# Patient Record
Sex: FEMALE | Race: WHITE | NOT HISPANIC OR LATINO | Employment: OTHER | URBAN - METROPOLITAN AREA
[De-identification: names, ages, dates, MRNs, and addresses within clinical notes are randomized per-mention and may not be internally consistent; named-entity substitution may affect disease eponyms.]

---

## 2017-02-21 ENCOUNTER — HISTORICAL (OUTPATIENT)
Dept: PREADMISSION TESTING | Facility: HOSPITAL | Age: 52
End: 2017-02-21

## 2017-02-23 ENCOUNTER — HISTORICAL (OUTPATIENT)
Dept: SURGERY | Facility: HOSPITAL | Age: 52
End: 2017-02-23

## 2017-09-20 ENCOUNTER — HISTORICAL (OUTPATIENT)
Dept: ADMINISTRATIVE | Facility: HOSPITAL | Age: 52
End: 2017-09-20

## 2018-07-18 ENCOUNTER — HISTORICAL (OUTPATIENT)
Dept: ADMINISTRATIVE | Facility: HOSPITAL | Age: 53
End: 2018-07-18

## 2018-09-26 ENCOUNTER — HISTORICAL (OUTPATIENT)
Dept: ADMINISTRATIVE | Facility: HOSPITAL | Age: 53
End: 2018-09-26

## 2019-04-30 ENCOUNTER — HISTORICAL (OUTPATIENT)
Dept: ADMINISTRATIVE | Facility: HOSPITAL | Age: 54
End: 2019-04-30

## 2019-10-01 ENCOUNTER — HISTORICAL (OUTPATIENT)
Dept: ADMINISTRATIVE | Facility: HOSPITAL | Age: 54
End: 2019-10-01

## 2020-07-07 ENCOUNTER — HISTORICAL (OUTPATIENT)
Dept: ADMINISTRATIVE | Facility: HOSPITAL | Age: 55
End: 2020-07-07

## 2020-10-06 ENCOUNTER — HISTORICAL (OUTPATIENT)
Dept: LAB | Facility: HOSPITAL | Age: 55
End: 2020-10-06

## 2020-10-06 ENCOUNTER — HISTORICAL (OUTPATIENT)
Dept: ADMINISTRATIVE | Facility: HOSPITAL | Age: 55
End: 2020-10-06

## 2020-10-06 LAB
ABS NEUT (OLG): 3.47 X10(3)/MCL (ref 2.1–9.2)
BASOPHILS # BLD AUTO: 0.08 X10(3)/MCL (ref 0–0.2)
BASOPHILS NFR BLD AUTO: 1.2 % (ref 0–1)
CRP SERPL HS-MCNC: 2.44 MG/L (ref 0–5)
EOSINOPHIL # BLD AUTO: 0.3 X10(3)/MCL (ref 0–0.9)
EOSINOPHIL NFR BLD AUTO: 4.5 % (ref 0–6.4)
ERYTHROCYTE [DISTWIDTH] IN BLOOD BY AUTOMATED COUNT: 12.9 % (ref 11.5–17)
ERYTHROCYTE [SEDIMENTATION RATE] IN BLOOD: 5 MM/HR (ref 0–30)
HCT VFR BLD AUTO: 43.5 % (ref 37–47)
HGB BLD-MCNC: 14.4 GM/DL (ref 12–16)
IMM GRANULOCYTES # BLD AUTO: 0.02 10*3/UL (ref 0–0.02)
IMM GRANULOCYTES NFR BLD AUTO: 0.3 % (ref 0–0.43)
LYMPHOCYTES # BLD AUTO: 2.17 X10(3)/MCL (ref 0.6–4.6)
LYMPHOCYTES NFR BLD AUTO: 32.9 % (ref 16–44)
MCH RBC QN AUTO: 30.3 PG (ref 27–31)
MCHC RBC AUTO-ENTMCNC: 33.1 GM/DL (ref 33–36)
MCV RBC AUTO: 91.4 FL (ref 80–94)
MONOCYTES # BLD AUTO: 0.56 X10(3)/MCL (ref 0.1–1.3)
MONOCYTES NFR BLD AUTO: 8.5 % (ref 4–12.1)
NEUTROPHILS # BLD AUTO: 3.47 X10(3)/MCL (ref 2.1–9.2)
NEUTROPHILS NFR BLD AUTO: 52.6 % (ref 43–73)
NRBC BLD AUTO-RTO: 0 % (ref 0–0.2)
PLATELET # BLD AUTO: 333 X10(3)/MCL (ref 130–400)
PMV BLD AUTO: 8.6 FL (ref 7.4–10.4)
RBC # BLD AUTO: 4.76 X10(6)/MCL (ref 4.2–5.4)
WBC # SPEC AUTO: 6.6 X10(3)/MCL (ref 4.5–11.5)

## 2020-11-05 ENCOUNTER — HISTORICAL (OUTPATIENT)
Dept: ADMINISTRATIVE | Facility: HOSPITAL | Age: 55
End: 2020-11-05

## 2020-12-03 ENCOUNTER — HISTORICAL (OUTPATIENT)
Dept: ADMINISTRATIVE | Facility: HOSPITAL | Age: 55
End: 2020-12-03

## 2021-01-14 ENCOUNTER — HISTORICAL (OUTPATIENT)
Dept: ADMINISTRATIVE | Facility: HOSPITAL | Age: 56
End: 2021-01-14

## 2021-02-11 ENCOUNTER — HISTORICAL (OUTPATIENT)
Dept: ADMINISTRATIVE | Facility: HOSPITAL | Age: 56
End: 2021-02-11

## 2021-03-11 LAB
HUMAN PAPILLOMAVIRUS (HPV): NORMAL
PAP RECOMMENDATION EXT: NORMAL

## 2021-06-29 ENCOUNTER — HISTORICAL (OUTPATIENT)
Dept: ADMINISTRATIVE | Facility: HOSPITAL | Age: 56
End: 2021-06-29

## 2021-12-21 ENCOUNTER — HISTORICAL (OUTPATIENT)
Dept: ADMINISTRATIVE | Facility: HOSPITAL | Age: 56
End: 2021-12-21

## 2022-04-08 ENCOUNTER — HISTORICAL (OUTPATIENT)
Dept: ADMINISTRATIVE | Facility: HOSPITAL | Age: 57
End: 2022-04-08

## 2022-04-08 ENCOUNTER — HISTORICAL (OUTPATIENT)
Dept: RADIOLOGY | Facility: HOSPITAL | Age: 57
End: 2022-04-08

## 2022-04-11 ENCOUNTER — HISTORICAL (OUTPATIENT)
Dept: ADMINISTRATIVE | Facility: HOSPITAL | Age: 57
End: 2022-04-11
Payer: MEDICARE

## 2022-04-29 VITALS
WEIGHT: 156.75 LBS | SYSTOLIC BLOOD PRESSURE: 122 MMHG | DIASTOLIC BLOOD PRESSURE: 83 MMHG | HEIGHT: 62 IN | BODY MASS INDEX: 28.84 KG/M2

## 2022-07-06 ENCOUNTER — HOSPITAL ENCOUNTER (INPATIENT)
Facility: HOSPITAL | Age: 57
LOS: 6 days | Discharge: HOME | DRG: 069 | End: 2022-07-12
Attending: EMERGENCY MEDICINE | Admitting: INTERNAL MEDICINE
Payer: MEDICARE

## 2022-07-06 DIAGNOSIS — R20.2 PARESTHESIA: ICD-10-CM

## 2022-07-06 DIAGNOSIS — R47.81 SLURRED SPEECH: Primary | ICD-10-CM

## 2022-07-06 DIAGNOSIS — I63.9 STROKE: ICD-10-CM

## 2022-07-06 DIAGNOSIS — R53.1 WEAKNESS: ICD-10-CM

## 2022-07-06 DIAGNOSIS — G45.9 TIA (TRANSIENT ISCHEMIC ATTACK): ICD-10-CM

## 2022-07-06 DIAGNOSIS — R27.8 DYSMETRIA: ICD-10-CM

## 2022-07-06 PROBLEM — R47.1 DYSARTHRIA: Status: ACTIVE | Noted: 2022-07-06

## 2022-07-06 PROBLEM — E11.9 TYPE II DIABETES MELLITUS: Status: ACTIVE | Noted: 2022-07-06

## 2022-07-06 LAB
ALBUMIN SERPL-MCNC: 4.3 GM/DL (ref 3.5–5)
ALBUMIN/GLOB SERPL: 1.7 RATIO (ref 1.1–2)
ALP SERPL-CCNC: 77 UNIT/L (ref 40–150)
ALT SERPL-CCNC: 21 UNIT/L (ref 0–55)
AMPHET UR QL SCN: NEGATIVE
APPEARANCE UR: ABNORMAL
AST SERPL-CCNC: 18 UNIT/L (ref 5–34)
BARBITURATE SCN PRESENT UR: NEGATIVE
BASOPHILS # BLD AUTO: 0.05 X10(3)/MCL (ref 0–0.2)
BASOPHILS NFR BLD AUTO: 0.8 %
BENZODIAZ UR QL SCN: NEGATIVE
BILIRUB UR QL STRIP.AUTO: NEGATIVE MG/DL
BILIRUBIN DIRECT+TOT PNL SERPL-MCNC: 0.4 MG/DL
BUN SERPL-MCNC: 14.1 MG/DL (ref 9.8–20.1)
CALCIUM SERPL-MCNC: 8.9 MG/DL (ref 8.4–10.2)
CANNABINOIDS UR QL SCN: NEGATIVE
CARBAMAZEPINE FREE SERPL-MCNC: 10.8 UG/ML (ref 4–12)
CHLORIDE SERPL-SCNC: 107 MMOL/L (ref 98–107)
CHOLEST SERPL-MCNC: 154 MG/DL
CHOLEST/HDLC SERPL: 4 {RATIO} (ref 0–5)
CO2 SERPL-SCNC: 21 MMOL/L (ref 22–29)
COCAINE UR QL SCN: NEGATIVE
COLOR UR AUTO: YELLOW
CREAT SERPL-MCNC: 0.89 MG/DL (ref 0.55–1.02)
EOSINOPHIL # BLD AUTO: 0.3 X10(3)/MCL (ref 0–0.9)
EOSINOPHIL NFR BLD AUTO: 4.8 %
ERYTHROCYTE [DISTWIDTH] IN BLOOD BY AUTOMATED COUNT: 12.9 % (ref 11.5–17)
EST. AVERAGE GLUCOSE BLD GHB EST-MCNC: 139.9 MG/DL
ETHANOL SERPL-MCNC: <10 MG/DL
FLUAV AG UPPER RESP QL IA.RAPID: NOT DETECTED
FLUBV AG UPPER RESP QL IA.RAPID: NOT DETECTED
GLOBULIN SER-MCNC: 2.5 GM/DL (ref 2.4–3.5)
GLUCOSE SERPL-MCNC: 95 MG/DL (ref 74–100)
GLUCOSE UR QL STRIP.AUTO: NEGATIVE MG/DL
HBA1C MFR BLD: 6.5 %
HCT VFR BLD AUTO: 37.4 % (ref 37–47)
HDLC SERPL-MCNC: 36 MG/DL (ref 35–60)
HGB BLD-MCNC: 13 GM/DL (ref 12–16)
IMM GRANULOCYTES # BLD AUTO: 0.02 X10(3)/MCL (ref 0–0.04)
IMM GRANULOCYTES NFR BLD AUTO: 0.3 %
INR BLD: 1 (ref 2–3)
KETONES UR QL STRIP.AUTO: NEGATIVE MG/DL
LDLC SERPL CALC-MCNC: 67 MG/DL (ref 50–140)
LEUKOCYTE ESTERASE UR QL STRIP.AUTO: NEGATIVE UNIT/L
LYMPHOCYTES # BLD AUTO: 2.76 X10(3)/MCL (ref 0.6–4.6)
LYMPHOCYTES NFR BLD AUTO: 44.4 %
MCH RBC QN AUTO: 30.1 PG (ref 27–31)
MCHC RBC AUTO-ENTMCNC: 34.8 MG/DL (ref 33–36)
MCV RBC AUTO: 86.6 FL (ref 80–94)
MDMA UR QL SCN: NEGATIVE
MONOCYTES # BLD AUTO: 0.53 X10(3)/MCL (ref 0.1–1.3)
MONOCYTES NFR BLD AUTO: 8.5 %
NEUTROPHILS # BLD AUTO: 2.6 X10(3)/MCL (ref 2.1–9.2)
NEUTROPHILS NFR BLD AUTO: 41.2 %
NITRITE UR QL STRIP.AUTO: NEGATIVE
NRBC BLD AUTO-RTO: 0 %
OPIATES UR QL SCN: POSITIVE
PCP UR QL: NEGATIVE
PH UR STRIP.AUTO: 7 [PH]
PH UR: 7 [PH] (ref 3–11)
PLATELET # BLD AUTO: 282 X10(3)/MCL (ref 130–400)
PMV BLD AUTO: 8.7 FL (ref 7.4–10.4)
POTASSIUM SERPL-SCNC: 3.6 MMOL/L (ref 3.5–5.1)
PROT SERPL-MCNC: 6.8 GM/DL (ref 6.4–8.3)
PROT UR QL STRIP.AUTO: NEGATIVE MG/DL
PROTHROMBIN TIME: 13 SECONDS (ref 11.7–14.5)
RBC # BLD AUTO: 4.32 X10(6)/MCL (ref 4.2–5.4)
RBC UR QL AUTO: NEGATIVE UNIT/L
SARS-COV-2 RNA RESP QL NAA+PROBE: NOT DETECTED
SODIUM SERPL-SCNC: 141 MMOL/L (ref 136–145)
SP GR UR STRIP.AUTO: 1.01
SPECIFIC GRAVITY, URINE AUTO (.000) (OHS): 1.01 (ref 1–1.03)
TRIGL SERPL-MCNC: 257 MG/DL (ref 37–140)
TSH SERPL-ACNC: 1.04 UIU/ML (ref 0.35–4.94)
UROBILINOGEN UR STRIP-ACNC: 1 MG/DL
VLDLC SERPL CALC-MCNC: 51 MG/DL
WBC # SPEC AUTO: 6.2 X10(3)/MCL (ref 4.5–11.5)

## 2022-07-06 PROCEDURE — 85025 COMPLETE CBC W/AUTO DIFF WBC: CPT | Performed by: EMERGENCY MEDICINE

## 2022-07-06 PROCEDURE — 80053 COMPREHEN METABOLIC PANEL: CPT | Performed by: EMERGENCY MEDICINE

## 2022-07-06 PROCEDURE — 99285 EMERGENCY DEPT VISIT HI MDM: CPT | Mod: 25

## 2022-07-06 PROCEDURE — 82077 ASSAY SPEC XCP UR&BREATH IA: CPT | Performed by: EMERGENCY MEDICINE

## 2022-07-06 PROCEDURE — 85610 PROTHROMBIN TIME: CPT | Performed by: EMERGENCY MEDICINE

## 2022-07-06 PROCEDURE — 87636 SARSCOV2 & INF A&B AMP PRB: CPT | Performed by: FAMILY MEDICINE

## 2022-07-06 PROCEDURE — 93010 ELECTROCARDIOGRAM REPORT: CPT | Mod: ,,, | Performed by: INTERNAL MEDICINE

## 2022-07-06 PROCEDURE — 25000003 PHARM REV CODE 250: Performed by: FAMILY MEDICINE

## 2022-07-06 PROCEDURE — 93010 EKG 12-LEAD: ICD-10-PCS | Mod: ,,, | Performed by: INTERNAL MEDICINE

## 2022-07-06 PROCEDURE — 96374 THER/PROPH/DIAG INJ IV PUSH: CPT

## 2022-07-06 PROCEDURE — 80061 LIPID PANEL: CPT | Performed by: EMERGENCY MEDICINE

## 2022-07-06 PROCEDURE — 25500020 PHARM REV CODE 255: Performed by: EMERGENCY MEDICINE

## 2022-07-06 PROCEDURE — 11000001 HC ACUTE MED/SURG PRIVATE ROOM

## 2022-07-06 PROCEDURE — 93005 ELECTROCARDIOGRAM TRACING: CPT

## 2022-07-06 PROCEDURE — 84443 ASSAY THYROID STIM HORMONE: CPT | Performed by: EMERGENCY MEDICINE

## 2022-07-06 PROCEDURE — 80161 ASY CARBAMAZEPIN 10,11-EPXID: CPT | Performed by: EMERGENCY MEDICINE

## 2022-07-06 PROCEDURE — 83036 HEMOGLOBIN GLYCOSYLATED A1C: CPT | Performed by: INTERNAL MEDICINE

## 2022-07-06 PROCEDURE — 80307 DRUG TEST PRSMV CHEM ANLYZR: CPT | Performed by: EMERGENCY MEDICINE

## 2022-07-06 PROCEDURE — 82962 GLUCOSE BLOOD TEST: CPT | Mod: 59

## 2022-07-06 PROCEDURE — 81003 URINALYSIS AUTO W/O SCOPE: CPT | Mod: 59 | Performed by: FAMILY MEDICINE

## 2022-07-06 PROCEDURE — 36415 COLL VENOUS BLD VENIPUNCTURE: CPT | Performed by: EMERGENCY MEDICINE

## 2022-07-06 RX ORDER — TALC
6 POWDER (GRAM) TOPICAL NIGHTLY PRN
Status: DISCONTINUED | OUTPATIENT
Start: 2022-07-06 | End: 2022-07-12 | Stop reason: HOSPADM

## 2022-07-06 RX ORDER — IBUPROFEN 200 MG
16 TABLET ORAL
Status: DISCONTINUED | OUTPATIENT
Start: 2022-07-06 | End: 2022-07-12 | Stop reason: HOSPADM

## 2022-07-06 RX ORDER — CYCLOBENZAPRINE HCL 10 MG
10 TABLET ORAL
Status: COMPLETED | OUTPATIENT
Start: 2022-07-06 | End: 2022-07-06

## 2022-07-06 RX ORDER — ONDANSETRON 2 MG/ML
4 INJECTION INTRAMUSCULAR; INTRAVENOUS EVERY 8 HOURS PRN
Status: DISCONTINUED | OUTPATIENT
Start: 2022-07-06 | End: 2022-07-06

## 2022-07-06 RX ORDER — LABETALOL HCL 20 MG/4 ML
10 SYRINGE (ML) INTRAVENOUS EVERY 6 HOURS PRN
Status: DISCONTINUED | OUTPATIENT
Start: 2022-07-06 | End: 2022-07-12 | Stop reason: HOSPADM

## 2022-07-06 RX ORDER — IBUPROFEN 200 MG
24 TABLET ORAL
Status: DISCONTINUED | OUTPATIENT
Start: 2022-07-06 | End: 2022-07-12 | Stop reason: HOSPADM

## 2022-07-06 RX ORDER — ACETAMINOPHEN 325 MG/1
650 TABLET ORAL EVERY 6 HOURS PRN
Status: DISCONTINUED | OUTPATIENT
Start: 2022-07-06 | End: 2022-07-12 | Stop reason: HOSPADM

## 2022-07-06 RX ORDER — ONDANSETRON 2 MG/ML
4 INJECTION INTRAMUSCULAR; INTRAVENOUS EVERY 6 HOURS PRN
Status: DISCONTINUED | OUTPATIENT
Start: 2022-07-06 | End: 2022-07-12 | Stop reason: HOSPADM

## 2022-07-06 RX ORDER — ATORVASTATIN CALCIUM 40 MG/1
40 TABLET, FILM COATED ORAL DAILY
Status: DISCONTINUED | OUTPATIENT
Start: 2022-07-07 | End: 2022-07-12 | Stop reason: HOSPADM

## 2022-07-06 RX ORDER — ASPIRIN 81 MG/1
81 TABLET ORAL DAILY
Status: DISCONTINUED | OUTPATIENT
Start: 2022-07-07 | End: 2022-07-12 | Stop reason: HOSPADM

## 2022-07-06 RX ORDER — SODIUM CHLORIDE 0.9 % (FLUSH) 0.9 %
10 SYRINGE (ML) INJECTION
Status: DISCONTINUED | OUTPATIENT
Start: 2022-07-06 | End: 2022-07-12 | Stop reason: HOSPADM

## 2022-07-06 RX ORDER — INSULIN ASPART 100 [IU]/ML
1-10 INJECTION, SOLUTION INTRAVENOUS; SUBCUTANEOUS
Status: DISCONTINUED | OUTPATIENT
Start: 2022-07-06 | End: 2022-07-12 | Stop reason: HOSPADM

## 2022-07-06 RX ORDER — GLUCAGON 1 MG
1 KIT INJECTION
Status: DISCONTINUED | OUTPATIENT
Start: 2022-07-06 | End: 2022-07-12 | Stop reason: HOSPADM

## 2022-07-06 RX ORDER — ENOXAPARIN SODIUM 100 MG/ML
40 INJECTION SUBCUTANEOUS EVERY 24 HOURS
Status: DISCONTINUED | OUTPATIENT
Start: 2022-07-06 | End: 2022-07-12 | Stop reason: HOSPADM

## 2022-07-06 RX ADMIN — IOPAMIDOL 100 ML: 755 INJECTION, SOLUTION INTRAVENOUS at 06:07

## 2022-07-06 RX ADMIN — CYCLOBENZAPRINE 10 MG: 10 TABLET, FILM COATED ORAL at 08:07

## 2022-07-06 NOTE — ED PROVIDER NOTES
Encounter Date: 7/6/2022       History     Chief Complaint   Patient presents with    Hyperglycemia     Patient presents with feeling unsteady and some slurred speech. Patient thinks she woke up like this. She had a friend come over and thought she looked very unsteady on her feet. Checked her glucose and told the patient is was in the 400s. They called her doctor and told her to come in here. Patient thinks she may have some tingling in the left forefinger but no where else. She does feel like her speech may be slurred. She's recently decreased her klonopin but otherwise has not had any adjustments to her meds. She lives in an appartment. She does have a headache but denies any falls that hit her head. She has some chronic weakness on the R side that lets that leg go out on her but none today that lead to a fall. No fevers, chills, or vision changes. No chest pain or shortness of breath. No vertigo like sensation.     Patient has an extensive medication list.  Albuterol, denadryk, benztropine, buproprion, calcium, carbamazepine, chlorzoaxone, lobetasol, clonazepam cymbalta, dexilant, docusate, estradiol, szopiclone, ezetimibe, famotines, feuroseminde, gabapentin, norco, invega, loratidine, meclinizine, metformin, methlyprednisolone, iovatik, prometazine, statin, isngular,         Review of patient's allergies indicates:   Allergen Reactions    Adhesive      Other reaction(s): skin tears easily with adhesive    Cephalexin Nausea And Vomiting    Erythromycin Nausea And Vomiting    Lithium Nausea And Vomiting    Naproxen Nausea And Vomiting     No past medical history on file.  No past surgical history on file.  No family history on file.     Review of Systems   All other systems reviewed and are negative.      Physical Exam     Initial Vitals [07/06/22 1402]   BP Pulse Resp Temp SpO2   (!) 135/93 93 20 98.2 °F (36.8 °C) 96 %      MAP       --         Physical Exam    Nursing note and vitals  reviewed.  Constitutional: She appears well-developed and well-nourished. No distress.   HENT:   Head: Normocephalic and atraumatic.   Eyes: Conjunctivae are normal.   Pupils are equal, mildly dilated and sluggishly reactive.    Neck: Neck supple.   Cardiovascular: Normal rate, regular rhythm and normal heart sounds.   No murmur heard.  Pulmonary/Chest: Breath sounds normal. No respiratory distress. She has no wheezes. She has no rhonchi.   Abdominal: Abdomen is soft. Bowel sounds are normal. She exhibits no distension. There is no abdominal tenderness. There is no rebound and no guarding.   Musculoskeletal:         General: No edema. Normal range of motion.      Cervical back: Neck supple.     Neurological: She is alert and oriented to person, place, and time. She has normal strength. A sensory deficit (subjective mild sensation changes in the left arm) is present. No cranial nerve deficit. Coordination (dysmetria but equal bilaterally) abnormal. GCS eye subscore is 4. GCS verbal subscore is 5. GCS motor subscore is 6.   Strength equal in extremities. No sensation changes in legs. No nystagmus. EOEM intact. No visual field deficits.Some slurred speech but no facial assymetry   Skin: Skin is warm and dry.   Psychiatric: She has a normal mood and affect. Thought content normal.         ED Course   Procedures  Labs Reviewed   COMPREHENSIVE METABOLIC PANEL - Abnormal; Notable for the following components:       Result Value    Carbon Dioxide 21 (*)     All other components within normal limits   PROTIME-INR - Abnormal; Notable for the following components:    INR 1.00 (*)     All other components within normal limits   LIPID PANEL - Abnormal; Notable for the following components:    Triglyceride 257 (*)     All other components within normal limits   DRUG SCREEN, URINE (BEAKER) - Abnormal; Notable for the following components:    Opiates, Urine Positive (*)     All other components within normal limits    Narrative:      Cut off concentrations:    Amphetamines - 1000 ng/ml  Barbiturates - 200 ng/ml  Benzodiazepine - 200 ng/ml  Cannabinoids (THC) - 50 ng/ml  Cocaine - 300 ng/ml  Fentanyl - 1.0 ng/ml  MDMA - 500 ng/ml  Opiates - 300 ng/ml   Phencyclidine (PCP) - 25 ng/ml    Specimen submitted for drug analysis and tested for pH and specific gravity in order to evaluate sample integrity. Suspect tampering if specific gravity is <1.003 and/or pH is not within the range of 4.5 - 8.0  False negatives may result form substances such as bleach added to urine.  False positives may result for the presence of a substance with similar chemical structure to the drug or its metabolite.    This test provides only a PRELIMINARY analytical test result. A more specific alternate chemical method must be used in order to obtain a confirmed analytical result. Gas chromatography/mass spectrometry (GC/MS) is the preferred confirmatory method. Other chemical confirmation methods are available. Clinical consideration and professional judgement should be applied to any drug of abuse test result, particularly when preliminary positive results are used.    Positive results will be confirmed only at the physicians request. Unconfirmed screening results are to be used only for medical purposes (treatment).        URINALYSIS - Abnormal; Notable for the following components:    Appearance, UA Slightly Cloudy (*)     All other components within normal limits   TSH - Normal   CARBAMAZEPINE LEVEL, TOTAL - Normal   ALCOHOL,MEDICAL (ETHANOL) - Normal   COVID/FLU A&B PCR - Normal   CBC W/ AUTO DIFFERENTIAL    Narrative:     The following orders were created for panel order CBC W/ AUTO DIFFERENTIAL.  Procedure                               Abnormality         Status                     ---------                               -----------         ------                     CBC with Differential[[id number]]                            Final result                 Please  view results for these tests on the individual orders.   CBC WITH DIFFERENTIAL   POCT GLUCOSE, HAND-HELD DEVICE   POCT PROTIME-INR     EKG Readings: (Independently Interpreted)   EKG Interpretation 1420 PM    Rate: 87  Rhythm: NSR  QRS: WNL  Qtc: WNL  No signs of ischemia         ECG Results          ECG 12 lead (Final result)  Result time 07/06/22 15:33:50    Final result by Interface, Lab In [facility] (07/06/22 15:33:50)                 Narrative:    Test Reason : I63.9,    Vent. Rate : 087 BPM     Atrial Rate : 087 BPM     P-R Int : 154 ms          QRS Dur : 090 ms      QT Int : 390 ms       P-R-T Axes : 043 030 036 degrees     QTc Int : 469 ms    Normal sinus rhythm  Normal ECG  Confirmed by Akil Sosa MD ([id number]) on 7/6/2022 3:33:44 PM    Referred By: [email or web address]   SELF           Confirmed By:Akil Sosa MD                            Imaging Results          CTA Head and Neck (xpd) (Final result)  Result time 07/06/22 18:45:05    Final result by Ceilne Mustafa MD (07/06/22 18:45:05)                 Impression:      No large vessel occlusions, critical stenoses, vascular malformations or aneurysms.      Electronically signed by: Celine Mustafa  Date:    07/06/2022  Time:    18:45             Narrative:    EXAMINATION:  CTA HEAD AND NECK (XPD)    CLINICAL HISTORY:  Stroke/TIA, determine embolic source;    TECHNIQUE:  Axial images obtained through the head and neck before and after the administration of intravenous contrast. Coronal, sagittal, MIP and 3D reconstructions were obtained from the axial data.    Automatic exposure control was utilized to limit radiation dose.    Radiation Dose:    Total DLP: 1094 mGy*cm    COMPARISON:  Head CT earlier the same day    FINDINGS:  Head CT with contrast:    No interval changes when compared to the previous CT.    No enhancing abnormalities.    If present, stenosis of the carotid bulbs is measured based on NASCET criteria, i.e. area of maximal stenosis compared to  the cervical ICA distal to the bulb.    Cervical CTA:    Aortic arch: Patent.  Minimal calcified plaque.  Otherwise no abnormalities.    Common Carotid arteries: Patent, no abnormalities.    Carotid Bulbs: Patent, no abnormalities.    Internal Carotid arteries: Patent, no abnormalities.    Vertebral arteries: Patent, no abnormalities.    Intracranial CTA:    Carotid arteries:    Minimal calcified plaque at the siphons without significant narrowing.    Left A1 segment is hypoplastic.    Otherwise normal A1 and M1 segments.    Vertebrobasilar Circulation:    Vertebral arteries: Patent, no abnormalities.    Basilar artery: Patent, no abnormalities.    Posterior cerebral arteries: Patent, no abnormalities.    Dural venous sinuses: Patent.    Normal Variants:    ACom: Patent.    PComs: Patent, diminutive bilaterally.    Vertebral arteries: Co-dominant.    Additional findings:    No cervical lymphadenopathy.    No acute bony pathology.    Atelectatic changes, otherwise lung apices clear.                               CT Head Without Contrast (Final result)  Result time 07/06/22 16:15:32    Final result by Celine Mustafa MD (07/06/22 16:15:32)                 Impression:      No acute intracranial abnormalities.    Chronic left sphenoid sinusitis.    No significant interval change from 02/16/2016.      Electronically signed by: Celine Mustafa  Date:    07/06/2022  Time:    16:15             Narrative:    EXAMINATION:  CT HEAD WITHOUT CONTRAST    CLINICAL HISTORY:  Neuro deficit, acute, stroke suspected;    TECHNIQUE:  Axial scans were obtained from skull base to the vertex.    Coronal and sagittal reconstructions obtained from the axial data.    Automatic exposure control was utilized to limit radiation dose.    Contrast: None    Radiation Dose:    Total DLP: 841 mGy*cm    COMPARISON:  Head CT 02/16/2016    FINDINGS:  Scalp/Skull:    No acute abnormalities.    Hyperostosis frontalis interna.    Retromastoid  osteoma.    Brain sulci: Appropriate for patient's age.    Ventricles: Normal in size and configuration. No hydrocephalus.    Extra-axial spaces:    No masses or fluid collections.    Parenchyma:    Mild chronic microangiopathy in the supratentorial white matter.    No mass, hemorrhage or CT evidence of an acute vascular insult.    Dural sinuses: No abnormal densities.    Sellar/Suprasellar region: No abnormalities.    Skull base and Craniocervical junction: No abnormalities.    Incidental findings:    Carotid siphon atherosclerotic vascular calcifications.    Near complete left sphenoid sinus opacification with wall osteitis.                                 Medications   iopamidoL (ISOVUE-370) injection 100 mL (100 mLs Intravenous Given 7/6/22 1824)   cyclobenzaprine tablet 10 mg (10 mg Oral Given 7/6/22 2028)     Medical Decision Making:   ED Management:  Entire workup was completed by previous ED physician Dr. Raman.  I was asked to speak with the hospitalist for her admission.             ED Course as of 07/06/22 2146 Wed Jul 06, 2022   1500 Glu here 95 [GM]   1504 Trying to get up to date list of medications [GM]   1853 CT plan to admit [GM]   1950 Shirin NP- Ok to admit. [AG]      ED Course User Index  [AG] Carlitos Marcelo MD  [GM] Deepika Raman MD             Clinical Impression:   Final diagnoses:  [I63.9] Stroke  [R53.1] Weakness  [R47.81] Slurred speech (Primary)  [R27.8] Dysmetria  [R20.2] Paresthesia          ED Disposition Condition    Admit               Carlitos Marcelo MD  07/06/22 2146

## 2022-07-06 NOTE — ED TRIAGE NOTES
at 1230 today. Rechecked in 15 minutes and CBG was 160. Reports clumsiness and lethargy today. Worker thinks she is slurring her speech and dropping things more today.

## 2022-07-07 LAB
ALBUMIN SERPL-MCNC: 4.1 GM/DL (ref 3.5–5)
ALBUMIN SERPL-MCNC: 4.2 GM/DL (ref 3.5–5)
ALBUMIN/GLOB SERPL: 1.6 RATIO (ref 1.1–2)
ALBUMIN/GLOB SERPL: 1.6 RATIO (ref 1.1–2)
ALP SERPL-CCNC: 74 UNIT/L (ref 40–150)
ALP SERPL-CCNC: 80 UNIT/L (ref 40–150)
ALT SERPL-CCNC: 19 UNIT/L (ref 0–55)
ALT SERPL-CCNC: 19 UNIT/L (ref 0–55)
APTT PPP: 28.2 SECONDS (ref 23.4–33.9)
AST SERPL-CCNC: 17 UNIT/L (ref 5–34)
AST SERPL-CCNC: 18 UNIT/L (ref 5–34)
BASOPHILS # BLD AUTO: 0.04 X10(3)/MCL (ref 0–0.2)
BASOPHILS # BLD AUTO: 0.04 X10(3)/MCL (ref 0–0.2)
BASOPHILS NFR BLD AUTO: 0.9 %
BASOPHILS NFR BLD AUTO: 0.9 %
BILIRUBIN DIRECT+TOT PNL SERPL-MCNC: 0.4 MG/DL
BILIRUBIN DIRECT+TOT PNL SERPL-MCNC: 0.4 MG/DL
BUN SERPL-MCNC: 10.7 MG/DL (ref 9.8–20.1)
BUN SERPL-MCNC: 12.1 MG/DL (ref 9.8–20.1)
CALCIUM SERPL-MCNC: 9.1 MG/DL (ref 8.4–10.2)
CALCIUM SERPL-MCNC: 9.6 MG/DL (ref 8.4–10.2)
CHLORIDE SERPL-SCNC: 108 MMOL/L (ref 98–107)
CHLORIDE SERPL-SCNC: 110 MMOL/L (ref 98–107)
CK MB SERPL-MCNC: 0.9 NG/ML
CO2 SERPL-SCNC: 21 MMOL/L (ref 22–29)
CO2 SERPL-SCNC: 24 MMOL/L (ref 22–29)
CREAT SERPL-MCNC: 0.79 MG/DL (ref 0.55–1.02)
CREAT SERPL-MCNC: 0.81 MG/DL (ref 0.55–1.02)
EOSINOPHIL # BLD AUTO: 0.19 X10(3)/MCL (ref 0–0.9)
EOSINOPHIL # BLD AUTO: 0.24 X10(3)/MCL (ref 0–0.9)
EOSINOPHIL NFR BLD AUTO: 4.2 %
EOSINOPHIL NFR BLD AUTO: 5.4 %
ERYTHROCYTE [DISTWIDTH] IN BLOOD BY AUTOMATED COUNT: 12.7 % (ref 11.5–17)
ERYTHROCYTE [DISTWIDTH] IN BLOOD BY AUTOMATED COUNT: 12.8 % (ref 11.5–17)
GLOBULIN SER-MCNC: 2.6 GM/DL (ref 2.4–3.5)
GLOBULIN SER-MCNC: 2.7 GM/DL (ref 2.4–3.5)
GLUCOSE SERPL-MCNC: 166 MG/DL (ref 74–100)
GLUCOSE SERPL-MCNC: 190 MG/DL (ref 74–100)
HCT VFR BLD AUTO: 39.4 % (ref 37–47)
HCT VFR BLD AUTO: 39.8 % (ref 37–47)
HGB BLD-MCNC: 13.6 GM/DL (ref 12–16)
HGB BLD-MCNC: 13.6 GM/DL (ref 12–16)
IMM GRANULOCYTES # BLD AUTO: 0.01 X10(3)/MCL (ref 0–0.04)
IMM GRANULOCYTES # BLD AUTO: 0.02 X10(3)/MCL (ref 0–0.04)
IMM GRANULOCYTES NFR BLD AUTO: 0.2 %
IMM GRANULOCYTES NFR BLD AUTO: 0.4 %
INR BLD: 1.02 (ref 2–3)
LYMPHOCYTES # BLD AUTO: 1.41 X10(3)/MCL (ref 0.6–4.6)
LYMPHOCYTES # BLD AUTO: 1.64 X10(3)/MCL (ref 0.6–4.6)
LYMPHOCYTES NFR BLD AUTO: 31.5 %
LYMPHOCYTES NFR BLD AUTO: 36.6 %
MAGNESIUM SERPL-MCNC: 2.1 MG/DL (ref 1.6–2.6)
MCH RBC QN AUTO: 30.5 PG (ref 27–31)
MCH RBC QN AUTO: 30.9 PG (ref 27–31)
MCHC RBC AUTO-ENTMCNC: 34.2 MG/DL (ref 33–36)
MCHC RBC AUTO-ENTMCNC: 34.5 MG/DL (ref 33–36)
MCV RBC AUTO: 89.2 FL (ref 80–94)
MCV RBC AUTO: 89.5 FL (ref 80–94)
MONOCYTES # BLD AUTO: 0.29 X10(3)/MCL (ref 0.1–1.3)
MONOCYTES # BLD AUTO: 0.4 X10(3)/MCL (ref 0.1–1.3)
MONOCYTES NFR BLD AUTO: 6.5 %
MONOCYTES NFR BLD AUTO: 8.9 %
NEUTROPHILS # BLD AUTO: 2.2 X10(3)/MCL (ref 2.1–9.2)
NEUTROPHILS # BLD AUTO: 2.5 X10(3)/MCL (ref 2.1–9.2)
NEUTROPHILS NFR BLD AUTO: 48 %
NEUTROPHILS NFR BLD AUTO: 56.5 %
NRBC BLD AUTO-RTO: 0 %
NRBC BLD AUTO-RTO: 0 %
PHOSPHATE SERPL-MCNC: 4.4 MG/DL (ref 2.3–4.7)
PLATELET # BLD AUTO: 275 X10(3)/MCL (ref 130–400)
PLATELET # BLD AUTO: 280 X10(3)/MCL (ref 130–400)
PMV BLD AUTO: 8.5 FL (ref 7.4–10.4)
PMV BLD AUTO: 8.7 FL (ref 7.4–10.4)
POCT GLUCOSE: 102 MG/DL (ref 70–110)
POCT GLUCOSE: 135 MG/DL (ref 70–110)
POCT GLUCOSE: 149 MG/DL (ref 70–110)
POCT GLUCOSE: 170 MG/DL (ref 70–110)
POTASSIUM SERPL-SCNC: 4 MMOL/L (ref 3.5–5.1)
POTASSIUM SERPL-SCNC: 4.3 MMOL/L (ref 3.5–5.1)
PROT SERPL-MCNC: 6.7 GM/DL (ref 6.4–8.3)
PROT SERPL-MCNC: 6.9 GM/DL (ref 6.4–8.3)
PROTHROMBIN TIME: 13.2 SECONDS (ref 11.7–14.5)
RBC # BLD AUTO: 4.4 X10(6)/MCL (ref 4.2–5.4)
RBC # BLD AUTO: 4.46 X10(6)/MCL (ref 4.2–5.4)
SODIUM SERPL-SCNC: 140 MMOL/L (ref 136–145)
SODIUM SERPL-SCNC: 142 MMOL/L (ref 136–145)
TROPONIN I SERPL-MCNC: 0.01 NG/ML (ref 0–0.04)
WBC # SPEC AUTO: 4.5 X10(3)/MCL (ref 4.5–11.5)
WBC # SPEC AUTO: 4.5 X10(3)/MCL (ref 4.5–11.5)

## 2022-07-07 PROCEDURE — 63600175 PHARM REV CODE 636 W HCPCS: Performed by: INTERNAL MEDICINE

## 2022-07-07 PROCEDURE — 85025 COMPLETE CBC W/AUTO DIFF WBC: CPT | Performed by: INTERNAL MEDICINE

## 2022-07-07 PROCEDURE — 84100 ASSAY OF PHOSPHORUS: CPT | Performed by: INTERNAL MEDICINE

## 2022-07-07 PROCEDURE — 92610 EVALUATE SWALLOWING FUNCTION: CPT

## 2022-07-07 PROCEDURE — 83735 ASSAY OF MAGNESIUM: CPT | Performed by: INTERNAL MEDICINE

## 2022-07-07 PROCEDURE — S4991 NICOTINE PATCH NONLEGEND: HCPCS | Performed by: INTERNAL MEDICINE

## 2022-07-07 PROCEDURE — 36415 COLL VENOUS BLD VENIPUNCTURE: CPT | Performed by: INTERNAL MEDICINE

## 2022-07-07 PROCEDURE — 85610 PROTHROMBIN TIME: CPT | Performed by: INTERNAL MEDICINE

## 2022-07-07 PROCEDURE — 80053 COMPREHEN METABOLIC PANEL: CPT | Performed by: INTERNAL MEDICINE

## 2022-07-07 PROCEDURE — 25000003 PHARM REV CODE 250: Performed by: INTERNAL MEDICINE

## 2022-07-07 PROCEDURE — 25000242 PHARM REV CODE 250 ALT 637 W/ HCPCS: Performed by: INTERNAL MEDICINE

## 2022-07-07 PROCEDURE — 85730 THROMBOPLASTIN TIME PARTIAL: CPT | Performed by: INTERNAL MEDICINE

## 2022-07-07 PROCEDURE — 11000001 HC ACUTE MED/SURG PRIVATE ROOM

## 2022-07-07 PROCEDURE — 82553 CREATINE MB FRACTION: CPT | Performed by: INTERNAL MEDICINE

## 2022-07-07 PROCEDURE — 84484 ASSAY OF TROPONIN QUANT: CPT | Performed by: INTERNAL MEDICINE

## 2022-07-07 RX ORDER — DULOXETIN HYDROCHLORIDE 30 MG/1
60 CAPSULE, DELAYED RELEASE ORAL 2 TIMES DAILY
Status: DISCONTINUED | OUTPATIENT
Start: 2022-07-07 | End: 2022-07-12 | Stop reason: HOSPADM

## 2022-07-07 RX ORDER — FAMOTIDINE 20 MG/1
20 TABLET, FILM COATED ORAL NIGHTLY
COMMUNITY
Start: 2022-06-15

## 2022-07-07 RX ORDER — ICOSAPENT ETHYL 1000 MG/1
2 CAPSULE ORAL 2 TIMES DAILY
COMMUNITY
Start: 2022-06-17

## 2022-07-07 RX ORDER — MECLIZINE HYDROCHLORIDE 25 MG/1
25 TABLET ORAL 3 TIMES DAILY PRN
COMMUNITY
Start: 2022-01-26 | End: 2023-01-26

## 2022-07-07 RX ORDER — MECLIZINE HYDROCHLORIDE 25 MG/1
25 TABLET ORAL 3 TIMES DAILY PRN
Status: DISCONTINUED | OUTPATIENT
Start: 2022-07-07 | End: 2022-07-12 | Stop reason: HOSPADM

## 2022-07-07 RX ORDER — CYCLOSPORINE 0.5 MG/ML
1 EMULSION OPHTHALMIC 2 TIMES DAILY
COMMUNITY
Start: 2022-02-24

## 2022-07-07 RX ORDER — GABAPENTIN 300 MG/1
300 CAPSULE ORAL 3 TIMES DAILY
COMMUNITY
Start: 2022-06-15 | End: 2023-01-26

## 2022-07-07 RX ORDER — FAMOTIDINE 20 MG/1
20 TABLET, FILM COATED ORAL 2 TIMES DAILY
Status: DISCONTINUED | OUTPATIENT
Start: 2022-07-07 | End: 2022-07-12 | Stop reason: HOSPADM

## 2022-07-07 RX ORDER — FLUTICASONE PROPIONATE 50 MCG
1 SPRAY, SUSPENSION (ML) NASAL NIGHTLY
Status: DISCONTINUED | OUTPATIENT
Start: 2022-07-07 | End: 2022-07-12 | Stop reason: HOSPADM

## 2022-07-07 RX ORDER — IBUPROFEN 200 MG
1 TABLET ORAL DAILY
COMMUNITY
Start: 2022-05-06 | End: 2024-03-29

## 2022-07-07 RX ORDER — METFORMIN HYDROCHLORIDE 500 MG/1
500 TABLET ORAL 3 TIMES DAILY
COMMUNITY
Start: 2022-03-30

## 2022-07-07 RX ORDER — MELOXICAM 7.5 MG/1
15 TABLET ORAL DAILY
Status: ON HOLD | COMMUNITY
Start: 2022-01-31 | End: 2022-09-15 | Stop reason: HOSPADM

## 2022-07-07 RX ORDER — TIZANIDINE 4 MG/1
4 TABLET ORAL
COMMUNITY
Start: 2022-04-13 | End: 2022-07-13

## 2022-07-07 RX ORDER — BENZTROPINE MESYLATE 1 MG/1
1 TABLET ORAL
COMMUNITY
Start: 2022-06-15

## 2022-07-07 RX ORDER — BUPROPION HYDROCHLORIDE 300 MG/1
300 TABLET ORAL EVERY MORNING
COMMUNITY
Start: 2022-06-15

## 2022-07-07 RX ORDER — CARBAMAZEPINE 200 MG/1
400 TABLET ORAL 2 TIMES DAILY
Status: DISCONTINUED | OUTPATIENT
Start: 2022-07-07 | End: 2022-07-12 | Stop reason: HOSPADM

## 2022-07-07 RX ORDER — TOPIRAMATE 100 MG/1
200 TABLET, FILM COATED ORAL 2 TIMES DAILY
Status: DISCONTINUED | OUTPATIENT
Start: 2022-07-07 | End: 2022-07-12 | Stop reason: HOSPADM

## 2022-07-07 RX ORDER — AZELASTINE 1 MG/ML
2 SPRAY, METERED NASAL 2 TIMES DAILY
Status: DISCONTINUED | OUTPATIENT
Start: 2022-07-07 | End: 2022-07-12 | Stop reason: HOSPADM

## 2022-07-07 RX ORDER — IBUPROFEN 200 MG
1 TABLET ORAL DAILY
Status: DISCONTINUED | OUTPATIENT
Start: 2022-07-07 | End: 2022-07-12 | Stop reason: HOSPADM

## 2022-07-07 RX ORDER — CLONAZEPAM 0.5 MG/1
0.5 TABLET ORAL 2 TIMES DAILY
COMMUNITY
Start: 2022-06-15

## 2022-07-07 RX ORDER — ATORVASTATIN CALCIUM 40 MG/1
40 TABLET, FILM COATED ORAL DAILY
Status: DISCONTINUED | OUTPATIENT
Start: 2022-07-07 | End: 2022-07-07

## 2022-07-07 RX ORDER — VALACYCLOVIR HYDROCHLORIDE 500 MG/1
500 TABLET, FILM COATED ORAL 2 TIMES DAILY
Status: DISCONTINUED | OUTPATIENT
Start: 2022-07-07 | End: 2022-07-12 | Stop reason: HOSPADM

## 2022-07-07 RX ORDER — HYDROCODONE BITARTRATE AND ACETAMINOPHEN 10; 325 MG/1; MG/1
1 TABLET ORAL EVERY 8 HOURS PRN
COMMUNITY
Start: 2022-06-14 | End: 2022-07-28

## 2022-07-07 RX ORDER — BUPROPION HYDROCHLORIDE 150 MG/1
300 TABLET ORAL EVERY MORNING
Status: DISCONTINUED | OUTPATIENT
Start: 2022-07-07 | End: 2022-07-12 | Stop reason: HOSPADM

## 2022-07-07 RX ORDER — MEDROXYPROGESTERONE ACETATE 10 MG/1
10 TABLET ORAL DAILY
COMMUNITY
Start: 2022-04-05 | End: 2023-01-26

## 2022-07-07 RX ORDER — DULOXETIN HYDROCHLORIDE 60 MG/1
60 CAPSULE, DELAYED RELEASE ORAL 2 TIMES DAILY
COMMUNITY
Start: 2022-06-15

## 2022-07-07 RX ORDER — CLONAZEPAM 0.5 MG/1
0.5 TABLET ORAL 3 TIMES DAILY
Status: DISCONTINUED | OUTPATIENT
Start: 2022-07-07 | End: 2022-07-08

## 2022-07-07 RX ORDER — ROSUVASTATIN CALCIUM 40 MG/1
40 TABLET, COATED ORAL NIGHTLY
COMMUNITY
Start: 2022-06-15 | End: 2022-07-11

## 2022-07-07 RX ORDER — FUROSEMIDE 20 MG/1
20 TABLET ORAL DAILY
Status: DISCONTINUED | OUTPATIENT
Start: 2022-07-07 | End: 2022-07-12 | Stop reason: HOSPADM

## 2022-07-07 RX ORDER — DEXLANSOPRAZOLE 60 MG/1
1 CAPSULE, DELAYED RELEASE ORAL DAILY
COMMUNITY
Start: 2022-06-14

## 2022-07-07 RX ORDER — ESZOPICLONE 2 MG/1
2 TABLET, FILM COATED ORAL NIGHTLY PRN
COMMUNITY
Start: 2022-06-15 | End: 2023-01-26

## 2022-07-07 RX ORDER — CARIPRAZINE 3 MG/1
1 CAPSULE, GELATIN COATED ORAL DAILY
COMMUNITY
Start: 2022-06-15 | End: 2024-02-15

## 2022-07-07 RX ORDER — BENZTROPINE MESYLATE 1 MG/1
1 TABLET ORAL 3 TIMES DAILY
Status: DISCONTINUED | OUTPATIENT
Start: 2022-07-07 | End: 2022-07-12 | Stop reason: HOSPADM

## 2022-07-07 RX ORDER — PREGABALIN 75 MG/1
75 CAPSULE ORAL 3 TIMES DAILY
COMMUNITY
Start: 2022-06-15 | End: 2023-04-26 | Stop reason: DRUGHIGH

## 2022-07-07 RX ORDER — PREGABALIN 75 MG/1
75 CAPSULE ORAL 3 TIMES DAILY
Status: DISCONTINUED | OUTPATIENT
Start: 2022-07-07 | End: 2022-07-12 | Stop reason: HOSPADM

## 2022-07-07 RX ORDER — CARBAMAZEPINE 400 MG/1
400 TABLET, EXTENDED RELEASE ORAL 2 TIMES DAILY
COMMUNITY
Start: 2022-06-15

## 2022-07-07 RX ORDER — ACYCLOVIR 50 MG/G
CREAM TOPICAL
Status: ON HOLD | COMMUNITY
Start: 2022-06-03 | End: 2022-09-13 | Stop reason: ALTCHOICE

## 2022-07-07 RX ORDER — NALOXEGOL OXALATE 25 MG/1
25 TABLET, FILM COATED ORAL DAILY
COMMUNITY
Start: 2022-06-15 | End: 2024-03-29

## 2022-07-07 RX ORDER — MELOXICAM 7.5 MG/1
15 TABLET ORAL DAILY
Status: DISCONTINUED | OUTPATIENT
Start: 2022-07-07 | End: 2022-07-12 | Stop reason: HOSPADM

## 2022-07-07 RX ORDER — TOPIRAMATE 200 MG/1
200 TABLET ORAL DAILY
COMMUNITY
Start: 2022-06-15

## 2022-07-07 RX ORDER — FLUTICASONE PROPIONATE 50 MCG
1 SPRAY, SUSPENSION (ML) NASAL NIGHTLY
COMMUNITY
Start: 2022-02-15

## 2022-07-07 RX ORDER — CELECOXIB 100 MG/1
100 CAPSULE ORAL 2 TIMES DAILY
Status: ON HOLD | COMMUNITY
Start: 2022-06-15 | End: 2022-07-11 | Stop reason: HOSPADM

## 2022-07-07 RX ORDER — PALIPERIDONE PALMITATE 234 MG/1.5ML
234 INJECTION INTRAMUSCULAR
COMMUNITY
Start: 2022-03-30 | End: 2024-02-15

## 2022-07-07 RX ORDER — METFORMIN HYDROCHLORIDE 500 MG/1
500 TABLET ORAL DAILY
Status: DISCONTINUED | OUTPATIENT
Start: 2022-07-07 | End: 2022-07-12 | Stop reason: HOSPADM

## 2022-07-07 RX ORDER — EZETIMIBE 10 MG/1
10 TABLET ORAL DAILY
COMMUNITY
Start: 2022-05-17 | End: 2023-01-26

## 2022-07-07 RX ORDER — FUROSEMIDE 20 MG/1
20 TABLET ORAL DAILY
COMMUNITY
Start: 2022-06-15

## 2022-07-07 RX ORDER — VALACYCLOVIR HYDROCHLORIDE 500 MG/1
500 TABLET, FILM COATED ORAL 2 TIMES DAILY
COMMUNITY
Start: 2022-05-10

## 2022-07-07 RX ORDER — AZELASTINE 1 MG/ML
SPRAY, METERED NASAL DAILY PRN
COMMUNITY
Start: 2022-06-06

## 2022-07-07 RX ORDER — EZETIMIBE 10 MG/1
10 TABLET ORAL DAILY
Status: DISCONTINUED | OUTPATIENT
Start: 2022-07-07 | End: 2022-07-12 | Stop reason: HOSPADM

## 2022-07-07 RX ORDER — ZOLPIDEM TARTRATE 5 MG/1
5 TABLET ORAL NIGHTLY PRN
Status: DISCONTINUED | OUTPATIENT
Start: 2022-07-07 | End: 2022-07-12 | Stop reason: HOSPADM

## 2022-07-07 RX ADMIN — ENOXAPARIN SODIUM 40 MG: 40 INJECTION SUBCUTANEOUS at 05:07

## 2022-07-07 RX ADMIN — VALACYCLOVIR HYDROCHLORIDE 500 MG: 500 TABLET, FILM COATED ORAL at 09:07

## 2022-07-07 RX ADMIN — INSULIN ASPART 2 UNITS: 100 INJECTION, SOLUTION INTRAVENOUS; SUBCUTANEOUS at 07:07

## 2022-07-07 RX ADMIN — FAMOTIDINE 20 MG: 20 TABLET ORAL at 03:07

## 2022-07-07 RX ADMIN — CARBAMAZEPINE 400 MG: 200 TABLET ORAL at 09:07

## 2022-07-07 RX ADMIN — TOPIRAMATE 200 MG: 100 TABLET, FILM COATED ORAL at 09:07

## 2022-07-07 RX ADMIN — TOPIRAMATE 200 MG: 100 TABLET, FILM COATED ORAL at 11:07

## 2022-07-07 RX ADMIN — ENOXAPARIN SODIUM 40 MG: 40 INJECTION SUBCUTANEOUS at 12:07

## 2022-07-07 RX ADMIN — BENZTROPINE MESYLATE 1 MG: 1 TABLET ORAL at 09:07

## 2022-07-07 RX ADMIN — MELOXICAM 15 MG: 7.5 TABLET ORAL at 11:07

## 2022-07-07 RX ADMIN — VALACYCLOVIR HYDROCHLORIDE 500 MG: 500 TABLET, FILM COATED ORAL at 11:07

## 2022-07-07 RX ADMIN — ACETAMINOPHEN 650 MG: 325 TABLET, FILM COATED ORAL at 09:07

## 2022-07-07 RX ADMIN — PREGABALIN 75 MG: 75 CAPSULE ORAL at 09:07

## 2022-07-07 RX ADMIN — CARBAMAZEPINE 400 MG: 200 TABLET ORAL at 12:07

## 2022-07-07 RX ADMIN — NICOTINE 1 PATCH: 21 PATCH, EXTENDED RELEASE TRANSDERMAL at 03:07

## 2022-07-07 RX ADMIN — AZELASTINE HYDROCHLORIDE 274 MCG: 137 SPRAY, METERED NASAL at 03:07

## 2022-07-07 RX ADMIN — DULOXETINE 60 MG: 30 CAPSULE, DELAYED RELEASE ORAL at 09:07

## 2022-07-07 RX ADMIN — DULOXETINE 60 MG: 30 CAPSULE, DELAYED RELEASE ORAL at 12:07

## 2022-07-07 RX ADMIN — CARIPRAZINE 3 MG: 3 CAPSULE, GELATIN COATED ORAL at 03:07

## 2022-07-07 RX ADMIN — CLONAZEPAM 0.5 MG: 0.5 TABLET ORAL at 09:07

## 2022-07-07 RX ADMIN — FUROSEMIDE 20 MG: 20 TABLET ORAL at 03:07

## 2022-07-07 RX ADMIN — FAMOTIDINE 20 MG: 20 TABLET ORAL at 09:07

## 2022-07-07 RX ADMIN — METFORMIN HYDROCHLORIDE 500 MG: 500 TABLET, FILM COATED ORAL at 12:07

## 2022-07-07 RX ADMIN — EZETIMIBE 10 MG: 10 TABLET ORAL at 11:07

## 2022-07-07 RX ADMIN — PREGABALIN 75 MG: 75 CAPSULE ORAL at 03:07

## 2022-07-07 RX ADMIN — CLONAZEPAM 0.5 MG: 0.5 TABLET ORAL at 03:07

## 2022-07-07 RX ADMIN — NALOXEGOL OXALATE 25 MG: 25 TABLET, FILM COATED ORAL at 12:07

## 2022-07-07 RX ADMIN — ATORVASTATIN CALCIUM 40 MG: 40 TABLET, FILM COATED ORAL at 09:07

## 2022-07-07 RX ADMIN — FLUTICASONE PROPIONATE 50 MCG: 50 SPRAY, METERED NASAL at 09:07

## 2022-07-07 RX ADMIN — ASPIRIN 81 MG: 81 TABLET, COATED ORAL at 09:07

## 2022-07-07 RX ADMIN — BUPROPION HYDROCHLORIDE 300 MG: 150 TABLET, FILM COATED, EXTENDED RELEASE ORAL at 11:07

## 2022-07-07 RX ADMIN — ONDANSETRON 4 MG: 2 INJECTION INTRAMUSCULAR; INTRAVENOUS at 02:07

## 2022-07-07 RX ADMIN — BENZTROPINE MESYLATE 1 MG: 1 TABLET ORAL at 03:07

## 2022-07-07 NOTE — PT/OT/SLP EVAL
"Speech Language Pathology Evaluation  Bedside Swallow    Patient Name:  Faith Jin   MRN:  [id number]  Admitting Diagnosis: Dysarthria    Recommendations:                 General Recommendations:  Speech language evaluation and Modified barium swallow study  Diet recommendations:  Soft & Bite Sized Diet - IDDSI Level 6, Thin liquids - IDDSI Level 0 (small, single sips)   Aspiration Precautions: 1 bite/sip at a time, HOB to 90 degrees, Small bites/sips and Standard aspiration precautions   General Precautions: Standard, aspiration  Communication strategies:  none    History:     Past Medical History:   Diagnosis Date    Bipolar disorder     COPD (chronic obstructive pulmonary disease)     Depression     Lumbar degenerative disc disease        Past Surgical History:   Procedure Laterality Date    ANTERIOR CRUCIATE LIGAMENT REPAIR Left     TOTAL KNEE ARTHROPLASTY Left     WRIST SURGERY Right      Prior diet: Regular solids, thin liquids.    Subjective     Pt awake and alert throughout evaluation. Pt appears to be in good spirits.  Patient goals: to go home    Pain/Comfort:  · Pain Rating 1: 0/10    Respiratory Status: Room air    Objective:     Oral Musculature Evaluation  · Oral Musculature: WFL  · Dentition: present and adequate, other (see comments) (pt reports "bad teeth" with pain while chewing regular solids)  · Secretion Management: adequate  · Mucosal Quality: adequate  · Mandibular Strength and Mobility: WFL  · Oral Labial Strength and Mobility: WFL  · Lingual Strength and Mobility: WFL    Bedside Swallow Eval:      Consistency Fed by Oral Symptoms Pharyngeal Symptoms   Thin liquids by cup self none Wet vocal quality and cough after the swallow with sequential sips; no s/sx with small single sip   Thin liquids by straw self none none   Puree SLP none none   Regular self Pain with mastication; pt reports "bad teeth" none       Assessment:     Faith Jin is a 57 y.o. female with an SLP " diagnosis of suspected dysphagia.  She presents with s/sx of aspiration with thin liquids via cup. Pt able to take small, single sips of thin liquids with minimal cues from SLP with no s/sx of aspiration. MBS is recommended for comprehensive swallow evaluation.     Plan:     · Plan of Care reviewed with:  patient   · SLP Follow-Up:  Yes       Barriers to Discharge:  None    Time Tracking:     SLP Treatment Date:   07/07/22  Speech Start Time:  1545  Speech Stop Time:  1605     Speech Total Time (min):  20 min    Billable Minutes: Eval Swallow and Oral Function 20 minutes    07/07/2022

## 2022-07-07 NOTE — PROGRESS NOTES
[facility] EMERGENCY DEPARTMENT  Hospital Medicine Progress Note      Patient Name: Faith Jin  MRN: [id number]  Admission Date: 7/6/2022   Length of Stay: 1  Attending Physician: Larry Mcneal MD  Primary Care Provider: Garrick Nascimento MD  Face-to-Face encounter date: 07/07/2022    Code Status: Full Code        Chief Complaint:   Hyperglycemia        HPI:   Ms. Jin is a 57 year old  female with a history of bipolar disorder, depression, COPD, and degenerative disc disease of the lumbar spine who presented to the ER today with hyperglycemia. She had a blood glucose level of 440 this morning at 11:30 am and she reports slurred speech, generalized weakness, left hand numbness and tingling, and an unsteady gait. She denies any visual changes, headache, facial droop, chest pain, shortness of breath, nausea, vomiting, or abdominal pain. On arrival to the ER she was mildly hypertensive with a blood pressure of 147/83 and hypoxic with an O2 saturation of 78% on room and her initial labwork was unremarkable. Her urine drug screen was positive for opiates and her urinalysis was negative. CT of the head showed no acute intracranial abnormality and CTA of the head and neck revealed no large vessel occlusion. She has received cyclobenzaprine 10 mg PO x 1 and her symptoms have resolved. She is otherwise in stable condition and she has no other complaints today.       Overview/Hospital Course:  No notes on file       Interval Hx:   Pt. Is resting in bed, this morning on waking up having some dysarthria but is improving.  Pt says has been having difficulty with balance running into walls and having difficulty finding words x 3weeks.  Ct mri neg.    Review of Systems   All other systems reviewed and are negative.      Objective/physical exam:  General: In no acute distress, afebrile  Chest: Clear to auscultation bilaterally  Heart: RRR, +S1, S2, no appreciable  murmur  Abdomen: Soft, nontender, BS +  MSK: Warm, no lower extremity edema, no clubbing or cyanosis  Neurologic: Alert and oriented x4, Cranial nerve II-XII intact, Strength 5/5 in all 4 extremities    VITAL SIGNS: 24 HRS MIN & MAX LAST   Temp  Min: 97.6 °F (36.4 °C)  Max: 98.6 °F (37 °C) 97.6 °F (36.4 °C)   BP  Min: 126/80  Max: 147/83 133/79   Pulse  Min: 65  Max: 93  70   Resp  Min: 16  Max: 29 20   SpO2  Min: 95 %  Max: 98 % 95 %       Recent Labs   Lab 07/06/22  1438 07/07/22  0606 07/07/22  0949   WBC 6.2 4.5 4.5   RBC 4.32 4.40 4.46   HGB 13.0 13.6 13.6   HCT 37.4 39.4 39.8   MCV 86.6 89.5 89.2   MCH 30.1 30.9 30.5   MCHC 34.8 34.5 34.2   RDW 12.9 12.8 12.7    280 275   MPV 8.7 8.7 8.5       Recent Labs   Lab 07/06/22  1438 07/07/22  0606 07/07/22  0949    142 140   K 3.6 4.0 4.3   CO2 21* 21* 24   BUN 14.1 12.1 10.7   CREATININE 0.89 0.81 0.79   CALCIUM 8.9 9.1 9.6   MG  --  2.10  --    ALBUMIN 4.3 4.1 4.2   ALKPHOS 77 74 80   ALT 21 19 19   AST 18 17 18   BILITOT 0.4 0.4 0.4        Microbiology Results (last 7 days)     ** No results found for the last 168 hours. **           Radiology:  MRI BRAIN WITHOUT CONTRAST  Narrative: EXAMINATION:  MRI BRAIN WITHOUT CONTRAST    CLINICAL HISTORY:  Transient ischemic attack (TIA);    TECHNIQUE:  Multiplanar, multisequence MR images of the brain were obtained without the administration of intravenous contrast.    COMPARISON:  CT head dated 07/06/2022    FINDINGS:  There is no restricted diffusion, hemorrhage or edema. There are mild scattered T2/FLAIR hyperintensities in the subcortical and periventricular white matter, likely representing chronic microvascular ischemic changes.    There is no mass effect or midline shift. The basal cisterns are patent. There is mild diffuse parenchymal volume loss. There is no hydrocephalus or abnormal extra-axial fluid collection. The major intracranial flow voids are patent. There is opacification of the left sphenoid  sinus.  Impression: 1. No acute intracranial abnormality.  2. Mild chronic microvascular ischemic changes.    Electronically signed by: Keya Rahman  Date:    07/07/2022  Time:    08:38      Scheduled Med:   aspirin  81 mg Oral Daily    atorvastatin  40 mg Oral Daily    enoxaparin  40 mg Subcutaneous Daily        Continuous Infusions:       PRN Meds:  acetaminophen, dextrose 10%, dextrose 10%, glucagon (human recombinant), glucose, glucose, insulin aspart U-100, labetalol, melatonin, ondansetron, sodium chloride 0.9%, sodium chloride 0.9%     Nutrition Status:      Assessment/Plan:  TIA/dysarthria  Off balance/vertigo  Overmedication?  Copd  Bipolar/depression  DM    Plan    Consult neuro  [facility] meds-deescalate    GI prophylaxis: Pepcid  DVT prophylaxis: Lovenox          All diagnosis and differential diagnosis have been reviewed; assessment and plan has been documented; I have personally reviewed the labs and test results that are presently available; I have reviewed the patients medication list; I have reviewed the consulting providers response and recommendations. I have reviewed or attempted to review medical records based upon their availability      _____________________________________________________________________            Larry Mcneal MD   07/07/2022

## 2022-07-07 NOTE — ED NOTES
Assumed pt care, report taken from Alejandra Gonzales RN, pt is AAO x 4, able to ambulate independently in ED room w/out assistance, steady gait, aware admission status to rule out stroke.

## 2022-07-07 NOTE — ASSESSMENT & PLAN NOTE
Likely due to TIA. An MRI of the brain and echocardiogram will be ordered in the morning. Consult PT, OT, and SLP.

## 2022-07-07 NOTE — H&P
[facility] Orthopaedics - Emergency [facility] Medicine  Telehospitalist History & Physical    Patient Name: Faith Jin  MRN: [id number]  Patient Class: IP- Inpatient  Admission Date: 7/6/2022  Attending Physician: Jerrell Briggs MD  Primary Care Provider: Garrick Nascimento MD      Patient information was obtained from patient and ER records.     Subjective:     Principal Problem:Dysarthria    Chief Complaint: Hyperglycemia.    HPI: Ms. Jin is a 57 year old  female with a history of bipolar disorder, depression, COPD, and degenerative disc disease of the lumbar spine who presented to the ER today with hyperglycemia. She had a blood glucose level of 440 this morning at 11:30 am and she reports slurred speech, generalized weakness, left hand numbness and tingling, and an unsteady gait. She denies any visual changes, headache, facial droop, chest pain, shortness of breath, nausea, vomiting, or abdominal pain. On arrival to the ER she was mildly hypertensive with a blood pressure of 147/83 and hypoxic with an O2 saturation of 78% on room and her initial labwork was unremarkable. Her urine drug screen was positive for opiates and her urinalysis was negative. CT of the head showed no acute intracranial abnormality and CTA of the head and neck revealed no large vessel occlusion. She has received cyclobenzaprine 10 mg PO x 1 and her symptoms have resolved. She is otherwise in stable condition and she has no other complaints today.      Past Medical History:   Diagnosis Date    Bipolar disorder     COPD (chronic obstructive pulmonary disease)     Depression     Lumbar degenerative disc disease        Past Surgical History:   Procedure Laterality Date    ANTERIOR CRUCIATE LIGAMENT REPAIR Left     TOTAL KNEE ARTHROPLASTY Left     WRIST SURGERY Right        Review of patient's allergies indicates:   Allergen Reactions    Adhesive      Other reaction(s): skin tears easily with adhesive     Cephalexin Nausea And Vomiting    Erythromycin Nausea And Vomiting    Lithium Nausea And Vomiting    Naproxen Nausea And Vomiting       No current facility-administered medications on file prior to encounter.     Current Outpatient Medications on File Prior to Encounter   Medication Sig    tiZANidine (ZANAFLEX) 4 MG tablet take 1 tablet BY MOUTH EVERY 8 HOURS AS NEEDED FOR MUSCLE SPASMS     Family History       Problem Relation (Age of Onset)    Coronary artery disease Mother, Father    Diabetes Mother        Social History:   Tobacco Use    Smoking status: Not on file    Smokeless tobacco: Not on file   Substance and Sexual Activity    Alcohol use: Not on file    Drug use: Not on file    Sexual activity: Not on file     Review of Systems   Constitutional: Negative.    HENT: Negative.     Eyes: Negative.    Respiratory: Negative.     Cardiovascular: Negative.    Gastrointestinal: Negative.    Endocrine: Negative.    Genitourinary: Negative.    Musculoskeletal: Negative.    Skin: Negative.    Allergic/Immunologic: Negative.    Neurological:  Positive for speech difficulty, weakness and numbness.   Hematological: Negative.    Psychiatric/Behavioral: Negative.     Objective:     Vital Signs (Most Recent):  Temp: 98.6 °F (37 °C) (07/06/22 1834)  Pulse: 85 (07/06/22 2145)  Resp: (!) 29 (07/06/22 2145)  BP: (!) 131/45 (07/06/22 2145)  SpO2: (!) 78 % (07/06/22 2145)   Vital Signs (24h Range):  Temp:  [98.2 °F (36.8 °C)-98.6 °F (37 °C)] 98.6 °F (37 °C)  Pulse:  [73-93] 85  Resp:  [16-29] 29  SpO2:  [78 %-98 %] 78 %  BP: (126-147)/(45-93) 131/45     Weight: 75.8 kg (167 lb)  Body mass index is 30.54 kg/m².    Physical Exam  General - WF in no acute distress.  HEENT - PERRLA. Extraocular movements intact. No scleral icterus. Oropharynx clear. Mucous membranes moist.  Neck - No lymphadenopathy, thyromegaly, or JVD.  CVS - Regular rate and rhythm. No murmurs, rubs, or gallops.  Resp - Lungs are clear to  auscultation bilaterally. No rales, wheeze, or rhonchi.   GI - Soft, nontender, nondistended, normoactive bowel sounds present.   Extremities - No clubbing, cyanosis, or edema.   Neuro - Alert and oriented times four.CN II through XII are grossly intact. No focal neurological deficits.   Psych -  Normal affect.  Skin - No rash, skin tear, or ulcer.         Significant Labs: All pertinent labs within the past 24 hours have been reviewed.  CBC:   Recent Labs   Lab 07/06/22  1438   WBC 6.2   HGB 13.0   HCT 37.4        CMP:   Recent Labs   Lab 07/06/22  1438      K 3.6   CO2 21*   BUN 14.1   CREATININE 0.89   CALCIUM 8.9   ALBUMIN 4.3   BILITOT 0.4   ALKPHOS 77   AST 18   ALT 21   EGFRNONAA >60     Coagulation:   Recent Labs   Lab 07/06/22  1438   INR 1.00*     TSH:   Recent Labs   Lab 07/06/22  1438   TSH 1.0353     Urine Studies:   Recent Labs   Lab 07/06/22 2034   APPEARANCEUA Slightly Cloudy*   PROTEINUA Negative   BILIRUBINUA Negative   UROBILINOGEN 1.0   LEUKOCYTESUR Negative       Significant Imaging: I have reviewed all pertinent imaging results/findings within the past 24 hours.  Imaging Results              CTA Head and Neck (xpd) (Final result)  Result time 07/06/22 18:45:05      Final result by Celine Mustafa MD (07/06/22 18:45:05)                   Impression:      No large vessel occlusions, critical stenoses, vascular malformations or aneurysms.      Electronically signed by: Celine Mustafa  Date:    07/06/2022  Time:    18:45               Narrative:    EXAMINATION:  CTA HEAD AND NECK (XPD)    CLINICAL HISTORY:  Stroke/TIA, determine embolic source;    TECHNIQUE:  Axial images obtained through the head and neck before and after the administration of intravenous contrast. Coronal, sagittal, MIP and 3D reconstructions were obtained from the axial data.    Automatic exposure control was utilized to limit radiation dose.    Radiation Dose:    Total DLP: 1094 mGy*cm    COMPARISON:  Head CT  earlier the same day    FINDINGS:  Head CT with contrast:    No interval changes when compared to the previous CT.    No enhancing abnormalities.    If present, stenosis of the carotid bulbs is measured based on NASCET criteria, i.e. area of maximal stenosis compared to the cervical ICA distal to the bulb.    Cervical CTA:    Aortic arch: Patent.  Minimal calcified plaque.  Otherwise no abnormalities.    Common Carotid arteries: Patent, no abnormalities.    Carotid Bulbs: Patent, no abnormalities.    Internal Carotid arteries: Patent, no abnormalities.    Vertebral arteries: Patent, no abnormalities.    Intracranial CTA:    Carotid arteries:    Minimal calcified plaque at the siphons without significant narrowing.    Left A1 segment is hypoplastic.    Otherwise normal A1 and M1 segments.    Vertebrobasilar Circulation:    Vertebral arteries: Patent, no abnormalities.    Basilar artery: Patent, no abnormalities.    Posterior cerebral arteries: Patent, no abnormalities.    Dural venous sinuses: Patent.    Normal Variants:    ACom: Patent.    PComs: Patent, diminutive bilaterally.    Vertebral arteries: Co-dominant.    Additional findings:    No cervical lymphadenopathy.    No acute bony pathology.    Atelectatic changes, otherwise lung apices clear.                                       CT Head Without Contrast (Final result)  Result time 07/06/22 16:15:32      Final result by Celine Mustafa MD (07/06/22 16:15:32)                   Impression:      No acute intracranial abnormalities.    Chronic left sphenoid sinusitis.    No significant interval change from 02/16/2016.      Electronically signed by: Celine Mustafa  Date:    07/06/2022  Time:    16:15               Narrative:    EXAMINATION:  CT HEAD WITHOUT CONTRAST    CLINICAL HISTORY:  Neuro deficit, acute, stroke suspected;    TECHNIQUE:  Axial scans were obtained from skull base to the vertex.    Coronal and sagittal reconstructions obtained from the axial  data.    Automatic exposure control was utilized to limit radiation dose.    Contrast: None    Radiation Dose:    Total DLP: 841 mGy*cm    COMPARISON:  Head CT 02/16/2016    FINDINGS:  Scalp/Skull:    No acute abnormalities.    Hyperostosis frontalis interna.    Retromastoid osteoma.    Brain sulci: Appropriate for patient's age.    Ventricles: Normal in size and configuration. No hydrocephalus.    Extra-axial spaces:    No masses or fluid collections.    Parenchyma:    Mild chronic microangiopathy in the supratentorial white matter.    No mass, hemorrhage or CT evidence of an acute vascular insult.    Dural sinuses: No abnormal densities.    Sellar/Suprasellar region: No abnormalities.    Skull base and Craniocervical junction: No abnormalities.    Incidental findings:    Carotid siphon atherosclerotic vascular calcifications.    Near complete left sphenoid sinus opacification with wall osteitis.                                        Assessment/Plan:     * Dysarthria  Likely due to TIA. An MRI of the brain and echocardiogram will be ordered in the morning. Consult PT, OT, and SLP.      Generalized weakness  Consult PT and OT as per above.      Type II diabetes mellitus  Continue moderate dose SSI. Hemoglobin A1c pending.        VTE Risk Mitigation (From admission, onward)         Ordered     enoxaparin injection 40 mg  Daily         07/06/22 2246     IP VTE HIGH RISK PATIENT  Once         07/06/22 2246     Place sequential compression device  Until discontinued         07/06/22 2246              This service was provided via telemedicine.  Type of Software: Audio/Visual.  Originating Site: [facility].  Distant Site: [place], [place].  Her exam was performed with the assistance of Shaheen Hill RN.     Jerrell Briggs MD  Department of Hospital Medicine   [facility] Orthopaedics - Emergency Dept

## 2022-07-07 NOTE — HPI
Ms. Jin is a 57 year old  female with a history of bipolar disorder, depression, COPD, and degenerative disc disease of the lumbar spine who presented to the ER today with hyperglycemia. She had a blood glucose level of 440 this morning at 11:30 am and she reports slurred speech, generalized weakness, left hand numbness and tingling, and an unsteady gait. She denies any visual changes, headache, facial droop, chest pain, shortness of breath, nausea, vomiting, or abdominal pain. On arrival to the ER she was mildly hypertensive with a blood pressure of 147/83 and hypoxic with an O2 saturation of 78% on room and her initial labwork was unremarkable. Her urine drug screen was positive for opiates and her urinalysis was negative. CT of the head showed no acute intracranial abnormality and CTA of the head and neck revealed no large vessel occlusion. She has received cyclobenzaprine 10 mg PO x 1 and her symptoms have resolved. She is otherwise in stable condition and she has no other complaints today.

## 2022-07-07 NOTE — PROGRESS NOTES
Initial PT orders received. Pt currently awaiting neuro consult at this time. PT to f/u as appropriate.

## 2022-07-07 NOTE — CLINICAL REVIEW
This is a 57-year-old female presents on 07/06/2022 with dysarthria and ataxia.  In the ED, she was hemodynamically stable, afebrile.  CT scan of the head was unremarkable.  CT angiogram of the head and neck was normal.  Symptoms have resolved.  MRI of the brain was unremarkable.  Neurology was consulted.  The patient remains on neuro checks, physical therapy, occupational therapy has yet to evaluate.  Further consultation from Neurology is pending.  In the setting of weakness necessitating physical therapy/occupational therapy related to functional impairment, making a potential return to home a safety risk at the present time, it is expected that her hospitalization will extend beyond 2 midnights.  Currently, remains appropriate for IP LOC    Tyler Skelton MD  Utilization Management  Physician Advisor

## 2022-07-07 NOTE — SUBJECTIVE & OBJECTIVE
Past Medical History:   Diagnosis Date    Bipolar disorder     COPD (chronic obstructive pulmonary disease)     Depression     Lumbar degenerative disc disease        Past Surgical History:   Procedure Laterality Date    ANTERIOR CRUCIATE LIGAMENT REPAIR Left     TOTAL KNEE ARTHROPLASTY Left     WRIST SURGERY Right        Review of patient's allergies indicates:   Allergen Reactions    Adhesive      Other reaction(s): skin tears easily with adhesive    Cephalexin Nausea And Vomiting    Erythromycin Nausea And Vomiting    Lithium Nausea And Vomiting    Naproxen Nausea And Vomiting       No current facility-administered medications on file prior to encounter.     Current Outpatient Medications on File Prior to Encounter   Medication Sig    tiZANidine (ZANAFLEX) 4 MG tablet take 1 tablet BY MOUTH EVERY 8 HOURS AS NEEDED FOR MUSCLE SPASMS     Family History       Problem Relation (Age of Onset)    Coronary artery disease Mother, Father    Diabetes Mother        Social History:   Tobacco Use    Smoking status: Not on file    Smokeless tobacco: Not on file   Substance and Sexual Activity    Alcohol use: Not on file    Drug use: Not on file    Sexual activity: Not on file     Review of Systems   Constitutional: Negative.    HENT: Negative.     Eyes: Negative.    Respiratory: Negative.     Cardiovascular: Negative.    Gastrointestinal: Negative.    Endocrine: Negative.    Genitourinary: Negative.    Musculoskeletal: Negative.    Skin: Negative.    Allergic/Immunologic: Negative.    Neurological:  Positive for speech difficulty, weakness and numbness.   Hematological: Negative.    Psychiatric/Behavioral: Negative.     Objective:     Vital Signs (Most Recent):  Temp: 98.6 °F (37 °C) (07/06/22 1834)  Pulse: 85 (07/06/22 2145)  Resp: (!) 29 (07/06/22 2145)  BP: (!) 131/45 (07/06/22 2145)  SpO2: (!) 78 % (07/06/22 2145)   Vital Signs (24h Range):  Temp:  [98.2 °F (36.8 °C)-98.6 °F (37 °C)] 98.6 °F (37 °C)  Pulse:  [73-93]  85  Resp:  [16-29] 29  SpO2:  [78 %-98 %] 78 %  BP: (126-147)/(45-93) 131/45     Weight: 75.8 kg (167 lb)  Body mass index is 30.54 kg/m².    Physical Exam  General - WF in no acute distress.  HEENT - PERRLA. Extraocular movements intact. No scleral icterus. Oropharynx clear. Mucous membranes moist.  Neck - No lymphadenopathy, thyromegaly, or JVD.  CVS - Regular rate and rhythm. No murmurs, rubs, or gallops.  Resp - Lungs are clear to auscultation bilaterally. No rales, wheeze, or rhonchi.   GI - Soft, nontender, nondistended, normoactive bowel sounds present.   Extremities - No clubbing, cyanosis, or edema.   Neuro - Alert and oriented times four.CN II through XII are grossly intact. No focal neurological deficits.   Psych -  Normal affect.  Skin - No rash, skin tear, or ulcer.         Significant Labs: All pertinent labs within the past 24 hours have been reviewed.  CBC:   Recent Labs   Lab 07/06/22  1438   WBC 6.2   HGB 13.0   HCT 37.4        CMP:   Recent Labs   Lab 07/06/22  1438      K 3.6   CO2 21*   BUN 14.1   CREATININE 0.89   CALCIUM 8.9   ALBUMIN 4.3   BILITOT 0.4   ALKPHOS 77   AST 18   ALT 21   EGFRNONAA >60     Coagulation:   Recent Labs   Lab 07/06/22  1438   INR 1.00*     TSH:   Recent Labs   Lab 07/06/22  1438   TSH 1.0353     Urine Studies:   Recent Labs   Lab 07/06/22 2034   APPEARANCEUA Slightly Cloudy*   PROTEINUA Negative   BILIRUBINUA Negative   UROBILINOGEN 1.0   LEUKOCYTESUR Negative       Significant Imaging: I have reviewed all pertinent imaging results/findings within the past 24 hours.  Imaging Results              CTA Head and Neck (xpd) (Final result)  Result time 07/06/22 18:45:05      Final result by Celine Mustafa MD (07/06/22 18:45:05)                   Impression:      No large vessel occlusions, critical stenoses, vascular malformations or aneurysms.      Electronically signed by: Celine Mustafa  Date:    07/06/2022  Time:    18:45               Narrative:     EXAMINATION:  CTA HEAD AND NECK (XPD)    CLINICAL HISTORY:  Stroke/TIA, determine embolic source;    TECHNIQUE:  Axial images obtained through the head and neck before and after the administration of intravenous contrast. Coronal, sagittal, MIP and 3D reconstructions were obtained from the axial data.    Automatic exposure control was utilized to limit radiation dose.    Radiation Dose:    Total DLP: 1094 mGy*cm    COMPARISON:  Head CT earlier the same day    FINDINGS:  Head CT with contrast:    No interval changes when compared to the previous CT.    No enhancing abnormalities.    If present, stenosis of the carotid bulbs is measured based on NASCET criteria, i.e. area of maximal stenosis compared to the cervical ICA distal to the bulb.    Cervical CTA:    Aortic arch: Patent.  Minimal calcified plaque.  Otherwise no abnormalities.    Common Carotid arteries: Patent, no abnormalities.    Carotid Bulbs: Patent, no abnormalities.    Internal Carotid arteries: Patent, no abnormalities.    Vertebral arteries: Patent, no abnormalities.    Intracranial CTA:    Carotid arteries:    Minimal calcified plaque at the siphons without significant narrowing.    Left A1 segment is hypoplastic.    Otherwise normal A1 and M1 segments.    Vertebrobasilar Circulation:    Vertebral arteries: Patent, no abnormalities.    Basilar artery: Patent, no abnormalities.    Posterior cerebral arteries: Patent, no abnormalities.    Dural venous sinuses: Patent.    Normal Variants:    ACom: Patent.    PComs: Patent, diminutive bilaterally.    Vertebral arteries: Co-dominant.    Additional findings:    No cervical lymphadenopathy.    No acute bony pathology.    Atelectatic changes, otherwise lung apices clear.                                       CT Head Without Contrast (Final result)  Result time 07/06/22 16:15:32      Final result by Celine Mustafa MD (07/06/22 16:15:32)                   Impression:      No acute intracranial  abnormalities.    Chronic left sphenoid sinusitis.    No significant interval change from 02/16/2016.      Electronically signed by: Celine Mustafa  Date:    07/06/2022  Time:    16:15               Narrative:    EXAMINATION:  CT HEAD WITHOUT CONTRAST    CLINICAL HISTORY:  Neuro deficit, acute, stroke suspected;    TECHNIQUE:  Axial scans were obtained from skull base to the vertex.    Coronal and sagittal reconstructions obtained from the axial data.    Automatic exposure control was utilized to limit radiation dose.    Contrast: None    Radiation Dose:    Total DLP: 841 mGy*cm    COMPARISON:  Head CT 02/16/2016    FINDINGS:  Scalp/Skull:    No acute abnormalities.    Hyperostosis frontalis interna.    Retromastoid osteoma.    Brain sulci: Appropriate for patient's age.    Ventricles: Normal in size and configuration. No hydrocephalus.    Extra-axial spaces:    No masses or fluid collections.    Parenchyma:    Mild chronic microangiopathy in the supratentorial white matter.    No mass, hemorrhage or CT evidence of an acute vascular insult.    Dural sinuses: No abnormal densities.    Sellar/Suprasellar region: No abnormalities.    Skull base and Craniocervical junction: No abnormalities.    Incidental findings:    Carotid siphon atherosclerotic vascular calcifications.    Near complete left sphenoid sinus opacification with wall osteitis.

## 2022-07-08 ENCOUNTER — HOSPITAL ENCOUNTER (OUTPATIENT)
Dept: RADIOLOGY | Facility: HOSPITAL | Age: 57
Discharge: HOME | End: 2022-07-08
Attending: INTERNAL MEDICINE
Payer: MEDICARE

## 2022-07-08 LAB
POCT GLUCOSE: 102 MG/DL (ref 70–110)
POCT GLUCOSE: 118 MG/DL (ref 70–110)
POCT GLUCOSE: 133 MG/DL (ref 70–110)
POCT GLUCOSE: 162 MG/DL (ref 70–110)
POCT GLUCOSE: 95 MG/DL (ref 70–110)

## 2022-07-08 PROCEDURE — 97162 PT EVAL MOD COMPLEX 30 MIN: CPT

## 2022-07-08 PROCEDURE — 74230 X-RAY XM SWLNG FUNCJ C+: CPT | Mod: TC

## 2022-07-08 PROCEDURE — 25000003 PHARM REV CODE 250: Performed by: INTERNAL MEDICINE

## 2022-07-08 PROCEDURE — 97166 OT EVAL MOD COMPLEX 45 MIN: CPT

## 2022-07-08 PROCEDURE — 92611 MOTION FLUOROSCOPY/SWALLOW: CPT

## 2022-07-08 PROCEDURE — 25000003 PHARM REV CODE 250: Performed by: NURSE PRACTITIONER

## 2022-07-08 PROCEDURE — 97535 SELF CARE MNGMENT TRAINING: CPT

## 2022-07-08 PROCEDURE — A9698 NON-RAD CONTRAST MATERIALNOC: HCPCS | Performed by: INTERNAL MEDICINE

## 2022-07-08 PROCEDURE — 11000001 HC ACUTE MED/SURG PRIVATE ROOM

## 2022-07-08 PROCEDURE — S4991 NICOTINE PATCH NONLEGEND: HCPCS | Performed by: INTERNAL MEDICINE

## 2022-07-08 PROCEDURE — 25500020 PHARM REV CODE 255: Performed by: INTERNAL MEDICINE

## 2022-07-08 PROCEDURE — 63600175 PHARM REV CODE 636 W HCPCS: Performed by: INTERNAL MEDICINE

## 2022-07-08 RX ORDER — CLONAZEPAM 0.5 MG/1
0.5 TABLET ORAL ONCE
Status: COMPLETED | OUTPATIENT
Start: 2022-07-08 | End: 2022-07-08

## 2022-07-08 RX ORDER — HYDROCODONE BITARTRATE AND ACETAMINOPHEN 7.5; 325 MG/1; MG/1
1 TABLET ORAL ONCE
Status: COMPLETED | OUTPATIENT
Start: 2022-07-08 | End: 2022-07-08

## 2022-07-08 RX ORDER — POLYETHYLENE GLYCOL 3350 17 G/17G
17 POWDER, FOR SOLUTION ORAL DAILY
Status: DISCONTINUED | OUTPATIENT
Start: 2022-07-08 | End: 2022-07-12 | Stop reason: HOSPADM

## 2022-07-08 RX ORDER — DOCUSATE SODIUM 100 MG/1
100 CAPSULE, LIQUID FILLED ORAL 2 TIMES DAILY
Status: DISCONTINUED | OUTPATIENT
Start: 2022-07-08 | End: 2022-07-12 | Stop reason: HOSPADM

## 2022-07-08 RX ADMIN — POLYETHYLENE GLYCOL 3350 17 G: 17 POWDER, FOR SOLUTION ORAL at 02:07

## 2022-07-08 RX ADMIN — FAMOTIDINE 20 MG: 20 TABLET ORAL at 09:07

## 2022-07-08 RX ADMIN — VALACYCLOVIR HYDROCHLORIDE 500 MG: 500 TABLET, FILM COATED ORAL at 10:07

## 2022-07-08 RX ADMIN — ENOXAPARIN SODIUM 40 MG: 40 INJECTION SUBCUTANEOUS at 05:07

## 2022-07-08 RX ADMIN — BENZTROPINE MESYLATE 1 MG: 1 TABLET ORAL at 09:07

## 2022-07-08 RX ADMIN — ATORVASTATIN CALCIUM 40 MG: 40 TABLET, FILM COATED ORAL at 10:07

## 2022-07-08 RX ADMIN — TOPIRAMATE 200 MG: 100 TABLET, FILM COATED ORAL at 10:07

## 2022-07-08 RX ADMIN — PREGABALIN 75 MG: 75 CAPSULE ORAL at 10:07

## 2022-07-08 RX ADMIN — PREGABALIN 75 MG: 75 CAPSULE ORAL at 02:07

## 2022-07-08 RX ADMIN — DOCUSATE SODIUM 100 MG: 100 CAPSULE, LIQUID FILLED ORAL at 09:07

## 2022-07-08 RX ADMIN — CARIPRAZINE 3 MG: 3 CAPSULE, GELATIN COATED ORAL at 10:07

## 2022-07-08 RX ADMIN — CLONAZEPAM 0.5 MG: 0.5 TABLET ORAL at 10:07

## 2022-07-08 RX ADMIN — EZETIMIBE 10 MG: 10 TABLET ORAL at 10:07

## 2022-07-08 RX ADMIN — FLUTICASONE PROPIONATE 50 MCG: 50 SPRAY, METERED NASAL at 09:07

## 2022-07-08 RX ADMIN — NALOXEGOL OXALATE 25 MG: 25 TABLET, FILM COATED ORAL at 10:07

## 2022-07-08 RX ADMIN — ONDANSETRON 4 MG: 2 INJECTION INTRAMUSCULAR; INTRAVENOUS at 09:07

## 2022-07-08 RX ADMIN — MELOXICAM 15 MG: 7.5 TABLET ORAL at 10:07

## 2022-07-08 RX ADMIN — BENZTROPINE MESYLATE 1 MG: 1 TABLET ORAL at 10:07

## 2022-07-08 RX ADMIN — AZELASTINE HYDROCHLORIDE 274 MCG: 137 SPRAY, METERED NASAL at 10:07

## 2022-07-08 RX ADMIN — BUPROPION HYDROCHLORIDE 300 MG: 150 TABLET, FILM COATED, EXTENDED RELEASE ORAL at 10:07

## 2022-07-08 RX ADMIN — FAMOTIDINE 20 MG: 20 TABLET ORAL at 10:07

## 2022-07-08 RX ADMIN — HYDROCODONE BITARTRATE AND ACETAMINOPHEN 1 TABLET: 7.5; 325 TABLET ORAL at 02:07

## 2022-07-08 RX ADMIN — ZOLPIDEM TARTRATE 5 MG: 5 TABLET ORAL at 09:07

## 2022-07-08 RX ADMIN — BARIUM SULFATE 10 ML: 0.81 POWDER, FOR SUSPENSION ORAL at 10:07

## 2022-07-08 RX ADMIN — TOPIRAMATE 200 MG: 100 TABLET, FILM COATED ORAL at 09:07

## 2022-07-08 RX ADMIN — CARBAMAZEPINE 400 MG: 200 TABLET ORAL at 10:07

## 2022-07-08 RX ADMIN — ASPIRIN 81 MG: 81 TABLET, COATED ORAL at 10:07

## 2022-07-08 RX ADMIN — PREGABALIN 75 MG: 75 CAPSULE ORAL at 09:07

## 2022-07-08 RX ADMIN — CARBAMAZEPINE 400 MG: 200 TABLET ORAL at 09:07

## 2022-07-08 RX ADMIN — BENZTROPINE MESYLATE 1 MG: 1 TABLET ORAL at 02:07

## 2022-07-08 RX ADMIN — NICOTINE 1 PATCH: 21 PATCH, EXTENDED RELEASE TRANSDERMAL at 09:07

## 2022-07-08 RX ADMIN — FUROSEMIDE 20 MG: 20 TABLET ORAL at 10:07

## 2022-07-08 RX ADMIN — ACETAMINOPHEN 650 MG: 325 TABLET, FILM COATED ORAL at 02:07

## 2022-07-08 RX ADMIN — AZELASTINE HYDROCHLORIDE 274 MCG: 137 SPRAY, METERED NASAL at 09:07

## 2022-07-08 RX ADMIN — DULOXETINE 60 MG: 30 CAPSULE, DELAYED RELEASE ORAL at 10:07

## 2022-07-08 RX ADMIN — VALACYCLOVIR HYDROCHLORIDE 500 MG: 500 TABLET, FILM COATED ORAL at 09:07

## 2022-07-08 RX ADMIN — CLONAZEPAM 0.5 MG: 0.5 TABLET ORAL at 09:07

## 2022-07-08 RX ADMIN — DULOXETINE 60 MG: 30 CAPSULE, DELAYED RELEASE ORAL at 09:07

## 2022-07-08 NOTE — PROGRESS NOTES
[facility] Hospital Medicine Progress Note        Chief Complaint: Inpatient Follow-up for TIA    HPI:   Ms. Jin is a 57 year old  female with a history of bipolar disorder, depression, COPD, and degenerative disc disease of the lumbar spine who presented to the ER today with hyperglycemia. She had a blood glucose level of 440 this morning at 11:30 am and she reports slurred speech, generalized weakness, left hand numbness and tingling, and an unsteady gait. She denies any visual changes, headache, facial droop, chest pain, shortness of breath, nausea, vomiting, or abdominal pain. On arrival to the ER she was mildly hypertensive with a blood pressure of 147/83 and hypoxic with an O2 saturation of 78% on room and her initial labwork was unremarkable. Her urine drug screen was positive for opiates and her urinalysis was negative. CT of the head showed no acute intracranial abnormality and CTA of the head and neck revealed no large vessel occlusion. She has received cyclobenzaprine 10 mg PO x 1 and her symptoms have resolved.    Interval Hx:   No acute events overnight.  Hemodynamically stable.  Comfortably resting.  Tolerating PT.  Denies any new complaints or concerns.  MRI brain showed no acute intracranial abnormalities.  Revealed chronic microvascular ischemic changes    Objective/physical exam:  Vitals:    07/08/22 0456 07/08/22 0713 07/08/22 1036 07/08/22 1133   BP: 134/84 126/74 126/74 127/80   Pulse: 81 78  78   Resp: 17 18  18   Temp: 97.9 °F (36.6 °C) 98.1 °F (36.7 °C)  98.5 °F (36.9 °C)   TempSrc: Oral Oral  Oral   SpO2: 97% 97%  96%   Weight:       Height:         General: In no acute distress, afebrile  Respiratory: Clear to auscultation bilaterally  Cardiovascular: S1, S2, no appreciable murmur  Abdomen: Soft, nontender, BS +  MSK: Warm, no lower extremity edema, no clubbing or cyanosis  Neurologic: Alert and oriented x4, moving all extremities with good  strength     Lab Results   Component Value Date     07/07/2022    K 4.3 07/07/2022    CO2 24 07/07/2022    BUN 10.7 07/07/2022    CREATININE 0.79 07/07/2022    CALCIUM 9.6 07/07/2022    EGFRNONAA >60 07/07/2022      Lab Results   Component Value Date    ALT 19 07/07/2022    AST 18 07/07/2022    ALKPHOS 80 07/07/2022    BILITOT 0.4 07/07/2022      Lab Results   Component Value Date    WBC 4.5 07/07/2022    HGB 13.6 07/07/2022    HCT 39.8 07/07/2022    MCV 89.2 07/07/2022     07/07/2022           Medications:   aspirin  81 mg Oral Daily    atorvastatin  40 mg Oral Daily    azelastine  2 spray Nasal BID    benztropine  1 mg Oral TID    buPROPion  300 mg Oral QAM    carBAMazepine  400 mg Oral BID    cariprazine  1 capsule Oral Daily    clonazePAM  0.5 mg Oral TID    DULoxetine  60 mg Oral BID    enoxaparin  40 mg Subcutaneous Daily    ezetimibe  10 mg Oral Daily    famotidine  20 mg Oral BID    fluticasone propionate  1 spray Each Nostril Nightly    furosemide  20 mg Oral Daily    meloxicam  15 mg Oral Daily    metFORMIN  500 mg Oral Daily    naloxegoL  25 mg Oral Daily    nicotine  1 patch Transdermal Daily    pregabalin  75 mg Oral TID    topiramate  200 mg Oral BID    valACYclovir  500 mg Oral BID      acetaminophen, dextrose 10%, dextrose 10%, glucagon (human recombinant), glucose, glucose, insulin aspart U-100, labetalol, meclizine, melatonin, ondansetron, sodium chloride 0.9%, sodium chloride 0.9%, zolpidem     Assessment/Plan:    TIA/dysarthria - resolving  Off balance/vertigo - improving  Overmedication?  Hyperglycemia, T2DM A1c 6.5    Hx: Copd, Bipolar/depression    Plan:  - Complete stroke work-up.  MRI brain showed no acute changes.  Neurological deficits improving.  Neurology consulted for further evaluation recommendations.  Continue aspirin and statin  - Other home medications were reviewed and renewed  - Counseled on medication adherence, glycemic control.  She was  understanding.  - Continue nicotine patch    PT            Michael Flores MD

## 2022-07-08 NOTE — PLAN OF CARE
Problem: Occupational Therapy  Goal: Occupational Therapy Goal  Description: Goals to be met by: 07/22/2022    Patient will increase functional independence with ADLs by performing:    Pt will be able to complete IADL medication management task w/ 100% accuracy.  Pt will be able to perform tub t/f w/ shower chair and IND.  Pt will be able to perform UE HEP x10 reps w/ IND.    Outcome: Ongoing, Progressing

## 2022-07-08 NOTE — PT/OT/SLP EVAL
"Occupational Therapy   Evaluation    Name: Faith Jin  MRN: [id number]  Admitting Diagnosis:  Dysarthria, vertigo, bipolar/depression  Recent Surgery: * No surgery found *      Recommendations:     Discharge Recommendations: home with home health, Assistance from sister as needed  Discharge Equipment Recommendations:   TBD  Barriers to discharge:   Impaired safety awareness    Assessment:     Faith Jin is a 57 y.o. female with a medical diagnosis of Dysarthria, vertigo, bipolar/depression. Pt lives alone in SS home and is IND at baseline. Performance deficits affecting function: gait instability, decreased coordination, impaired cognition, decreased safety awareness. At this time, pt ambulates w/ IND, some unsteadiness noted. Pt w/ good, symmetric UE strength. Pt reports dull sensation to LUE, minor coordination difficulties to LUE noted, pt reports new worsened blurred vision, and exhibits slowed processing speed w/ some slurred speech. Pt's sister present at bedside, reports she noticed pt presented as "off" a few days ago when pt was observed bumping into objects while walking in home, and exhibiting increased forgetfulness and confusion. Pt presents w/ ability to perform basic ADLs, however concerns lie with difficulty performing higher level cognitive tasks including medication management or simple cooking tasks, impacting overall safety. Pt would benefit from 2-3 additional skilled OT tx sessions in order to address higher level cognitive IADL tasks in order to ensure safety and IND prior to returning home alone. OT recs for d/c include home w/ HH services and assistance from sister/family as needed.    Rehab Prognosis: N/A; patient would benefit from acute skilled OT services to address these deficits and reach maximum level of function.       Plan:     Patient to be seen 2 x/week, 3 x/week to address the above listed problems via therapeutic activities, therapeutic exercises, " "self-care/home management  · Plan of Care Expires: 07/22/22  · Plan of Care Reviewed with: patient, family    Subjective     Chief Complaint: Confusion, headache  Patient/Family Comments/goals: To go home and be safe    Occupational Profile:  Living Environment: Lives alone in  home, 1 small "hump" upon entry, tub/shower combo  Previous level of function: IND   Equipment Used at Home:  cane, straight, shower chair  Assistance upon Discharge: Self, sister as needed    Pain/Comfort:  · Pain Rating 1: 5/10  · Location 1: head  · Pain Addressed 1: Nurse notified, Cessation of Activity    Objective:     Communicated with: RN prior to session.  Patient found gathering grooming items with   sister present upon OT entry to room.    General Precautions: Standard,     Respiratory Status: Room air    Occupational Performance:    Bed Mobility:    · Not performed    Functional Mobility/Transfers:  · Patient completed step t/f to bedside recliner w/ IND and no AD. Pt somewhat unsteady    Activities of Daily Living:  · Grooming: independence to perform hair brushing while standing at sink    Cognitive/Visual Perceptual:  Cognitive/Psychosocial Skills:     -       Oriented to: Person, Place, Time and Situation   -       Follows Commands/attention:Follows two-step commands  -       Safety awareness/insight to disability: intact   -       Slowed processing speed noted   Visual/Perceptual:      -Intact visual tracking, pt reports new worsened blurred vision    Physical Exam:  Balance: -       Good dynamic balance  Sensation:    -       Impaired  light/touch to LUE, reports sensation is dulled  Upper Extremity Range of Motion:     -       Right Upper Extremity: WNL  -       Left Upper Extremity: WNL  Upper Extremity Strength:    -       Right Upper Extremity: 4/5  -       Left Upper Extremity: 4/5  Fine Motor Coordination:    -       Impaired  Left hand, finger to nose ; slow and minimally off target    AMPAC 6 Click ADL:  AMPAC " Total Score: 23    Treatment & Education:  Discussed stroke signs and symptoms w/ pt and pt's sister.  Education:    Patient left up in chair with call button in reach, RN notified and sister present    GOALS:   Multidisciplinary Problems     Occupational Therapy Goals        Problem: Occupational Therapy    Goal Priority Disciplines Outcome Interventions   Occupational Therapy Goal     OT, PT/OT Ongoing, Progressing    Description: Goals to be met by: 07/22/2022    Patient will increase functional independence with ADLs by performing:    Pt will be able to complete IADL medication management task w/ 100% accuracy.  Pt will be able to perform tub t/f w/ shower chair and IND.  Pt will be able to perform UE HEP x10 reps w/ IND.                     History:     Past Medical History:   Diagnosis Date    Bipolar disorder     COPD (chronic obstructive pulmonary disease)     Depression     Lumbar degenerative disc disease        Past Surgical History:   Procedure Laterality Date    ANTERIOR CRUCIATE LIGAMENT REPAIR Left     TOTAL KNEE ARTHROPLASTY Left     WRIST SURGERY Right        Time Tracking:     OT Date of Treatment: 07/08/22  OT Start Time: 1335  OT Stop Time: 1355  OT Total Time (min): 20 min    Billable Minutes:Evaluation 20 Minutes Mod complex    7/8/2022

## 2022-07-08 NOTE — PROCEDURES
Speech Language Pathology Department  Modified Barium Swallow Study    Patient Name:  Faith Jin   MRN:  [id number]  Diagnosis: Dysarthria     Recommendations:     General Recommendations:  Speech/Language and Cognitive Evaluation  Diet recommendations:  Regular, Liquid Diet Level: Thin   Swallow Strategies/Precautions: small bites/sips and slow rate  General Precautions: Standard, aspiration    History:     A Modified Barium Swallow Study was compeleted to assess the efficiency of his/her swallow function, rule out aspiration and make recommendations regarding safe dietary consistencies, effective compensatory strategies, and safe eating environment.     Past Medical History:   Diagnosis Date    Bipolar disorder     COPD (chronic obstructive pulmonary disease)     Depression     Lumbar degenerative disc disease        Home Diet: Regular and thin liquids  Current Method of Nutrition: NPO      Subjective:     Patient awake, alert and oriented x4.      Fluoroscopic Results:     Oral Musculature Evaluation:   Oral Musculature: WFL   Dentition: present and adequate   Secretion Management: adequate   Mucosal Quality: adequate   Mandibular Strength and Mobility: WFL   Oral Labial Strength and Mobility: WFL   Lingual Strength and Mobility: WFL    Visualization  · Patient was seen in the lateral view  · Patient was seen in the anterior view    Oral Phase:    WFL    Pharyngeal Phase:    WFL  Consistency Laryngeal Penetration Aspiration Residue Strategy   Thin via cup none none     Thin via straw none none                                   Cervical Esophageal Phase:    residual material visualized          Assessment:     No laryngeal penetration or aspiration visualized. NO skilled sLP intervention is warranted.     Goals:   Multidisciplinary Problems     SLP Goals     Not on file                Plan:     Patient to be seen:      Plan of Care expires:     Plan of Care reviewed with:  patient   SLP  Follow-Up:  Yes      Time Tracking:     SLP Treatment Date:      Speech Start Time:     Speech Stop Time:        Speech Total Time (min):       Billable minutes: Motion Fluoroscopic Evaluation, Video Recording,    Self Care/Home Management,         07/08/2022

## 2022-07-08 NOTE — PT/OT/SLP EVAL
Physical Therapy Evaluation    Patient Name:  Faith Jin   MRN:  [id number]    Recommendations:     Discharge Recommendations:  home, home health PT   Discharge Equipment Recommendations:  (TBD)   Barriers to discharge: None    Assessment:     Faith Jin is a 57 y.o. female admitted with a medical diagnosis of Dysarthria, generalized weakness, hyperglycemia, possible TIA.  She presents with the following impairments/functional limitations:  weakness, gait instability. Patient willing to participate with PT today. Patient noted to have a few moments of difficulty with processing questions and word finding. From a mobility standpoint, patient is independent with bed mobility and transfers at this time. She ambulates well overall but slight gait instability observed with ambulation without assistive device. Patient has a cane at home and uses it prn. Patient would benefit from 1-2 more acute PT sessions to make sure patient is stable based on admitting neuro symptoms; also for lower extremity strengthening and gait training without AD to ensure safety and stability during ambulation upon discharge and at home.     Rehab Prognosis: Good; patient would benefit from acute skilled PT services to address these deficits and reach maximum level of function.    Recent Surgery: * No surgery found *      Plan:     During this hospitalization, patient to be seen 5 x/week to address the identified rehab impairments via gait training, therapeutic activities, therapeutic exercises, neuromuscular re-education and progress toward the following goals:    · Plan of Care Expires:  07/29/22    Subjective     Chief Complaint: none stated  Patient/Family Comments/goals: return home  Pain/Comfort:  · Pain Rating 1: 0/10  · Pain Rating 2: 0/10    Patients cultural, spiritual, Yazidism conflicts given the current situation: no    Living Environment:  Patient lives alone in first floor apartment unit, no steps to  enter.  Prior to admission, patients level of function was independent with ADLs.  Patient has RW and BSC at home. Equipment used at home: cane, straight.  DME owned (not currently used): none.  Upon discharge, patient will have assistance from self.    Objective:     Communicated with RN prior to session.  Patient found HOB elevated with peripheral IV  upon PT entry to room.    General Precautions: Standard, fall   Orthopedic Precautions:N/A   Braces: N/A  Respiratory Status: Room air    Exams:  · RLE ROM: WFL  · RLE Strength: hip flexors 3+/4, quads 4/5, hamstrings 3+/5, DF 3+/5, PF 3/5 (painful)  · LLE ROM: WFL  · LLE Strength: hip flexors 3+/4, quads 4/5, hamstrings 3+/5, DF 3+/5, PF 3+/5 (painful)    Functional Mobility:  · Bed Mobility:     · Supine to Sit: stand by assistance  · Transfers:     · Sit to Stand:  stand by assistance with rolling walker  · Gait: Patient ambulated 75 feet with RW and 75 feet without AD; decreased gait speed, minimal gait instability observed when ambulating without AD.      AM-PAC 6 CLICK MOBILITY  Total Score:23     Patient left up in chair with all lines intact, call button in reach and MD present.    GOALS:   Multidisciplinary Problems     Physical Therapy Goals        Problem: Physical Therapy    Goal Priority Disciplines Outcome Goal Variances Interventions   Physical Therapy Goal     PT, PT/OT Ongoing, Progressing     Description: Goals to be met by: 2022    Patient will increase functional independence with mobility by performin. Gait  x 400 feet with [place] using No Assistive Device.   2. Increased functional strength to 4/5 to increase and maintain functional independence and safety with daily activities.                     History:     Past Medical History:   Diagnosis Date    Bipolar disorder     COPD (chronic obstructive pulmonary disease)     Depression     Lumbar degenerative disc disease        Past Surgical History:   Procedure Laterality  Date    ANTERIOR CRUCIATE LIGAMENT REPAIR Left     TOTAL KNEE ARTHROPLASTY Left     WRIST SURGERY Right        Time Tracking:     PT Received On: 07/08/22  PT Start Time: 0928     PT Stop Time: 0941  PT Total Time (min): 13 min     Billable Minutes: Evaluation x 13 minutes; moderate complexity      07/08/2022

## 2022-07-08 NOTE — PLAN OF CARE
Problem: Physical Therapy  Goal: Physical Therapy Goal  Description: Goals to be met by: 2022    Patient will increase functional independence with mobility by performin. Gait  x 400 feet with [place] using No Assistive Device.   2. Increased functional strength to 4/5 to increase and maintain functional independence and safety with daily activities.    Outcome: Ongoing, Progressing

## 2022-07-09 LAB
POCT GLUCOSE: 125 MG/DL (ref 70–110)
POCT GLUCOSE: 125 MG/DL (ref 70–110)
POCT GLUCOSE: 145 MG/DL (ref 70–110)
POCT GLUCOSE: 146 MG/DL (ref 70–110)

## 2022-07-09 PROCEDURE — 63600175 PHARM REV CODE 636 W HCPCS: Performed by: INTERNAL MEDICINE

## 2022-07-09 PROCEDURE — 25000003 PHARM REV CODE 250: Performed by: INTERNAL MEDICINE

## 2022-07-09 PROCEDURE — 11000001 HC ACUTE MED/SURG PRIVATE ROOM

## 2022-07-09 PROCEDURE — S4991 NICOTINE PATCH NONLEGEND: HCPCS | Performed by: INTERNAL MEDICINE

## 2022-07-09 RX ORDER — CLONAZEPAM 0.5 MG/1
0.5 TABLET ORAL 2 TIMES DAILY
Status: DISCONTINUED | OUTPATIENT
Start: 2022-07-09 | End: 2022-07-12 | Stop reason: HOSPADM

## 2022-07-09 RX ORDER — BUTALBITAL, ACETAMINOPHEN AND CAFFEINE 50; 325; 40 MG/1; MG/1; MG/1
1 TABLET ORAL EVERY 4 HOURS PRN
Status: DISCONTINUED | OUTPATIENT
Start: 2022-07-09 | End: 2022-07-12 | Stop reason: HOSPADM

## 2022-07-09 RX ADMIN — BUPROPION HYDROCHLORIDE 300 MG: 150 TABLET, FILM COATED, EXTENDED RELEASE ORAL at 06:07

## 2022-07-09 RX ADMIN — DOCUSATE SODIUM 100 MG: 100 CAPSULE, LIQUID FILLED ORAL at 08:07

## 2022-07-09 RX ADMIN — TOPIRAMATE 200 MG: 100 TABLET, FILM COATED ORAL at 08:07

## 2022-07-09 RX ADMIN — ASPIRIN 81 MG: 81 TABLET, COATED ORAL at 08:07

## 2022-07-09 RX ADMIN — PREGABALIN 75 MG: 75 CAPSULE ORAL at 03:07

## 2022-07-09 RX ADMIN — NICOTINE 1 PATCH: 21 PATCH, EXTENDED RELEASE TRANSDERMAL at 08:07

## 2022-07-09 RX ADMIN — MELATONIN TAB 3 MG 6 MG: 3 TAB at 08:07

## 2022-07-09 RX ADMIN — ACETAMINOPHEN 650 MG: 325 TABLET, FILM COATED ORAL at 10:07

## 2022-07-09 RX ADMIN — VALACYCLOVIR HYDROCHLORIDE 500 MG: 500 TABLET, FILM COATED ORAL at 08:07

## 2022-07-09 RX ADMIN — FAMOTIDINE 20 MG: 20 TABLET ORAL at 08:07

## 2022-07-09 RX ADMIN — AZELASTINE HYDROCHLORIDE 274 MCG: 137 SPRAY, METERED NASAL at 08:07

## 2022-07-09 RX ADMIN — MELOXICAM 15 MG: 7.5 TABLET ORAL at 08:07

## 2022-07-09 RX ADMIN — CARBAMAZEPINE 400 MG: 200 TABLET ORAL at 08:07

## 2022-07-09 RX ADMIN — NALOXEGOL OXALATE 25 MG: 25 TABLET, FILM COATED ORAL at 08:07

## 2022-07-09 RX ADMIN — ZOLPIDEM TARTRATE 5 MG: 5 TABLET ORAL at 08:07

## 2022-07-09 RX ADMIN — POLYETHYLENE GLYCOL 3350 17 G: 17 POWDER, FOR SOLUTION ORAL at 08:07

## 2022-07-09 RX ADMIN — BENZTROPINE MESYLATE 1 MG: 1 TABLET ORAL at 03:07

## 2022-07-09 RX ADMIN — METFORMIN HYDROCHLORIDE 500 MG: 500 TABLET, FILM COATED ORAL at 08:07

## 2022-07-09 RX ADMIN — PREGABALIN 75 MG: 75 CAPSULE ORAL at 08:07

## 2022-07-09 RX ADMIN — DULOXETINE 60 MG: 30 CAPSULE, DELAYED RELEASE ORAL at 08:07

## 2022-07-09 RX ADMIN — CLONAZEPAM 0.5 MG: 0.5 TABLET ORAL at 08:07

## 2022-07-09 RX ADMIN — CARIPRAZINE 3 MG: 3 CAPSULE, GELATIN COATED ORAL at 08:07

## 2022-07-09 RX ADMIN — BUTALBITAL, ACETAMINOPHEN, AND CAFFEINE 1 TABLET: 50; 325; 40 TABLET ORAL at 04:07

## 2022-07-09 RX ADMIN — ENOXAPARIN SODIUM 40 MG: 40 INJECTION SUBCUTANEOUS at 04:07

## 2022-07-09 RX ADMIN — BENZTROPINE MESYLATE 1 MG: 1 TABLET ORAL at 08:07

## 2022-07-09 RX ADMIN — FLUTICASONE PROPIONATE 50 MCG: 50 SPRAY, METERED NASAL at 08:07

## 2022-07-09 RX ADMIN — ATORVASTATIN CALCIUM 40 MG: 40 TABLET, FILM COATED ORAL at 08:07

## 2022-07-09 RX ADMIN — EZETIMIBE 10 MG: 10 TABLET ORAL at 08:07

## 2022-07-09 RX ADMIN — FUROSEMIDE 20 MG: 20 TABLET ORAL at 08:07

## 2022-07-09 NOTE — PLAN OF CARE
Problem: Adult Inpatient Plan of Care  Goal: Plan of Care Review  Outcome: Ongoing, Progressing  Goal: Patient-Specific Goal (Individualized)  Outcome: Ongoing, Progressing  Goal: Absence of Hospital-Acquired Illness or Injury  Outcome: Ongoing, Progressing  Intervention: Identify and Manage Fall Risk  Flowsheets (Taken 7/8/2022 1954)  Safety Promotion/Fall Prevention: assistive device/personal item within reach  Intervention: Prevent and Manage VTE (Venous Thromboembolism) Risk  Flowsheets (Taken 7/8/2022 1954)  Activity Management: Ambulated -L4  Range of Motion: active ROM (range of motion) encouraged  Intervention: Prevent Infection  Flowsheets (Taken 7/8/2022 1954)  Infection Prevention: hand hygiene promoted  Goal: Optimal Comfort and Wellbeing  Outcome: Ongoing, Progressing  Intervention: Monitor Pain and Promote Comfort  Flowsheets (Taken 7/8/2022 1954)  Pain Management Interventions: around-the-clock dosing utilized  Intervention: Provide Person-Centered Care  Flowsheets (Taken 7/8/2022 1954)  Trust Relationship/Rapport:   choices provided   questions answered   care explained  Goal: Readiness for Transition of Care  Outcome: Ongoing, Progressing     Problem: Infection  Goal: Absence of Infection Signs and Symptoms  Outcome: Ongoing, Progressing     Problem: Adjustment to Illness (Stroke, Ischemic/Transient Ischemic Attack)  Goal: Optimal Coping  Outcome: Ongoing, Progressing  Intervention: Support Psychosocial Response to Stroke  Flowsheets (Taken 7/8/2022 1954)  Supportive Measures: active listening utilized  Family/Support System Care: self-care encouraged     Problem: Bowel Elimination Impaired (Stroke, Ischemic/Transient Ischemic Attack)  Goal: Effective Bowel Elimination  Outcome: Ongoing, Progressing  Intervention: Promote Effective Bowel Elimination  Flowsheets (Taken 7/8/2022 1954)  Bowel Program: maintenance program followed     Problem: Cerebral Tissue Perfusion (Stroke, Ischemic/Transient  Ischemic Attack)  Goal: Optimal Cerebral Tissue Perfusion  Outcome: Ongoing, Progressing  Intervention: Protect and Optimize Cerebral Perfusion  Flowsheets (Taken 7/8/2022 1954)  Sensory Stimulation Regulation: quiet environment promoted  Cerebral Perfusion Promotion: blood pressure monitored     Problem: Cognitive Impairment (Stroke, Ischemic/Transient Ischemic Attack)  Goal: Optimal Cognitive Function  Outcome: Ongoing, Progressing  Intervention: Optimize Cognitive Function  Flowsheets (Taken 7/8/2022 1954)  Environment Familiarity/Consistency: daily routine followed  Sensory Stimulation Regulation: quiet environment promoted     Problem: Communication Impairment (Stroke, Ischemic/Transient Ischemic Attack)  Goal: Improved Communication Skills  Outcome: Ongoing, Progressing  Intervention: Optimize Communication Skills  Flowsheets (Taken 7/8/2022 1954)  Communication Enhancement Strategies: device use encouraged     Problem: Functional Ability Impaired (Stroke, Ischemic/Transient Ischemic Attack)  Goal: Optimal Functional Ability  Outcome: Ongoing, Progressing  Intervention: Optimize Functional Ability  Flowsheets (Taken 7/8/2022 1954)  Activity Management: Ambulated -L4     Problem: Respiratory Compromise (Stroke, Ischemic/Transient Ischemic Attack)  Goal: Effective Oxygenation and Ventilation  Outcome: Ongoing, Progressing     Problem: Sensorimotor Impairment (Stroke, Ischemic/Transient Ischemic Attack)  Goal: Improved Sensorimotor Function  Outcome: Ongoing, Progressing  Intervention: Optimize Range of Motion, Motor Control and Function  Flowsheets (Taken 7/8/2022 1954)  Range of Motion: active ROM (range of motion) encouraged     Problem: Swallowing Impairment (Stroke, Ischemic/Transient Ischemic Attack)  Goal: Optimal Eating and Swallowing without Aspiration  Outcome: Ongoing, Progressing     Problem: Urinary Elimination Impaired (Stroke, Ischemic/Transient Ischemic Attack)  Goal: Effective Urinary  Elimination  Outcome: Ongoing, Progressing     Problem: Diabetes Comorbidity  Goal: Blood Glucose Level Within Targeted Range  Outcome: Ongoing, Progressing

## 2022-07-09 NOTE — PLAN OF CARE
Problem: Adult Inpatient Plan of Care  Goal: Plan of Care Review  Outcome: Ongoing, Progressing  Goal: Patient-Specific Goal (Individualized)  Outcome: Ongoing, Progressing  Goal: Absence of Hospital-Acquired Illness or Injury  Outcome: Ongoing, Progressing  Intervention: Identify and Manage Fall Risk  Flowsheets (Taken 7/9/2022 1532)  Safety Promotion/Fall Prevention: assistive device/personal item within reach  Intervention: Prevent and Manage VTE (Venous Thromboembolism) Risk  Flowsheets (Taken 7/9/2022 1532)  Activity Management: Ambulated to bathroom - L4  Range of Motion: active ROM (range of motion) encouraged  Goal: Optimal Comfort and Wellbeing  Outcome: Ongoing, Progressing  Intervention: Monitor Pain and Promote Comfort  Flowsheets (Taken 7/9/2022 1532)  Pain Management Interventions: around-the-clock dosing utilized  Intervention: Provide Person-Centered Care  Flowsheets (Taken 7/9/2022 1532)  Trust Relationship/Rapport:   care explained   questions answered   choices provided  Goal: Readiness for Transition of Care  Outcome: Ongoing, Progressing     Problem: Infection  Goal: Absence of Infection Signs and Symptoms  Outcome: Ongoing, Progressing     Problem: Adjustment to Illness (Stroke, Ischemic/Transient Ischemic Attack)  Goal: Optimal Coping  Outcome: Ongoing, Progressing  Intervention: Support Psychosocial Response to Stroke  Flowsheets (Taken 7/9/2022 1532)  Supportive Measures: active listening utilized     Problem: Bowel Elimination Impaired (Stroke, Ischemic/Transient Ischemic Attack)  Goal: Effective Bowel Elimination  Outcome: Ongoing, Progressing     Problem: Cerebral Tissue Perfusion (Stroke, Ischemic/Transient Ischemic Attack)  Goal: Optimal Cerebral Tissue Perfusion  Outcome: Ongoing, Progressing  Intervention: Protect and Optimize Cerebral Perfusion  Flowsheets (Taken 7/9/2022 1532)  Sensory Stimulation Regulation: auditory stimulation minimized  Cerebral Perfusion Promotion: blood  pressure monitored     Problem: Cognitive Impairment (Stroke, Ischemic/Transient Ischemic Attack)  Goal: Optimal Cognitive Function  Outcome: Ongoing, Progressing  Intervention: Optimize Cognitive Function  Flowsheets (Taken 7/9/2022 1532)  Environment Familiarity/Consistency: daily routine followed  Sensory Stimulation Regulation: auditory stimulation minimized     Problem: Communication Impairment (Stroke, Ischemic/Transient Ischemic Attack)  Goal: Improved Communication Skills  Outcome: Ongoing, Progressing     Problem: Functional Ability Impaired (Stroke, Ischemic/Transient Ischemic Attack)  Goal: Optimal Functional Ability  Outcome: Ongoing, Progressing  Intervention: Optimize Functional Ability  Flowsheets (Taken 7/9/2022 1532)  Activity Management: Ambulated to bathroom - L4     Problem: Respiratory Compromise (Stroke, Ischemic/Transient Ischemic Attack)  Goal: Effective Oxygenation and Ventilation  Outcome: Ongoing, Progressing     Problem: Sensorimotor Impairment (Stroke, Ischemic/Transient Ischemic Attack)  Goal: Improved Sensorimotor Function  Outcome: Ongoing, Progressing  Intervention: Optimize Range of Motion, Motor Control and Function  Flowsheets (Taken 7/9/2022 1532)  Range of Motion: active ROM (range of motion) encouraged     Problem: Swallowing Impairment (Stroke, Ischemic/Transient Ischemic Attack)  Goal: Optimal Eating and Swallowing without Aspiration  Outcome: Ongoing, Progressing     Problem: Urinary Elimination Impaired (Stroke, Ischemic/Transient Ischemic Attack)  Goal: Effective Urinary Elimination  Outcome: Ongoing, Progressing     Problem: Diabetes Comorbidity  Goal: Blood Glucose Level Within Targeted Range  Outcome: Ongoing, Progressing

## 2022-07-09 NOTE — PROGRESS NOTES
[facility] Hospital Medicine Progress Note        Chief Complaint: Inpatient Follow-up for TIA    HPI:   Ms. Jin is a 57 year old  female with a history of bipolar disorder, depression, COPD, and degenerative disc disease of the lumbar spine who presented to the ER today with hyperglycemia. She had a blood glucose level of 440 this morning at 11:30 am and she reports slurred speech, generalized weakness, left hand numbness and tingling, and an unsteady gait. She denies any visual changes, headache, facial droop, chest pain, shortness of breath, nausea, vomiting, or abdominal pain. On arrival to the ER she was mildly hypertensive with a blood pressure of 147/83 and hypoxic with an O2 saturation of 78% on room and her initial labwork was unremarkable. Her urine drug screen was positive for opiates and her urinalysis was negative. CT of the head showed no acute intracranial abnormality and CTA of the head and neck revealed no large vessel occlusion. She has received cyclobenzaprine 10 mg PO x 1 and her symptoms have resolved.    Interval Hx:     Events.  Hemodynamically stable.  No worsening neurological deficits but admits gait instability and episode of dizziness.  Comfortably resting in the chair now.  Tolerating diet    Objective/physical exam:  Vitals:    07/08/22 1528 07/08/22 1906 07/08/22 2213 07/09/22 0243   BP: 128/86 125/87 123/74 127/78   Pulse: 86 90 82 69   Resp: 18 20 20 20   Temp: 98.7 °F (37.1 °C) 98.5 °F (36.9 °C) 98.6 °F (37 °C) 97.5 °F (36.4 °C)   TempSrc: Oral Oral Oral Oral   SpO2: 98% 96% 95% (!) 92%   Weight:       Height:         General: In no acute distress, afebrile  Respiratory: Clear to auscultation bilaterally  Cardiovascular: S1, S2, no appreciable murmur  Abdomen: Soft, nontender, BS +  MSK: Warm, no lower extremity edema, no clubbing or cyanosis  Neurologic: Alert and oriented x4, moving all extremities with good strength     Lab Results    Component Value Date     07/07/2022    K 4.3 07/07/2022    CO2 24 07/07/2022    BUN 10.7 07/07/2022    CREATININE 0.79 07/07/2022    CALCIUM 9.6 07/07/2022    EGFRNONAA >60 07/07/2022      Lab Results   Component Value Date    ALT 19 07/07/2022    AST 18 07/07/2022    ALKPHOS 80 07/07/2022    BILITOT 0.4 07/07/2022      Lab Results   Component Value Date    WBC 4.5 07/07/2022    HGB 13.6 07/07/2022    HCT 39.8 07/07/2022    MCV 89.2 07/07/2022     07/07/2022           Medications:   aspirin  81 mg Oral Daily    atorvastatin  40 mg Oral Daily    azelastine  2 spray Nasal BID    benztropine  1 mg Oral TID    buPROPion  300 mg Oral QAM    carBAMazepine  400 mg Oral BID    cariprazine  1 capsule Oral Daily    docusate sodium  100 mg Oral BID    DULoxetine  60 mg Oral BID    enoxaparin  40 mg Subcutaneous Daily    ezetimibe  10 mg Oral Daily    famotidine  20 mg Oral BID    fluticasone propionate  1 spray Each Nostril Nightly    furosemide  20 mg Oral Daily    meloxicam  15 mg Oral Daily    metFORMIN  500 mg Oral Daily    naloxegoL  25 mg Oral Daily    nicotine  1 patch Transdermal Daily    polyethylene glycol  17 g Oral Daily    pregabalin  75 mg Oral TID    topiramate  200 mg Oral BID    valACYclovir  500 mg Oral BID      acetaminophen, dextrose 10%, dextrose 10%, glucagon (human recombinant), glucose, glucose, insulin aspart U-100, labetalol, meclizine, melatonin, ondansetron, sodium chloride 0.9%, sodium chloride 0.9%, zolpidem     Assessment/Plan:    TIA/dysarthria - resolving  Off balance/vertigo - improving  Overmedication?  Hyperglycemia, T2DM A1c 6.5    Hx: Copd, Bipolar/depression    Plan:  - MRI brain showed no acute changes.  Neurological deficits improving.  Neurology consulted for further evaluation recommendations.  Continue aspirin and statin  - Other home medications were reviewed and renewed  - Counseled on medication adherence, glycemic control.   - Continue  nicotine patch     PT        Michael Flores MD

## 2022-07-10 ENCOUNTER — APPOINTMENT (OUTPATIENT)
Dept: CARDIOLOGY | Facility: HOSPITAL | Age: 57
End: 2022-07-10
Attending: INTERNAL MEDICINE
Payer: MEDICARE

## 2022-07-10 PROBLEM — G45.9 TIA (TRANSIENT ISCHEMIC ATTACK): Status: ACTIVE | Noted: 2022-07-10

## 2022-07-10 LAB
AV INDEX (PROSTH): 0.78
AV MEAN GRADIENT: 4 MMHG
AV PEAK GRADIENT: 7 MMHG
AV VALVE AREA: 2.71 CM2
AV VELOCITY RATIO: 0.8
BSA FOR ECHO PROCEDURE: 1.83 M2
CV ECHO LV RWT: 0.43 CM
DOP CALC AO PEAK VEL: 1.29 M/S
DOP CALC AO VTI: 24 CM
DOP CALC LVOT AREA: 3.5 CM2
DOP CALC LVOT DIAMETER: 2.1 CM
DOP CALC LVOT PEAK VEL: 1.03 M/S
DOP CALC LVOT STROKE VOLUME: 65.08 CM3
DOP CALC MV VTI: 15.3 CM
DOP CALCLVOT PEAK VEL VTI: 18.8 CM
E WAVE DECELERATION TIME: 193 MSEC
E/A RATIO: 0.66
E/E' RATIO: 7.65 M/S
ECHO LV POSTERIOR WALL: 0.94 CM (ref 0.6–1.1)
EJECTION FRACTION: 60 %
FRACTIONAL SHORTENING: 20 % (ref 28–44)
INTERVENTRICULAR SEPTUM: 0.99 CM (ref 0.6–1.1)
LEFT ATRIUM SIZE: 3.3 CM
LEFT ATRIUM VOLUME INDEX MOD: 14.6 ML/M2
LEFT ATRIUM VOLUME MOD: 26 CM3
LEFT INTERNAL DIMENSION IN SYSTOLE: 3.47 CM (ref 2.1–4)
LEFT VENTRICLE DIASTOLIC VOLUME INDEX: 32.75 ML/M2
LEFT VENTRICLE DIASTOLIC VOLUME: 58.3 ML
LEFT VENTRICLE MASS INDEX: 77 G/M2
LEFT VENTRICLE SYSTOLIC VOLUME INDEX: 19.5 ML/M2
LEFT VENTRICLE SYSTOLIC VOLUME: 34.72 ML
LEFT VENTRICULAR INTERNAL DIMENSION IN DIASTOLE: 4.34 CM (ref 3.5–6)
LEFT VENTRICULAR MASS: 137.67 G
LV LATERAL E/E' RATIO: 6.5 M/S
LV SEPTAL E/E' RATIO: 9.29 M/S
LVOT MG: 2 MMHG
LVOT MV: 0.67 CM/S
MV MEAN GRADIENT: 2 MMHG
MV PEAK A VEL: 0.99 M/S
MV PEAK E VEL: 0.65 M/S
MV PEAK GRADIENT: 3 MMHG
MV VALVE AREA BY CONTINUITY EQUATION: 4.25 CM2
POCT GLUCOSE: 118 MG/DL (ref 70–110)
POCT GLUCOSE: 139 MG/DL (ref 70–110)
POCT GLUCOSE: 97 MG/DL (ref 70–110)
TDI LATERAL: 0.1 M/S
TDI SEPTAL: 0.07 M/S
TDI: 0.09 M/S
TRICUSPID ANNULAR PLANE SYSTOLIC EXCURSION: 2.07 CM

## 2022-07-10 PROCEDURE — 25000003 PHARM REV CODE 250: Performed by: INTERNAL MEDICINE

## 2022-07-10 PROCEDURE — 93306 TTE W/DOPPLER COMPLETE: CPT | Mod: 26,,, | Performed by: INTERNAL MEDICINE

## 2022-07-10 PROCEDURE — 11000001 HC ACUTE MED/SURG PRIVATE ROOM

## 2022-07-10 PROCEDURE — 93306 TTE W/DOPPLER COMPLETE: CPT

## 2022-07-10 PROCEDURE — 63600175 PHARM REV CODE 636 W HCPCS: Performed by: INTERNAL MEDICINE

## 2022-07-10 PROCEDURE — 93306 ECHO (CUPID ONLY): ICD-10-PCS | Mod: 26,,, | Performed by: INTERNAL MEDICINE

## 2022-07-10 PROCEDURE — S4991 NICOTINE PATCH NONLEGEND: HCPCS | Performed by: INTERNAL MEDICINE

## 2022-07-10 RX ADMIN — BENZTROPINE MESYLATE 1 MG: 1 TABLET ORAL at 04:07

## 2022-07-10 RX ADMIN — DOCUSATE SODIUM 100 MG: 100 CAPSULE, LIQUID FILLED ORAL at 08:07

## 2022-07-10 RX ADMIN — BUPROPION HYDROCHLORIDE 300 MG: 150 TABLET, FILM COATED, EXTENDED RELEASE ORAL at 06:07

## 2022-07-10 RX ADMIN — CARBAMAZEPINE 400 MG: 200 TABLET ORAL at 08:07

## 2022-07-10 RX ADMIN — DOCUSATE SODIUM 100 MG: 100 CAPSULE, LIQUID FILLED ORAL at 09:07

## 2022-07-10 RX ADMIN — NICOTINE 1 PATCH: 21 PATCH, EXTENDED RELEASE TRANSDERMAL at 10:07

## 2022-07-10 RX ADMIN — METFORMIN HYDROCHLORIDE 500 MG: 500 TABLET, FILM COATED ORAL at 10:07

## 2022-07-10 RX ADMIN — VALACYCLOVIR HYDROCHLORIDE 500 MG: 500 TABLET, FILM COATED ORAL at 09:07

## 2022-07-10 RX ADMIN — DULOXETINE 60 MG: 30 CAPSULE, DELAYED RELEASE ORAL at 08:07

## 2022-07-10 RX ADMIN — PREGABALIN 75 MG: 75 CAPSULE ORAL at 08:07

## 2022-07-10 RX ADMIN — VALACYCLOVIR HYDROCHLORIDE 500 MG: 500 TABLET, FILM COATED ORAL at 08:07

## 2022-07-10 RX ADMIN — MELATONIN TAB 3 MG 6 MG: 3 TAB at 08:07

## 2022-07-10 RX ADMIN — FAMOTIDINE 20 MG: 20 TABLET ORAL at 08:07

## 2022-07-10 RX ADMIN — TOPIRAMATE 200 MG: 100 TABLET, FILM COATED ORAL at 08:07

## 2022-07-10 RX ADMIN — CLONAZEPAM 0.5 MG: 0.5 TABLET ORAL at 10:07

## 2022-07-10 RX ADMIN — ACETAMINOPHEN 650 MG: 325 TABLET, FILM COATED ORAL at 09:07

## 2022-07-10 RX ADMIN — ASPIRIN 81 MG: 81 TABLET, COATED ORAL at 10:07

## 2022-07-10 RX ADMIN — ACETAMINOPHEN 650 MG: 325 TABLET, FILM COATED ORAL at 04:07

## 2022-07-10 RX ADMIN — MELOXICAM 15 MG: 7.5 TABLET ORAL at 10:07

## 2022-07-10 RX ADMIN — EZETIMIBE 10 MG: 10 TABLET ORAL at 10:07

## 2022-07-10 RX ADMIN — PREGABALIN 75 MG: 75 CAPSULE ORAL at 04:07

## 2022-07-10 RX ADMIN — TOPIRAMATE 200 MG: 100 TABLET, FILM COATED ORAL at 09:07

## 2022-07-10 RX ADMIN — FUROSEMIDE 20 MG: 20 TABLET ORAL at 10:07

## 2022-07-10 RX ADMIN — BENZTROPINE MESYLATE 1 MG: 1 TABLET ORAL at 09:07

## 2022-07-10 RX ADMIN — FLUTICASONE PROPIONATE 50 MCG: 50 SPRAY, METERED NASAL at 08:07

## 2022-07-10 RX ADMIN — ENOXAPARIN SODIUM 40 MG: 40 INJECTION SUBCUTANEOUS at 04:07

## 2022-07-10 RX ADMIN — DULOXETINE 60 MG: 30 CAPSULE, DELAYED RELEASE ORAL at 09:07

## 2022-07-10 RX ADMIN — AZELASTINE HYDROCHLORIDE 274 MCG: 137 SPRAY, METERED NASAL at 10:07

## 2022-07-10 RX ADMIN — CARIPRAZINE 3 MG: 3 CAPSULE, GELATIN COATED ORAL at 09:07

## 2022-07-10 RX ADMIN — NALOXEGOL OXALATE 25 MG: 25 TABLET, FILM COATED ORAL at 10:07

## 2022-07-10 RX ADMIN — PREGABALIN 75 MG: 75 CAPSULE ORAL at 10:07

## 2022-07-10 RX ADMIN — CLONAZEPAM 0.5 MG: 0.5 TABLET ORAL at 08:07

## 2022-07-10 RX ADMIN — POLYETHYLENE GLYCOL 3350 17 G: 17 POWDER, FOR SOLUTION ORAL at 10:07

## 2022-07-10 RX ADMIN — CARBAMAZEPINE 400 MG: 200 TABLET ORAL at 10:07

## 2022-07-10 RX ADMIN — BENZTROPINE MESYLATE 1 MG: 1 TABLET ORAL at 08:07

## 2022-07-10 RX ADMIN — AZELASTINE HYDROCHLORIDE 274 MCG: 137 SPRAY, METERED NASAL at 08:07

## 2022-07-10 RX ADMIN — FAMOTIDINE 20 MG: 20 TABLET ORAL at 09:07

## 2022-07-10 RX ADMIN — ATORVASTATIN CALCIUM 40 MG: 40 TABLET, FILM COATED ORAL at 10:07

## 2022-07-10 RX ADMIN — ZOLPIDEM TARTRATE 5 MG: 5 TABLET ORAL at 08:07

## 2022-07-10 NOTE — HPI
57 year old female with a past medical history of bipolar disorder, COPD, depression, and degenerative disc disease presented to [facility] ED on 7/6 for unsteady gait and slurred speech. She reported she went to be normal on 7/5. She woke up and felt ok, went to her doctors appointment in the morning. She reports at 11:30 am she had worsening symptoms of unsteady gait, slurred speech, and left hand numbness.  She also reports she has had difficulty word finding x 1 month, chronic LLE weakness, and left hand numbness x 2 weeks; however, symptoms were significantly worse on 7/6 at 11:30 and she went to ED. Upon arrival to ED, 135/93. CT head was negative for acute intracranial abnormalities. She was transferred to [facility] on 7/7. MRI brain was negative for acute infarct. Neurology was consulted for stroke workup.     She reports her symptoms have not resolved, she has persistent left hand numbness and difficulty word finding.

## 2022-07-10 NOTE — CONSULTS
[facility] - 9th Floor Med Surg  Neurology  Consult Note    Patient Name: Faith Jin  MRN: [id number]  Admission Date: 7/6/2022  Hospital Length of Stay: 4 days  Code Status: Full Code   Attending Provider: Jose Pat MD   Consulting Provider: Aminah Pritchett NP  Primary Care Physician: Garrick Nascimento MD  Principal Problem:Dysarthria    Inpatient consult to Vascular (Stroke) Neurology  Consult performed by: Aminah Pritchett NP  Consult ordered by: Larry Mcneal MD         Subjective:     Chief Complaint:  Slurred speech, left hand numbness, LLE weakness      HPI:   57 year old female with a past medical history of bipolar disorder, COPD, depression, and degenerative disc disease presented to [facility] ED on 7/6 for unsteady gait and slurred speech. She reported she went to be normal on 7/5. She woke up and felt ok, went to her doctors appointment in the morning. She reports at 11:30 am she had worsening symptoms of unsteady gait, slurred speech, and left hand numbness.  She also reports she has had difficulty word finding x 1 month, chronic LLE weakness, and left hand numbness x 2 weeks; however, symptoms were significantly worse on 7/6 at 11:30 and she went to ED. Upon arrival to ED, 135/93. CT head was negative for acute intracranial abnormalities. She was transferred to [facility] on 7/7. MRI brain was negative for acute infarct. Neurology was consulted for stroke workup.     She reports her symptoms have not resolved, she has persistent left hand numbness and difficulty word finding.        Past Medical History:   Diagnosis Date    Bipolar disorder     COPD (chronic obstructive pulmonary disease)     Depression     Lumbar degenerative disc disease        Past Surgical History:   Procedure Laterality Date    ANTERIOR CRUCIATE LIGAMENT REPAIR Left     TOTAL KNEE ARTHROPLASTY Left     WRIST SURGERY Right        Review of patient's allergies indicates:   Allergen Reactions     Adhesive      Other reaction(s): skin tears easily with adhesive    Cephalexin Nausea And Vomiting    Erythromycin Nausea And Vomiting    Lithium Nausea And Vomiting    Naproxen Nausea And Vomiting       Current Neurological Medications:     No current facility-administered medications on file prior to encounter.     Current Outpatient Medications on File Prior to Encounter   Medication Sig    acyclovir 5% (ZOVIRAX) 5 % Crea SMARTSIG:Topical 4-5 Times Daily PRN    azelastine (ASTELIN) 137 mcg (0.1 %) nasal spray SMARTSI Spray(s) Both Nares Every Morning    benztropine (COGENTIN) 1 MG tablet Take 1 mg by mouth 3 (three) times daily.    buPROPion (WELLBUTRIN XL) 300 MG 24 hr tablet Take 300 mg by mouth every morning.    carBAMazepine (TEGRETOL XR) 400 MG Tb12 Take 400 mg by mouth 2 (two) times daily.    celecoxib (CELEBREX) 100 MG capsule Take 100 mg by mouth 2 (two) times daily.    clonazePAM (KLONOPIN) 0.5 MG tablet Take 0.5 mg by mouth 3 (three) times daily.    dexlansoprazole (DEXILANT) 60 mg capsule Take 1 capsule by mouth once daily.    DULoxetine (CYMBALTA) 60 MG capsule Take 60 mg by mouth 2 (two) times daily.    eszopiclone (LUNESTA) 2 MG Tab Take 2 mg by mouth nightly as needed.    ezetimibe (ZETIA) 10 mg tablet Take 10 mg by mouth once daily.    famotidine (PEPCID) 20 MG tablet Take 20 mg by mouth daily as needed.    fluticasone propionate (FLONASE) 50 mcg/actuation nasal spray 1 spray by Each Nostril route nightly.    furosemide (LASIX) 20 MG tablet Take 20 mg by mouth once daily.    gabapentin (NEURONTIN) 300 MG capsule Take 300 mg by mouth 3 (three) times daily.    HYDROcodone-acetaminophen (NORCO)  mg per tablet Take 1 tablet by mouth every 8 (eight) hours as needed.    icosapent ethyL (VASCEPA) 1 gram Cap Take 2 capsules by mouth 2 (two) times daily.    meclizine (ANTIVERT) 25 mg tablet Take 25 mg by mouth 3 (three) times daily as needed.    medroxyPROGESTERone  (PROVERA) 10 MG tablet Take 10 mg by mouth once daily.    meloxicam (MOBIC) 7.5 MG tablet Take 15 mg by mouth once daily.    metFORMIN (GLUCOPHAGE) 500 MG tablet Take 500 mg by mouth once daily.    MOVANTIK 25 mg tablet Take 25 mg by mouth once daily.    nicotine (NICODERM CQ) 21 mg/24 hr 1 patch once daily.    paliperidone palmitate (INVEGA SUSTENNA) 234 mg/1.5 mL Syrg injection Inject 234 mg into the muscle.    pregabalin (LYRICA) 75 MG capsule Take 75 mg by mouth 3 (three) times daily.    RESTASIS 0.05 % ophthalmic emulsion Place 1 drop into both eyes 2 (two) times daily.    rosuvastatin (CRESTOR) 40 MG Tab Take 40 mg by mouth nightly.    tiZANidine (ZANAFLEX) 4 MG tablet Take 4 mg by mouth.    topiramate (TOPAMAX) 200 MG Tab Take 200 mg by mouth 2 (two) times daily.    valACYclovir (VALTREX) 500 MG tablet Take 500 mg by mouth 2 (two) times daily.    VRAYLAR 3 mg Cap Take 1 capsule by mouth once daily.     Family History       Problem Relation (Age of Onset)    Coronary artery disease Mother, Father    Diabetes Mother          Tobacco Use    Smoking status: Not on file    Smokeless tobacco: Not on file   Substance and Sexual Activity    Alcohol use: Not on file    Drug use: Not on file    Sexual activity: Not on file     Review of Systems   Musculoskeletal:  Positive for gait problem (unsteady gait).   Neurological:  Positive for speech difficulty and numbness (left hand numbness).   All other systems reviewed and are negative.  Objective:     Vital Signs (Most Recent):  Temp: 97.7 °F (36.5 °C) (07/10/22 0904)  Pulse: 83 (07/10/22 0904)  Resp: 16 (07/10/22 0904)  BP: 116/76 (07/10/22 1009)  SpO2: 98 % (07/10/22 0904) Vital Signs (24h Range):  Temp:  [97.4 °F (36.3 °C)-99 °F (37.2 °C)] 97.7 °F (36.5 °C)  Pulse:  [71-93] 83  Resp:  [16-18] 16  SpO2:  [96 %-98 %] 98 %  BP: (108-129)/(70-82) 116/76     Weight: 75.8 kg (167 lb)  Body mass index is 30.54 kg/m².    Physical Exam  Vitals reviewed.    Constitutional:       General: She is awake. She is not in acute distress.     Appearance: Normal appearance. She is not ill-appearing or toxic-appearing.   Eyes:      Extraocular Movements: EOM normal.      Pupils: Pupils are equal, round, and reactive to light.   Neurological:      Mental Status: She is alert and oriented to person, place, and time.      Motor: Motor strength is normal.      Coordination: Finger-Nose-Finger Test normal.   Psychiatric:         Speech: Speech normal.         Behavior: Behavior is cooperative.       NEUROLOGICAL EXAMINATION:     MENTAL STATUS   Oriented to person, place, and time.   Speech: speech is normal (Occasional slowed responses)  Level of consciousness: alert  Able to name object.     CRANIAL NERVES     CN II   Visual fields full to confrontation.     CN III, IV, VI   Pupils are equal, round, and reactive to light.  Extraocular motions are normal.     CN V   Facial sensation intact.     CN VII   Facial expression full, symmetric.     CN VIII   Hearing: intact    CN XII   Tongue deviation: none    MOTOR EXAM   Muscle bulk: normal  Overall muscle tone: normal    Strength   Strength 5/5 throughout.     SENSORY EXAM   Right arm light touch: normal  Left arm light touch: decreased from elbow  Right leg light touch: normal  Left leg light touch: normal    GAIT AND COORDINATION      Coordination   Finger to nose coordination: normal    Significant Labs:   Recent Lab Results         07/10/22  0532   07/09/22  1940   07/09/22  1630   07/09/22  1146        POCT Glucose 139   125   145   125               Significant Imaging: I have reviewed all pertinent imaging results/findings within the past 24 hours.    MRI brain 7/6/22     FINDINGS:  There is no restricted diffusion, hemorrhage or edema. There are mild scattered T2/FLAIR hyperintensities in the subcortical and periventricular white matter, likely representing chronic microvascular ischemic changes.     There is no mass effect or  midline shift. The basal cisterns are patent. There is mild diffuse parenchymal volume loss. There is no hydrocephalus or abnormal extra-axial fluid collection. The major intracranial flow voids are patent. There is opacification of the left sphenoid sinus.     Impression:     1. No acute intracranial abnormality.  2. Mild chronic microvascular ischemic changes.      CTA head and neck 7/6/22    FINDINGS:  Head CT with contrast:     No interval changes when compared to the previous CT.     No enhancing abnormalities.     If present, stenosis of the carotid bulbs is measured based on NASCET criteria, i.e. area of maximal stenosis compared to the cervical ICA distal to the bulb.     Cervical CTA:     Aortic arch: Patent.  Minimal calcified plaque.  Otherwise no abnormalities.     Common Carotid arteries: Patent, no abnormalities.     Carotid Bulbs: Patent, no abnormalities.     Internal Carotid arteries: Patent, no abnormalities.     Vertebral arteries: Patent, no abnormalities.     Intracranial CTA:     Carotid arteries:     Minimal calcified plaque at the siphons without significant narrowing.     Left A1 segment is hypoplastic.     Otherwise normal A1 and M1 segments.     Vertebrobasilar Circulation:     Vertebral arteries: Patent, no abnormalities.     Basilar artery: Patent, no abnormalities.     Posterior cerebral arteries: Patent, no abnormalities.     Dural venous sinuses: Patent.     Normal Variants:     ACom: Patent.     PComs: Patent, diminutive bilaterally.     Vertebral arteries: Co-dominant.     Additional findings:     No cervical lymphadenopathy.     No acute bony pathology.     Atelectatic changes, otherwise lung apices clear.     Impression:     No large vessel occlusions, critical stenoses, vascular malformations or aneurysms.    Assessment and Plan:     TIA (transient ischemic attack)  Slurred speech, left hand numbness, unsteady gait- ?TIA?    Awaiting ECHO and CUS   Could be TIA although it sounds  like most of her symptoms were chronic and worsened on Wednesday in the setting of hyperglycemia  Would recommend aspirin 81 mg as well as Atorvastatin 40 mg daily     Antithrombotics for secondary stroke prevention: Antiplatelets: Aspirin: 81 mg daily    Statins for secondary stroke prevention and hyperlipidemia, if present:   Statins: Atorvastatin- 40 mg daily    Aggressive risk factor modification: HTN, DM     Rehab efforts: The patient has been evaluated by a stroke team provider and the therapy needs have been fully considered based off the presenting complaints and exam findings. The following therapy evaluations are needed: PT evaluate and treat, OT evaluate and treat, SLP evaluate and treat    Diagnostics ordered/pending: Carotid ultrasound to assess vasculature, CT scan of head without contrast to asses brain parenchyma, CTA Neck/Arch to assess vasculature, HgbA1C to assess blood glucose levels, MRI head without contrast to assess brain parenchyma, TTE to assess cardiac function/status , TSH to assess thyroid function       MRI brain: negative, chronic microvascular changes  CTA head and neck: negative  CT head: negative  A1c: 6.5  LDL: 67  TSH: 1.0353    VTE prophylaxis: Mechanical prophylaxis: Place SCDs    BP parameters: TIA: does not need permissive HTN, ok to normalize BP             VTE Risk Mitigation (From admission, onward)         Ordered     enoxaparin injection 40 mg  Daily         07/06/22 2246     IP VTE HIGH RISK PATIENT  Once         07/06/22 2246     Place sequential compression device  Until discontinued         07/06/22 2246                Thank you for your consult. Further recommendations to follow from MD Aminah Pritchett, NP  Neurology  [facility] - 9th Floor Med Surg

## 2022-07-10 NOTE — PROGRESS NOTES
[facility] Hospital Medicine Progress Note        Chief Complaint: Inpatient Follow-up for TIA    HPI:   Ms. Jin is a 57 year old  female with a history of bipolar disorder, depression, COPD, and degenerative disc disease of the lumbar spine who presented to the ER today with hyperglycemia. She had a blood glucose level of 440 this morning at 11:30 am and she reports slurred speech, generalized weakness, left hand numbness and tingling, and an unsteady gait. She denies any visual changes, headache, facial droop, chest pain, shortness of breath, nausea, vomiting, or abdominal pain. On arrival to the ER she was mildly hypertensive with a blood pressure of 147/83 and hypoxic with an O2 saturation of 78% on room and her initial labwork was unremarkable. Her urine drug screen was positive for opiates and her urinalysis was negative. CT of the head showed no acute intracranial abnormality and CTA of the head and neck revealed no large vessel occlusion. She has received cyclobenzaprine 10 mg PO x 1 and her symptoms have resolved.    Interval Hx:     No acute events overnight.  Comfortably resting.  Has no new complaints or concerns.      Objective/physical exam:  Vitals:    07/09/22 2254 07/10/22 0230 07/10/22 0904 07/10/22 1009   BP: 122/76 108/71 116/76 116/76   BP Location:       Patient Position:       Pulse: 75 71 83    Resp: 18 18 16    Temp: 97.4 °F (36.3 °C) 97.5 °F (36.4 °C) 97.7 °F (36.5 °C)    TempSrc: Oral Oral Oral    SpO2: 96% 97% 98%    Weight:       Height:         General: In no acute distress, afebrile  Respiratory: Clear to auscultation bilaterally  Cardiovascular: S1, S2, no appreciable murmur  Abdomen: Soft, nontender, BS +  MSK: Warm, no lower extremity edema, no clubbing or cyanosis  Neurologic: Alert and oriented x4, moving all extremities with good strength     Lab Results   Component Value Date     07/07/2022    K 4.3 07/07/2022    CO2 24  07/07/2022    BUN 10.7 07/07/2022    CREATININE 0.79 07/07/2022    CALCIUM 9.6 07/07/2022    EGFRNONAA >60 07/07/2022      Lab Results   Component Value Date    ALT 19 07/07/2022    AST 18 07/07/2022    ALKPHOS 80 07/07/2022    BILITOT 0.4 07/07/2022      Lab Results   Component Value Date    WBC 4.5 07/07/2022    HGB 13.6 07/07/2022    HCT 39.8 07/07/2022    MCV 89.2 07/07/2022     07/07/2022           Medications:   aspirin  81 mg Oral Daily    atorvastatin  40 mg Oral Daily    azelastine  2 spray Nasal BID    benztropine  1 mg Oral TID    buPROPion  300 mg Oral QAM    carBAMazepine  400 mg Oral BID    cariprazine  1 capsule Oral Daily    clonazePAM  0.5 mg Oral BID    docusate sodium  100 mg Oral BID    DULoxetine  60 mg Oral BID    enoxaparin  40 mg Subcutaneous Daily    ezetimibe  10 mg Oral Daily    famotidine  20 mg Oral BID    fluticasone propionate  1 spray Each Nostril Nightly    furosemide  20 mg Oral Daily    meloxicam  15 mg Oral Daily    metFORMIN  500 mg Oral Daily    naloxegoL  25 mg Oral Daily    nicotine  1 patch Transdermal Daily    polyethylene glycol  17 g Oral Daily    pregabalin  75 mg Oral TID    topiramate  200 mg Oral BID    valACYclovir  500 mg Oral BID      acetaminophen, butalbital-acetaminophen-caffeine -40 mg, dextrose 10%, dextrose 10%, glucagon (human recombinant), glucose, glucose, insulin aspart U-100, labetalol, meclizine, melatonin, ondansetron, sodium chloride 0.9%, sodium chloride 0.9%, zolpidem     Assessment/Plan:    TIA/dysarthria - resolving  Off balance/vertigo - improving  Overmedication?  Hyperglycemia, T2DM A1c 6.5    Hx: Copd, Bipolar/depression    Plan:  - MRI brain showed no acute changes.  Neurological deficits improving.  Neurology consulted for further evaluation recommendations.  Continue aspirin and statin  - Other home medications were reviewed and renewed  - Counseled on medication adherence, glycemic control.   - Continue  nicotine patch     PT        Michael Flores MD

## 2022-07-10 NOTE — SUBJECTIVE & OBJECTIVE
Past Medical History:   Diagnosis Date    Bipolar disorder     COPD (chronic obstructive pulmonary disease)     Depression     Lumbar degenerative disc disease        Past Surgical History:   Procedure Laterality Date    ANTERIOR CRUCIATE LIGAMENT REPAIR Left     TOTAL KNEE ARTHROPLASTY Left     WRIST SURGERY Right        Review of patient's allergies indicates:   Allergen Reactions    Adhesive      Other reaction(s): skin tears easily with adhesive    Cephalexin Nausea And Vomiting    Erythromycin Nausea And Vomiting    Lithium Nausea And Vomiting    Naproxen Nausea And Vomiting       Current Neurological Medications:     No current facility-administered medications on file prior to encounter.     Current Outpatient Medications on File Prior to Encounter   Medication Sig    acyclovir 5% (ZOVIRAX) 5 % Crea SMARTSIG:Topical 4-5 Times Daily PRN    azelastine (ASTELIN) 137 mcg (0.1 %) nasal spray SMARTSI Spray(s) Both Nares Every Morning    benztropine (COGENTIN) 1 MG tablet Take 1 mg by mouth 3 (three) times daily.    buPROPion (WELLBUTRIN XL) 300 MG 24 hr tablet Take 300 mg by mouth every morning.    carBAMazepine (TEGRETOL XR) 400 MG Tb12 Take 400 mg by mouth 2 (two) times daily.    celecoxib (CELEBREX) 100 MG capsule Take 100 mg by mouth 2 (two) times daily.    clonazePAM (KLONOPIN) 0.5 MG tablet Take 0.5 mg by mouth 3 (three) times daily.    dexlansoprazole (DEXILANT) 60 mg capsule Take 1 capsule by mouth once daily.    DULoxetine (CYMBALTA) 60 MG capsule Take 60 mg by mouth 2 (two) times daily.    eszopiclone (LUNESTA) 2 MG Tab Take 2 mg by mouth nightly as needed.    ezetimibe (ZETIA) 10 mg tablet Take 10 mg by mouth once daily.    famotidine (PEPCID) 20 MG tablet Take 20 mg by mouth daily as needed.    fluticasone propionate (FLONASE) 50 mcg/actuation nasal spray 1 spray by Each Nostril route nightly.    furosemide (LASIX) 20 MG tablet Take 20 mg by mouth once daily.    gabapentin (NEURONTIN) 300 MG capsule  Take 300 mg by mouth 3 (three) times daily.    HYDROcodone-acetaminophen (NORCO)  mg per tablet Take 1 tablet by mouth every 8 (eight) hours as needed.    icosapent ethyL (VASCEPA) 1 gram Cap Take 2 capsules by mouth 2 (two) times daily.    meclizine (ANTIVERT) 25 mg tablet Take 25 mg by mouth 3 (three) times daily as needed.    medroxyPROGESTERone (PROVERA) 10 MG tablet Take 10 mg by mouth once daily.    meloxicam (MOBIC) 7.5 MG tablet Take 15 mg by mouth once daily.    metFORMIN (GLUCOPHAGE) 500 MG tablet Take 500 mg by mouth once daily.    MOVANTIK 25 mg tablet Take 25 mg by mouth once daily.    nicotine (NICODERM CQ) 21 mg/24 hr 1 patch once daily.    paliperidone palmitate (INVEGA SUSTENNA) 234 mg/1.5 mL Syrg injection Inject 234 mg into the muscle.    pregabalin (LYRICA) 75 MG capsule Take 75 mg by mouth 3 (three) times daily.    RESTASIS 0.05 % ophthalmic emulsion Place 1 drop into both eyes 2 (two) times daily.    rosuvastatin (CRESTOR) 40 MG Tab Take 40 mg by mouth nightly.    tiZANidine (ZANAFLEX) 4 MG tablet Take 4 mg by mouth.    topiramate (TOPAMAX) 200 MG Tab Take 200 mg by mouth 2 (two) times daily.    valACYclovir (VALTREX) 500 MG tablet Take 500 mg by mouth 2 (two) times daily.    VRAYLAR 3 mg Cap Take 1 capsule by mouth once daily.     Family History       Problem Relation (Age of Onset)    Coronary artery disease Mother, Father    Diabetes Mother          Tobacco Use    Smoking status: Not on file    Smokeless tobacco: Not on file   Substance and Sexual Activity    Alcohol use: Not on file    Drug use: Not on file    Sexual activity: Not on file     Review of Systems   Musculoskeletal:  Positive for gait problem (unsteady gait).   Neurological:  Positive for speech difficulty and numbness (left hand numbness).   All other systems reviewed and are negative.  Objective:     Vital Signs (Most Recent):  Temp: 97.7 °F (36.5 °C) (07/10/22 0904)  Pulse: 83 (07/10/22 0904)  Resp: 16 (07/10/22  0904)  BP: 116/76 (07/10/22 1009)  SpO2: 98 % (07/10/22 0904) Vital Signs (24h Range):  Temp:  [97.4 °F (36.3 °C)-99 °F (37.2 °C)] 97.7 °F (36.5 °C)  Pulse:  [71-93] 83  Resp:  [16-18] 16  SpO2:  [96 %-98 %] 98 %  BP: (108-129)/(70-82) 116/76     Weight: 75.8 kg (167 lb)  Body mass index is 30.54 kg/m².    Physical Exam  Vitals reviewed.   Constitutional:       General: She is awake. She is not in acute distress.     Appearance: Normal appearance. She is not ill-appearing or toxic-appearing.   Eyes:      Extraocular Movements: EOM normal.      Pupils: Pupils are equal, round, and reactive to light.   Neurological:      Mental Status: She is alert and oriented to person, place, and time.      Motor: Motor strength is normal.      Coordination: Finger-Nose-Finger Test normal.   Psychiatric:         Speech: Speech normal.         Behavior: Behavior is cooperative.       NEUROLOGICAL EXAMINATION:     MENTAL STATUS   Oriented to person, place, and time.   Speech: speech is normal (Occasional slowed responses)  Level of consciousness: alert  Able to name object.     CRANIAL NERVES     CN II   Visual fields full to confrontation.     CN III, IV, VI   Pupils are equal, round, and reactive to light.  Extraocular motions are normal.     CN V   Facial sensation intact.     CN VII   Facial expression full, symmetric.     CN VIII   Hearing: intact    CN XII   Tongue deviation: none    MOTOR EXAM   Muscle bulk: normal  Overall muscle tone: normal    Strength   Strength 5/5 throughout.     SENSORY EXAM   Right arm light touch: normal  Left arm light touch: decreased from elbow  Right leg light touch: normal  Left leg light touch: normal    GAIT AND COORDINATION      Coordination   Finger to nose coordination: normal    Significant Labs:   Recent Lab Results         07/10/22  0532   07/09/22  1940   07/09/22  1630   07/09/22  1146        POCT Glucose 139   125   145   125               Significant Imaging: I have reviewed all  pertinent imaging results/findings within the past 24 hours.    MRI brain 7/6/22     FINDINGS:  There is no restricted diffusion, hemorrhage or edema. There are mild scattered T2/FLAIR hyperintensities in the subcortical and periventricular white matter, likely representing chronic microvascular ischemic changes.     There is no mass effect or midline shift. The basal cisterns are patent. There is mild diffuse parenchymal volume loss. There is no hydrocephalus or abnormal extra-axial fluid collection. The major intracranial flow voids are patent. There is opacification of the left sphenoid sinus.     Impression:     1. No acute intracranial abnormality.  2. Mild chronic microvascular ischemic changes.      CTA head and neck 7/6/22    FINDINGS:  Head CT with contrast:     No interval changes when compared to the previous CT.     No enhancing abnormalities.     If present, stenosis of the carotid bulbs is measured based on NASCET criteria, i.e. area of maximal stenosis compared to the cervical ICA distal to the bulb.     Cervical CTA:     Aortic arch: Patent.  Minimal calcified plaque.  Otherwise no abnormalities.     Common Carotid arteries: Patent, no abnormalities.     Carotid Bulbs: Patent, no abnormalities.     Internal Carotid arteries: Patent, no abnormalities.     Vertebral arteries: Patent, no abnormalities.     Intracranial CTA:     Carotid arteries:     Minimal calcified plaque at the siphons without significant narrowing.     Left A1 segment is hypoplastic.     Otherwise normal A1 and M1 segments.     Vertebrobasilar Circulation:     Vertebral arteries: Patent, no abnormalities.     Basilar artery: Patent, no abnormalities.     Posterior cerebral arteries: Patent, no abnormalities.     Dural venous sinuses: Patent.     Normal Variants:     ACom: Patent.     PComs: Patent, diminutive bilaterally.     Vertebral arteries: Co-dominant.     Additional findings:     No cervical lymphadenopathy.     No acute bony  pathology.     Atelectatic changes, otherwise lung apices clear.     Impression:     No large vessel occlusions, critical stenoses, vascular malformations or aneurysms.

## 2022-07-10 NOTE — ASSESSMENT & PLAN NOTE
Slurred speech, left hand numbness, unsteady gait- ?TIA?    Awaiting ECHO and CUS   Could be TIA although it sounds like most of her symptoms were chronic and worsened on Wednesday in the setting of hyperglycemia  Would recommend aspirin 81 mg as well as Atorvastatin 40 mg daily     Antithrombotics for secondary stroke prevention: Antiplatelets: Aspirin: 81 mg daily    Statins for secondary stroke prevention and hyperlipidemia, if present:   Statins: Atorvastatin- 40 mg daily    Aggressive risk factor modification: HTN, DM     Rehab efforts: The patient has been evaluated by a stroke team provider and the therapy needs have been fully considered based off the presenting complaints and exam findings. The following therapy evaluations are needed: PT evaluate and treat, OT evaluate and treat, SLP evaluate and treat    Diagnostics ordered/pending: Carotid ultrasound to assess vasculature, CT scan of head without contrast to asses brain parenchyma, CTA Neck/Arch to assess vasculature, HgbA1C to assess blood glucose levels, MRI head without contrast to assess brain parenchyma, TTE to assess cardiac function/status , TSH to assess thyroid function       MRI brain: negative, chronic microvascular changes  CTA head and neck: negative  CT head: negative  A1c: 6.5  LDL: 67  TSH: 1.0353    VTE prophylaxis: Mechanical prophylaxis: Place SCDs    BP parameters: TIA: does not need permissive HTN, ok to normalize BP

## 2022-07-11 PROBLEM — G45.9 TIA (TRANSIENT ISCHEMIC ATTACK): Status: RESOLVED | Noted: 2022-07-10 | Resolved: 2022-07-11

## 2022-07-11 PROBLEM — R47.1 DYSARTHRIA: Status: RESOLVED | Noted: 2022-07-06 | Resolved: 2022-07-11

## 2022-07-11 LAB
POCT GLUCOSE: 104 MG/DL (ref 70–110)
POCT GLUCOSE: 109 MG/DL (ref 70–110)
POCT GLUCOSE: 128 MG/DL (ref 70–110)
POCT GLUCOSE: 155 MG/DL (ref 70–110)
POCT GLUCOSE: 94 MG/DL (ref 70–110)

## 2022-07-11 PROCEDURE — 63600175 PHARM REV CODE 636 W HCPCS: Performed by: INTERNAL MEDICINE

## 2022-07-11 PROCEDURE — 97116 GAIT TRAINING THERAPY: CPT | Mod: CQ

## 2022-07-11 PROCEDURE — 97535 SELF CARE MNGMENT TRAINING: CPT

## 2022-07-11 PROCEDURE — 11000001 HC ACUTE MED/SURG PRIVATE ROOM

## 2022-07-11 PROCEDURE — S4991 NICOTINE PATCH NONLEGEND: HCPCS | Performed by: INTERNAL MEDICINE

## 2022-07-11 PROCEDURE — 25000003 PHARM REV CODE 250: Performed by: INTERNAL MEDICINE

## 2022-07-11 PROCEDURE — 92523 SPEECH SOUND LANG COMPREHEN: CPT

## 2022-07-11 RX ORDER — ATORVASTATIN CALCIUM 40 MG/1
40 TABLET, FILM COATED ORAL DAILY
Qty: 90 TABLET | Refills: 3 | Status: SHIPPED | OUTPATIENT
Start: 2022-07-12 | End: 2023-01-26

## 2022-07-11 RX ORDER — ASPIRIN 81 MG/1
81 TABLET ORAL DAILY
Qty: 90 TABLET | Refills: 3 | Status: SHIPPED | OUTPATIENT
Start: 2022-07-12 | End: 2023-04-26

## 2022-07-11 RX ADMIN — NICOTINE 1 PATCH: 21 PATCH, EXTENDED RELEASE TRANSDERMAL at 09:07

## 2022-07-11 RX ADMIN — BENZTROPINE MESYLATE 1 MG: 1 TABLET ORAL at 09:07

## 2022-07-11 RX ADMIN — CARBAMAZEPINE 400 MG: 200 TABLET ORAL at 09:07

## 2022-07-11 RX ADMIN — VALACYCLOVIR HYDROCHLORIDE 500 MG: 500 TABLET, FILM COATED ORAL at 09:07

## 2022-07-11 RX ADMIN — AZELASTINE HYDROCHLORIDE 274 MCG: 137 SPRAY, METERED NASAL at 09:07

## 2022-07-11 RX ADMIN — ENOXAPARIN SODIUM 40 MG: 40 INJECTION SUBCUTANEOUS at 04:07

## 2022-07-11 RX ADMIN — PREGABALIN 75 MG: 75 CAPSULE ORAL at 03:07

## 2022-07-11 RX ADMIN — BUPROPION HYDROCHLORIDE 300 MG: 150 TABLET, FILM COATED, EXTENDED RELEASE ORAL at 06:07

## 2022-07-11 RX ADMIN — ZOLPIDEM TARTRATE 5 MG: 5 TABLET ORAL at 08:07

## 2022-07-11 RX ADMIN — BENZTROPINE MESYLATE 1 MG: 1 TABLET ORAL at 08:07

## 2022-07-11 RX ADMIN — AZELASTINE HYDROCHLORIDE 274 MCG: 137 SPRAY, METERED NASAL at 08:07

## 2022-07-11 RX ADMIN — CLONAZEPAM 0.5 MG: 0.5 TABLET ORAL at 09:07

## 2022-07-11 RX ADMIN — MELOXICAM 15 MG: 7.5 TABLET ORAL at 09:07

## 2022-07-11 RX ADMIN — PREGABALIN 75 MG: 75 CAPSULE ORAL at 09:07

## 2022-07-11 RX ADMIN — TOPIRAMATE 200 MG: 100 TABLET, FILM COATED ORAL at 08:07

## 2022-07-11 RX ADMIN — VALACYCLOVIR HYDROCHLORIDE 500 MG: 500 TABLET, FILM COATED ORAL at 08:07

## 2022-07-11 RX ADMIN — POLYETHYLENE GLYCOL 3350 17 G: 17 POWDER, FOR SOLUTION ORAL at 09:07

## 2022-07-11 RX ADMIN — DOCUSATE SODIUM 100 MG: 100 CAPSULE, LIQUID FILLED ORAL at 09:07

## 2022-07-11 RX ADMIN — CARBAMAZEPINE 400 MG: 200 TABLET ORAL at 08:07

## 2022-07-11 RX ADMIN — TOPIRAMATE 200 MG: 100 TABLET, FILM COATED ORAL at 09:07

## 2022-07-11 RX ADMIN — CARIPRAZINE 3 MG: 3 CAPSULE, GELATIN COATED ORAL at 09:07

## 2022-07-11 RX ADMIN — CLONAZEPAM 0.5 MG: 0.5 TABLET ORAL at 08:07

## 2022-07-11 RX ADMIN — ASPIRIN 81 MG: 81 TABLET, COATED ORAL at 09:07

## 2022-07-11 RX ADMIN — EZETIMIBE 10 MG: 10 TABLET ORAL at 09:07

## 2022-07-11 RX ADMIN — FAMOTIDINE 20 MG: 20 TABLET ORAL at 09:07

## 2022-07-11 RX ADMIN — DULOXETINE 60 MG: 30 CAPSULE, DELAYED RELEASE ORAL at 09:07

## 2022-07-11 RX ADMIN — DOCUSATE SODIUM 100 MG: 100 CAPSULE, LIQUID FILLED ORAL at 08:07

## 2022-07-11 RX ADMIN — FLUTICASONE PROPIONATE 50 MCG: 50 SPRAY, METERED NASAL at 08:07

## 2022-07-11 RX ADMIN — BENZTROPINE MESYLATE 1 MG: 1 TABLET ORAL at 03:07

## 2022-07-11 RX ADMIN — PREGABALIN 75 MG: 75 CAPSULE ORAL at 08:07

## 2022-07-11 RX ADMIN — FAMOTIDINE 20 MG: 20 TABLET ORAL at 08:07

## 2022-07-11 RX ADMIN — METFORMIN HYDROCHLORIDE 500 MG: 500 TABLET, FILM COATED ORAL at 09:07

## 2022-07-11 RX ADMIN — NALOXEGOL OXALATE 25 MG: 25 TABLET, FILM COATED ORAL at 09:07

## 2022-07-11 RX ADMIN — FUROSEMIDE 20 MG: 20 TABLET ORAL at 09:07

## 2022-07-11 RX ADMIN — DULOXETINE 60 MG: 30 CAPSULE, DELAYED RELEASE ORAL at 08:07

## 2022-07-11 RX ADMIN — ATORVASTATIN CALCIUM 40 MG: 40 TABLET, FILM COATED ORAL at 09:07

## 2022-07-11 RX ADMIN — BUTALBITAL, ACETAMINOPHEN, AND CAFFEINE 1 TABLET: 50; 325; 40 TABLET ORAL at 04:07

## 2022-07-11 NOTE — PT/OT/SLP PROGRESS
Occupational Therapy   Treatment    Name: Faith Jin  MRN: [id number]  Admitting Diagnosis:  TIA (transient ischemic attack), dysarthria, vertigo, hx of bipolar/depression       Recommendations:     Discharge Recommendations: home with home health  Discharge Equipment Recommendations:  none  Barriers to discharge:   Impaired cognition    Assessment:     Faith Jin is a 57 y.o. female with a medical diagnosis of TIA (transient ischemic attack), dysarthria, vertigo, hx of bipolar/depression. Performance deficits affecting function are decreased coordination, impaired cognition, decreased safety awareness. Pt cont to demonstrate impaired processing speed and difficulty problem solving. Pt participated in medication management ax, able to follow directions and perform task w/ 100% accuracy. Some fine motor manipulation difficulties noted. Pt participation in education regarding setting reminders for medication and preparing a list of medication type, dosage, and use. Assisted pt in creating alarms on cell phone as reminder to take medication, requiring mod vc to sequence steps then progressing to min vc. Extended time required. Concerns for safety regarding IADL tasks which require higher level cognitive skills upon d/c. Pt's sister present, reports pt's friend will be able to stay w/ pt for some days upon d/c.    Rehab Prognosis:  N/A; patient would benefit from acute skilled OT services to address these deficits and reach maximum level of function.       Plan:     Patient to be seen 2 x/week, 3 x/week to address the above listed problems via self-care/home management, therapeutic activities, therapeutic exercises  · Plan of Care Expires: 07/22/22  · Plan of Care Reviewed with: patient, sibling    Subjective     Pain/Comfort:  · Pain Rating 1: 0/10    Objective:     Communicated with: Rn prior to session.  Patient found HOB elevated with sister present upon OT entry to room.    General Precautions:  Standard,     Respiratory Status: Room air     Occupational Performance:     Bed Mobility:    · Patient completed Supine to Sit with independence  · Patient completed Sit to Supine with independence     Activities of Daily Living:  · Medication management: Pt demonstrated ability to follow directions and appropriately place mock medications w/ 100% accuracy. Some fine motor coordination difficulties noted. Assisted pt w/ setting reminders on cell phone. Pt required mod vc to sequence proper steps to create alarm, progressing to min vc by creation of final alarm. Extended time required.    [facility] 6 Click ADL: 23    Patient left HOB elevated with all lines intact and sister presentEducation:      GOALS:   Multidisciplinary Problems     Occupational Therapy Goals        Problem: Occupational Therapy    Goal Priority Disciplines Outcome Interventions   Occupational Therapy Goal     OT, PT/OT Ongoing, Progressing    Description: Goals to be met by: 07/22/2022    Patient will increase functional independence with ADLs by performing:    Pt will be able to complete IADL medication management task w/ 100% accuracy.  Pt will be able to perform tub t/f w/ shower chair and IND.  Pt will be able to perform UE HEP x10 reps w/ IND.                     Time Tracking:     OT Date of Treatment: 07/11/22  OT Start Time: 1425  OT Stop Time: 1451  OT Total Time (min): 26 min    Billable Minutes:Self Care/Home Management 26 Minutes    OT/ROBSON: OT     ROBSON Visit Number: 1 7/11/2022

## 2022-07-11 NOTE — PROGRESS NOTES
Patient was not seen today, chart reviewed    Possible TIA     Stroke workup complete  -MRI brain: negative, chronic microvascular changes  -CTA head and neck: negative  -CT head: negative  -A1c: 6.5  -LDL: 67  -TSH: 1.0353  -ECHO: BS negative; EF: 60%, normal LA  -CUS: negative (R ICA no stenosis, left ICA less than 50%)    Can follow up with Dr. Betancourt in clinic within 3-4 weeks of discharge  Recommend aspirin 81 mg and atorvastatin 40 mg daily    Signing off, please call with questions, no further recommendations at this time

## 2022-07-11 NOTE — PROGRESS NOTES
[facility] Hospital Medicine Progress Note        Chief Complaint: Inpatient Follow-up for TIA    HPI:   Ms. Jin is a 57 year old  female with a history of bipolar disorder, depression, COPD, and degenerative disc disease of the lumbar spine who presented to the ER today with hyperglycemia. She had a blood glucose level of 440 this morning at 11:30 am and she reports slurred speech, generalized weakness, left hand numbness and tingling, and an unsteady gait. She denies any visual changes, headache, facial droop, chest pain, shortness of breath, nausea, vomiting, or abdominal pain. On arrival to the ER she was mildly hypertensive with a blood pressure of 147/83 and hypoxic with an O2 saturation of 78% on room and her initial labwork was unremarkable. Her urine drug screen was positive for opiates and her urinalysis was negative. CT of the head showed no acute intracranial abnormality and CTA of the head and neck revealed no large vessel occlusion. She has received cyclobenzaprine 10 mg PO x 1 and her symptoms have resolved.    Interval Hx:     No acute events overnight.  Comfortably resting.  Has no new complaints or concerns.      Objective/physical exam:  Vitals:    07/10/22 2333 07/11/22 0241 07/11/22 0737 07/11/22 1133   BP: 120/74 119/79 120/82 118/70   BP Location: Right arm      Patient Position: Lying      Pulse: 74 74 70 91   Resp: 18 20 18 18   Temp: 97.4 °F (36.3 °C) 97.5 °F (36.4 °C) 97.6 °F (36.4 °C) 98.2 °F (36.8 °C)   TempSrc: Oral Oral Oral Oral   SpO2: 96% 98% 97% 98%   Weight:       Height:         General: In no acute distress, afebrile  Respiratory: Clear to auscultation bilaterally  Cardiovascular: S1, S2, no appreciable murmur  Abdomen: Soft, nontender, BS +  MSK: Warm, no lower extremity edema, no clubbing or cyanosis  Neurologic: Alert and oriented x4, moving all extremities with good strength     Lab Results   Component Value Date      07/07/2022    K 4.3 07/07/2022    CO2 24 07/07/2022    BUN 10.7 07/07/2022    CREATININE 0.79 07/07/2022    CALCIUM 9.6 07/07/2022    EGFRNONAA >60 07/07/2022      Lab Results   Component Value Date    ALT 19 07/07/2022    AST 18 07/07/2022    ALKPHOS 80 07/07/2022    BILITOT 0.4 07/07/2022      Lab Results   Component Value Date    WBC 4.5 07/07/2022    HGB 13.6 07/07/2022    HCT 39.8 07/07/2022    MCV 89.2 07/07/2022     07/07/2022           Medications:   aspirin  81 mg Oral Daily    atorvastatin  40 mg Oral Daily    azelastine  2 spray Nasal BID    benztropine  1 mg Oral TID    buPROPion  300 mg Oral QAM    carBAMazepine  400 mg Oral BID    cariprazine  1 capsule Oral Daily    clonazePAM  0.5 mg Oral BID    docusate sodium  100 mg Oral BID    DULoxetine  60 mg Oral BID    enoxaparin  40 mg Subcutaneous Daily    ezetimibe  10 mg Oral Daily    famotidine  20 mg Oral BID    fluticasone propionate  1 spray Each Nostril Nightly    furosemide  20 mg Oral Daily    meloxicam  15 mg Oral Daily    metFORMIN  500 mg Oral Daily    naloxegoL  25 mg Oral Daily    nicotine  1 patch Transdermal Daily    polyethylene glycol  17 g Oral Daily    pregabalin  75 mg Oral TID    topiramate  200 mg Oral BID    valACYclovir  500 mg Oral BID      acetaminophen, butalbital-acetaminophen-caffeine -40 mg, dextrose 10%, dextrose 10%, glucagon (human recombinant), glucose, glucose, insulin aspart U-100, labetalol, meclizine, melatonin, ondansetron, sodium chloride 0.9%, sodium chloride 0.9%, zolpidem     Assessment/Plan:    TIA/dysarthria - resolving  Off balance/vertigo - improving  Overmedication?  Hyperglycemia, T2DM A1c 6.5    Hx: Copd, Bipolar/depression    Plan:  - MRI brain showed no acute changes.  Neurological deficits improving. C/o speech difficulty and instability with headache. Neurology consulted for further evaluation recommendations.  Continue aspirin and statin  - Other home medications  were reviewed and renewed  - Counseled on medication adherence, glycemic control.   - Continue nicotine patch     PT        Michael Flores MD

## 2022-07-11 NOTE — PT/OT/SLP EVAL
"Speech Language Pathology Department  Cognitive-Communication Evaluation    Patient Name:  Faith Jin   MRN:  [id number]  Admitting Diagnosis: TIA (transient ischemic attack)    Recommendations:     General Recommendations:  Speech/language therapy  Communication strategies:  none  Barriers to Discharge:  None     History:     Past Medical History:   Diagnosis Date    Bipolar disorder     COPD (chronic obstructive pulmonary disease)     Depression     Lumbar degenerative disc disease        Past Surgical History:   Procedure Laterality Date    ANTERIOR CRUCIATE LIGAMENT REPAIR Left     TOTAL KNEE ARTHROPLASTY Left     WRIST SURGERY Right      Previous level of Function   Education:college   Occupation: unemployed, disabled   Lives: alone   Handed: Right   Glasses: no   Hearing Aids: no      Subjective     Patient awake, alert and oriented x4.  Patient goals: "to go home"  Pain/Comfort:  · Pain Rating 1: 0/10    Objective:     SPEECH PRODUCTION   Phoneme Production: wfl   Voice Quality: wfl   Voice Production: wfl   Speech Rate: wfl   Loudness: wfl     Speech Intelligibility  Known Context: Greater that 90%  Unknown Context: Greater that 90%    AUDITORY COMPREHENSION  Identification:   Body parts: 1005   Objects: 100%  Following Directions:   1-Step: 100%   2-Step: 100%  Yes/No Questions:   Biographical: 100%   Environmental: 100%    VERBAL EXPRESSION  Automatic Speech:   Functional needs: 100%   Days of the week: 100%   Months of the year: 100%  Phrase Completion: 100%  Confrontation Naming   Body Parts: 100%   Objects: 100%  Wh- Questions:   Object name: 100%   Object function: 100%    COGNITION  Orientation:   Person: 100%   Place: 100%  Memory:   Immediate: 100%   Short Term: 100%  Problem Solving   Functional simple: 80%  Organization:   Convergent thinkin%   Divergent thinkin%  Executive Function:   Awareness: 70   Reasonin    Assessment:   Pt " presents with impaired cognitive linguistic skill warranting skilled SLP intervention.     Goals:   Multidisciplinary Problems     SLP Goals        Problem: SLP    Goal Priority Disciplines Outcome   SLP Goal     SLP    Description: LTG:   Increase functional cognitive-linguistic skills to increase safety awareness and independence to return to PLOF.   ST. Increase verbal associations with minimcally cueing and 80% accuracy.   2. Increase simple problem solving accuracy with minimal cueing at 70% accuracy.  3. Increase word finding in conversation to 80% accuracy.                      Plan:     Patient to be seen:      Plan of Care expires:     Plan of Care reviewed with:  patient   SLP Follow-Up:  Yes       Time Tracking:     SLP Treatment Date:   22  Speech Start Time:     Speech Stop Time:        Speech Total Time (min):       Billable minutes:  Evaluation of Speech Sound Production with Comprehension and Expression,         2022

## 2022-07-11 NOTE — PT/OT/SLP PROGRESS
Physical Therapy         Treatment        Faith Jin   MRN: [id number]     PT Received On: 22  PT Start Time: 952     PT Stop Time: 1000    PT Total Time (min): 8 min       Billable Minutes:  Gait Training8  Total Minutes: 8    Treatment Type: Treatment  PT/PTA: PTA     PTA Visit Number: 1       General Precautions: Standard, fall  Orthopedic Precautions: Orthopedic Precautions : N/A   Braces:      Spiritual, Cultural Beliefs, Voodoo Practices, Values that Affect Care: no    Objective:  Patient found in chair upon entry.    Functional Mobility:    Gait Training:  Patient gait trained 600  feet on level tile with No Assistive Device with Independent.  Pt presents with steady jamarcus and no LOB    Activity Tolerance:  Patient tolerated treatment well    Patient left up in chair with call button in reach.    Assessment:  Faith Jin is a 57 y.o. female with a medical diagnosis of TIA (transient ischemic attack). Pt is back to baseline and is appropriate for DC from PT services.     Activity tolerance: Excellent    Discharge recommendations: Home    Equipment recommendations: none    GOALS:   Multidisciplinary Problems     Physical Therapy Goals        Problem: Physical Therapy    Goal Priority Disciplines Outcome Goal Variances Interventions   Physical Therapy Goal     PT, PT/OT Ongoing, Progressing     Description: Goals to be met by: 2022    Patient will increase functional independence with mobility by performin. Gait  x 400 feet with [place] using No Assistive Device.   2. Increased functional strength to 4/5 to increase and maintain functional independence and safety with daily activities.                     PLAN:    Patient to be seen 5 x/week  to address the above listed problems via gait training  Plan of Care expires: 22  Plan of Care reviewed with: patient         2022

## 2022-07-12 VITALS
DIASTOLIC BLOOD PRESSURE: 82 MMHG | HEART RATE: 101 BPM | BODY MASS INDEX: 30.83 KG/M2 | OXYGEN SATURATION: 97 % | TEMPERATURE: 98 F | RESPIRATION RATE: 18 BRPM | HEIGHT: 62 IN | WEIGHT: 167.56 LBS | SYSTOLIC BLOOD PRESSURE: 151 MMHG

## 2022-07-12 LAB — POCT GLUCOSE: 161 MG/DL (ref 70–110)

## 2022-07-12 PROCEDURE — 25000003 PHARM REV CODE 250: Performed by: INTERNAL MEDICINE

## 2022-07-12 PROCEDURE — S4991 NICOTINE PATCH NONLEGEND: HCPCS | Performed by: INTERNAL MEDICINE

## 2022-07-12 RX ADMIN — CARIPRAZINE 3 MG: 3 CAPSULE, GELATIN COATED ORAL at 09:07

## 2022-07-12 RX ADMIN — BENZTROPINE MESYLATE 1 MG: 1 TABLET ORAL at 09:07

## 2022-07-12 RX ADMIN — ATORVASTATIN CALCIUM 40 MG: 40 TABLET, FILM COATED ORAL at 09:07

## 2022-07-12 RX ADMIN — POLYETHYLENE GLYCOL 3350 17 G: 17 POWDER, FOR SOLUTION ORAL at 09:07

## 2022-07-12 RX ADMIN — AZELASTINE HYDROCHLORIDE 274 MCG: 137 SPRAY, METERED NASAL at 09:07

## 2022-07-12 RX ADMIN — FAMOTIDINE 20 MG: 20 TABLET ORAL at 09:07

## 2022-07-12 RX ADMIN — MELOXICAM 15 MG: 7.5 TABLET ORAL at 09:07

## 2022-07-12 RX ADMIN — BUPROPION HYDROCHLORIDE 300 MG: 150 TABLET, FILM COATED, EXTENDED RELEASE ORAL at 06:07

## 2022-07-12 RX ADMIN — CARBAMAZEPINE 400 MG: 200 TABLET ORAL at 09:07

## 2022-07-12 RX ADMIN — CLONAZEPAM 0.5 MG: 0.5 TABLET ORAL at 09:07

## 2022-07-12 RX ADMIN — NICOTINE 1 PATCH: 21 PATCH, EXTENDED RELEASE TRANSDERMAL at 09:07

## 2022-07-12 RX ADMIN — ASPIRIN 81 MG: 81 TABLET, COATED ORAL at 09:07

## 2022-07-12 RX ADMIN — TOPIRAMATE 200 MG: 100 TABLET, FILM COATED ORAL at 09:07

## 2022-07-12 RX ADMIN — DULOXETINE 60 MG: 30 CAPSULE, DELAYED RELEASE ORAL at 09:07

## 2022-07-12 RX ADMIN — FUROSEMIDE 20 MG: 20 TABLET ORAL at 09:07

## 2022-07-12 RX ADMIN — PREGABALIN 75 MG: 75 CAPSULE ORAL at 09:07

## 2022-07-12 RX ADMIN — METFORMIN HYDROCHLORIDE 500 MG: 500 TABLET, FILM COATED ORAL at 09:07

## 2022-07-12 RX ADMIN — BUTALBITAL, ACETAMINOPHEN, AND CAFFEINE 1 TABLET: 50; 325; 40 TABLET ORAL at 09:07

## 2022-07-12 RX ADMIN — NALOXEGOL OXALATE 25 MG: 25 TABLET, FILM COATED ORAL at 09:07

## 2022-07-12 RX ADMIN — VALACYCLOVIR HYDROCHLORIDE 500 MG: 500 TABLET, FILM COATED ORAL at 09:07

## 2022-07-12 RX ADMIN — EZETIMIBE 10 MG: 10 TABLET ORAL at 09:07

## 2022-07-12 RX ADMIN — DOCUSATE SODIUM 100 MG: 100 CAPSULE, LIQUID FILLED ORAL at 09:07

## 2022-07-12 NOTE — PLAN OF CARE
07/12/22 0903   Discharge Assessment   Assessment Type Discharge Planning Assessment   Confirmed/corrected address, phone number and insurance Yes   Source of Information patient   When was your last doctors appointment?   (Dr Garrick Nascimento, 2-3 weeks ago)   Communicated CONOR with patient/caregiver Yes   Reason For Admission elevated blood sugars   Lives With alone   Do you expect to return to your current living situation? Yes   Do you have help at home or someone to help you manage your care at home? Yes   Who are your caregiver(s) and their phone number(s)? friend/neighbor Dilia, sister Hailey Cheema [phone]   Prior to hospitilization cognitive status: Unable to Assess   Current cognitive status: Alert/Oriented   Walking or Climbing Stairs Difficulty none   Dressing/Bathing Difficulty none   Do you have any problems with:   (patient does not drive, friends and family provide transportation as needed)   Equipment Currently Used at Home glucometer;walker, rolling;cane, quad;bath bench   Readmission within 30 days? No   Patient currently being followed by outpatient case management? No   Do you currently have service(s) that help you manage your care at home? No   Do you take prescription medications? Yes   Do you have prescription coverage? Yes   Do you have any problems affording any of your prescribed medications? No   Is the patient taking medications as prescribed? yes  (with reminders)   Who is going to help you get home at discharge? Sister Hailey   How do you get to doctors appointments? family or friend will provide   Are you on dialysis? No   Do you take coumadin? No   Discharge Plan A Home with family   Discharge Plan B Home   DME Needed Upon Discharge  none   Discharge Plan discussed with: Friend;Patient   Name(s) and Number(s) Dilia   Discharge Barriers Identified None   No DME/HHC needs at this time

## 2022-07-12 NOTE — DISCHARGE SUMMARY
[facility] - 9th Floor Med Surg  [facility]  Discharge Summary      Patient Name: Faith Jin  MRN: [id number]  Patient Class: IP- Inpatient  Admission Date: 7/6/2022  Hospital Length of Stay: 6 days  Discharge Date and Time:  07/12/2022 12:20 PM  Attending Physician: Jose Pat MD   Discharging Provider: Michael Flores MD  Primary Care Provider: Garrick Nascimento MD      HPI:     Ms. Jin is a 57 year old  female with a history of bipolar disorder, depression, COPD, and degenerative disc disease of the lumbar spine who presented to the ERwith hyperglycemia, TIA like symptoms. She had a blood glucose level of 440 at 11:30 am and she reports slurred speech, generalized weakness, left hand numbness and tingling, and an unsteady gait. She denied any visual changes, headache, facial droop, chest pain, shortness of breath, nausea, vomiting, or abdominal pain.     On arrival to the ER she was mildly hypertensive with a blood pressure of 147/83 and hypoxic with an O2 saturation of 78% on room and her initial labwork was unremarkable. Her urine drug screen was positive for opiates and her urinalysis was negative. CT of the head showed no acute intracranial abnormality and CTA of the head and neck revealed no large vessel occlusion. She has received cyclobenzaprine 10 mg PO x 1 and her symptoms have resolved. Admitted to hospital medicine service for further evaluation and management.  Neurology was consulted.    MRI brain revealed chronic microvascular changes with no acute changes.  CTA head was negative.  TTE revealed negative bubble study, intact EF.  CVS was unremarkable.  A1c was 6.5.  All neurological deficits had resolved.  Neurology cleared for discharge and recommended outpatient follow-up in 3 to 4 weeks.  Suggested to continue aspirin and statin.  Prior to discharge all her medications were reconciled and necessary prescriptions were provided.  Encourage patient to  follow with PCP closely for management of hypertension and diabetes.  She verbalized understanding of all the recommendations and agreed to follow.    TIA/dysarthria - resolving  Off balance/vertigo - improving  Overmedication?  Hyperglycemia, T2DM A1c 6.5     Hx: Copd, Bipolar/depression    Goals of Care Treatment Preferences:  Code Status: Full Code      Consults:   Consults (From admission, onward)        Status Ordering Provider     Inpatient consult to Vascular (Stroke) Neurology  Once        Provider:  Guevara Collier MD    Completed BORIS FLORES     IP consult to case management/social work  Once        Provider:  (Not yet assigned)    Acknowledged BORIS FLORES          No new Assessment & Plan notes have been filed under this hospital service since the last note was generated.  Service: Hospital Medicine    Final Active Diagnoses:    Diagnosis Date Noted POA    PRINCIPAL PROBLEM:  TIA (transient ischemic attack) [G45.9] 07/10/2022 Yes    Generalized weakness [R53.1] 07/06/2022 Yes    Type II diabetes mellitus [E11.9] 07/06/2022 Yes      Problems Resolved During this Admission:    Diagnosis Date Noted Date Resolved POA    Dysarthria [R47.1] 07/06/2022 07/11/2022 Yes       Discharged Condition: good    Disposition: Home or Self Care    Follow Up:   Follow-up Information     Garrick Nascimento MD Follow up in 1 week(s).    Specialty: Family Medicine  Contact information:  [address] [facility] [address] [address] [phone]             Fernando Betancourt MD Follow up in 3 week(s).    Specialty: Neurology  Contact information:  [address]  [address]  [place] [address]  [phone]                       Patient Instructions:   No discharge procedures on file.    Significant Diagnostic Studies: Labs: CMP No results for input(s): NA, K, CL, CO2, GLU, BUN, CREATININE, CALCIUM, PROT, ALBUMIN, BILITOT, ALKPHOS, AST, ALT, ANIONGAP, ESTGFRAFRICA, EGFRNONAA in the last 48 hours.    Pending Diagnostic  Studies:     None         Medications:  Reconciled Home Medications:      Medication List      START taking these medications    aspirin 81 MG EC tablet  Commonly known as: ECOTRIN  Take 1 tablet (81 mg total) by mouth once daily.     atorvastatin 40 MG tablet  Commonly known as: LIPITOR  Take 1 tablet (40 mg total) by mouth once daily.        CHANGE how you take these medications    tiZANidine 4 MG tablet  Commonly known as: ZANAFLEX  Take 4 mg by mouth.  What changed: Another medication with the same name was removed. Continue taking this medication, and follow the directions you see here.        CONTINUE taking these medications    acyclovir 5% 5 % Crea  Commonly known as: ZOVIRAX  SMARTSIG:Topical 4-5 Times Daily PRN     azelastine 137 mcg (0.1 %) nasal spray  Commonly known as: ASTELIN  SMARTSI Spray(s) Both Nares Every Morning     benztropine 1 MG tablet  Commonly known as: COGENTIN  Take 1 mg by mouth 3 (three) times daily.     buPROPion 300 MG 24 hr tablet  Commonly known as: WELLBUTRIN XL  Take 300 mg by mouth every morning.     carBAMazepine 400 MG Tb12  Commonly known as: TEGRETOL XR  Take 400 mg by mouth 2 (two) times daily.     clonazePAM 0.5 MG tablet  Commonly known as: KlonoPIN  Take 0.5 mg by mouth 3 (three) times daily.     dexlansoprazole 60 mg capsule  Commonly known as: DEXILANT  Take 1 capsule by mouth once daily.     DULoxetine 60 MG capsule  Commonly known as: CYMBALTA  Take 60 mg by mouth 2 (two) times daily.     eszopiclone 2 MG Tab  Commonly known as: LUNESTA  Take 2 mg by mouth nightly as needed.     ezetimibe 10 mg tablet  Commonly known as: ZETIA  Take 10 mg by mouth once daily.     famotidine 20 MG tablet  Commonly known as: PEPCID  Take 20 mg by mouth daily as needed.     fluticasone propionate 50 mcg/actuation nasal spray  Commonly known as: FLONASE  1 spray by Each Nostril route nightly.     furosemide 20 MG tablet  Commonly known as: LASIX  Take 20 mg by mouth once daily.      gabapentin 300 MG capsule  Commonly known as: NEURONTIN  Take 300 mg by mouth 3 (three) times daily.     HYDROcodone-acetaminophen  mg per tablet  Commonly known as: NORCO  Take 1 tablet by mouth every 8 (eight) hours as needed.     icosapent ethyL 1 gram Cap  Commonly known as: VASCEPA  Take 2 capsules by mouth 2 (two) times daily.     INVEGA SUSTENNA 234 mg/1.5 mL Syrg injection  Generic drug: paliperidone palmitate  Inject 234 mg into the muscle.     meclizine 25 mg tablet  Commonly known as: ANTIVERT  Take 25 mg by mouth 3 (three) times daily as needed.     medroxyPROGESTERone 10 MG tablet  Commonly known as: PROVERA  Take 10 mg by mouth once daily.     meloxicam 7.5 MG tablet  Commonly known as: MOBIC  Take 15 mg by mouth once daily.     metFORMIN 500 MG tablet  Commonly known as: GLUCOPHAGE  Take 500 mg by mouth once daily.     MOVANTIK 25 mg tablet  Generic drug: naloxegoL  Take 25 mg by mouth once daily.     nicotine 21 mg/24 hr  Commonly known as: NICODERM CQ  1 patch once daily.     pregabalin 75 MG capsule  Commonly known as: LYRICA  Take 75 mg by mouth 3 (three) times daily.     RESTASIS 0.05 % ophthalmic emulsion  Generic drug: cycloSPORINE  Place 1 drop into both eyes 2 (two) times daily.     topiramate 200 MG Tab  Commonly known as: TOPAMAX  Take 200 mg by mouth 2 (two) times daily.     valACYclovir 500 MG tablet  Commonly known as: VALTREX  Take 500 mg by mouth 2 (two) times daily.     VRAYLAR 3 mg Cap  Generic drug: cariprazine  Take 1 capsule by mouth once daily.        STOP taking these medications    celecoxib 100 MG capsule  Commonly known as: CeleBREX     rosuvastatin 40 MG Tab  Commonly known as: CRESTOR            Indwelling Lines/Drains at time of discharge:   Lines/Drains/Airways     None                 Time spent on the discharge of patient: 35 minutes         Michael Flores MD  Department of Hospital Medicine  [facility] - 9th Floor Med Surg

## 2022-07-13 ENCOUNTER — PATIENT OUTREACH (OUTPATIENT)
Dept: ADMINISTRATIVE | Facility: CLINIC | Age: 57
End: 2022-07-13
Payer: MEDICARE

## 2022-07-13 RX ORDER — TIZANIDINE 4 MG/1
TABLET ORAL
Qty: 90 TABLET | Refills: 1 | Status: SHIPPED | OUTPATIENT
Start: 2022-07-13 | End: 2022-09-13 | Stop reason: SDUPTHER

## 2022-07-13 NOTE — PROGRESS NOTES
Spoke to Faith Jin, she doesn't have her medications with her at present and will call me back to complete the call, CB # provided

## 2022-07-14 NOTE — PROGRESS NOTES
C3 nurse spoke with Faith Jin for a TCC post hospital discharge follow up call, call completed. The patient has a scheduled HOSFU appointment with Garrick Nascimento MD on 7/15/22 @ 9:20am.

## 2022-07-15 LAB
LEFT CCA DIST DIAS: 15 CM/S
LEFT CCA DIST SYS: 75 CM/S
LEFT CCA PROX DIAS: 20 CM/S
LEFT CCA PROX SYS: 103 CM/S
LEFT ECA DIAS: 17 CM/S
LEFT ECA SYS: 108 CM/S
LEFT ICA DIST DIAS: 19 CM/S
LEFT ICA DIST SYS: 71 CM/S
LEFT ICA MID DIAS: 14 CM/S
LEFT ICA MID SYS: 63 CM/S
LEFT ICA PROX DIAS: 15 CM/S
LEFT ICA PROX SYS: 62 CM/S
LEFT VERTEBRAL SYS: 39 CM/S
OHS CV CAROTID RIGHT ICA EDV HIGHEST: 25
OHS CV CAROTID ULTRASOUND LEFT ICA/CCA RATIO: 0.95
OHS CV CAROTID ULTRASOUND RIGHT ICA/CCA RATIO: 1.12
OHS CV PV CAROTID LEFT HIGHEST CCA: 103
OHS CV PV CAROTID LEFT HIGHEST ICA: 71
OHS CV PV CAROTID RIGHT HIGHEST CCA: 101
OHS CV PV CAROTID RIGHT HIGHEST ICA: 84
OHS CV US CAROTID LEFT HIGHEST EDV: 19
RIGHT CCA DIST DIAS: 17 CM/S
RIGHT CCA DIST SYS: 75 CM/S
RIGHT CCA PROX DIAS: 19 CM/S
RIGHT CCA PROX SYS: 101 CM/S
RIGHT ECA DIAS: 16 CM/S
RIGHT ECA SYS: 107 CM/S
RIGHT ICA DIST DIAS: 25 CM/S
RIGHT ICA DIST SYS: 84 CM/S
RIGHT ICA MID DIAS: 22 CM/S
RIGHT ICA MID SYS: 80 CM/S
RIGHT ICA PROX DIAS: 16 CM/S
RIGHT ICA PROX SYS: 81 CM/S
RIGHT VERTEBRAL SYS: 65 CM/S

## 2022-07-19 ENCOUNTER — TELEPHONE (OUTPATIENT)
Dept: NEUROLOGY | Facility: CLINIC | Age: 57
End: 2022-07-19
Payer: MEDICARE

## 2022-07-19 NOTE — TELEPHONE ENCOUNTER
All stroke work up was negative. TIA would not be causing persistent dysarthria. MRI did not have any stroke. She should follow up with her PCP to rule out any infection such as a UTI or any hyperglycemia that may be causing these symptoms to persist.  Patient will solely be following up for TIA follow up. She will need a new referral from PCP if there are any concerns of dementia.

## 2022-07-19 NOTE — TELEPHONE ENCOUNTER
Pt's sister Hailey is calling w/ concerns regarding pt's memory.  Pt was hospitalized on 07/06/22 due to slurred speech and was diagnosed w/ TIA and dysarthria.  She reports the pt's speech has not improved and pt is experiencing difficulties w/ short term memory.  She is unsure if this could be due to the TIA or if it could potentially be the beginning of dementia.  Pt does have a follow up w/ Jayna on 07/26/22.

## 2022-07-28 ENCOUNTER — PATIENT OUTREACH (OUTPATIENT)
Dept: ADMINISTRATIVE | Facility: HOSPITAL | Age: 57
End: 2022-07-28
Payer: MEDICARE

## 2022-07-28 ENCOUNTER — OFFICE VISIT (OUTPATIENT)
Dept: NEUROLOGY | Facility: CLINIC | Age: 57
End: 2022-07-28
Payer: MEDICARE

## 2022-07-28 VITALS
HEIGHT: 62 IN | DIASTOLIC BLOOD PRESSURE: 68 MMHG | BODY MASS INDEX: 30.73 KG/M2 | SYSTOLIC BLOOD PRESSURE: 116 MMHG | WEIGHT: 167 LBS

## 2022-07-28 DIAGNOSIS — M54.16 LUMBAR RADICULOPATHY: Primary | ICD-10-CM

## 2022-07-28 DIAGNOSIS — G45.9 TIA (TRANSIENT ISCHEMIC ATTACK): ICD-10-CM

## 2022-07-28 PROCEDURE — 99999 PR PBB SHADOW E&M-EST. PATIENT-LVL V: CPT | Mod: PBBFAC,,, | Performed by: NURSE PRACTITIONER

## 2022-07-28 PROCEDURE — 99214 OFFICE O/P EST MOD 30 MIN: CPT | Mod: S$PBB,,, | Performed by: NURSE PRACTITIONER

## 2022-07-28 PROCEDURE — 99215 OFFICE O/P EST HI 40 MIN: CPT | Mod: PBBFAC | Performed by: NURSE PRACTITIONER

## 2022-07-28 PROCEDURE — 99214 PR OFFICE/OUTPT VISIT, EST, LEVL IV, 30-39 MIN: ICD-10-PCS | Mod: S$PBB,,, | Performed by: NURSE PRACTITIONER

## 2022-07-28 PROCEDURE — 99999 PR PBB SHADOW E&M-EST. PATIENT-LVL V: ICD-10-PCS | Mod: PBBFAC,,, | Performed by: NURSE PRACTITIONER

## 2022-07-28 NOTE — PATIENT INSTRUCTIONS
Continue with Aspirin and Atorvastatin for secondary stroke prevention  Discussed Epidural steroid injections for lumbar spine disc bulging/radiculopathy. Asked to ok with Dr. Perry  Encouraged close monitoring of DM/sugars. Elevated or decreased sugars may resemble stroke symptoms

## 2022-07-28 NOTE — PROGRESS NOTES
Population Health Outreach. The following record(s)  below were uploaded for Health Maintenance .               PAP SMEAR/HPV  03/11/2021

## 2022-07-28 NOTE — PROGRESS NOTES
Subjective:       Patient ID: Faith Jin is a 57 y.o. female.    Chief Complaint:  Transient Ischemic Attack (F/u for TIA on 7/6/2022. Patient reports daily headaches, dizziness, some slurred speech. Denies blurred vision.) and Peripheral Neuropathy (F/u for neuropathy in bilateral feet. Patient reports neuropathy is well controlled with lyrica and gabapentin.)      History of Present Illness  Patient presents for follow up after TIA on July 6, 2022. Patient originally presented to [facility] ED with reports of unsteady gait and slurred speech. States the morning of 7/6 she went to her psychiatrist appointment and felt ok. After visit reports she had worsening of her gait and slurred speech with left arm numbness. CT head, MRI brain negative for stroke. CUS revealed no significant stenosis. Echo showed EF of 60%. Patient was hyperglycemic at time of admit. Reports she sees Dr. Perry next week for diabetic management. States her sugars have not been consistent. Patient was discharged home with ASA 81 mg daily/Atorvastatin 40 mg. Reports at the time of hospitalization she had not been taking her crestor. Patient reports pain to lower back radiating into lower extremities. MRI reviewed and reveals multilevel disc bulging and disc disease.  Patient reports she has dizziness and headaches at times. On multiple medications such as gabapentin and lyrica, topiramate, multiple psych meds that may be increasing patient's dizziness. Patient also reports right knee pain. Has had 3 surgeries on her right knee per patient.        Review of Systems  Review of Systems   Musculoskeletal: Positive for back pain and gait problem.   Neurological: Positive for dizziness and weakness.   All other systems reviewed and are negative.      Objective:      Neurologic Exam     Mental Status   Oriented to person, place, and time.   Attention: normal. Concentration: normal.   Speech: speech is normal   Level of consciousness:  alert  Knowledge: good.   Normal comprehension.     Cranial Nerves     CN II   Visual fields full to confrontation.     CN III, IV, VI   Pupils are equal, round, and reactive to light.  Extraocular motions are normal.     CN V   Facial sensation intact.     CN VII   Facial expression full, symmetric.     Motor Exam   Muscle bulk: normal  Overall muscle tone: normal  Right arm tone: normal  Left arm tone: normal  Right leg tone: normal  Left leg tone: normal    Strength   Right deltoid: 5/5  Left deltoid: 5/5  Right biceps: 5/5  Left biceps: 5/5  Right triceps: 5/5  Left triceps: 5/5  Right wrist flexion: 5/5  Left wrist flexion: 5/5  Right wrist extension: 5/5  Left wrist extension: 5/5  Right quadriceps: 5/5  Left quadriceps: 5/5  Right hamstrin/5  Left hamstrin/5    Sensory Exam   Light touch normal.     Gait, Coordination, and Reflexes     Gait  Gait: normal      Physical Exam  Constitutional:       Appearance: Normal appearance.   Eyes:      Extraocular Movements: EOM normal.      Pupils: Pupils are equal, round, and reactive to light.   Pulmonary:      Effort: Pulmonary effort is normal.   Musculoskeletal:      Lumbar back: Tenderness present.   Neurological:      Mental Status: She is alert and oriented to person, place, and time.      Gait: Gait is intact.   Psychiatric:         Speech: Speech normal.           Assessment:        1. Lumbar radiculopathy    2. TIA (transient ischemic attack)        Plan:     Discussed mechanism of TIA/stroke vs. Hyperglycemia. Patient has appointment with Dr. Perry soon for management of DM. Reports sugars have been elevated.  Continue ASA/statin  Reviewed MRI L spine results with patient; Discussed ESIs; patient will clear with Dr. Perry before proceeding   Reviewed imaging with patient from hospitalization. MRI/MRA/CT/Echo/CUS all normal. Advised imaging so far revealed no neurological cause of patient's dizziness. Recommend PT for vestibular rehab  Advised to  discuss polypharmacy with PCP, psych  Follow up with ortho for right knee pain.    MDM moderate

## 2022-08-20 ENCOUNTER — HOSPITAL ENCOUNTER (EMERGENCY)
Facility: HOSPITAL | Age: 57
Discharge: HOME | End: 2022-08-20
Attending: EMERGENCY MEDICINE
Payer: MEDICARE

## 2022-08-20 VITALS
DIASTOLIC BLOOD PRESSURE: 73 MMHG | BODY MASS INDEX: 32.76 KG/M2 | OXYGEN SATURATION: 97 % | RESPIRATION RATE: 18 BRPM | WEIGHT: 178 LBS | HEIGHT: 62 IN | TEMPERATURE: 98 F | SYSTOLIC BLOOD PRESSURE: 122 MMHG | HEART RATE: 89 BPM

## 2022-08-20 DIAGNOSIS — S63.502A WRIST SPRAIN, LEFT, INITIAL ENCOUNTER: Primary | ICD-10-CM
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
## 2022-08-20 PROCEDURE — 99283 EMERGENCY DEPT VISIT LOW MDM: CPT | Mod: 25

## 2022-08-20 NOTE — ED PROVIDER NOTES
Encounter Date: 8/20/2022       History     Chief Complaint   Patient presents with    Fall     Pt fell off toilet Friday morning around 0130, unsure if hit her head, states is on a blood thinner, unsure of which one. Reports HA, R wrist, R shoulder and jyotsna knee pain,     57-year-old female states that she fell off of the toilet yesterday and hit her head and sprained her right wrist, right shoulder, and bruised both of her knees.  She is asking for pain medication.  She had no loss of consciousness, vomiting, dizziness, confusion or speech difficulty.  She does have a mild headache.        Review of patient's allergies indicates:   Allergen Reactions    Adhesive      Other reaction(s): skin tears easily with adhesive    Cephalexin Nausea And Vomiting    Erythromycin Nausea And Vomiting    Lithium Nausea And Vomiting    Naproxen Nausea And Vomiting     Past Medical History:   Diagnosis Date    Bipolar disorder     COPD (chronic obstructive pulmonary disease)     Depression     Lumbar degenerative disc disease     TIA (transient ischemic attack)      Past Surgical History:   Procedure Laterality Date    ANTERIOR CRUCIATE LIGAMENT REPAIR Left     TOTAL KNEE ARTHROPLASTY Left     WRIST SURGERY Right      Family History   Problem Relation Age of Onset    Coronary artery disease Mother     Diabetes Mother     Coronary artery disease Father      Social History     Tobacco Use    Smoking status: Current Every Day Smoker     Types: Vaping w/o nicotine    Smokeless tobacco: Never Used   Substance Use Topics    Alcohol use: Not Currently     Comment: stopped in 2005    Drug use: Not Currently     Types: Marijuana, Cocaine     Comment: stopped in 2005     Review of Systems   Musculoskeletal: Positive for arthralgias.   Neurological: Positive for headaches.   All other systems reviewed and are negative.      Physical Exam     Initial Vitals [08/20/22 1135]   BP Pulse Resp Temp SpO2   119/74 89 18 98.1 °F  (36.7 °C) 98 %      MAP       --         Physical Exam    Nursing note and vitals reviewed.  Constitutional: She appears well-developed and well-nourished. She is not diaphoretic. No distress.   HENT:   Head: Normocephalic and atraumatic.   Mouth/Throat: Oropharynx is clear and moist.   Eyes: Conjunctivae are normal. Pupils are equal, round, and reactive to light.   Neck: Neck supple.   Cardiovascular: Normal rate, regular rhythm, normal heart sounds and intact distal pulses.   Pulmonary/Chest: Breath sounds normal. No respiratory distress. She has no wheezes. She has no rhonchi. She has no rales.   Abdominal: Abdomen is soft. Bowel sounds are normal. She exhibits no distension. There is no abdominal tenderness. There is no guarding.   Musculoskeletal:         General: No tenderness or edema. Normal range of motion.      Cervical back: Neck supple.      Comments: All joints have full range of motion but does she does have pain with movement to her left wrist and tenderness to the radial side of the wrist.  Very mild swelling noted to the joint.  Radial pulses 2+, neurovascular intact.     Neurological: She is alert and oriented to person, place, and time.   Skin: Skin is warm and dry. Capillary refill takes less than 2 seconds. No rash noted.   Psychiatric: She has a normal mood and affect. Thought content normal.         ED Course   Procedures  Labs Reviewed - No data to display       Imaging Results          X-Ray Wrist Complete Right (Final result)  Result time 08/20/22 12:34:24    Final result by Erin Mccann MD (08/20/22 12:34:24)                 Impression:      No acute osseous abnormality.      Electronically signed by: Erin Mccann  Date:    08/20/2022  Time:    12:34             Narrative:    EXAMINATION:  XR WRIST COMPLETE 3 VIEWS RIGHT    CLINICAL HISTORY:  Unspecified fall, initial encounter    TECHNIQUE:  PA, lateral, and oblique views of the right wrist were  performed.    COMPARISON:  Right hand radiographs 07/18/2018    FINDINGS:  No acute fracture.  No dislocation. Chronic postsurgical changes are seen at the wrist with anchors at the scaphoid and lunate.  Chronic joint space narrowing between the proximal and distal carpal row.  Chronic ossicle at the dorsum of the carpal bones seen on the lateral view.    Soft tissue swelling.                                 Medications - No data to display              ED Course as of 08/20/22 1830   Sat Aug 20, 2022   1252 Ace wrap will be applied to her left wrist and she has an ice pack at this time.  I see no sign of injury other than mild swelling to her left wrist.  She is on multiple sedating medications not have discussed with her fall risk.  This safest thing for her to take for pain is going to be Tylenol.  She will follow-up with her primary care provider for further care. [SH]      ED Course User Index  [SH] Yi Finn MD             Clinical Impression:   Final diagnoses:  [W19.XXXA] Fall  [S63.502A] Wrist sprain, left, initial encounter (Primary)          ED Disposition Condition    Discharge Stable        ED Prescriptions     None        Follow-up Information     Follow up With Specialties Details Why Contact Info    Garrick Nascimento MD Family Medicine Schedule an appointment as soon as possible for a visit in 1 week  [address] [facility] [id number] [phone]             Yi Finn MD  08/20/22 1831

## 2022-08-20 NOTE — DISCHARGE INSTRUCTIONS
Tylenol is the safest medication to use for pain.  Use caution to prevent future falls and follow up with your doctor next week.

## 2022-08-29 ENCOUNTER — PROCEDURE VISIT (OUTPATIENT)
Dept: NEUROLOGY | Facility: CLINIC | Age: 57
End: 2022-08-29
Payer: MEDICARE

## 2022-08-29 DIAGNOSIS — G62.9 NEUROPATHY: Primary | ICD-10-CM

## 2022-08-29 DIAGNOSIS — M51.36 LUMBAR DEGENERATIVE DISC DISEASE: ICD-10-CM

## 2022-08-29 PROCEDURE — 95886 PR EMG COMPLETE, W/ NERVE CONDUCTION STUDIES, 5+ MUSCLES: ICD-10-PCS | Mod: 26,S$PBB,, | Performed by: PSYCHIATRY & NEUROLOGY

## 2022-08-29 PROCEDURE — 95911 PR NERVE CONDUCTION STUDY; 9-10 STUDIES: ICD-10-PCS | Mod: 26,S$PBB,, | Performed by: PSYCHIATRY & NEUROLOGY

## 2022-08-29 PROCEDURE — 95886 MUSC TEST DONE W/N TEST COMP: CPT | Mod: 26,S$PBB,, | Performed by: PSYCHIATRY & NEUROLOGY

## 2022-08-29 PROCEDURE — 95911 NRV CNDJ TEST 9-10 STUDIES: CPT | Mod: 26,S$PBB,, | Performed by: PSYCHIATRY & NEUROLOGY

## 2022-08-29 PROCEDURE — 95911 NRV CNDJ TEST 9-10 STUDIES: CPT | Mod: PBBFAC | Performed by: PSYCHIATRY & NEUROLOGY

## 2022-08-29 PROCEDURE — 95886 MUSC TEST DONE W/N TEST COMP: CPT | Mod: PBBFAC | Performed by: PSYCHIATRY & NEUROLOGY

## 2022-08-29 NOTE — PROCEDURES
Procedures     EMG/NCS Report      REFERRING PHYSICIAN: Dr. Ortega    REASON FOR EVALUATION: neuropathy, right lumbosacral radiculopathy    HISTORY OF PRESENT ILLNESS: 57 y.o. year old female here numbness in BLE, and for back pain that shoots down into right lower extremity.     NERVE CONDUCTION STUDIES:   Bilateral sural and right radial antidromic sensory responses showed normal peak latencies and normal amplitudes. Right ulnar,bilateral peroneal, tibial showed normal distal motor latencies, normal CMAP amplitudes, and normal motor nerve conduction velocities. The relative ulnar nerve conduction velocity across the elbow was reduced.  F waves absent R tibial, left, peroneal and tibial. Otherwise, F wave minimal latencies were within normal limits. Bilateral tibial H waves were absent.    NEEDLE EMG: Concentric needle EMG was performed on the right tibialis anterior, gastrocnemius, vastus medialis, peroneus tertius, gluteus medius, lumbar paraspinals. All muscles tested were normal.     IMPRESSION: There was no clear-cut electrodiagnostic evidence of right lumbosacral radiculopathy. Clinical and imaging correlation are recommended.   Sural SNAPs were borderline, but did not fall within the range to meet the criteria for neuropathy. There was electrodiagnostic evidence of mild right ulnar neuropathy at the elbow.

## 2022-09-06 ENCOUNTER — HOSPITAL ENCOUNTER (OUTPATIENT)
Dept: RADIOLOGY | Facility: CLINIC | Age: 57
Discharge: HOME | End: 2022-09-06
Attending: ORTHOPAEDIC SURGERY
Payer: MEDICARE

## 2022-09-06 ENCOUNTER — OFFICE VISIT (OUTPATIENT)
Dept: ORTHOPEDICS | Facility: CLINIC | Age: 57
End: 2022-09-06
Payer: MEDICARE

## 2022-09-06 VITALS
DIASTOLIC BLOOD PRESSURE: 73 MMHG | SYSTOLIC BLOOD PRESSURE: 122 MMHG | BODY MASS INDEX: 32.76 KG/M2 | HEART RATE: 89 BPM | HEIGHT: 62 IN | WEIGHT: 178 LBS

## 2022-09-06 DIAGNOSIS — T14.90XA INJURY: ICD-10-CM

## 2022-09-06 DIAGNOSIS — M25.562 LEFT KNEE PAIN, UNSPECIFIED CHRONICITY: ICD-10-CM

## 2022-09-06 DIAGNOSIS — S63.501S RIGHT WRIST SPRAIN, SEQUELA: Primary | ICD-10-CM

## 2022-09-06 PROCEDURE — 99213 PR OFFICE/OUTPT VISIT, EST, LEVL III, 20-29 MIN: ICD-10-PCS | Mod: ,,, | Performed by: NURSE PRACTITIONER

## 2022-09-06 PROCEDURE — 73562 XR KNEE 3 VIEW LEFT: ICD-10-PCS | Mod: LT,,, | Performed by: ORTHOPAEDIC SURGERY

## 2022-09-06 PROCEDURE — 73562 X-RAY EXAM OF KNEE 3: CPT | Mod: LT,,, | Performed by: ORTHOPAEDIC SURGERY

## 2022-09-06 PROCEDURE — 99213 OFFICE O/P EST LOW 20 MIN: CPT | Mod: ,,, | Performed by: NURSE PRACTITIONER

## 2022-09-06 RX ORDER — MELOXICAM 15 MG/1
15 TABLET ORAL DAILY
Qty: 30 TABLET | Refills: 2 | Status: ON HOLD | OUTPATIENT
Start: 2022-09-06 | End: 2022-09-15 | Stop reason: HOSPADM

## 2022-09-06 NOTE — PROGRESS NOTES
Past Medical History:   Diagnosis Date    Bipolar disorder     COPD (chronic obstructive pulmonary disease)     Depression     Lumbar degenerative disc disease     TIA (transient ischemic attack)        Past Surgical History:   Procedure Laterality Date    ANTERIOR CRUCIATE LIGAMENT REPAIR Left     TOTAL KNEE ARTHROPLASTY Left     WRIST SURGERY Right        Current Outpatient Medications   Medication Sig    buPROPion (WELLBUTRIN XL) 300 MG 24 hr tablet Take 300 mg by mouth every morning.    carBAMazepine (TEGRETOL XR) 400 MG Tb12 Take 400 mg by mouth 2 (two) times daily.    clonazePAM (KLONOPIN) 0.5 MG tablet Take 0.5 mg by mouth 3 (three) times daily.    dexlansoprazole (DEXILANT) 60 mg capsule Take 1 capsule by mouth once daily.    DULoxetine (CYMBALTA) 60 MG capsule Take 60 mg by mouth 2 (two) times daily.    eszopiclone (LUNESTA) 2 MG Tab Take 2 mg by mouth nightly as needed.    ezetimibe (ZETIA) 10 mg tablet Take 10 mg by mouth once daily.    famotidine (PEPCID) 20 MG tablet Take 20 mg by mouth daily as needed.    fluticasone propionate (FLONASE) 50 mcg/actuation nasal spray 1 spray by Each Nostril route nightly.    furosemide (LASIX) 20 MG tablet Take 20 mg by mouth once daily.    gabapentin (NEURONTIN) 300 MG capsule Take 300 mg by mouth 3 (three) times daily.    icosapent ethyL (VASCEPA) 1 gram Cap Take 2 capsules by mouth 2 (two) times daily.    meclizine (ANTIVERT) 25 mg tablet Take 25 mg by mouth 3 (three) times daily as needed.    meloxicam (MOBIC) 7.5 MG tablet Take 15 mg by mouth once daily.    metFORMIN (GLUCOPHAGE) 500 MG tablet Take 500 mg by mouth once daily.    MOVANTIK 25 mg tablet Take 25 mg by mouth once daily.    paliperidone palmitate (INVEGA SUSTENNA) 234 mg/1.5 mL Syrg injection Inject 234 mg into the muscle.    pregabalin (LYRICA) 75 MG capsule Take 75 mg by mouth 3 (three) times daily.    RESTASIS 0.05 % ophthalmic emulsion Place 1 drop into both eyes 2 (two) times daily.    tiZANidine  (ZANAFLEX) 4 MG tablet take 1 tablet BY MOUTH EVERY 8 HOURS AS NEEDED FOR MUSCLE SPASMS    topiramate (TOPAMAX) 200 MG Tab Take 200 mg by mouth 2 (two) times daily.    valACYclovir (VALTREX) 500 MG tablet Take 500 mg by mouth 2 (two) times daily.    VRAYLAR 3 mg Cap Take 1 capsule by mouth once daily.    acyclovir 5% (ZOVIRAX) 5 % Crea SMARTSIG:Topical 4-5 Times Daily PRN    aspirin (ECOTRIN) 81 MG EC tablet Take 1 tablet (81 mg total) by mouth once daily.    atorvastatin (LIPITOR) 40 MG tablet Take 1 tablet (40 mg total) by mouth once daily.    azelastine (ASTELIN) 137 mcg (0.1 %) nasal spray SMARTSI Spray(s) Both Nares Every Morning    benztropine (COGENTIN) 1 MG tablet Take 1 mg by mouth 3 (three) times daily.    medroxyPROGESTERone (PROVERA) 10 MG tablet Take 10 mg by mouth once daily.    meloxicam (MOBIC) 15 MG tablet Take 1 tablet (15 mg total) by mouth once daily.    nicotine (NICODERM CQ) 21 mg/24 hr 1 patch once daily.     No current facility-administered medications for this visit.       Review of patient's allergies indicates:   Allergen Reactions    Adhesive      Other reaction(s): skin tears easily with adhesive    Cephalexin Nausea And Vomiting    Erythromycin Nausea And Vomiting    Lithium Nausea And Vomiting    Naproxen Nausea And Vomiting       Family History   Problem Relation Age of Onset    Coronary artery disease Mother     Diabetes Mother     Coronary artery disease Father        Social History     Socioeconomic History    Marital status:    Tobacco Use    Smoking status: Every Day     Types: Vaping w/o nicotine    Smokeless tobacco: Never   Substance and Sexual Activity    Alcohol use: Not Currently     Comment: stopped in     Drug use: Not Currently     Types: Marijuana, Cocaine     Comment: stopped in        Chief Complaint:   Chief Complaint   Patient presents with    Wrist Injury     Pt reports a fall 3 weeks ago which she injured her wrist. Pt put ice and uses a sling  everyday that gave her some relief.    Knee Pain     Pt has this pain ongoing after her sx and she had a fall which made it worse. Pt uses a cervical brace that helps with the swelling.        History of present illness: Faith Jin is a 57 y.o. female, presents to clinic today after a fall about 3 weeks ago.  Patient was seen at the emergency room after this fall and treated for a right wrist sprain.  She was treated with tramadol.  Patient states that she is out of tramadol.  She continues to have discomfort to the right wrist although this has improved.  Also complains of left knee pain.  She does have a history of a left total knee arthroplasty.      Review of Systems:    Denies fevers, chills, chest pain, shortness of breath. Comprehensive review of systems performed and otherwise negative except as noted in HPI     Physical Examination:    General: awake and alert, no acute distress, healthy appearing  Head and Neck: Head atraumatic/normocephalic. Moist MM  CV: brisk cap refill  Lungs: non-labored breathing, w/o cough or SOB  Skin: no rashes present, warm to touch  Neuro: sensation grossly intact distally       Vital Signs:    Vitals:    09/06/22 1039   BP: 122/73   Pulse: 89       Body mass index is 32.56 kg/m².    Right wrist:  Skin warm, dry, intact.  No ecchymosis erythema noted.    No significant swelling  Full range of motion without any discomfort   Sensation intact distally   Radial pulse +2   Brisk capillary refill distally       Examination left knee:    Incision clean dry and intact. No erythema or drainage or signs of infection.  Sensation intact distally to left foot  Positive FHL/EHL/gastrocsoleus/tib ant  Brisk capillary refill to left foot  No swelling or signs of DVT  Range of motion: extension at 0 and flexion at 120  Stable to varus valgus  Stable to anterior and posterior drawer     X-rays: 3 views of the left knee reviewed.  Hardware in place with no loosening or subsidence  noted.     Assessment::right wrist sprain    Plan:  Patient was seen in the emergency room and diagnosed with a right wrist sprain.  Upon examination her sprain has improved.  Regards to her left knee upon examination and x-rays her knee is stable with no fracture noted.  She is ambulating without any assistance.  She was educated to continue her anti-inflammatories and brace/ice her wrist as needed for comfort.  Prescribed her Mobic and told her to discontinue her Celebrex.  I have the patient return on as-needed basis.  Patient states understanding and agrees to plan of care.    This note was created using everbill voice recognition software that occasionally misinterpreted phrases or words.    Consult note is delivered via Epic messaging service.

## 2022-09-09 ENCOUNTER — HOSPITAL ENCOUNTER (EMERGENCY)
Facility: HOSPITAL | Age: 57
Discharge: HOME | End: 2022-09-10
Attending: STUDENT IN AN ORGANIZED HEALTH CARE EDUCATION/TRAINING PROGRAM
Payer: MEDICARE

## 2022-09-09 VITALS
DIASTOLIC BLOOD PRESSURE: 83 MMHG | RESPIRATION RATE: 18 BRPM | BODY MASS INDEX: 31.1 KG/M2 | HEART RATE: 86 BPM | TEMPERATURE: 98 F | HEIGHT: 62 IN | WEIGHT: 169 LBS | SYSTOLIC BLOOD PRESSURE: 145 MMHG | OXYGEN SATURATION: 98 %

## 2022-09-09 DIAGNOSIS — S90.121A CONTUSION OF LESSER TOE OF RIGHT FOOT WITHOUT DAMAGE TO NAIL, INITIAL ENCOUNTER: Primary | ICD-10-CM

## 2022-09-09 DIAGNOSIS — M79.674 PAIN IN TOE OF RIGHT FOOT: ICD-10-CM

## 2022-09-09 PROCEDURE — 99283 EMERGENCY DEPT VISIT LOW MDM: CPT | Mod: 25

## 2022-09-10 NOTE — ED PROVIDER NOTES
Encounter Date: 9/9/2022       History     Chief Complaint   Patient presents with    Toe Injury     HPI    57-year-old female with a past medical history as delineated below presents emergency department for left 5th toe pain.  Patient states that she accidentally dropped a chair on her toe.  States that it is minimally hurting want to make sure it was not broken.  States that she is able to walk with no difficulty.  No other injuries.    Review of patient's allergies indicates:   Allergen Reactions    Adhesive      Other reaction(s): skin tears easily with adhesive    Cephalexin Nausea And Vomiting    Erythromycin Nausea And Vomiting    Lithium Nausea And Vomiting    Naproxen Nausea And Vomiting     Past Medical History:   Diagnosis Date    Bipolar disorder     COPD (chronic obstructive pulmonary disease)     Depression     Lumbar degenerative disc disease     TIA (transient ischemic attack)      Past Surgical History:   Procedure Laterality Date    ANTERIOR CRUCIATE LIGAMENT REPAIR Left     TOTAL KNEE ARTHROPLASTY Left     WRIST SURGERY Right      Family History   Problem Relation Age of Onset    Coronary artery disease Mother     Diabetes Mother     Coronary artery disease Father      Social History     Tobacco Use    Smoking status: Every Day     Types: Vaping w/o nicotine    Smokeless tobacco: Never   Substance Use Topics    Alcohol use: Not Currently     Comment: stopped in 2005    Drug use: Not Currently     Types: Marijuana, Cocaine     Comment: stopped in 2005     Review of Systems   Constitutional:  Negative for fever.   Respiratory:  Negative for cough and shortness of breath.    Cardiovascular:  Negative for chest pain.   Gastrointestinal:  Negative for abdominal pain.   Musculoskeletal:         Toe pain per HPI   All other systems reviewed and are negative.    Physical Exam     Initial Vitals [09/09/22 2350]   BP Pulse Resp Temp SpO2   (!) 145/83 86 18 98.2 °F (36.8 °C) 98 %      MAP       --          Physical Exam    Nursing note and vitals reviewed.  Constitutional: She appears well-developed and well-nourished. No distress.   Cardiovascular:  Normal rate and regular rhythm.           Pulmonary/Chest: Breath sounds normal. No respiratory distress.   Abdominal: Abdomen is soft. Bowel sounds are normal. There is no abdominal tenderness.   Musculoskeletal:         General: Tenderness (tenderness to palpation to the right 5th toe of the foot.  No damage to nail.  Skin intact.  Mild redness noted.) present. Normal range of motion.     Neurological: She is alert and oriented to person, place, and time.   Skin: Skin is warm. Capillary refill takes less than 2 seconds.   Psychiatric: She has a normal mood and affect. Thought content normal.       ED Course   Procedures  Labs Reviewed - No data to display       Imaging Results              X-Ray Foot Complete Right (Preliminary result)  Result time 09/10/22 00:06:48      ED Interpretation by Jarrod Pitts MD (09/10/22 00:06:48, [facility] - Emergency Dept, Emergency Medicine)    No appreciable fractures identified                                     Medications - No data to display  Medical Decision Making:   Differential Diagnosis:   Contusion, fracture                    Clinical Impression:   Final diagnoses:  [M79.674] Pain in toe of right foot  [S90.121A] Contusion of lesser toe of right foot without damage to nail, initial encounter (Primary)        ED Disposition Condition    Discharge Stable          ED Prescriptions    None       Follow-up Information       Follow up With Specialties Details Why Contact Info    Garrick Nascimento MD Family Medicine Schedule an appointment as soon as possible for a visit   [address] [facility] [address] [phone]      [facility] - Emergency Dept Emergency Medicine Go to  If symptoms worsen [id number]  [facility] [address] [phone]              Jarrod Pitts MD  09/10/22 0007

## 2022-09-13 ENCOUNTER — HOSPITAL ENCOUNTER (INPATIENT)
Facility: HOSPITAL | Age: 57
LOS: 2 days | Discharge: HOME | DRG: 948 | End: 2022-09-15
Attending: EMERGENCY MEDICINE | Admitting: INTERNAL MEDICINE
Payer: MEDICARE

## 2022-09-13 DIAGNOSIS — I63.9 STROKE: ICD-10-CM

## 2022-09-13 DIAGNOSIS — M62.838 MUSCLE SPASMS OF NECK: Primary | ICD-10-CM

## 2022-09-13 DIAGNOSIS — I63.9 ACUTE CVA (CEREBROVASCULAR ACCIDENT): Primary | ICD-10-CM

## 2022-09-13 DIAGNOSIS — R55 SYNCOPE, UNSPECIFIED SYNCOPE TYPE: ICD-10-CM

## 2022-09-13 DIAGNOSIS — I63.9 CVA (CEREBRAL VASCULAR ACCIDENT): ICD-10-CM

## 2022-09-13 DIAGNOSIS — M54.16 LUMBAR RADICULOPATHY: ICD-10-CM

## 2022-09-13 PROBLEM — R53.1 LEFT-SIDED WEAKNESS: Status: ACTIVE | Noted: 2022-09-13

## 2022-09-13 LAB
ALBUMIN SERPL-MCNC: 4 GM/DL (ref 3.5–5)
ALBUMIN/GLOB SERPL: 1.6 RATIO (ref 1.1–2)
ALP SERPL-CCNC: 85 UNIT/L (ref 40–150)
ALT SERPL-CCNC: 18 UNIT/L (ref 0–55)
AMPHET UR QL SCN: NEGATIVE
APAP SERPL-MCNC: <17.4 UG/ML (ref 17.4–30)
APPEARANCE UR: CLEAR
AST SERPL-CCNC: 11 UNIT/L (ref 5–34)
BACTERIA #/AREA URNS AUTO: NORMAL /HPF
BARBITURATE SCN PRESENT UR: NEGATIVE
BASOPHILS # BLD AUTO: 0.05 X10(3)/MCL (ref 0–0.2)
BASOPHILS NFR BLD AUTO: 0.8 %
BENZODIAZ UR QL SCN: NEGATIVE
BILIRUB UR QL STRIP.AUTO: NEGATIVE MG/DL
BILIRUBIN DIRECT+TOT PNL SERPL-MCNC: 0.2 MG/DL
BUN SERPL-MCNC: 18 MG/DL (ref 9.8–20.1)
CALCIUM SERPL-MCNC: 9.3 MG/DL (ref 8.4–10.2)
CANNABINOIDS UR QL SCN: POSITIVE
CHLORIDE SERPL-SCNC: 108 MMOL/L (ref 98–107)
CHOLEST SERPL-MCNC: 210 MG/DL
CHOLEST/HDLC SERPL: 5 {RATIO} (ref 0–5)
CO2 SERPL-SCNC: 23 MMOL/L (ref 22–29)
COCAINE UR QL SCN: NEGATIVE
COLOR UR AUTO: YELLOW
CREAT SERPL-MCNC: 1.07 MG/DL (ref 0.55–1.02)
EOSINOPHIL # BLD AUTO: 0.24 X10(3)/MCL (ref 0–0.9)
EOSINOPHIL NFR BLD AUTO: 3.8 %
ERYTHROCYTE [DISTWIDTH] IN BLOOD BY AUTOMATED COUNT: 13.2 % (ref 11.5–17)
EST. AVERAGE GLUCOSE BLD GHB EST-MCNC: 131.2 MG/DL
ETHANOL SERPL-MCNC: <10 MG/DL
FENTANYL UR QL SCN: NEGATIVE
GFR SERPLBLD CREATININE-BSD FMLA CKD-EPI: 60 MLS/MIN/1.73/M2
GLOBULIN SER-MCNC: 2.5 GM/DL (ref 2.4–3.5)
GLUCOSE SERPL-MCNC: 140 MG/DL (ref 74–100)
GLUCOSE UR QL STRIP.AUTO: ABNORMAL MG/DL
HBA1C MFR BLD: 6.2 %
HCT VFR BLD AUTO: 39.9 % (ref 37–47)
HDLC SERPL-MCNC: 41 MG/DL (ref 35–60)
HGB BLD-MCNC: 12.9 GM/DL (ref 12–16)
IMM GRANULOCYTES # BLD AUTO: 0.04 X10(3)/MCL (ref 0–0.04)
IMM GRANULOCYTES NFR BLD AUTO: 0.6 %
INR BLD: 0.9 (ref 0–1.3)
KETONES UR QL STRIP.AUTO: NEGATIVE MG/DL
LDLC SERPL CALC-MCNC: 100 MG/DL (ref 50–140)
LEUKOCYTE ESTERASE UR QL STRIP.AUTO: NEGATIVE UNIT/L
LYMPHOCYTES # BLD AUTO: 1.29 X10(3)/MCL (ref 0.6–4.6)
LYMPHOCYTES NFR BLD AUTO: 20.5 %
MCH RBC QN AUTO: 30.4 PG (ref 27–31)
MCHC RBC AUTO-ENTMCNC: 32.3 MG/DL (ref 33–36)
MCV RBC AUTO: 93.9 FL (ref 80–94)
MDMA UR QL SCN: NEGATIVE
MONOCYTES # BLD AUTO: 0.42 X10(3)/MCL (ref 0.1–1.3)
MONOCYTES NFR BLD AUTO: 6.7 %
NEUTROPHILS # BLD AUTO: 4.2 X10(3)/MCL (ref 2.1–9.2)
NEUTROPHILS NFR BLD AUTO: 67.6 %
NITRITE UR QL STRIP.AUTO: NEGATIVE
NRBC BLD AUTO-RTO: 0 %
OPIATES UR QL SCN: NEGATIVE
PCP UR QL: NEGATIVE
PH UR STRIP.AUTO: 6 [PH]
PH UR: 6 [PH] (ref 3–11)
PLATELET # BLD AUTO: 282 X10(3)/MCL (ref 130–400)
PMV BLD AUTO: 8.7 FL (ref 7.4–10.4)
POTASSIUM SERPL-SCNC: 3.9 MMOL/L (ref 3.5–5.1)
PROT SERPL-MCNC: 6.5 GM/DL (ref 6.4–8.3)
PROT UR QL STRIP.AUTO: NEGATIVE MG/DL
PROTHROMBIN TIME: 12 SECONDS (ref 12.5–14.5)
RBC # BLD AUTO: 4.25 X10(6)/MCL (ref 4.2–5.4)
RBC #/AREA URNS AUTO: <5 /HPF
RBC UR QL AUTO: NEGATIVE UNIT/L
SALICYLATES SERPL-MCNC: <5 MG/DL
SODIUM SERPL-SCNC: 142 MMOL/L (ref 136–145)
SP GR UR STRIP.AUTO: 1.02 (ref 1–1.03)
SPECIFIC GRAVITY, URINE AUTO (.000) (OHS): 1.02 (ref 1–1.03)
SQUAMOUS #/AREA URNS AUTO: <5 /HPF
TRIGL SERPL-MCNC: 345 MG/DL (ref 37–140)
TROPONIN I SERPL-MCNC: <0.01 NG/ML (ref 0–0.04)
TSH SERPL-ACNC: 0.39 UIU/ML (ref 0.35–4.94)
UROBILINOGEN UR STRIP-ACNC: 0.2 MG/DL
VLDLC SERPL CALC-MCNC: 69 MG/DL
WBC # SPEC AUTO: 6.3 X10(3)/MCL (ref 4.5–11.5)
WBC #/AREA URNS AUTO: <5 /HPF

## 2022-09-13 PROCEDURE — 25000003 PHARM REV CODE 250: Performed by: NURSE PRACTITIONER

## 2022-09-13 PROCEDURE — 93010 ELECTROCARDIOGRAM REPORT: CPT | Mod: ,,, | Performed by: INTERNAL MEDICINE

## 2022-09-13 PROCEDURE — 36415 COLL VENOUS BLD VENIPUNCTURE: CPT | Performed by: EMERGENCY MEDICINE

## 2022-09-13 PROCEDURE — 83036 HEMOGLOBIN GLYCOSYLATED A1C: CPT | Performed by: NURSE PRACTITIONER

## 2022-09-13 PROCEDURE — 85610 PROTHROMBIN TIME: CPT | Performed by: EMERGENCY MEDICINE

## 2022-09-13 PROCEDURE — 80179 DRUG ASSAY SALICYLATE: CPT

## 2022-09-13 PROCEDURE — 81001 URINALYSIS AUTO W/SCOPE: CPT

## 2022-09-13 PROCEDURE — 99285 EMERGENCY DEPT VISIT HI MDM: CPT | Mod: 25

## 2022-09-13 PROCEDURE — 80143 DRUG ASSAY ACETAMINOPHEN: CPT

## 2022-09-13 PROCEDURE — 99358 PR PROLONGED SERV,NO CONTACT,1ST HR: ICD-10-PCS | Mod: ,,, | Performed by: PSYCHIATRY & NEUROLOGY

## 2022-09-13 PROCEDURE — 36415 COLL VENOUS BLD VENIPUNCTURE: CPT | Performed by: NURSE PRACTITIONER

## 2022-09-13 PROCEDURE — 80307 DRUG TEST PRSMV CHEM ANLYZR: CPT

## 2022-09-13 PROCEDURE — 20000000 HC ICU ROOM

## 2022-09-13 PROCEDURE — 80061 LIPID PANEL: CPT | Performed by: EMERGENCY MEDICINE

## 2022-09-13 PROCEDURE — 80053 COMPREHEN METABOLIC PANEL: CPT | Performed by: EMERGENCY MEDICINE

## 2022-09-13 PROCEDURE — 99358 PROLONG SERVICE W/O CONTACT: CPT | Mod: ,,, | Performed by: PSYCHIATRY & NEUROLOGY

## 2022-09-13 PROCEDURE — 84484 ASSAY OF TROPONIN QUANT: CPT

## 2022-09-13 PROCEDURE — 82077 ASSAY SPEC XCP UR&BREATH IA: CPT

## 2022-09-13 PROCEDURE — 93010 EKG 12-LEAD: ICD-10-PCS | Mod: ,,, | Performed by: INTERNAL MEDICINE

## 2022-09-13 PROCEDURE — 63600175 PHARM REV CODE 636 W HCPCS: Mod: JG | Performed by: EMERGENCY MEDICINE

## 2022-09-13 PROCEDURE — 25000003 PHARM REV CODE 250

## 2022-09-13 PROCEDURE — 84443 ASSAY THYROID STIM HORMONE: CPT | Performed by: EMERGENCY MEDICINE

## 2022-09-13 PROCEDURE — 93005 ELECTROCARDIOGRAM TRACING: CPT

## 2022-09-13 PROCEDURE — 85025 COMPLETE CBC W/AUTO DIFF WBC: CPT | Performed by: EMERGENCY MEDICINE

## 2022-09-13 RX ORDER — TIZANIDINE 4 MG/1
TABLET ORAL
Qty: 90 TABLET | Refills: 3 | Status: SHIPPED | OUTPATIENT
Start: 2022-09-13 | End: 2023-02-14

## 2022-09-13 RX ORDER — BUPROPION HYDROCHLORIDE 150 MG/1
300 TABLET ORAL EVERY MORNING
Status: DISCONTINUED | OUTPATIENT
Start: 2022-09-14 | End: 2022-09-15 | Stop reason: HOSPADM

## 2022-09-13 RX ORDER — EZETIMIBE 10 MG/1
10 TABLET ORAL DAILY
Status: DISCONTINUED | OUTPATIENT
Start: 2022-09-14 | End: 2022-09-15 | Stop reason: HOSPADM

## 2022-09-13 RX ORDER — DULOXETIN HYDROCHLORIDE 30 MG/1
60 CAPSULE, DELAYED RELEASE ORAL 2 TIMES DAILY
Status: DISCONTINUED | OUTPATIENT
Start: 2022-09-14 | End: 2022-09-15 | Stop reason: HOSPADM

## 2022-09-13 RX ORDER — TIZANIDINE 4 MG/1
TABLET ORAL
Qty: 90 TABLET | Refills: 1 | OUTPATIENT
Start: 2022-09-13

## 2022-09-13 RX ORDER — CARBAMAZEPINE 200 MG/1
400 TABLET, EXTENDED RELEASE ORAL 2 TIMES DAILY
Status: DISCONTINUED | OUTPATIENT
Start: 2022-09-13 | End: 2022-09-13

## 2022-09-13 RX ORDER — GABAPENTIN 300 MG/1
300 CAPSULE ORAL 3 TIMES DAILY
Status: DISCONTINUED | OUTPATIENT
Start: 2022-09-13 | End: 2022-09-13

## 2022-09-13 RX ORDER — CLONAZEPAM 0.5 MG/1
0.5 TABLET ORAL 3 TIMES DAILY
Status: DISCONTINUED | OUTPATIENT
Start: 2022-09-14 | End: 2022-09-15 | Stop reason: HOSPADM

## 2022-09-13 RX ORDER — ATORVASTATIN CALCIUM 40 MG/1
40 TABLET, FILM COATED ORAL DAILY
Status: DISCONTINUED | OUTPATIENT
Start: 2022-09-14 | End: 2022-09-15 | Stop reason: HOSPADM

## 2022-09-13 RX ORDER — FUROSEMIDE 20 MG/1
20 TABLET ORAL DAILY
Status: DISCONTINUED | OUTPATIENT
Start: 2022-09-14 | End: 2022-09-15 | Stop reason: HOSPADM

## 2022-09-13 RX ORDER — TOPIRAMATE 100 MG/1
200 TABLET, FILM COATED ORAL 2 TIMES DAILY
Status: DISCONTINUED | OUTPATIENT
Start: 2022-09-13 | End: 2022-09-15 | Stop reason: HOSPADM

## 2022-09-13 RX ORDER — SODIUM CHLORIDE 9 MG/ML
INJECTION, SOLUTION INTRAVENOUS
Status: DISCONTINUED | OUTPATIENT
Start: 2022-09-13 | End: 2022-09-13

## 2022-09-13 RX ORDER — CLONAZEPAM 0.5 MG/1
0.5 TABLET ORAL 3 TIMES DAILY
Status: DISCONTINUED | OUTPATIENT
Start: 2022-09-13 | End: 2022-09-13

## 2022-09-13 RX ORDER — PANTOPRAZOLE SODIUM 40 MG/1
40 TABLET, DELAYED RELEASE ORAL DAILY
Status: DISCONTINUED | OUTPATIENT
Start: 2022-09-14 | End: 2022-09-15 | Stop reason: HOSPADM

## 2022-09-13 RX ORDER — SODIUM CHLORIDE 9 MG/ML
INJECTION, SOLUTION INTRAVENOUS CONTINUOUS
Status: DISCONTINUED | OUTPATIENT
Start: 2022-09-13 | End: 2022-09-15 | Stop reason: HOSPADM

## 2022-09-13 RX ORDER — IBUPROFEN 200 MG
1 TABLET ORAL DAILY
Status: DISCONTINUED | OUTPATIENT
Start: 2022-09-14 | End: 2022-09-15 | Stop reason: HOSPADM

## 2022-09-13 RX ORDER — DULOXETIN HYDROCHLORIDE 30 MG/1
60 CAPSULE, DELAYED RELEASE ORAL 2 TIMES DAILY
Status: DISCONTINUED | OUTPATIENT
Start: 2022-09-13 | End: 2022-09-13

## 2022-09-13 RX ORDER — CARBAMAZEPINE 200 MG/1
400 TABLET, EXTENDED RELEASE ORAL 2 TIMES DAILY
Status: DISCONTINUED | OUTPATIENT
Start: 2022-09-14 | End: 2022-09-15 | Stop reason: HOSPADM

## 2022-09-13 RX ORDER — ASPIRIN 81 MG/1
81 TABLET ORAL DAILY
Status: DISCONTINUED | OUTPATIENT
Start: 2022-09-14 | End: 2022-09-13

## 2022-09-13 RX ADMIN — TOPIRAMATE 200 MG: 100 TABLET, FILM COATED ORAL at 08:09

## 2022-09-13 RX ADMIN — ALTEPLASE 60.8 MG: KIT at 03:09

## 2022-09-13 RX ADMIN — SODIUM CHLORIDE: 9 INJECTION, SOLUTION INTRAVENOUS at 06:09

## 2022-09-13 NOTE — CONSULTS
[facility] - Emergency Dept  Neurology  Consult Note    Patient Name: Faith Jin  MRN: [id number]  Admission Date: 9/13/2022  Hospital Length of Stay: 0 days  Code Status: Prior   Attending Provider: Mihaela Foster MD   Consulting Provider: KRISTI Duran  Primary Care Physician: Garrick Nascimento MD  Principal Problem:<principal problem not specified>    Inpatient consult to Vascular (Stroke) Neurology  Consult performed by: KRISTI Duran  Consult ordered by: Mihaela Foster MD         Subjective:     Chief Complaint:    Chief Complaint   Patient presents with    Weakness     Pt to ER via [facility] for weakness in left side.  Pt was playing cards and at approx 1315 had slurred speech and weakness.  Pt unable to lift left leg and appears lethargic.  Code fast called at 1419         HPI:   Faith Jin is a 57 y.o. female, with past medical history of bipolar disorder, COPD, depression, and degenerative disc disease, who presented to [facility] on 9/13/2022 with reports of syncope followed by left sided weakness.  Friend, who was with patient at the time of the episode, described episode as patient dropped her cards and slumped over.  EMS was called.  Upon EMS arrival, patient noted to have left sided weakness.  Stroke alert activated upon ED arrival.  NIH 2.  CTh showed no acute intracranial abnormality.  Discussed with neurologist on call ... recommends IV tPA.  Discussed with patient and friend; patient gave consent to proceed with tPA.        Stroke alert activated at 1420  Stroke NP responded at 1425  Decision for IV tPA 1435  Patient consent for tPA 1451       Past Medical History:   Diagnosis Date    Bipolar disorder     COPD (chronic obstructive pulmonary disease)     Depression     Lumbar degenerative disc disease     TIA (transient ischemic attack)        Past Surgical History:   Procedure Laterality Date    ANTERIOR CRUCIATE LIGAMENT REPAIR Left     TOTAL  KNEE ARTHROPLASTY Left     WRIST SURGERY Right        Review of patient's allergies indicates:   Allergen Reactions    Adhesive      Other reaction(s): skin tears easily with adhesive    Cephalexin Nausea And Vomiting    Erythromycin Nausea And Vomiting    Lithium Nausea And Vomiting    Naproxen Nausea And Vomiting         No current facility-administered medications on file prior to encounter.     Current Outpatient Medications on File Prior to Encounter   Medication Sig    acyclovir 5% (ZOVIRAX) 5 % Crea SMARTSIG:Topical 4-5 Times Daily PRN    aspirin (ECOTRIN) 81 MG EC tablet Take 1 tablet (81 mg total) by mouth once daily.    atorvastatin (LIPITOR) 40 MG tablet Take 1 tablet (40 mg total) by mouth once daily.    azelastine (ASTELIN) 137 mcg (0.1 %) nasal spray SMARTSI Spray(s) Both Nares Every Morning    benztropine (COGENTIN) 1 MG tablet Take 1 mg by mouth 3 (three) times daily.    buPROPion (WELLBUTRIN XL) 300 MG 24 hr tablet Take 300 mg by mouth every morning.    carBAMazepine (TEGRETOL XR) 400 MG Tb12 Take 400 mg by mouth 2 (two) times daily.    clonazePAM (KLONOPIN) 0.5 MG tablet Take 0.5 mg by mouth 3 (three) times daily.    dexlansoprazole (DEXILANT) 60 mg capsule Take 1 capsule by mouth once daily.    DULoxetine (CYMBALTA) 60 MG capsule Take 60 mg by mouth 2 (two) times daily.    eszopiclone (LUNESTA) 2 MG Tab Take 2 mg by mouth nightly as needed.    ezetimibe (ZETIA) 10 mg tablet Take 10 mg by mouth once daily.    famotidine (PEPCID) 20 MG tablet Take 20 mg by mouth daily as needed.    fluticasone propionate (FLONASE) 50 mcg/actuation nasal spray 1 spray by Each Nostril route nightly.    furosemide (LASIX) 20 MG tablet Take 20 mg by mouth once daily.    gabapentin (NEURONTIN) 300 MG capsule Take 300 mg by mouth 3 (three) times daily.    icosapent ethyL (VASCEPA) 1 gram Cap Take 2 capsules by mouth 2 (two) times daily.    meclizine (ANTIVERT) 25 mg tablet Take 25 mg by mouth 3  (three) times daily as needed.    medroxyPROGESTERone (PROVERA) 10 MG tablet Take 10 mg by mouth once daily.    meloxicam (MOBIC) 15 MG tablet Take 1 tablet (15 mg total) by mouth once daily.    meloxicam (MOBIC) 7.5 MG tablet Take 15 mg by mouth once daily.    metFORMIN (GLUCOPHAGE) 500 MG tablet Take 500 mg by mouth once daily.    MOVANTIK 25 mg tablet Take 25 mg by mouth once daily.    nicotine (NICODERM CQ) 21 mg/24 hr 1 patch once daily.    paliperidone palmitate (INVEGA SUSTENNA) 234 mg/1.5 mL Syrg injection Inject 234 mg into the muscle.    pregabalin (LYRICA) 75 MG capsule Take 75 mg by mouth 3 (three) times daily.    RESTASIS 0.05 % ophthalmic emulsion Place 1 drop into both eyes 2 (two) times daily.    tiZANidine (ZANAFLEX) 4 MG tablet take 1 tablet BY MOUTH EVERY 8 HOURS AS NEEDED FOR MUSCLE SPASMS Strength: 4 mg    topiramate (TOPAMAX) 200 MG Tab Take 200 mg by mouth 2 (two) times daily.    valACYclovir (VALTREX) 500 MG tablet Take 500 mg by mouth 2 (two) times daily.    VRAYLAR 3 mg Cap Take 1 capsule by mouth once daily.    [DISCONTINUED] rosuvastatin (CRESTOR) 40 MG Tab Take 40 mg by mouth nightly.    [DISCONTINUED] tiZANidine (ZANAFLEX) 4 MG tablet take 1 tablet BY MOUTH EVERY 8 HOURS AS NEEDED FOR MUSCLE SPASMS     Family History       Problem Relation (Age of Onset)    Coronary artery disease Mother, Father    Diabetes Mother          Tobacco Use    Smoking status: Every Day     Types: Vaping w/o nicotine    Smokeless tobacco: Never   Substance and Sexual Activity    Alcohol use: Not Currently     Comment: stopped in 2005    Drug use: Not Currently     Types: Marijuana, Cocaine     Comment: stopped in 2005    Sexual activity: Not on file     Review of Systems   Neurological:  Positive for syncope and weakness.   All other systems reviewed and are negative.    Objective:     Vital Signs (Most Recent):  Temp: 97.7 °F (36.5 °C) (09/13/22 1423)  Pulse: 92 (09/13/22 1450)  Resp: 14  (09/13/22 1450)  BP: 127/75 (09/13/22 1450)  SpO2: 97 % (09/13/22 1450) Vital Signs (24h Range):  Temp:  [97.7 °F (36.5 °C)] 97.7 °F (36.5 °C)  Pulse:  [92-98] 92  Resp:  [14-18] 14  SpO2:  [97 %-98 %] 97 %  BP: (126-127)/(75-79) 127/75     Weight: 75 kg (165 lb 3.8 oz)  Body mass index is 30.22 kg/m².    Physical Exam  Current NIH Stroke Score Values      Flowsheet Row Most Recent Value   Interval initial 15 minute assessment from arrival   1a. Level of Consciousness 1-->Not alert, but arousable by minor stimulation to obey, answer, or respond   1b. LOC Questions 0-->Answers both questions correctly   1c. LOC Commands 0-->Performs both tasks correctly   2. Best Gaze 0-->Normal   3. Visual 0-->No visual loss   4. Facial Palsy 0-->Normal symmetrical movements   5a. Motor Arm, Left 0-->No drift, limb holds 90 (or 45) degrees for full 10 secs   5b. Motor Arm, Right 0-->No drift, limb holds 90 (or 45) degrees for full 10 secs   6a. Motor Leg, Left 1-->Drift, leg falls by the end of the 5-sec period but does not hit bed   6b. Motor Leg, Right 0-->No drift, leg holds 30 degree position for full 5 secs   7. Limb Ataxia 0-->Absent   8. Sensory 0-->Normal, no sensory loss   9. Best Language 0-->No aphasia, normal   10. Dysarthria 1-->Mild-to-moderate dysarthria, patient slurs at least some words and, at worst, can be understood with some difficulty   11. Extinction and Inattention (formerly Neglect) 0-->No abnormality   Total (NIH Stroke Scale) 2             Significant Labs: BMP: No results for input(s): GLU, NA, K, CL, CO2, BUN, CREATININE, CALCIUM, MG in the last 48 hours.  CBC: No results for input(s): WBC, HGB, HCT, PLT in the last 48 hours.    Significant Imaging: I have reviewed all pertinent imaging results/findings within the past 24 hours.    Assessment and Plan:     Left-sided weakness  Left sided weakness following syncopal episode      CTh negative  Neurologist on call recommends IV tPA      -permissive HTN for  now ... SBP less than 180  -STAT CTh for any HA or neuro change  -q1hr neuro checks  -HOB flat, Bedrest, NPO  -repeat CTh after 24 hours to determine if antiplatelet therapy can be initiated             Thank you for your consult. Further recommendations to follow by MD. Barbara Mann, PEDROP  Neurology  [facility] - Emergency Dept

## 2022-09-13 NOTE — HPI
Faiht Jin is a 57 y.o. female, with past medical history of bipolar disorder, COPD, depression, and degenerative disc disease, who presented to [facility] on 9/13/2022 with reports of syncope followed by left sided weakness.  Friend, who was with patient at the time of the episode, described episode as patient dropped her cards and slumped over.  EMS was called.  Upon EMS arrival, patient noted to have left sided weakness.  Stroke alert activated upon ED arrival.  NIH 2.  CTh showed no acute intracranial abnormality.  Discussed with neurologist on call ... recommends IV tPA.  Discussed with patient and friend; patient gave consent to proceed with tPA.

## 2022-09-13 NOTE — SUBJECTIVE & OBJECTIVE
Past Medical History:   Diagnosis Date    Bipolar disorder     COPD (chronic obstructive pulmonary disease)     Depression     Lumbar degenerative disc disease     TIA (transient ischemic attack)        Past Surgical History:   Procedure Laterality Date    ANTERIOR CRUCIATE LIGAMENT REPAIR Left     TOTAL KNEE ARTHROPLASTY Left     WRIST SURGERY Right        Review of patient's allergies indicates:   Allergen Reactions    Adhesive      Other reaction(s): skin tears easily with adhesive    Cephalexin Nausea And Vomiting    Erythromycin Nausea And Vomiting    Lithium Nausea And Vomiting    Naproxen Nausea And Vomiting         No current facility-administered medications on file prior to encounter.     Current Outpatient Medications on File Prior to Encounter   Medication Sig    acyclovir 5% (ZOVIRAX) 5 % Crea SMARTSIG:Topical 4-5 Times Daily PRN    aspirin (ECOTRIN) 81 MG EC tablet Take 1 tablet (81 mg total) by mouth once daily.    atorvastatin (LIPITOR) 40 MG tablet Take 1 tablet (40 mg total) by mouth once daily.    azelastine (ASTELIN) 137 mcg (0.1 %) nasal spray SMARTSI Spray(s) Both Nares Every Morning    benztropine (COGENTIN) 1 MG tablet Take 1 mg by mouth 3 (three) times daily.    buPROPion (WELLBUTRIN XL) 300 MG 24 hr tablet Take 300 mg by mouth every morning.    carBAMazepine (TEGRETOL XR) 400 MG Tb12 Take 400 mg by mouth 2 (two) times daily.    clonazePAM (KLONOPIN) 0.5 MG tablet Take 0.5 mg by mouth 3 (three) times daily.    dexlansoprazole (DEXILANT) 60 mg capsule Take 1 capsule by mouth once daily.    DULoxetine (CYMBALTA) 60 MG capsule Take 60 mg by mouth 2 (two) times daily.    eszopiclone (LUNESTA) 2 MG Tab Take 2 mg by mouth nightly as needed.    ezetimibe (ZETIA) 10 mg tablet Take 10 mg by mouth once daily.    famotidine (PEPCID) 20 MG tablet Take 20 mg by mouth daily as needed.    fluticasone propionate (FLONASE) 50 mcg/actuation nasal spray 1 spray by Each Nostril route nightly.    furosemide  (LASIX) 20 MG tablet Take 20 mg by mouth once daily.    gabapentin (NEURONTIN) 300 MG capsule Take 300 mg by mouth 3 (three) times daily.    icosapent ethyL (VASCEPA) 1 gram Cap Take 2 capsules by mouth 2 (two) times daily.    meclizine (ANTIVERT) 25 mg tablet Take 25 mg by mouth 3 (three) times daily as needed.    medroxyPROGESTERone (PROVERA) 10 MG tablet Take 10 mg by mouth once daily.    meloxicam (MOBIC) 15 MG tablet Take 1 tablet (15 mg total) by mouth once daily.    meloxicam (MOBIC) 7.5 MG tablet Take 15 mg by mouth once daily.    metFORMIN (GLUCOPHAGE) 500 MG tablet Take 500 mg by mouth once daily.    MOVANTIK 25 mg tablet Take 25 mg by mouth once daily.    nicotine (NICODERM CQ) 21 mg/24 hr 1 patch once daily.    paliperidone palmitate (INVEGA SUSTENNA) 234 mg/1.5 mL Syrg injection Inject 234 mg into the muscle.    pregabalin (LYRICA) 75 MG capsule Take 75 mg by mouth 3 (three) times daily.    RESTASIS 0.05 % ophthalmic emulsion Place 1 drop into both eyes 2 (two) times daily.    tiZANidine (ZANAFLEX) 4 MG tablet take 1 tablet BY MOUTH EVERY 8 HOURS AS NEEDED FOR MUSCLE SPASMS Strength: 4 mg    topiramate (TOPAMAX) 200 MG Tab Take 200 mg by mouth 2 (two) times daily.    valACYclovir (VALTREX) 500 MG tablet Take 500 mg by mouth 2 (two) times daily.    VRAYLAR 3 mg Cap Take 1 capsule by mouth once daily.    [DISCONTINUED] rosuvastatin (CRESTOR) 40 MG Tab Take 40 mg by mouth nightly.    [DISCONTINUED] tiZANidine (ZANAFLEX) 4 MG tablet take 1 tablet BY MOUTH EVERY 8 HOURS AS NEEDED FOR MUSCLE SPASMS     Family History       Problem Relation (Age of Onset)    Coronary artery disease Mother, Father    Diabetes Mother          Tobacco Use    Smoking status: Every Day     Types: Vaping w/o nicotine    Smokeless tobacco: Never   Substance and Sexual Activity    Alcohol use: Not Currently     Comment: stopped in 2005    Drug use: Not Currently     Types: Marijuana, Cocaine     Comment: stopped in 2005    Sexual  activity: Not on file     Review of Systems   Neurological:  Positive for syncope and weakness.   All other systems reviewed and are negative.    Objective:     Vital Signs (Most Recent):  Temp: 97.7 °F (36.5 °C) (09/13/22 1423)  Pulse: 92 (09/13/22 1450)  Resp: 14 (09/13/22 1450)  BP: 127/75 (09/13/22 1450)  SpO2: 97 % (09/13/22 1450) Vital Signs (24h Range):  Temp:  [97.7 °F (36.5 °C)] 97.7 °F (36.5 °C)  Pulse:  [92-98] 92  Resp:  [14-18] 14  SpO2:  [97 %-98 %] 97 %  BP: (126-127)/(75-79) 127/75     Weight: 75 kg (165 lb 3.8 oz)  Body mass index is 30.22 kg/m².    Physical Exam  Current NIH Stroke Score Values      Flowsheet Row Most Recent Value   Interval initial 15 minute assessment from arrival   1a. Level of Consciousness 1-->Not alert, but arousable by minor stimulation to obey, answer, or respond   1b. LOC Questions 0-->Answers both questions correctly   1c. LOC Commands 0-->Performs both tasks correctly   2. Best Gaze 0-->Normal   3. Visual 0-->No visual loss   4. Facial Palsy 0-->Normal symmetrical movements   5a. Motor Arm, Left 0-->No drift, limb holds 90 (or 45) degrees for full 10 secs   5b. Motor Arm, Right 0-->No drift, limb holds 90 (or 45) degrees for full 10 secs   6a. Motor Leg, Left 1-->Drift, leg falls by the end of the 5-sec period but does not hit bed   6b. Motor Leg, Right 0-->No drift, leg holds 30 degree position for full 5 secs   7. Limb Ataxia 0-->Absent   8. Sensory 0-->Normal, no sensory loss   9. Best Language 0-->No aphasia, normal   10. Dysarthria 1-->Mild-to-moderate dysarthria, patient slurs at least some words and, at worst, can be understood with some difficulty   11. Extinction and Inattention (formerly Neglect) 0-->No abnormality   Total (NIH Stroke Scale) 2             Significant Labs: BMP: No results for input(s): GLU, NA, K, CL, CO2, BUN, CREATININE, CALCIUM, MG in the last 48 hours.  CBC: No results for input(s): WBC, HGB, HCT, PLT in the last 48 hours.    Significant  Imaging: I have reviewed all pertinent imaging results/findings within the past 24 hours.

## 2022-09-13 NOTE — ED PROVIDER NOTES
Encounter Date: 9/13/2022       History     Chief Complaint   Patient presents with    Weakness     Pt to ER via [facility] for weakness in left side.  Pt was playing cards and at approx 1315 had slurred speech and weakness.  Pt unable to lift left leg and appears lethargic.  Code fast called at 1419     56 y/o F with hx HTN, COPD, prior TIA presents to the ED for evaluation after episode of sudden LOC while playing cards, reportedly with confusion, slurred speech, possible transient facial droop and left sided weakness about 1 hour TPA. Improved at this time though still dysarthric and unable to life leg leg up off bed (symmetric strength bilateral UE though tremulous). CODE FAST called following triage. No anticoagulant use.     The history is provided by the patient, the EMS personnel and a friend.   Cerebrovascular Accident  The primary symptoms include loss of consciousness, focal weakness and speech change. Primary symptoms do not include headaches, seizures, fever, nausea or vomiting. The symptoms began just prior to arrival. The symptoms are improving. The neurological symptoms are focal.   Loss of consciousness began less than 1 hour ago. The loss of consciousness lasted greater than 5 minutes. The loss of consciousness was witnessed.   Weakness began less than 1 hour ago.   Additional symptoms include weakness.   Review of patient's allergies indicates:   Allergen Reactions    Adhesive      Other reaction(s): skin tears easily with adhesive    Cephalexin Nausea And Vomiting    Erythromycin Nausea And Vomiting    Lithium Nausea And Vomiting    Naproxen Nausea And Vomiting     Past Medical History:   Diagnosis Date    Bipolar disorder     COPD (chronic obstructive pulmonary disease)     Depression     Lumbar degenerative disc disease     TIA (transient ischemic attack)      Past Surgical History:   Procedure Laterality Date    ANTERIOR CRUCIATE LIGAMENT REPAIR Left     TOTAL KNEE ARTHROPLASTY Left     WRIST  SURGERY Right      Family History   Problem Relation Age of Onset    Coronary artery disease Mother     Diabetes Mother     Coronary artery disease Father      Social History     Tobacco Use    Smoking status: Every Day     Types: Vaping w/o nicotine    Smokeless tobacco: Never   Substance Use Topics    Alcohol use: Not Currently     Comment: stopped in 2005    Drug use: Not Currently     Types: Marijuana, Cocaine     Comment: stopped in 2005     Review of Systems   Constitutional:  Negative for fatigue and fever.   Eyes:  Negative for visual disturbance.   Respiratory:  Negative for chest tightness and shortness of breath.    Cardiovascular:  Negative for chest pain.   Gastrointestinal:  Negative for abdominal pain, diarrhea, nausea and vomiting.   Genitourinary:  Negative for dysuria and hematuria.   Musculoskeletal:  Negative for back pain and neck pain.   Skin:  Negative for rash.   Allergic/Immunologic: Negative for immunocompromised state.   Neurological:  Positive for speech change, focal weakness, loss of consciousness, syncope, speech difficulty and weakness. Negative for seizures and headaches.   All other systems reviewed and are negative.    Physical Exam     Initial Vitals [09/13/22 1423]   BP Pulse Resp Temp SpO2   126/79 98 18 97.7 °F (36.5 °C) 98 %      MAP       --         Physical Exam    Nursing note and vitals reviewed.  Constitutional: She appears well-developed and well-nourished. No distress.   HENT:   Head: Normocephalic and atraumatic.   Mouth/Throat: Oropharynx is clear and moist.   Eyes: Conjunctivae are normal. Pupils are equal, round, and reactive to light.   Neck: Neck supple.   Normal range of motion.  Cardiovascular:  Normal rate, regular rhythm and normal heart sounds.           No murmur heard.  Pulmonary/Chest: Breath sounds normal. No respiratory distress.   Abdominal: Abdomen is soft. She exhibits no distension. There is no abdominal tenderness.   Musculoskeletal:          "General: Normal range of motion.      Cervical back: Normal range of motion and neck supple.     Neurological: She is alert and oriented to person, place, and time. GCS score is 15. GCS eye subscore is 4. GCS verbal subscore is 5. GCS motor subscore is 6.   Somewhat dysarthric speech, no gross facial asymmetry, 4/5 strength bilateral upper extremities and tremulous when trying to keep arms extended for NIHSS. RLE lifts but also "bounces" when trying to sustain elevation x 4 seconds, unable to lift LLE but able to plantar/dorsiflex. Sensation grossly intact to light touch, possibly mildly diminished (subjectively) to LLE.    Skin: Skin is warm and dry. No rash noted. No pallor.   Psychiatric: She has a normal mood and affect.       ED Course   Critical Care    Date/Time: 9/13/2022 2:20 PM  Performed by: Mihaela Foster MD  Authorized by: Mihaela Foster MD   Direct patient critical care time: 36 minutes  Additional history critical care time: 3 minutes  Ordering / reviewing critical care time: 4 minutes  Documentation critical care time: 4 minutes  Consulting other physicians critical care time: 5 minutes  Total critical care time (exclusive of procedural time) : 52 minutes  Critical care time was exclusive of separately billable procedures and treating other patients.  Critical care was necessary to treat or prevent imminent or life-threatening deterioration of the following conditions: CNS failure or compromise.  Critical care was time spent personally by me on the following activities: discussions with consultants, evaluation of patient's response to treatment, examination of patient, obtaining history from patient or surrogate, ordering and performing treatments and interventions, ordering and review of laboratory studies, ordering and review of radiographic studies, pulse oximetry and re-evaluation of patient's condition.      Labs Reviewed   COMPREHENSIVE METABOLIC PANEL - Abnormal; Notable for the following " components:       Result Value    Chloride 108 (*)     Glucose Level 140 (*)     Creatinine 1.07 (*)     All other components within normal limits   PROTIME-INR - Abnormal; Notable for the following components:    PT 12.0 (*)     All other components within normal limits   LIPID PANEL - Abnormal; Notable for the following components:    Cholesterol Total 210 (*)     Triglyceride 345 (*)     All other components within normal limits   CBC WITH DIFFERENTIAL - Abnormal; Notable for the following components:    MCHC 32.3 (*)     All other components within normal limits   TSH - Normal   CBC W/ AUTO DIFFERENTIAL    Narrative:     The following orders were created for panel order CBC W/ AUTO DIFFERENTIAL.  Procedure                               Abnormality         Status                     ---------                               -----------         ------                     CBC with Differential[[id number]]        Abnormal            Final result                 Please view results for these tests on the individual orders.   POCT GLUCOSE, HAND-HELD DEVICE     EKG Readings: (Independently Interpreted)   Initial Reading: No STEMI. Rhythm: Normal Sinus Rhythm. Heart Rate: 90. Ectopy: No Ectopy. Conduction: Normal. ST Segments: Normal ST Segments. T Waves: Normal. Axis: Normal.   09/13/2022 @ 1658     Imaging Results              CT HEAD FOR STROKE (Final result)  Result time 09/13/22 14:42:42   Procedure changed from CT Head Without Contrast     Final result by Adarsh Cline MD (09/13/22 14:42:42)                   Impression:        1.  No acute intracranial process is identified.    2.  Sinus similar in appearance to the previous exam.  Disease with changes most from the left sphenoid    These findings were reported to Dr. Foster in the emergency department at 14:40 hours on 09/13/2020.      Electronically signed by: Adarsh Cline MD  Date:    09/13/2022  Time:    14:42               Narrative:      CT HEAD WITHOUT  CONTRAST:    CPT 44070    Total DLP: 909 mGy-cm. Automatic exposure control was utilized to limit the radiation dose to the patient.    HISTORY: Code stroke.  Acute onset of focal neurologic deficit with dysphagia.    Comparison: 07/06/2022    TECHNIQUE: Multiple contiguous axial images were acquired from the base of the skull and the vertex without contrast administration.    FINDINGS:    The ventricles and basal cisterns appear normal. No cerebral or cerebellar parenchymal abnormality is identified. There is no hemorrhage, midline shift, significant mass-effect, or extra-axial fluid collection.  There is mucosal thickening scattered in the paranasal sinuses with most prominent mucosal thickening and possible mucous retention cyst in left sphenoid sinus almost completely opacifying and unchanged.  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  The mastoid air cells are clear. The calvarium is intact there is an osteoma in the right occipital bone dorsal to the mastoid.                                       Medications   alteplase (ACtivase) 100 mg injection (has no administration in time range)     Medical Decision Making:   Initial Assessment:   Ms. Jin presented for evaluation of LOC, speech changes, LLE weakness - onset just PTA and slightly improved per EMS. CODE FAST called and she was evaluated promptly by the stroke service. Currently hemodynamically stable, no need for airway management, transported emergently to CT scan to evaluate for hemorrhage, possible TPA candidate.   Differential Diagnosis:   Ischemic vs hemorrhagic CVA, syncope, seizure, TIA,  Independently Interpreted Test(s):   I have ordered and independently interpreted EKG Reading(s) - see prior notes  Clinical Tests:   Lab Tests: Ordered and Reviewed  The following lab test(s) were unremarkable: CBC and CMP  Radiological Study: Ordered and Reviewed  Medical Tests: Ordered and Reviewed  ED Management:  CT w/o acute findings. Stroke team  recommends TPA. They have discussed r/b/a with the patient and she is agreeable to proceed. Consents signed. TPA ordered and she will be admitted to the ICU for post-TPA administration monitoring.              ED Course as of 09/14/22 0529   Tue Sep 13, 2022   1441 Radiology called w/ neg acute CT head [KS]   1452 Evaluated at bedside by stroke team, per Barbara Mann NP (discussed with Dr. Collier), recommends TPA. They have discussed this with the patient and she has consented to treatment.  [KS]      ED Course User Index  [KS] Mihaela Foster MD                 Clinical Impression:   Final diagnoses:  [I63.9] Stroke  [I63.9] Acute CVA (cerebrovascular accident) (Primary)  [R55] Syncope, unspecified syncope type        ED Disposition Condition    Admit                 Mihaela Foster MD  09/14/22 0553

## 2022-09-13 NOTE — H&P
[facility] - Emergency Dept  Pulmonary Critical Care Note    Patient Name: Faith Jin  MRN: [id number]  Admission Date: 9/13/2022  Hospital Length of Stay: 0 days  Code Status: Prior  Attending Provider: Mihaela Foster MD  Primary Care Provider: Garrick Nascimento MD     Subjective:     HPI:   Faith Jin is a 57-year-old female with PMH of TIA in 7/2022, bipolar disorder, COPD, depression, degenerative disc disease who presented to [facility] ED on 9/13/22 with reports of syncope followed by weakness. She is lethargic but able to describe what happened earlier today; she states that she was playing cards with her friends at her house and the next thing she knows she woke up on the card table. She states that she had no symptoms leading up to the event other than slight headache and felt at baseline this morning. Her friend is at bedside and states that she was at baseline and acting normally until she slumped over to one side and dropped her cards on the floor. She states that she was not responding verbally but was looking around the room and looking at who was speaking to her. She denies noticing any jerking, urinary incontinence or tongue biting. EMS was called at that point and she was brought to the ED where a stat head CT was negative for hemorrhage, neurology was consulted at which point the decision was made to push TPA. Admitted to ICU for frequent neuro checks and close monitoring post-TPA. She states that she has had a slight headache this morning but denies dizziness, weakness, or nausea prior to this morning. She states that she still has a headache with slight nausea.     Of note pt was admitted 7/6/22 and d/c 7/12/22 for TIA, at that time had negative CT head and negative TTE bubble study. Pt was discharged on ASA and statin, pt states compliance with all prescribed medication.      Social hx: denies alcohol use, admits to daily vaping, quit cigarette smoking about 2  weeks ago, previously smoked 3-4 cigarettes a day since teen years      Hospital Course/Significant events:   received TPA and admitted to ICU    24 Hour Interval History:  Please see above    Past Medical History:   Diagnosis Date    Bipolar disorder     COPD (chronic obstructive pulmonary disease)     Depression     Lumbar degenerative disc disease     TIA (transient ischemic attack)        Past Surgical History:   Procedure Laterality Date    ANTERIOR CRUCIATE LIGAMENT REPAIR Left     TOTAL KNEE ARTHROPLASTY Left     WRIST SURGERY Right        Social History     Socioeconomic History    Marital status:    Tobacco Use    Smoking status: Every Day     Types: Vaping w/o nicotine    Smokeless tobacco: Never   Substance and Sexual Activity    Alcohol use: Not Currently     Comment: stopped in     Drug use: Not Currently     Types: Marijuana, Cocaine     Comment: stopped in            Current Outpatient Medications   Medication Instructions    acyclovir 5% (ZOVIRAX) 5 % Crea SMARTSIG:Topical 4-5 Times Daily PRN    aspirin (ECOTRIN) 81 mg, Oral, Daily    atorvastatin (LIPITOR) 40 mg, Oral, Daily    azelastine (ASTELIN) 137 mcg (0.1 %) nasal spray SMARTSI Spray(s) Both Nares Every Morning    benztropine (COGENTIN) 1 mg, Oral, 3 times daily    buPROPion (WELLBUTRIN XL) 300 mg, Oral, Every morning    carBAMazepine (TEGRETOL XR) 400 mg, Oral, 2 times daily    clonazePAM (KLONOPIN) 0.5 mg, Oral, 3 times daily    dexlansoprazole (DEXILANT) 60 mg capsule 1 capsule, Oral, Daily    DULoxetine (CYMBALTA) 60 mg, Oral, 2 times daily    eszopiclone (LUNESTA) 2 mg, Oral, Nightly PRN    ezetimibe (ZETIA) 10 mg, Oral, Daily    famotidine (PEPCID) 20 mg, Oral, Daily PRN    fluticasone propionate (FLONASE) 50 mcg/actuation nasal spray 1 spray, Each Nostril, Nightly    furosemide (LASIX) 20 mg, Oral, Daily    gabapentin (NEURONTIN) 300 mg, Oral, 3 times daily    icosapent ethyL (VASCEPA) 1 gram Cap 2 capsules, Oral,  2 times daily    INVEGA SUSTENNA 234 mg, Intramuscular    meclizine (ANTIVERT) 25 mg, Oral, 3 times daily PRN    medroxyPROGESTERone (PROVERA) 10 mg, Oral, Daily    meloxicam (MOBIC) 15 mg, Oral, Daily    meloxicam (MOBIC) 15 mg, Oral, Daily    metFORMIN (GLUCOPHAGE) 500 mg, Oral, Daily    MOVANTIK 25 mg, Oral, Daily    nicotine (NICODERM CQ) 21 mg/24 hr 1 patch, Daily    pregabalin (LYRICA) 75 mg, Oral, 3 times daily    RESTASIS 0.05 % ophthalmic emulsion 1 drop, Both Eyes, 2 times daily    tiZANidine (ZANAFLEX) 4 MG tablet take 1 tablet BY MOUTH EVERY 8 HOURS AS NEEDED FOR MUSCLE SPASMS Strength: 4 mg    topiramate (TOPAMAX) 200 mg, Oral, 2 times daily    valACYclovir (VALTREX) 500 mg, Oral, 2 times daily    VRAYLAR 3 mg Cap 1 capsule, Oral, Daily       Current Inpatient Medications   sodium chloride 0.9%   Intravenous ED 1 Time    alteplase  0.81 mg/kg Intravenous Once       Current Intravenous Infusions        Review of Systems   Constitutional:  Negative for chills, diaphoresis and fever.   HENT:  Negative for hearing loss.    Eyes:  Negative for blurred vision and double vision.   Respiratory:  Negative for shortness of breath.    Cardiovascular:  Negative for chest pain, palpitations and leg swelling.   Gastrointestinal:  Positive for nausea. Negative for abdominal pain and vomiting.   Musculoskeletal:         Wrist pain   Skin:  Negative for rash.   Neurological:  Positive for dizziness, speech change, weakness and headaches. Negative for sensory change and seizures.   Endo/Heme/Allergies:  Does not bruise/bleed easily.        Objective:     No intake or output data in the 24 hours ending 09/13/22 1532      Vital Signs (Most Recent):  Temp: 97.7 °F (36.5 °C) (09/13/22 1423)  Pulse: 91 (09/13/22 1530)  Resp: (!) 24 (09/13/22 1530)  BP: (!) 140/97 (09/13/22 1530)  SpO2: 100 % (09/13/22 1530)    Body mass index is 30.22 kg/m².  Weight: 75 kg (165 lb 3.8 oz) Vital Signs (24h Range):  Temp:  [97.7 °F (36.5 °C)]  97.7 °F (36.5 °C)  Pulse:  [88-98] 91  Resp:  [14-24] 24  SpO2:  [97 %-100 %] 100 %  BP: (126-140)/(75-97) 140/97     Physical Exam  Constitutional:       General: She is not in acute distress.     Appearance: She is not ill-appearing or toxic-appearing.      Comments: Lethargic but able to converse and answer all questions. Speech is slow and slightly dysarthric    HENT:      Head: Normocephalic and atraumatic.      Nose: Nose normal.      Mouth/Throat:      Mouth: Mucous membranes are moist.      Pharynx: Oropharynx is clear.   Eyes:      Extraocular Movements: Extraocular movements intact.      Pupils: Pupils are equal, round, and reactive to light.   Cardiovascular:      Rate and Rhythm: Normal rate and regular rhythm.      Pulses: Normal pulses.      Heart sounds: Normal heart sounds. No murmur heard.  Pulmonary:      Effort: Pulmonary effort is normal. No respiratory distress.      Breath sounds: Normal breath sounds. No stridor. No wheezing or rales.   Abdominal:      General: Abdomen is flat. Bowel sounds are normal. There is no distension.      Palpations: Abdomen is soft. There is no mass.      Tenderness: There is no guarding or rebound.   Musculoskeletal:         General: No signs of injury. Normal range of motion.      Cervical back: Normal range of motion.      Right lower leg: No edema.      Left lower leg: No edema.   Skin:     General: Skin is warm and dry.   Neurological:      Mental Status: She is alert and oriented to person, place, and time.      Cranial Nerves: No cranial nerve deficit.      Sensory: No sensory deficit.      Comments: Left LE strength 4/5, all other extremity strength 5/5    Psychiatric:         Behavior: Behavior normal.         Lines/Drains/Airways       Peripheral Intravenous Line  Duration                  Peripheral IV - Single Lumen 09/13/22 1308 20 G Posterior;Right Hand <1 day         Peripheral IV - Single Lumen 09/13/22 1500 18 G Left;Posterior Wrist <1 day                     Significant Labs:    Lab Results   Component Value Date    WBC 4.5 07/07/2022    HGB 13.6 07/07/2022    HCT 39.8 07/07/2022    MCV 89.2 07/07/2022     07/07/2022         BMP  Lab Results   Component Value Date     07/07/2022    K 4.3 07/07/2022    CO2 24 07/07/2022    BUN 10.7 07/07/2022    CREATININE 0.79 07/07/2022    CALCIUM 9.6 07/07/2022    EGFRNONAA >60 07/07/2022       ABG  No results for input(s): PH, PO2, PCO2, HCO3, BE in the last 168 hours.    Mechanical Ventilation Support:       Significant Imaging:  I have reviewed the pertinent imaging within the past 24 hours.        Assessment/Plan:     Assessment  Left sided weakness, likely 2/2 CVA  CT head neg for hemorrhage  S/p TPA  bipolar disorder  COPD  Depression  degenerative disc disease   Recent right wrist sprain  Polypharmacy       Plan  - pt received TPA bolus followed infusion over 60 min  - q 15 min neuro checks during TPA admin, followed by q30 min for 6 hours, followed by hourly for 16 hours  - troponin, EKG ordered, pending  - UA UDS salicylate and acetaminophen level ordered due to AMS  - echo w/ bubble ordered, pending  - I[facility] guidelines: notify if neurological deterioration, severe headache, acute hypertension, nausea or vomiting  - will determine accurate med rec and address    DVT Prophylaxis: s/p TPA  GI Prophylaxis: protonix     33 minutes of critical care was time spent personally by me on the following activities: development of treatment plan with patient or surrogate and bedside caregivers, discussions with consultants, evaluation of patient's response to treatment, examination of patient, ordering and performing treatments and interventions, ordering and review of laboratory studies, ordering and review of radiographic studies, pulse oximetry, re-evaluation of patient's condition.  This critical care time did not overlap with that of any other provider or involve time for any procedures.     Yanet Jackson,  DO  Pulmonary Critical Care Medicine  [facility] - Emergency Dept

## 2022-09-13 NOTE — ASSESSMENT & PLAN NOTE
Left sided weakness following syncopal episode      CTh negative  Neurologist on call recommends IV tPA      -permissive HTN for now ... SBP less than 180  -STAT CTh for any HA or neuro change  -q1hr neuro checks  -HOB flat, Bedrest, NPO  -repeat CTh after 24 hours to determine if antiplatelet therapy can be initiated

## 2022-09-13 NOTE — Clinical Note
Diagnosis: Stroke [630760]   Admitting Provider:: MARGARET TOMAS JR. [[id number]]   Future Attending Provider: MARGARET TOMAS JR. [[id number]]   Reason for IP Medical Treatment  (Clinical interventions that can only be accomplished in the IP setting? ) :: post TPA management/stroke work up   Estimated Length of Stay:: 3-4 midnights   I certify that Inpatient services for greater than or equal to 2 midnights are medically necessary:: Yes   Plans for Post-Acute care--if anticipated (pick the single best option):: A. No post acute care anticipated at this time

## 2022-09-13 NOTE — NURSING
Nurses Note -- 4 Eyes      9/13/2022   4:34 PM      Skin assessed during: Admit      [x] No Pressure Injuries Present    [x]Prevention Measures Documented      [] Yes- Altered Skin Integrity Present or Discovered   [] LDA Added if Not in Epic (Describe Wound)   [] New Altered Skin Integrity was Present on Admit and Documented in LDA   [] Wound Image Taken    Wound Care Consulted? No    Attending Nurse:  Lilly Hemphill RN     Second RN/Staff Member:  missy montez rn

## 2022-09-14 LAB
ANION GAP SERPL CALC-SCNC: 17 MMOL/L (ref 8–16)
AORTIC VALVE CUSP SEPERATION: 2 CM
AV INDEX (PROSTH): 0.79
AV MEAN GRADIENT: 3 MMHG
AV PEAK GRADIENT: 6 MMHG
AV VALVE AREA: 2.25 CM2
AV VELOCITY RATIO: 0.76
BSA FOR ECHO PROCEDURE: 1.67 M2
BUN SERPL-MCNC: 18 MG/DL (ref 6–30)
CHLORIDE SERPL-SCNC: 109 MMOL/L (ref 95–110)
CREAT SERPL-MCNC: 1.1 MG/DL (ref 0.5–1.4)
CV ECHO LV RWT: 0.37 CM
DOP CALC AO PEAK VEL: 1.26 M/S
DOP CALC AO VTI: 24.6 CM
DOP CALC LVOT AREA: 2.8 CM2
DOP CALC LVOT DIAMETER: 1.9 CM
DOP CALC LVOT PEAK VEL: 0.96 M/S
DOP CALC LVOT STROKE VOLUME: 55.26 CM3
DOP CALC MV VTI: 16.6 CM
DOP CALCLVOT PEAK VEL VTI: 19.5 CM
E WAVE DECELERATION TIME: 156 MSEC
E/A RATIO: 0.83
E/E' RATIO: 8.5 M/S
ECHO LV POSTERIOR WALL: 0.7 CM (ref 0.6–1.1)
EJECTION FRACTION: 60 %
FRACTIONAL SHORTENING: 32 % (ref 28–44)
GLUCOSE SERPL-MCNC: 156 MG/DL (ref 70–110)
HCT VFR BLD CALC: 41 %PCV (ref 36–54)
HGB BLD-MCNC: 14 G/DL
INTERVENTRICULAR SEPTUM: 0.87 CM (ref 0.6–1.1)
LEFT ATRIUM SIZE: 2.7 CM
LEFT ATRIUM VOLUME INDEX MOD: 18.8 ML/M2
LEFT ATRIUM VOLUME MOD: 30.8 CM3
LEFT INTERNAL DIMENSION IN SYSTOLE: 2.6 CM (ref 2.1–4)
LEFT VENTRICLE DIASTOLIC VOLUME INDEX: 37.99 ML/M2
LEFT VENTRICLE DIASTOLIC VOLUME: 62.3 ML
LEFT VENTRICLE MASS INDEX: 51 G/M2
LEFT VENTRICLE SYSTOLIC VOLUME INDEX: 15 ML/M2
LEFT VENTRICLE SYSTOLIC VOLUME: 24.6 ML
LEFT VENTRICULAR INTERNAL DIMENSION IN DIASTOLE: 3.81 CM (ref 3.5–6)
LEFT VENTRICULAR MASS: 84.14 G
LV LATERAL E/E' RATIO: 7.73 M/S
LV SEPTAL E/E' RATIO: 9.44 M/S
LVOT MG: 2 MMHG
LVOT MV: 0.66 CM/S
MV MEAN GRADIENT: 1 MMHG
MV PEAK A VEL: 1.03 M/S
MV PEAK E VEL: 0.85 M/S
MV PEAK GRADIENT: 2 MMHG
MV VALVE AREA BY CONTINUITY EQUATION: 3.33 CM2
POC IONIZED CALCIUM: 1.14 MMOL/L (ref 1.06–1.42)
POC TCO2 (MEASURED): 20 MMOL/L (ref 23–29)
POCT GLUCOSE: 101 MG/DL (ref 70–110)
POCT GLUCOSE: 111 MG/DL (ref 70–110)
POCT GLUCOSE: 136 MG/DL (ref 70–110)
POTASSIUM BLD-SCNC: 3.8 MMOL/L (ref 3.5–5.1)
RA MAJOR: 3.77 CM
RA PRESSURE: 3 MMHG
RA WIDTH: 3.15 CM
RIGHT VENTRICULAR END-DIASTOLIC DIMENSION: 2.63 CM
RV MID DIAMA: 2.6 CM
SAMPLE: ABNORMAL
SODIUM BLD-SCNC: 141 MMOL/L (ref 136–145)
TDI LATERAL: 0.11 M/S
TDI SEPTAL: 0.09 M/S
TDI: 0.1 M/S
TRICUSPID ANNULAR PLANE SYSTOLIC EXCURSION: 2.39 CM

## 2022-09-14 PROCEDURE — 25000003 PHARM REV CODE 250: Performed by: NURSE PRACTITIONER

## 2022-09-14 PROCEDURE — S4991 NICOTINE PATCH NONLEGEND: HCPCS

## 2022-09-14 PROCEDURE — 99233 SBSQ HOSP IP/OBS HIGH 50: CPT | Mod: ,,, | Performed by: PSYCHIATRY & NEUROLOGY

## 2022-09-14 PROCEDURE — A9577 INJ MULTIHANCE: HCPCS | Performed by: INTERNAL MEDICINE

## 2022-09-14 PROCEDURE — 92523 SPEECH SOUND LANG COMPREHEN: CPT

## 2022-09-14 PROCEDURE — 25000003 PHARM REV CODE 250: Performed by: STUDENT IN AN ORGANIZED HEALTH CARE EDUCATION/TRAINING PROGRAM

## 2022-09-14 PROCEDURE — 20000000 HC ICU ROOM

## 2022-09-14 PROCEDURE — 99233 PR SUBSEQUENT HOSPITAL CARE,LEVL III: ICD-10-PCS | Mod: ,,, | Performed by: PSYCHIATRY & NEUROLOGY

## 2022-09-14 PROCEDURE — 25500020 PHARM REV CODE 255: Performed by: INTERNAL MEDICINE

## 2022-09-14 PROCEDURE — 25000003 PHARM REV CODE 250

## 2022-09-14 RX ORDER — ACETAMINOPHEN 325 MG/1
650 TABLET ORAL EVERY 6 HOURS PRN
Status: DISCONTINUED | OUTPATIENT
Start: 2022-09-14 | End: 2022-09-15 | Stop reason: HOSPADM

## 2022-09-14 RX ADMIN — EZETIMIBE 10 MG: 10 TABLET ORAL at 08:09

## 2022-09-14 RX ADMIN — ACETAMINOPHEN 650 MG: 325 TABLET ORAL at 04:09

## 2022-09-14 RX ADMIN — ACETAMINOPHEN 650 MG: 325 TABLET ORAL at 10:09

## 2022-09-14 RX ADMIN — CLONAZEPAM 0.5 MG: 0.5 TABLET ORAL at 08:09

## 2022-09-14 RX ADMIN — DULOXETINE 60 MG: 30 CAPSULE, DELAYED RELEASE ORAL at 08:09

## 2022-09-14 RX ADMIN — FUROSEMIDE 20 MG: 20 TABLET ORAL at 08:09

## 2022-09-14 RX ADMIN — SODIUM CHLORIDE: 9 INJECTION, SOLUTION INTRAVENOUS at 05:09

## 2022-09-14 RX ADMIN — ATORVASTATIN CALCIUM 40 MG: 40 TABLET, FILM COATED ORAL at 08:09

## 2022-09-14 RX ADMIN — NICOTINE 1 PATCH: 21 PATCH, EXTENDED RELEASE TRANSDERMAL at 08:09

## 2022-09-14 RX ADMIN — GADOBENATE DIMEGLUMINE 20 ML: 529 INJECTION, SOLUTION INTRAVENOUS at 05:09

## 2022-09-14 RX ADMIN — TOPIRAMATE 200 MG: 100 TABLET, FILM COATED ORAL at 08:09

## 2022-09-14 RX ADMIN — PANTOPRAZOLE SODIUM 40 MG: 40 TABLET, DELAYED RELEASE ORAL at 08:09

## 2022-09-14 RX ADMIN — CLONAZEPAM 0.5 MG: 0.5 TABLET ORAL at 04:09

## 2022-09-14 RX ADMIN — BUPROPION HYDROCHLORIDE 300 MG: 150 TABLET, FILM COATED, EXTENDED RELEASE ORAL at 07:09

## 2022-09-14 RX ADMIN — CARBAMAZEPINE 400 MG: 200 TABLET, EXTENDED RELEASE ORAL at 08:09

## 2022-09-14 NOTE — PT/OT/SLP PROGRESS
Occupational Therapy      Patient Name:  Faith Jin   MRN:  [id number]    OT consult received. Case discussed with RN. Patient not seen today secondary to CVA bedrest until 16:15 per tpa protocol . Will follow-up tomorrow for initial eval as appropriate.    9/14/2022

## 2022-09-14 NOTE — SUBJECTIVE & OBJECTIVE
Subjective:     Interval History:   No new issues overnight, appears to be back at baseline     Current Neurological Medications:     Current Facility-Administered Medications   Medication Dose Route Frequency Provider Last Rate Last Admin    0.9%  NaCl infusion   Intravenous Continuous Barbara Mann, FNP 75 mL/hr at 09/14/22 0558 New Bag at 09/14/22 0558    acetaminophen tablet 650 mg  650 mg Oral Q6H PRN Jony Moore, DO   650 mg at 09/14/22 1037    atorvastatin tablet 40 mg  40 mg Oral Daily Yanet Dowdia, DO   40 mg at 09/14/22 0832    buPROPion TB24 tablet 300 mg  300 mg Oral QAM Yanet Victor Mroseya, DO   300 mg at 09/14/22 0738    carBAMazepine 12 hr tablet 400 mg  400 mg Oral BID Yanet Dowliberty, DO   400 mg at 09/14/22 0834    cariprazine Cap 3 mg  3 mg Oral Daily Yanet Victor Mroseya, DO        clonazePAM tablet 0.5 mg  0.5 mg Oral TID Yanet Victor Mroseya, DO   0.5 mg at 09/14/22 0832    DULoxetine DR capsule 60 mg  60 mg Oral BID Yanet Mayesa, DO   60 mg at 09/14/22 0832    ezetimibe tablet 10 mg  10 mg Oral Daily Yanet Dowroseya, DO   10 mg at 09/14/22 0833    furosemide tablet 20 mg  20 mg Oral Daily Yanet Mayesa, DO   20 mg at 09/14/22 0832    nicotine 21 mg/24 hr 1 patch  1 patch Transdermal Daily Yanet aMyesa, DO   1 patch at 09/14/22 0832    pantoprazole EC tablet 40 mg  40 mg Oral Daily Yanet Sugeya, DO   40 mg at 09/14/22 0832    topiramate tablet 200 mg  200 mg Oral BID Yanet Dowdia, DO   200 mg at 09/14/22 0833       Review of Systems  Objective:     Vital Signs (Most Recent):  Temp: 98.6 °F (37 °C) (09/14/22 1200)  Pulse: 86 (09/14/22 1400)  Resp: 19 (09/14/22 1400)  BP: 136/87 (09/14/22 1400)  SpO2: 95 % (09/14/22 1400) Vital Signs (24h Range):  Temp:  [97.8 °F (36.6 °C)-98.6 °F (37 °C)] 98.6 °F (37 °C)  Pulse:  [] 86  Resp:  [10-22] 19  SpO2:  [90 %-100 %] 95 %  BP: ()/(52-92) 136/87     Weight: 64 kg (141 lb)  Body mass index is 25.95 kg/m².    Physical Exam     PE and ROS per  MD    Significant Labs:   Recent Lab Results  (Last 5 results in the past 24 hours)        09/14/22  0405   09/13/22  2053   09/13/22  1809   09/13/22  1808   09/13/22  1734        Phencyclidine       Negative         Amphetamine Screen, Ur       Negative         Appearance, UA     Clear           Bacteria, UA     None Seen           Barbiturate Screen, Ur       Negative         Benzodiazepine Screen, Urine       Negative         Bilirubin, UA     Negative           Cannabinoids, Urine       Positive         Cocaine (Metab.)       Negative         Color, UA     Yellow           Estimated Avg Glucose         131.2       Fentanyl, Urine       Negative         Glucose, UA     3+           Hemoglobin A1C External         6.2       Ketones, UA     Negative           Leukocytes, UA     Negative           MDMA, Urine       Negative         NITRITE UA     Negative           Occult Blood UA     Negative           Opiate Scrn, Ur       Negative         pH, UA     6.0           pH, Urine       6.0         POCT Glucose 136   111             Protein, UA     Negative           RBC, UA     <5           Specific Gravity,UA     1.023           Specific Gravity, Urine Auto       1.023         Squam Epithel, UA     <5           Urobilinogen, UA     0.2           WBC, UA     <5                                  Significant Imaging: I have reviewed all pertinent imaging results/findings within the past 24 hours.

## 2022-09-14 NOTE — ASSESSMENT & PLAN NOTE
Left sided weakness following syncopal episode s/p tPA     Repeat CT head this afternoon/MRI to determine antiplatelet therapy, if it can be initiated  Q4 neuro checks after 24 hours completed  Ok to downgrade from ICU if neurologically stable  Ok to eat if she passed her Raman      Antithrombotics for secondary stroke prevention: Antiplatelets: None: Hold all Antithrombotics x 24 hours after IV t-PA administration    Statins for secondary stroke prevention and hyperlipidemia, if present:   Statins: Atorvastatin- 40 mg daily    Aggressive risk factor modification: bipolar, depression, COPD      Rehab efforts: The patient has been evaluated by a stroke team provider and the therapy needs have been fully considered based off the presenting complaints and exam findings. The following therapy evaluations are needed: PT evaluate and treat, OT evaluate and treat, SLP evaluate and treat    Diagnostics ordered/pending: Carotid ultrasound to assess vasculature, CT scan of head without contrast to asses brain parenchyma, HgbA1C to assess blood glucose levels, Lipid Profile to assess cholesterol levels, MRA head to assess vasculature, MRA neck/arch to assess vasculature, MRI head without contrast to assess brain parenchyma, TTE to assess cardiac function/status , TSH to assess thyroid function     Stroke workup  MRI brain and MRA ordered   ECHO completed, awaiting interpretation   MRI brain: negative   CT head: negative  TSH: 0.3916  LDL: 100    VTE prophylaxis: Mechanical prophylaxis: Place SCDs    BP parameters: Infarct: Post tPA, SBP <180

## 2022-09-14 NOTE — PT/OT/SLP EVAL
Speech Language Pathology Department  Cognitive-Communication Evaluation    Patient Name:  Faith Jin   MRN:  [id number]  Admitting Diagnosis: <principal problem not specified>    Recommendations:     General recommendations:  Speech/language therapy  Communication strategies:  none    Discharge recommendations:      Barriers to safe discharge: past medical history and limited family/social support    History:     Past Medical History:   Diagnosis Date    Bipolar disorder     COPD (chronic obstructive pulmonary disease)     Depression     Lumbar degenerative disc disease     TIA (transient ischemic attack)        Past Surgical History:   Procedure Laterality Date    ANTERIOR CRUCIATE LIGAMENT REPAIR Left     TOTAL KNEE ARTHROPLASTY Left     WRIST SURGERY Right        Previous level of Function  Education:some college  Occupation: unemployed, disabled  Lives: alone  Handed: Right  Glasses: yes  Hearing Aids: no  Home Responsibilities: health and wellness/ has aid for day-day living      Subjective     Patient awake, alert, and cooperative.    Pain/Comfort:   Lower back pain    Respiratory Status: room air    Objective:     SPEECH PRODUCTION  Phoneme Production: Adequate  Voice Quality: Adequate   Voice Production: slightly slurred, per Pt  Speech Rate: Adequate  Loudness: Adequate  Respiration: Adequate  Resonance: Adequate  Prosody: Adequate  Speech Intelligibility  Known Context: Greater that 90%  Unknown Context: Greater that 90%    AUDITORY COMPREHENSION    Following Directions:  1-Step: WFL  2-Step: WFL  Yes/No Questions:  Biographical: WF  Environmental: 4/5 self corrected after delay  Simple: 4/5  Complex: 3/5    VERBAL EXPRESSION  Automatic Speech:  Functional needs: WFL  Days of the week: ALL  Months of the year: ALL    Confrontation Naming  Body Parts: [facility]  Objects: [facility]  Wh- Questions:  Object name: [facility]  Object function: [facility]    COGNITION  Orientation:  x4    Memory:  Long Term: 5/5  delay on  descriptive explanation  Problem Solving  3/4 became confused  Organization:  Convergent thinking: WFL  Divergent thinkin/3 categories      Assessment:     Pt presents with impaired cognitive/ linguistic skills. SLP intervention is warranted    Goals:   Multidisciplinary Problems       SLP Goals       Not on file                    Plan:     Patient to be seen:      Plan of Care expires:     Plan of Care reviewed with:      SLP Follow-Up:          Time Tracking:     SLP Treatment Date:   22   Speech Start Time:     Speech Stop Time:        Speech Total Time (min):   15    Billable minutes:  Evaluation of Speech Sound Production with Comprehension and Expression, 15min        2022

## 2022-09-14 NOTE — PROGRESS NOTES
[facility]                       [address] [place] [address]    PATIENT NAME:       MANNY CASAS  YOB: 1965  CSN:                [id number]   MRN:                [id number]  ADMIT DATE:         09/13/2022 14:26:00  PHYSICIAN:          ARAMIS Gonsalves MD                            PROGRESS NOTE    DATE:      She is a 57-year-old white female patient who had a previous history of TIA in   July 2022.  She, at that time, had seen Dr. Aric Ortega.  She says she   inadvertently missed her medicines x2 days on Friday and Saturday of last week.    She resumed them on Sunday.  She came in with symptoms of a CVA, which was   outlined on a previous note.  She was given tPA, and has normal neurological   exam at this time.  She denies headache, nausea, or vomiting.  Normally, she is   very compliant.  She says she just lapsed with taking her medicine those 2 days.    She is undergoing the post tPA protocol, and is scheduled for an MRI, MRA of   the brain this morning.    PHYSICAL EXAMINATION:  VITAL SIGNS:  Currently, her temperature is 98.3, blood pressure 138/78, heart   rate 79, respiratory rate 17 and unlabored, satting 97% on 2 L nasal cannula.  HEENT:  Unremarkable.    CHEST:  Clear.  HEART:  Regular in rhythm.  (Note no arrhythmias noted).    ABDOMEN:  Soft, active bowel sounds.    EXTREMITIES:  No significant edema.    Neurologic:  Awake, alert.  Speech fluent.  She has mild right upper extremity   weakness which she says is baseline from previous surgery on her right arm.    Otherwise, she has good strength and sensation.    LABS:  From yesterday were reviewed, as well as the CT head.  The MRI of the   brain is pending.    IMPRESSION:    1. Acute cerebrovascular accident, status post tPA with resolution of   neurological abnormalities.    2. Bipolar disorder.    3. Chronic obstructive pulmonary  disease, unquantitated.  4. Depression.  5. Degenerative joint disease.    PLAN:    1. Continue to follow post tPA protocol.    2. The patient is scheduled for MRI, MRA of the brain today.    3. We are allowing permissive hypertension; however, her blood pressure has been   in acceptable range and has not been elevated.        ______________________________  G MD JOHANA Ambrosio/GEENA  DD:  09/14/2022  Time:  08:07AM  DT:  09/14/2022  Time:  08:15AM  Job #:  [id number]      PROGRESS NOTE

## 2022-09-15 VITALS
BODY MASS INDEX: 25.95 KG/M2 | RESPIRATION RATE: 17 BRPM | WEIGHT: 141 LBS | HEIGHT: 62 IN | DIASTOLIC BLOOD PRESSURE: 72 MMHG | HEART RATE: 83 BPM | SYSTOLIC BLOOD PRESSURE: 136 MMHG | OXYGEN SATURATION: 99 % | TEMPERATURE: 98 F

## 2022-09-15 LAB — POCT GLUCOSE: 126 MG/DL (ref 70–110)

## 2022-09-15 PROCEDURE — 25000003 PHARM REV CODE 250: Performed by: STUDENT IN AN ORGANIZED HEALTH CARE EDUCATION/TRAINING PROGRAM

## 2022-09-15 PROCEDURE — 97165 OT EVAL LOW COMPLEX 30 MIN: CPT

## 2022-09-15 PROCEDURE — 25000003 PHARM REV CODE 250: Performed by: NURSE PRACTITIONER

## 2022-09-15 PROCEDURE — S4991 NICOTINE PATCH NONLEGEND: HCPCS

## 2022-09-15 PROCEDURE — 97129 THER IVNTJ 1ST 15 MIN: CPT

## 2022-09-15 PROCEDURE — 25000003 PHARM REV CODE 250

## 2022-09-15 PROCEDURE — 97162 PT EVAL MOD COMPLEX 30 MIN: CPT

## 2022-09-15 RX ORDER — ASPIRIN 325 MG
325 TABLET, DELAYED RELEASE (ENTERIC COATED) ORAL DAILY
Status: DISCONTINUED | OUTPATIENT
Start: 2022-09-15 | End: 2022-09-15 | Stop reason: HOSPADM

## 2022-09-15 RX ADMIN — ATORVASTATIN CALCIUM 40 MG: 40 TABLET, FILM COATED ORAL at 09:09

## 2022-09-15 RX ADMIN — TOPIRAMATE 200 MG: 100 TABLET, FILM COATED ORAL at 09:09

## 2022-09-15 RX ADMIN — BUPROPION HYDROCHLORIDE 300 MG: 150 TABLET, FILM COATED, EXTENDED RELEASE ORAL at 07:09

## 2022-09-15 RX ADMIN — DULOXETINE 60 MG: 30 CAPSULE, DELAYED RELEASE ORAL at 09:09

## 2022-09-15 RX ADMIN — ASPIRIN 325 MG: 325 TABLET, COATED ORAL at 12:09

## 2022-09-15 RX ADMIN — CLONAZEPAM 0.5 MG: 0.5 TABLET ORAL at 09:09

## 2022-09-15 RX ADMIN — PANTOPRAZOLE SODIUM 40 MG: 40 TABLET, DELAYED RELEASE ORAL at 09:09

## 2022-09-15 RX ADMIN — ACETAMINOPHEN 650 MG: 325 TABLET ORAL at 12:09

## 2022-09-15 RX ADMIN — FUROSEMIDE 20 MG: 20 TABLET ORAL at 09:09

## 2022-09-15 RX ADMIN — NICOTINE 1 PATCH: 21 PATCH, EXTENDED RELEASE TRANSDERMAL at 09:09

## 2022-09-15 RX ADMIN — CARIPRAZINE 3 MG: 3 CAPSULE, GELATIN COATED ORAL at 09:09

## 2022-09-15 RX ADMIN — EZETIMIBE 10 MG: 10 TABLET ORAL at 09:09

## 2022-09-15 RX ADMIN — CARBAMAZEPINE 400 MG: 200 TABLET, EXTENDED RELEASE ORAL at 09:09

## 2022-09-15 NOTE — PT/OT/SLP PROGRESS
Speech Language Pathology Department  Therapy Progress Note    Patient Name:  Faith Jin   MRN:  [id number]  Admitting Diagnosis: Left-sided weakness    Recommendations:     General recommendations:  Cognitive-linguistic therapy  Communication strategies:  none    Subjective     Patient awake and alert.      Objective:     Word finding with 70%   STM 3/4, 3/3   Divergent with 70%    Assessment:     Pt continues to present with cognitive-lingustic deficits warranting skilled SLP intervention.     Goals:   Multidisciplinary Problems       SLP Goals       Not on file                    Plan:     Will continue to follow and tx as appropriate.    Patient to be seen:      Plan of Care expires:     Plan of Care reviewed with:      SLP Follow-Up:         Time Tracking:     SLP Treatment Date:    9/15/22  Speech Start Time:     Speech Stop Time:        Speech Total Time (min):   15    Billable minutes:  Cognitive Skills Intervention, 15        09/15/2022

## 2022-09-15 NOTE — DISCHARGE SUMMARY
[facility] - [facility]  Pulmonary Critical Care Note    Patient Name: Faith Jin  MRN: [id number]  Admission Date: 9/13/2022  Hospital Length of Stay: 2 days  Code Status: Full Code  Attending Provider: Ceferino Montelongo Jr., MD, *  Primary Care Provider: Garrick Nascimento MD     Condition: Good  Outcome: Condition has improved and patient is now ready for discharge.  DISPOSITION: Home or Self Care        Discharge Diagnoses:     Patient Active Problem List   Diagnosis    Generalized weakness    Type II diabetes mellitus    TIA (transient ischemic attack)    Left-sided weakness       Principal Problem:  Left-sided weakness    Consultants and Procedures:     Consultants:  IP CONSULT TO VASCULAR (STROKE) NEUROLOGY  IP CONSULT TO SOCIAL WORK/CASE MANAGEMENT       Brief Admission History:      Faith Jin is a 57-year-old female with PMH of TIA in 7/2022, bipolar disorder, COPD, depression, degenerative disc disease who presented to [facility] ED on 9/13/22 with reports of syncope followed by weakness. She is lethargic but able to describe what happened earlier today; she states that she was playing cards with her friends at her house and the next thing she knows she woke up on the card table. She states that she had no symptoms leading up to the event other than slight headache and felt at baseline this morning. Her friend is at bedside and states that she was at baseline and acting normally until she slumped over to one side and dropped her cards on the floor. She states that she was not responding verbally but was looking around the room and looking at who was speaking to her. She denies noticing any jerking, urinary incontinence or tongue biting. EMS was called at that point and she was brought to the ED where a stat head CT was negative for hemorrhage, neurology was consulted at which point the decision was made to push TPA. Admitted to ICU for frequent neuro checks and close monitoring  "post-TPA. She states that she has had a slight headache this morning but denies dizziness, weakness, or nausea prior to this morning. She states that she still has a headache with slight nausea.     Hospital Course with Pertinent Findings:      Patient was given tPA in the ED and admitted to the ICU afterwards. She underwent MRI Brain W and W/O contrast and MRA neck that showed no significant findings. Patient was monitored for 24 hours and was deemed acceptable for discharge by Neurology and the Intensive Care team.    Discharge physical exam:  Vitals  BP: (!) 143/80  Temp: 98.3 °F (36.8 °C)  Temp src: Oral  Pulse: 86  Resp: 17  SpO2: 99 %  Height: 5' 1.81" (157 cm)  Weight: 64 kg (141 lb)    General: Patient is sitting comfortably in the chair and not in distress  HEENT: Normocephalic and atraumatic. PERRL and EOMI  Cardiac: RRR with no murmurs noted  Pulm: Clear and equal breath sounds bilaterally with no wheezing  Abdomen: Non-tender and non-distended abdomen with bowel sounds present  Extremities: 2+ radial pulses with no lower extremity edema  Neuro: CN II-VII grossly intact. Able to ambulate with out any difficulties. Normal sensation to all 4 extremities. 5/5 strength to all four extremities.    TIME SPENT ON DISCHARGE: 35 minutes    Discharge Medications:         Medication List        CONTINUE taking these medications      aspirin 81 MG EC tablet  Commonly known as: ECOTRIN  Take 1 tablet (81 mg total) by mouth once daily.     atorvastatin 40 MG tablet  Commonly known as: LIPITOR  Take 1 tablet (40 mg total) by mouth once daily.     azelastine 137 mcg (0.1 %) nasal spray  Commonly known as: ASTELIN     benztropine 1 MG tablet  Commonly known as: COGENTIN     buPROPion 300 MG 24 hr tablet  Commonly known as: WELLBUTRIN XL     carBAMazepine 400 MG Tb12  Commonly known as: TEGRETOL XR     clonazePAM 0.5 MG tablet  Commonly known as: KlonoPIN     dexlansoprazole 60 mg capsule  Commonly known as: DEXILANT   "   DULoxetine 60 MG capsule  Commonly known as: CYMBALTA     eszopiclone 2 MG Tab  Commonly known as: LUNESTA     ezetimibe 10 mg tablet  Commonly known as: ZETIA     famotidine 20 MG tablet  Commonly known as: PEPCID     fluticasone propionate 50 mcg/actuation nasal spray  Commonly known as: FLONASE     furosemide 20 MG tablet  Commonly known as: LASIX     gabapentin 300 MG capsule  Commonly known as: NEURONTIN     icosapent ethyL 1 gram Cap  Commonly known as: VASCEPA     INVEGA SUSTENNA 234 mg/1.5 mL Syrg injection  Generic drug: paliperidone palmitate     meclizine 25 mg tablet  Commonly known as: ANTIVERT     medroxyPROGESTERone 10 MG tablet  Commonly known as: PROVERA     metFORMIN 500 MG tablet  Commonly known as: GLUCOPHAGE     MOVANTIK 25 mg tablet  Generic drug: naloxegoL     nicotine 21 mg/24 hr  Commonly known as: NICODERM CQ     pregabalin 75 MG capsule  Commonly known as: LYRICA     RESTASIS 0.05 % ophthalmic emulsion  Generic drug: cycloSPORINE     tiZANidine 4 MG tablet  Commonly known as: ZANAFLEX  take 1 tablet BY MOUTH EVERY 8 HOURS AS NEEDED FOR MUSCLE SPASMS Strength: 4 mg     topiramate 200 MG Tab  Commonly known as: TOPAMAX     valACYclovir 500 MG tablet  Commonly known as: VALTREX     VRAYLAR 3 mg Cap  Generic drug: cariprazine            STOP taking these medications      meloxicam 15 MG tablet  Commonly known as: MOBIC     meloxicam 7.5 MG tablet  Commonly known as: MOBIC     rosuvastatin 40 MG Tab  Commonly known as: CRESTOR              Discharge Instructions:        At this time, Faith Jin is determined to have maximized benefits of IP hospitalization. she is discharged in stable condition with OP f/u recommendations and instructions. All questions answered, and patient verbalized agreement with the POC. They were given return precautions prior to d/c including symptoms that should prompt return to ED or to call PCP. Total time spent of DC of 35 minutes.       Jovany Krishnamurthy,  MD  Pulmonary Critical Care Medicine  [facility] - [facility]

## 2022-09-15 NOTE — PROGRESS NOTES
Patient was not seen by me today  Discussed case with Dr. Collier    -s/p tPA from a syncopal episode with LLE weakness  -ok to d/c from a neuro standpoint  -follow up with her PCP, does not need a follow up with neurology  -MRI brain negative   -recommend aspirin 325 mg daily   -at baseline  -similar episode in July with negative MRI brain  -?functional vs migraine? Vs TIA?    Ok to d/c from neurology

## 2022-09-15 NOTE — PROGRESS NOTES
"S: no acute events.     O:  /81   Pulse 91   Temp 98.2 °F (36.8 °C) (Oral)   Resp 17   Ht 5' 1.81" (1.57 m)   Wt 64 kg (141 lb)   SpO2 96%   BMI 25.95 kg/m²   On exam: pt is awake and alert, conversant, in NAD, no face droop, no VF cut, EOMI, no slurred speech, no aphasia.  No pronator drift of weakness in all 4. No numbness in all 4.   head: NCAT, Skin warm and dry, breathing not labored, no knee or ankle swelling so signs of lymphedema, muscle bulk is normal, mood: good.  NIHSS 0    A/p:   Acute LLE weakness, s/p tpa.   MRI negative, ,so low suspcion for acute ischemic stroke to be the etiology of her acute neuro deficit,   Back to normal.  Has similar episode on July with negative MRI.   Functional? Migraines, TIA?  - will follow up on stroke w/u.   - start asa today, MRI no bleed.   - PT,OT, SLP.   - SCD.   - continous telemetry.   - Q 4 neurocheks.  - NPO until passes bedside eval or cleared by SLP.   - head CT stat if any neuro change or NIHSS is 4 points higher then the previous.   - antiplatlets daily with 325 mg of asa, if NPO then 300 mg of asa per rectum daily.  - will continue to follow      "

## 2022-09-15 NOTE — PROGRESS NOTES
[facility]                       [address] [place] [address]    PATIENT NAME:       MANNY CASAS  YOB: 1965  CSN:                [id number]   MRN:                [id number]  ADMIT DATE:         09/13/2022 14:26:00  PHYSICIAN:          ARAMIS Gonsalves MD                            PROGRESS NOTE    DATE:      A 57-year-old white female patient with previous history of TIA in July 22 or   what was suspected to be a TIA.  The patient returned with a similar symptom   where she was found to have just a blank stare while playing cords, dropped her   cards.  She was brought to the ER.  They thought that possibly she was having a   stroke.  They gave her tPA.  Dr. Belle of neurology saw the patient yesterday,   said MRI/MRA is completely within normal limits and she doubted that the patient   was having a CVA.  Other possibilities, I guess to consider would be a migraine   or perhaps petit mal seizure.  In any event, this morning the patient is awake,   alert, fully functional.    PHYSICAL EXAMINATION:  CURRENT VITAL SIGNS:  She is afebrile.  Blood pressure 139/84, heart rate 87,   respiratory rate 18.   HEENT:  Unremarkable.   CHEST:  Clear.   HEART:  Regular rhythm.   ABDOMEN:  Benign.  EXTREMITIES:  No edema.   NEUROLOGIC:  Awake, alert, and intact.    LABS:  No lab work done today.  MRI, MRA were within normal limits.    IMPRESSION:    1. Neuro event, not further identified at this time.  Possibilities include   petit mal seizure, transient ischemic attack, migraine prodrome.  2. Bipolar disorder.  3. Chronic obstructive pulmonary disease, unquantified.  4. Depression.    5. Degenerative joint disease.    PLAN:  The patient is stable for downgrade to the floor.  We will defer to   Neurology for further workup and discharge plans.        ______________________________  MD JOHANA Gee/GEENA  DD:   09/15/2022  Time:  07:46AM  DT:  09/15/2022  Time:  07:59AM  Job #:  [id number]      PROGRESS NOTE

## 2022-09-15 NOTE — NURSING
Discussed with Patient incorrect aspirin dosage in d/c instructions, new dose is 325mg PO daily. Patient verbalized understanding of new dosage and repeated dosing information back to nurse. Spoke with Dr. Baker about discharge instructions, verified new dosage.

## 2022-09-15 NOTE — PT/OT/SLP EVAL
Occupational Therapy   Evaluation and Discharge Note    Name: Faith Jin  MRN: [id number]  Admitting Diagnosis:  <principal problem not specified>   Recent Surgery: * No surgery found *      Recommendations:     Discharge Recommendations: home  Discharge Equipment Recommendations:  none  Barriers to discharge:  None    Assessment:     Faith Jin is a 57 y.o. female with a medical diagnosis of <principal problem not specified>. At this time, patient is functioning at their prior level of function and does not require further acute OT services.     Plan:     During this hospitalization, patient does not require further acute OT services.  Please re-consult if situation changes.    Plan of Care Reviewed with: patient    Subjective     Chief Complaint: minor speech difficulties   Patient/Family Comments/goals: To return home    Occupational Profile:  Living Environment: Pt lives with friend, Dilia, in a SLH with no steps to enter. Primary bathroom is a tub/shower combo with a shower chair.   Previous level of function: Pt was I with ADLs but has required assistance with IADLs since July. Pt ambulates with a straight cane for long distances. Does not use and AD in the house.   Roles and Routines: Pt does not work or drive.   Equipment Used at home:  cane, straight, shower chair  Assistance upon Discharge: Pt's friend Dilia.     Pain/Comfort:  Pain Rating 1: 0/10    Patients cultural, spiritual, Druze conflicts given the current situation:      Objective:     Communicated with: nrsg prior to session.  Patient found up in chair with pulse ox (continuous), telemetry, blood pressure cuff upon OT entry to room.    General Precautions: Standard, fall   Orthopedic Precautions:N/A   Braces: N/A  Respiratory Status: Room air     Occupational Performance:    Bed Mobility:    Pt found up in chair.     Functional Mobility/Transfers:  Patient completed Sit <> Stand Transfer with independence  with  no  assistive device   Patient completed Toilet Transfer Step Transfer technique with independence with  no AD  Functional Mobility: Pt ambulated from bedside chair to toilet with SUP assistance and no AD.     Activities of Daily Living:  Lower Body Dressing: independence to doff/fr[place]  socks  Toileting: modified independence for clothing management.     Cognitive/Visual Perceptual:  Cognitive/Psychosocial Skills:     -       Oriented to: Person, Place, Time, and Situation   -       Follows Commands/attention:Follows multistep  commands  -       Communication: slight dysarthria  -       Safety awareness/insight to disability: intact   Visual/Perceptual:      -Intact  tracking, saccades, and visual field .    Physical Exam:  Upper Extremity Strength:    -       Right Upper Extremity: WFL  -       Left Upper Extremity: WFL  Fine Motor Coordination:    -       Intact    AMPAC 6 Click ADL:  AMPAC Total Score:      Treatment & Education:  Pt educated on OT POC, verbalized understanding.     Patient left up in chair with all lines intact, call button in reach, and nrsg notified    GOALS:   Multidisciplinary Problems       Occupational Therapy Goals          Problem: Occupational Therapy    Goal Priority Disciplines Outcome Interventions   Occupational Therapy Goal     OT, PT/OT                         History:     Past Medical History:   Diagnosis Date    Bipolar disorder     COPD (chronic obstructive pulmonary disease)     Depression     Lumbar degenerative disc disease     TIA (transient ischemic attack)          Past Surgical History:   Procedure Laterality Date    ANTERIOR CRUCIATE LIGAMENT REPAIR Left     TOTAL KNEE ARTHROPLASTY Left     WRIST SURGERY Right        Time Tracking:     OT Date of Treatment: 09/15/22  OT Start Time: 1044  OT Stop Time: 1101  OT Total Time (min): 17 min    Billable Minutes:Evaluation 17 in LOW    9/15/2022

## 2022-09-15 NOTE — PT/OT/SLP EVAL
Physical Therapy Evaluation and Discharge Note    Patient Name:  Faith Jin   MRN:  [id number]    Recommendations:     Discharge Recommendations:  home   Discharge Equipment Recommendations: none   Barriers to discharge: None    Assessment:     Faith Jin is a 57 y.o. female admitted with a medical diagnosis of acute LLE weakness s/p TPA. MRI brain negative. Possible TIA, migraine, or seizure.  At this time, patient is functioning at their prior level of function and does not require further acute PT services.     Recent Surgery: * No surgery found *      Plan:     During this hospitalization, patient does not require further acute PT services.  Please re-consult if situation changes.      Subjective     Chief Complaint: head ache  Patient/Family Comments/goals: to go home  Pain/Comfort:  Pain Rating 1: 3/10  Location 1: head  Pain Addressed 1: Pre-medicate for activity, Reposition    Patients cultural, spiritual, Mormonism conflicts given the current situation: no    Living Environment:  One story home, no steps to enter. Has a friend that assists her with homemaking, cleaning, and cooking.   Prior to admission, patients level of function was independent with use of a cane.  Equipment at home: cane, straight, shower chair, walker, rolling, bedside commode.   Upon discharge, patient will have assistance from friend.    Objective:     Communicated with nurse prior to session.  Patient found supine with pulse ox (continuous), telemetry, blood pressure cuff upon PT entry to room.    General Precautions: Standard, fall   Orthopedic Precautions:    Braces:     Respiratory Status: Room air  At rest: 89 HR, 97% spO2, 143/80    After ambulatin/88, 97%, 96 HR      Exams:  Cognitive Exam:  Patient is oriented to Person, Place, Time, and Situation  Sensation:    -       Intact  RLE ROM: WNL  RLE Strength: WNL  LLE ROM: WNL  LLE Strength: WNL    Functional Mobility:  Bed Mobility:     Scooting:  independence  Supine to Sit: independence  Sit to Supine: independence  Transfers:     Sit to Stand:  independence with no AD  Gait: 300ft with no AD and independent. No LOB. Vc for increasing arm swing B.     AM-PAC 6 CLICK MOBILITY  Total Score:24       AM-PAC 6 CLICK MOBILITY  Total Score:24     Patient left up in chair with all lines intact and call button in reach.    GOALS:   Multidisciplinary Problems       Physical Therapy Goals       Not on file                    History:     Past Medical History:   Diagnosis Date    Bipolar disorder     COPD (chronic obstructive pulmonary disease)     Depression     Lumbar degenerative disc disease     TIA (transient ischemic attack)        Past Surgical History:   Procedure Laterality Date    ANTERIOR CRUCIATE LIGAMENT REPAIR Left     TOTAL KNEE ARTHROPLASTY Left     WRIST SURGERY Right        Time Tracking:     PT Received On: 09/15/22  PT Start Time: 0847     PT Stop Time: 0908  PT Total Time (min): 21 min     Billable Minutes: Evaluation 21      09/15/2022

## 2022-09-16 ENCOUNTER — PATIENT OUTREACH (OUTPATIENT)
Dept: ADMINISTRATIVE | Facility: CLINIC | Age: 57
End: 2022-09-16
Payer: MEDICARE

## 2022-09-16 NOTE — PROGRESS NOTES
C3 nurse spoke with Faith Jin for a TCC post hospital discharge follow up call. The patient has a scheduled HOSFU appointment with Garrick Nascimento MD on 9/19/22 @ 10:30 and caro Grady on 10/27/22 @ 10:30.

## 2022-09-16 NOTE — PROGRESS NOTES
C3 nurse attempted to contact Faith Jin for a TCC post hospital discharge follow up call. No answer. Left voicemail with callback information. The patient has a scheduled HOSFU appointment with Jayna BERRY, neuro on 10/27/22 @ 10:30.

## 2022-09-28 ENCOUNTER — OFFICE VISIT (OUTPATIENT)
Dept: ORTHOPEDICS | Facility: CLINIC | Age: 57
End: 2022-09-28
Payer: MEDICARE

## 2022-09-28 ENCOUNTER — HOSPITAL ENCOUNTER (OUTPATIENT)
Dept: RADIOLOGY | Facility: CLINIC | Age: 57
Discharge: HOME | End: 2022-09-28
Attending: STUDENT IN AN ORGANIZED HEALTH CARE EDUCATION/TRAINING PROGRAM
Payer: MEDICARE

## 2022-09-28 VITALS
WEIGHT: 141 LBS | HEIGHT: 61 IN | DIASTOLIC BLOOD PRESSURE: 78 MMHG | BODY MASS INDEX: 26.62 KG/M2 | HEART RATE: 99 BPM | SYSTOLIC BLOOD PRESSURE: 121 MMHG

## 2022-09-28 DIAGNOSIS — M79.641 RIGHT HAND PAIN: ICD-10-CM

## 2022-09-28 DIAGNOSIS — M65.311 TRIGGER THUMB OF RIGHT HAND: Primary | ICD-10-CM

## 2022-09-28 DIAGNOSIS — M18.11 PRIMARY OSTEOARTHRITIS OF FIRST CARPOMETACARPAL JOINT OF RIGHT HAND: ICD-10-CM

## 2022-09-28 DIAGNOSIS — G56.01 RIGHT CARPAL TUNNEL SYNDROME: ICD-10-CM

## 2022-09-28 PROCEDURE — 99203 OFFICE O/P NEW LOW 30 MIN: CPT | Mod: 25,,, | Performed by: STUDENT IN AN ORGANIZED HEALTH CARE EDUCATION/TRAINING PROGRAM

## 2022-09-28 PROCEDURE — 73130 X-RAY EXAM OF HAND: CPT | Mod: RT,,, | Performed by: STUDENT IN AN ORGANIZED HEALTH CARE EDUCATION/TRAINING PROGRAM

## 2022-09-28 PROCEDURE — 73130 XR HAND COMPLETE 3 VIEW RIGHT: ICD-10-PCS | Mod: RT,,, | Performed by: STUDENT IN AN ORGANIZED HEALTH CARE EDUCATION/TRAINING PROGRAM

## 2022-09-28 PROCEDURE — 20550 TENDON SHEATH: ICD-10-PCS | Mod: 59,RT,, | Performed by: STUDENT IN AN ORGANIZED HEALTH CARE EDUCATION/TRAINING PROGRAM

## 2022-09-28 PROCEDURE — 20600 DRAIN/INJ JOINT/BURSA W/O US: CPT | Mod: 51,RT,, | Performed by: STUDENT IN AN ORGANIZED HEALTH CARE EDUCATION/TRAINING PROGRAM

## 2022-09-28 PROCEDURE — 99203 PR OFFICE/OUTPT VISIT, NEW, LEVL III, 30-44 MIN: ICD-10-PCS | Mod: 25,,, | Performed by: STUDENT IN AN ORGANIZED HEALTH CARE EDUCATION/TRAINING PROGRAM

## 2022-09-28 PROCEDURE — 20550 NJX 1 TENDON SHEATH/LIGAMENT: CPT | Mod: 59,RT,, | Performed by: STUDENT IN AN ORGANIZED HEALTH CARE EDUCATION/TRAINING PROGRAM

## 2022-09-28 PROCEDURE — 20600 SMALL JOINT ASPIRATION/INJECTION: R THUMB CMC: ICD-10-PCS | Mod: 51,RT,, | Performed by: STUDENT IN AN ORGANIZED HEALTH CARE EDUCATION/TRAINING PROGRAM

## 2022-09-28 RX ORDER — LIDOCAINE HYDROCHLORIDE 10 MG/ML
1 INJECTION INFILTRATION; PERINEURAL
Status: DISCONTINUED | OUTPATIENT
Start: 2022-09-28 | End: 2022-09-28 | Stop reason: HOSPADM

## 2022-09-28 RX ORDER — BETAMETHASONE SODIUM PHOSPHATE AND BETAMETHASONE ACETATE 3; 3 MG/ML; MG/ML
6 INJECTION, SUSPENSION INTRA-ARTICULAR; INTRALESIONAL; INTRAMUSCULAR; SOFT TISSUE
Status: DISCONTINUED | OUTPATIENT
Start: 2022-09-28 | End: 2022-09-28 | Stop reason: HOSPADM

## 2022-09-28 RX ADMIN — LIDOCAINE HYDROCHLORIDE 1 ML: 10 INJECTION INFILTRATION; PERINEURAL at 03:09

## 2022-09-28 RX ADMIN — BETAMETHASONE SODIUM PHOSPHATE AND BETAMETHASONE ACETATE 6 MG: 3; 3 INJECTION, SUSPENSION INTRA-ARTICULAR; INTRALESIONAL; INTRAMUSCULAR; SOFT TISSUE at 03:09

## 2022-09-28 NOTE — PROGRESS NOTES
Chief Complaint:  right thumb pain, numbness    Consulting Physician: No ref. provider found    History of present illness:     Patient is a 57-year-old female on disability who presents for initial evaluation of her right thumb pain.  She has had many years of right thumb pain.  She has pain localized over the A1 pulley as well as the thumb CMC joint.  The pain is worse with motion.  She has not had an injection to this.  She has not tried any bracing for this.  She also complains of numbness and tingling in her fingertips.  This started at nighttime initially but is now all the time.  She has had this for several years.  She now notices clumsiness in her hand. She had a surgery on the dorsum of her wrist many years ago but she does not recall what surgery was.    Past Medical History:   Diagnosis Date    Bipolar disorder     COPD (chronic obstructive pulmonary disease)     Depression     Lumbar degenerative disc disease     TIA (transient ischemic attack)        Past Surgical History:   Procedure Laterality Date    ANTERIOR CRUCIATE LIGAMENT REPAIR Left     TOTAL KNEE ARTHROPLASTY Left     WRIST SURGERY Right        Current Outpatient Medications   Medication Sig    aspirin (ECOTRIN) 81 MG EC tablet Take 1 tablet (81 mg total) by mouth once daily.    atorvastatin (LIPITOR) 40 MG tablet Take 1 tablet (40 mg total) by mouth once daily.    azelastine (ASTELIN) 137 mcg (0.1 %) nasal spray SMARTSI Spray(s) Both Nares Every Morning    benztropine (COGENTIN) 1 MG tablet Take 1 mg by mouth 3 (three) times daily.    buPROPion (WELLBUTRIN XL) 300 MG 24 hr tablet Take 300 mg by mouth every morning.    carBAMazepine (TEGRETOL XR) 400 MG Tb12 Take 400 mg by mouth 2 (two) times daily.    clonazePAM (KLONOPIN) 0.5 MG tablet Take 0.5 mg by mouth 3 (three) times daily.    dexlansoprazole (DEXILANT) 60 mg capsule Take 1 capsule by mouth once daily.    DULoxetine (CYMBALTA) 60 MG capsule Take 60 mg by mouth 2 (two) times daily.     eszopiclone (LUNESTA) 2 MG Tab Take 2 mg by mouth nightly as needed.    ezetimibe (ZETIA) 10 mg tablet Take 10 mg by mouth once daily.    famotidine (PEPCID) 20 MG tablet Take 20 mg by mouth daily as needed.    fluticasone propionate (FLONASE) 50 mcg/actuation nasal spray 1 spray by Each Nostril route nightly.    furosemide (LASIX) 20 MG tablet Take 20 mg by mouth once daily.    gabapentin (NEURONTIN) 300 MG capsule Take 300 mg by mouth 3 (three) times daily.    icosapent ethyL (VASCEPA) 1 gram Cap Take 2 capsules by mouth 2 (two) times daily.    meclizine (ANTIVERT) 25 mg tablet Take 25 mg by mouth 3 (three) times daily as needed.    medroxyPROGESTERone (PROVERA) 10 MG tablet Take 10 mg by mouth once daily.    metFORMIN (GLUCOPHAGE) 500 MG tablet Take 500 mg by mouth once daily.    MOVANTIK 25 mg tablet Take 25 mg by mouth once daily.    nicotine (NICODERM CQ) 21 mg/24 hr 1 patch once daily.    paliperidone palmitate (INVEGA SUSTENNA) 234 mg/1.5 mL Syrg injection Inject 234 mg into the muscle.    pregabalin (LYRICA) 75 MG capsule Take 75 mg by mouth 3 (three) times daily.    RESTASIS 0.05 % ophthalmic emulsion Place 1 drop into both eyes 2 (two) times daily.    tiZANidine (ZANAFLEX) 4 MG tablet take 1 tablet BY MOUTH EVERY 8 HOURS AS NEEDED FOR MUSCLE SPASMS Strength: 4 mg    topiramate (TOPAMAX) 200 MG Tab Take 200 mg by mouth 2 (two) times daily.    valACYclovir (VALTREX) 500 MG tablet Take 500 mg by mouth 2 (two) times daily.    VRAYLAR 3 mg Cap Take 1 capsule by mouth once daily.     No current facility-administered medications for this visit.       Review of patient's allergies indicates:   Allergen Reactions    Adhesive      Other reaction(s): skin tears easily with adhesive    Cephalexin Nausea And Vomiting    Erythromycin Nausea And Vomiting    Lithium Nausea And Vomiting    Naproxen Nausea And Vomiting       Family History   Problem Relation Age of Onset    Coronary artery disease Mother     Diabetes  "Mother     Coronary artery disease Father        Social History     Socioeconomic History    Marital status:    Tobacco Use    Smoking status: Every Day     Types: Vaping w/o nicotine    Smokeless tobacco: Never   Substance and Sexual Activity    Alcohol use: Not Currently     Comment: stopped in 2005    Drug use: Not Currently     Types: Marijuana, Cocaine     Comment: stopped in 2005       Review of Systems:    Constitution:   Denies chills, fever, and sweats.  HENT:   Denies headaches or blurry vision.  Cardiovascular:  Denies chest pain or irregular heart beat.  Respiratory:   Denies cough or shortness of breath.  Gastrointestinal:  Denies abdominal pain, nausea, or vomiting.  Musculoskeletal:   Denies muscle cramps.  Neurological:   Denies dizziness or focal weakness.  Psychiatric/Behavior: Normal mental status.  Hematology/Lymph:  Denies bleeding problem or easy bruising/bleeding.  Skin:    Denies rash or suspicious lesions.    Examination:    Vital Signs:    Vitals:    09/28/22 1525   BP: 121/78   Pulse: 99   Weight: 64 kg (141 lb)   Height: 5' 1" (1.549 m)       Body mass index is 26.64 kg/m².    Constitution:   Well-developed, well nourished patient in no acute distress.  Neurological:   Alert and oriented x 3 and cooperative to examination.     Psychiatric/Behavior: Normal mental status.  Respiratory:   No shortness of breath.  Eyes:    Extraoccular muscles intact  Skin:    No scars, rash or suspicious lesions.    MSK:     Right hand:  Previous surgical incision over the dorsum of the wrist is healed without signs of infection .  Tenderness to palpation over the thumb CMC joint.  Grind test is positive.  Tenderness to palpation over the A1 pulley of the thumb.  There is a palpable catching with flexion extension of the thumb.  She is able make a fist extend her digits.  She is completely numb to the thumb index and long finger.  Tinel's is positive at the wrist over the median nerve, Phalen's and " Durkan's is positive.  She has sensation in her ring and small finger.  Mild Tinel over the cubital tunnel.  Flexion and compression cubital tunnel is mildly positive.  She has supple range of motion of the joints in her hand.  Her hand is warm well perfused    Imaging:    X-ray of the right hand shows mild thumb osteoarthritis, previous scapholunate ligament reconstruction with anchors.     Assessment:     Right trigger thumb   Right carpal tunnel syndrome   Right thumb CMC osteoarthritis    Plan:   I will get a EMG to better evaluate her carpal tunnel syndrome on the right side.  We can treat the trigger thumb and thumb CMC osteoarthritis conservatively.  I will give her steroid injections today in clinic.  She can follow-up after EMG    Follow Up:  after EMG  Xray at next visit:  none

## 2022-09-28 NOTE — PROCEDURES
Tendon Sheath    Date/Time: 9/28/2022 3:15 PM  Performed by: Canelo Bhatt MD  Authorized by: Canelo Bhatt MD     Consent Done?:  Yes (Verbal)  Timeout: prior to procedure the correct patient, procedure, and site was verified    Location:  Thumb  Site:  R thumb flexor tendon sheath  Needle size:  25 G  Approach:  Volar  Medications:  1 mL LIDOcaine HCL 10 mg/ml (1%) 10 mg/mL (1 %); 6 mg betamethasone acetate-betamethasone sodium phosphate 6 mg/mL  Small Joint Aspiration/Injection: R thumb CMC    Date/Time: 9/28/2022 3:15 PM  Performed by: Canelo Bhatt MD  Authorized by: Canelo Bhatt MD     Consent Done?:  Yes (Verbal)  Indications:  Arthritis  Timeout: prior to procedure the correct patient, procedure, and site was verified    Location:  Thumb  Site:  R thumb CMC  Ultrasonic guidance for needle placement?: No    Needle size:  25 G  Medications:  1 mL LIDOcaine HCL 10 mg/ml (1%) 10 mg/mL (1 %); 6 mg betamethasone acetate-betamethasone sodium phosphate 6 mg/mL

## 2022-10-20 ENCOUNTER — HOSPITAL ENCOUNTER (OUTPATIENT)
Dept: RADIOLOGY | Facility: CLINIC | Age: 57
Discharge: HOME | End: 2022-10-20
Attending: ORTHOPAEDIC SURGERY
Payer: MEDICARE

## 2022-10-20 ENCOUNTER — OFFICE VISIT (OUTPATIENT)
Dept: ORTHOPEDICS | Facility: CLINIC | Age: 57
End: 2022-10-20
Payer: MEDICARE

## 2022-10-20 VITALS
HEIGHT: 61 IN | DIASTOLIC BLOOD PRESSURE: 76 MMHG | SYSTOLIC BLOOD PRESSURE: 116 MMHG | HEART RATE: 85 BPM | WEIGHT: 141 LBS | BODY MASS INDEX: 26.62 KG/M2

## 2022-10-20 DIAGNOSIS — M25.561 PAIN IN BOTH KNEES, UNSPECIFIED CHRONICITY: Primary | ICD-10-CM

## 2022-10-20 DIAGNOSIS — M25.561 PAIN IN BOTH KNEES, UNSPECIFIED CHRONICITY: ICD-10-CM

## 2022-10-20 DIAGNOSIS — Z47.1 AFTERCARE FOLLOWING LEFT KNEE JOINT REPLACEMENT SURGERY: ICD-10-CM

## 2022-10-20 DIAGNOSIS — M17.11 PRIMARY OSTEOARTHRITIS OF RIGHT KNEE: ICD-10-CM

## 2022-10-20 DIAGNOSIS — M25.562 PAIN IN BOTH KNEES, UNSPECIFIED CHRONICITY: Primary | ICD-10-CM

## 2022-10-20 DIAGNOSIS — Z96.652 AFTERCARE FOLLOWING LEFT KNEE JOINT REPLACEMENT SURGERY: ICD-10-CM

## 2022-10-20 DIAGNOSIS — M25.562 PAIN IN BOTH KNEES, UNSPECIFIED CHRONICITY: ICD-10-CM

## 2022-10-20 PROCEDURE — 73564 X-RAY EXAM KNEE 4 OR MORE: CPT | Mod: 50,,, | Performed by: ORTHOPAEDIC SURGERY

## 2022-10-20 PROCEDURE — 20610 DRAIN/INJ JOINT/BURSA W/O US: CPT | Mod: RT,,, | Performed by: ORTHOPAEDIC SURGERY

## 2022-10-20 PROCEDURE — 99213 PR OFFICE/OUTPT VISIT, EST, LEVL III, 20-29 MIN: ICD-10-PCS | Mod: 25,,, | Performed by: ORTHOPAEDIC SURGERY

## 2022-10-20 PROCEDURE — 99213 OFFICE O/P EST LOW 20 MIN: CPT | Mod: 25,,, | Performed by: ORTHOPAEDIC SURGERY

## 2022-10-20 PROCEDURE — 73564 XR KNEE COMP 4 OR MORE VIEWS BILAT: ICD-10-PCS | Mod: 50,,, | Performed by: ORTHOPAEDIC SURGERY

## 2022-10-20 PROCEDURE — 20610 LARGE JOINT ASPIRATION/INJECTION: R KNEE: ICD-10-PCS | Mod: RT,,, | Performed by: ORTHOPAEDIC SURGERY

## 2022-10-20 RX ORDER — LIDOCAINE HYDROCHLORIDE 20 MG/ML
5 INJECTION, SOLUTION EPIDURAL; INFILTRATION; INTRACAUDAL; PERINEURAL
Status: DISCONTINUED | OUTPATIENT
Start: 2022-10-20 | End: 2022-10-20 | Stop reason: HOSPADM

## 2022-10-20 RX ORDER — BETAMETHASONE SODIUM PHOSPHATE AND BETAMETHASONE ACETATE 3; 3 MG/ML; MG/ML
12 INJECTION, SUSPENSION INTRA-ARTICULAR; INTRALESIONAL; INTRAMUSCULAR; SOFT TISSUE
Status: DISCONTINUED | OUTPATIENT
Start: 2022-10-20 | End: 2022-10-20 | Stop reason: HOSPADM

## 2022-10-20 RX ORDER — MELOXICAM 15 MG/1
15 TABLET ORAL DAILY
Qty: 30 TABLET | Refills: 2 | Status: SHIPPED | OUTPATIENT
Start: 2022-10-20 | End: 2022-11-08 | Stop reason: SDUPTHER

## 2022-10-20 RX ADMIN — LIDOCAINE HYDROCHLORIDE 5 ML: 20 INJECTION, SOLUTION EPIDURAL; INFILTRATION; INTRACAUDAL; PERINEURAL at 08:10

## 2022-10-20 RX ADMIN — BETAMETHASONE SODIUM PHOSPHATE AND BETAMETHASONE ACETATE 12 MG: 3; 3 INJECTION, SUSPENSION INTRA-ARTICULAR; INTRALESIONAL; INTRAMUSCULAR; SOFT TISSUE at 08:10

## 2022-10-20 NOTE — PROGRESS NOTES
Past Medical History:   Diagnosis Date    Bipolar disorder     COPD (chronic obstructive pulmonary disease)     Depression     Lumbar degenerative disc disease     TIA (transient ischemic attack)        Past Surgical History:   Procedure Laterality Date    ANTERIOR CRUCIATE LIGAMENT REPAIR Left     TOTAL KNEE ARTHROPLASTY Left     WRIST SURGERY Right        Current Outpatient Medications   Medication Sig    aspirin (ECOTRIN) 81 MG EC tablet Take 1 tablet (81 mg total) by mouth once daily.    atorvastatin (LIPITOR) 40 MG tablet Take 1 tablet (40 mg total) by mouth once daily.    azelastine (ASTELIN) 137 mcg (0.1 %) nasal spray SMARTSI Spray(s) Both Nares Every Morning    benztropine (COGENTIN) 1 MG tablet Take 1 mg by mouth 3 (three) times daily.    buPROPion (WELLBUTRIN XL) 300 MG 24 hr tablet Take 300 mg by mouth every morning.    carBAMazepine (TEGRETOL XR) 400 MG Tb12 Take 400 mg by mouth 2 (two) times daily.    clonazePAM (KLONOPIN) 0.5 MG tablet Take 0.5 mg by mouth 3 (three) times daily.    dexlansoprazole (DEXILANT) 60 mg capsule Take 1 capsule by mouth once daily.    DULoxetine (CYMBALTA) 60 MG capsule Take 60 mg by mouth 2 (two) times daily.    eszopiclone (LUNESTA) 2 MG Tab Take 2 mg by mouth nightly as needed.    ezetimibe (ZETIA) 10 mg tablet Take 10 mg by mouth once daily.    famotidine (PEPCID) 20 MG tablet Take 20 mg by mouth daily as needed.    fluticasone propionate (FLONASE) 50 mcg/actuation nasal spray 1 spray by Each Nostril route nightly.    furosemide (LASIX) 20 MG tablet Take 20 mg by mouth once daily.    gabapentin (NEURONTIN) 300 MG capsule Take 300 mg by mouth 3 (three) times daily.    icosapent ethyL (VASCEPA) 1 gram Cap Take 2 capsules by mouth 2 (two) times daily.    meclizine (ANTIVERT) 25 mg tablet Take 25 mg by mouth 3 (three) times daily as needed.    medroxyPROGESTERone (PROVERA) 10 MG tablet Take 10 mg by mouth once daily.    metFORMIN (GLUCOPHAGE) 500 MG tablet Take 500 mg by  mouth once daily.    MOVANTIK 25 mg tablet Take 25 mg by mouth once daily.    nicotine (NICODERM CQ) 21 mg/24 hr 1 patch once daily.    paliperidone palmitate (INVEGA SUSTENNA) 234 mg/1.5 mL Syrg injection Inject 234 mg into the muscle.    pregabalin (LYRICA) 75 MG capsule Take 75 mg by mouth 3 (three) times daily.    RESTASIS 0.05 % ophthalmic emulsion Place 1 drop into both eyes 2 (two) times daily.    tiZANidine (ZANAFLEX) 4 MG tablet take 1 tablet BY MOUTH EVERY 8 HOURS AS NEEDED FOR MUSCLE SPASMS Strength: 4 mg    topiramate (TOPAMAX) 200 MG Tab Take 200 mg by mouth 2 (two) times daily.    valACYclovir (VALTREX) 500 MG tablet Take 500 mg by mouth 2 (two) times daily.    VRAYLAR 3 mg Cap Take 1 capsule by mouth once daily.    meloxicam (MOBIC) 15 MG tablet Take 1 tablet (15 mg total) by mouth once daily.     No current facility-administered medications for this visit.       Review of patient's allergies indicates:   Allergen Reactions    Adhesive      Other reaction(s): skin tears easily with adhesive    Cephalexin Nausea And Vomiting    Erythromycin Nausea And Vomiting    Lithium Nausea And Vomiting    Naproxen Nausea And Vomiting       Family History   Problem Relation Age of Onset    Coronary artery disease Mother     Diabetes Mother     Coronary artery disease Father        Social History     Socioeconomic History    Marital status:    Tobacco Use    Smoking status: Every Day     Types: Vaping w/o nicotine    Smokeless tobacco: Never   Substance and Sexual Activity    Alcohol use: Not Currently     Comment: stopped in 2005    Drug use: Not Currently     Types: Marijuana, Cocaine     Comment: stopped in 2005    Sexual activity: Not Currently       Chief Complaint:   Chief Complaint   Patient presents with    Pain     Bilateral Knee pain and swelling, pt states pulled a recliner in house 10/13/22, pt states pain started after that, pt states swelling has gone down some but not much, states pain is  sharp pain that is constant and has been getting worse, pt states taking tyelnol to help with pain relief,        History of present illness:  57-year-old female presents today for evaluation of bilateral knee pain right as well as left.  Patient states she has been recliner couple days ago, when she developed pain into the medial aspect of her right knee and diffusely in the left knee.  Has a history of revision of her left knee in the past.  Otherwise has done well up to this point.  She takes Tylenol for this as well as Celebrex.  Denies any radiation.  She is using no assistive devices today.      Review of Systems:    Constitution: Negative for chills, fever, and sweats.  Negative for unexplained weight loss.    HENT:  Negative for headaches and blurry vision.    Cardiovascular:Negative for chest pain or irregular heart beat. Negative for hypertension.    Respiratory:  Negative for cough and shortness of breath.    Gastrointestinal: Negative for abdominal pain, heartburn, melena, nausea, and vomitting.    Genitourinary:  Negative bladder incontinence and dysuria.    Musculoskeletal:  See HPI    Neurological: Negative for numbness.    Psychiatric/Behavioral: Negative for depression.  The patient is not nervous/anxious.      Endocrine: Negative for polyuria    Hematologic/Lymphatic: Negative for bleeding problem.  Does not bruise/bleed easily.    Skin: Negative for poor would healing and rash      Physical Examination:    Vital Signs:    Vitals:    10/20/22 0901   BP: 116/76   Pulse: 85       Body mass index is 26.64 kg/m².    General: No acute distress, alert and oriented, healthy appearing    HEENT: Head is atraumatic, mucous membranes are moist    Neck: Supples, no JVD    Cardiovascular: Palpable dorsalis pedis and posterior tibial pulses, regular rate and rhythm to those pulses    Lungs: Breathing non-labored    Skin: no rashes appreciated    Neurologic: Can flex and extend knees, ankles, and toes. Sensation  is grossly intact    He left knee:  Patient's left knee has well-healed incision.  He full range of motion from 0 to greater than 125°.  Stable throughout range of motion.  No significant tenderness.      Right knee:  Tenderness to medial joint line.  Range of motion 0 to greater than 120°.  Stable to varus and valgus.  Stable anterior-posterior drawer.    X-rays:  He four views of bilateral knees reviewed.  Patient with well-maintained joint space of the right knee.  She does have some medial joint space narrowing.  With regards to her the the left knee, she is well fixed.  No signs of loosening or subsidence noted.     Assessment::  Right knee osteoarthritis  Status post left total knee arthroplasty    Plan:  Plan to give her injection to her right knee.  Loss of placed on some anti-inflammatories for her knee flare up bilaterally.  Return in 4-6 weeks to check her progress.    This note was created using [employer] voice recognition software that occasionally misinterpreted phrases or words.    Consult note is delivered via Epic messaging service.

## 2022-10-20 NOTE — PROCEDURES
Large Joint Aspiration/Injection: R knee    Date/Time: 10/20/2022 8:00 AM  Performed by: Moi Pittman MD  Authorized by: Mio Pittman MD     Consent Done?:  Yes (Verbal)  Indications:  Arthritis  Timeout: prior to procedure the correct patient, procedure, and site was verified    Prep: patient was prepped and draped in usual sterile fashion      Details:  Needle Size:  22 G  Approach:  Anterolateral  Location:  Knee  Site:  R knee  Medications:  5 mL LIDOcaine (PF) 20 mg/mL (2%) 20 mg/mL (2 %); 12 mg betamethasone acetate-betamethasone sodium phosphate 6 mg/mL

## 2022-10-26 ENCOUNTER — TELEPHONE (OUTPATIENT)
Dept: SMOKING CESSATION | Facility: CLINIC | Age: 57
End: 2022-10-26
Payer: MEDICARE

## 2022-10-26 NOTE — TELEPHONE ENCOUNTER
Pt had not shown up for her [facility] appointment.  Called and spoke to pt.  Pt will call back to reschedule.

## 2022-10-27 ENCOUNTER — OFFICE VISIT (OUTPATIENT)
Dept: NEUROLOGY | Facility: CLINIC | Age: 57
End: 2022-10-27
Payer: MEDICARE

## 2022-10-27 VITALS
BODY MASS INDEX: 26.62 KG/M2 | HEIGHT: 61 IN | WEIGHT: 141 LBS | SYSTOLIC BLOOD PRESSURE: 93 MMHG | DIASTOLIC BLOOD PRESSURE: 72 MMHG

## 2022-10-27 DIAGNOSIS — G45.9 TRANSIENT ISCHEMIC ATTACK (TIA): Primary | ICD-10-CM

## 2022-10-27 PROCEDURE — 99213 OFFICE O/P EST LOW 20 MIN: CPT | Mod: S$PBB,,, | Performed by: NURSE PRACTITIONER

## 2022-10-27 PROCEDURE — 99999 PR PBB SHADOW E&M-EST. PATIENT-LVL V: ICD-10-PCS | Mod: PBBFAC,,, | Performed by: NURSE PRACTITIONER

## 2022-10-27 PROCEDURE — 99999 PR PBB SHADOW E&M-EST. PATIENT-LVL V: CPT | Mod: PBBFAC,,, | Performed by: NURSE PRACTITIONER

## 2022-10-27 PROCEDURE — 99215 OFFICE O/P EST HI 40 MIN: CPT | Mod: PBBFAC | Performed by: NURSE PRACTITIONER

## 2022-10-27 PROCEDURE — 99213 PR OFFICE/OUTPT VISIT, EST, LEVL III, 20-29 MIN: ICD-10-PCS | Mod: S$PBB,,, | Performed by: NURSE PRACTITIONER

## 2022-10-27 NOTE — PROGRESS NOTES
Subjective:       Patient ID: Faith Jin is a 57 y.o. female.    Chief Complaint:  Transient Ischemic Attack (3 mos f/u. Pt states still having HA daily w dizziness. Also states tingling in fingers and feet. )      History of Present Illness  Patient presents for follow up of TIA. Had been doing well since LOV until September 13 when she was brought to ED by EMS. Reports she had been playing cards with friends when she slumped over and woke up with left sided weakness. Patient was brought to ED, CT head negative for stroke and patient given TPA. Patient denies any residual left sided weakness. MRI brain did not show any stroke. MRA head and neck were normal. Event was similar to TIA event she had in July of this year. Patient discharged home with  mg daily. Today patient reports tingling to fingers and feet; has diabetic neuropathy and on Topiramate for migraines. Patient reports taking  mg.      Past Medical History:   Diagnosis Date    Bipolar disorder     COPD (chronic obstructive pulmonary disease)     Depression     Lumbar degenerative disc disease     TIA (transient ischemic attack)        Past Surgical History:   Procedure Laterality Date    ANTERIOR CRUCIATE LIGAMENT REPAIR Left     TOTAL KNEE ARTHROPLASTY Left     WRIST SURGERY Right        Family History   Problem Relation Age of Onset    Coronary artery disease Mother     Diabetes Mother     Coronary artery disease Father        Social History     Socioeconomic History    Marital status:    Tobacco Use    Smoking status: Some Days     Types: Vaping w/o nicotine    Smokeless tobacco: Never   Substance and Sexual Activity    Alcohol use: Not Currently     Comment: stopped in 2005    Drug use: Not Currently     Types: Marijuana, Cocaine     Comment: stopped in 2005    Sexual activity: Not Currently       Current Outpatient Medications   Medication Sig Dispense Refill    aspirin (ECOTRIN) 81 MG EC tablet Take 1 tablet (81 mg  total) by mouth once daily. 90 tablet 3    atorvastatin (LIPITOR) 40 MG tablet Take 1 tablet (40 mg total) by mouth once daily. 90 tablet 3    azelastine (ASTELIN) 137 mcg (0.1 %) nasal spray SMARTSI Spray(s) Both Nares Every Morning      benztropine (COGENTIN) 1 MG tablet Take 1 mg by mouth 3 (three) times daily.      buPROPion (WELLBUTRIN XL) 300 MG 24 hr tablet Take 300 mg by mouth every morning.      carBAMazepine (TEGRETOL XR) 400 MG Tb12 Take 400 mg by mouth 2 (two) times daily.      clonazePAM (KLONOPIN) 0.5 MG tablet Take 0.5 mg by mouth nightly.      dexlansoprazole (DEXILANT) 60 mg capsule Take 1 capsule by mouth once daily.      DULoxetine (CYMBALTA) 60 MG capsule Take 60 mg by mouth 2 (two) times daily.      eszopiclone (LUNESTA) 2 MG Tab Take 2 mg by mouth nightly as needed.      ezetimibe (ZETIA) 10 mg tablet Take 10 mg by mouth once daily.      famotidine (PEPCID) 20 MG tablet Take 20 mg by mouth daily as needed.      fluticasone propionate (FLONASE) 50 mcg/actuation nasal spray 1 spray by Each Nostril route nightly.      furosemide (LASIX) 20 MG tablet Take 20 mg by mouth once daily.      gabapentin (NEURONTIN) 300 MG capsule Take 300 mg by mouth 3 (three) times daily.      icosapent ethyL (VASCEPA) 1 gram Cap Take 2 capsules by mouth 2 (two) times daily.      meclizine (ANTIVERT) 25 mg tablet Take 25 mg by mouth 3 (three) times daily as needed.      medroxyPROGESTERone (PROVERA) 10 MG tablet Take 10 mg by mouth once daily.      meloxicam (MOBIC) 15 MG tablet Take 1 tablet (15 mg total) by mouth once daily. 30 tablet 2    metFORMIN (GLUCOPHAGE) 500 MG tablet Take 500 mg by mouth once daily.      MOVANTIK 25 mg tablet Take 25 mg by mouth once daily.      nicotine (NICODERM CQ) 21 mg/24 hr 1 patch once daily.      paliperidone palmitate (INVEGA SUSTENNA) 234 mg/1.5 mL Syrg injection Inject 234 mg into the muscle.      pregabalin (LYRICA) 75 MG capsule Take 75 mg by mouth 3 (three) times daily.       RESTASIS 0.05 % ophthalmic emulsion Place 1 drop into both eyes 2 (two) times daily.      tiZANidine (ZANAFLEX) 4 MG tablet take 1 tablet BY MOUTH EVERY 8 HOURS AS NEEDED FOR MUSCLE SPASMS Strength: 4 mg 90 tablet 3    topiramate (TOPAMAX) 200 MG Tab Take 200 mg by mouth 2 (two) times daily.      valACYclovir (VALTREX) 500 MG tablet Take 500 mg by mouth 2 (two) times daily.      VRAYLAR 3 mg Cap Take 1 capsule by mouth once daily.       No current facility-administered medications for this visit.       Review of patient's allergies indicates:   Allergen Reactions    Adhesive      Other reaction(s): skin tears easily with adhesive    Cephalexin Nausea And Vomiting    Erythromycin Nausea And Vomiting    Lithium Nausea And Vomiting    Naproxen Nausea And Vomiting        Vitals:    10/27/22 1033   BP: 93/72       Review of Systems  Review of Systems   Neurological:  Positive for numbness. Negative for dizziness, facial asymmetry, speech difficulty, weakness, light-headedness and headaches.   All other systems reviewed and are negative.    Objective:      Neurologic Exam     Mental Status   Oriented to person, place, and time.   Attention: normal. Concentration: normal.   Speech: speech is normal   Level of consciousness: alert  Knowledge: good.   Normal comprehension.     Cranial Nerves   Cranial nerves II through XII intact.     Motor Exam   Muscle bulk: normal  Right arm tone: normal  Left arm tone: normal  Right leg tone: normal  Left leg tone: normal    Strength   Strength 5/5 throughout.     Sensory Exam   Light touch normal.     Gait, Coordination, and Reflexes     Gait  Gait: normal    Physical Exam  Vitals and nursing note reviewed.   Pulmonary:      Effort: Pulmonary effort is normal.   Neurological:      Mental Status: She is oriented to person, place, and time.      Cranial Nerves: Cranial nerves 2-12 are intact.      Motor: Motor strength is normal.      Gait: Gait is intact.   Psychiatric:         Speech:  Speech normal.         Assessment:        1. Transient ischemic attack (TIA)        Plan:   Advised patient to follow up with PCP in regards to low blood pressure  Continue ASA/statin  Secondary stroke prevention measures discussed. Education provided on signs and symptoms of stroke; advised to call 9-1-1 with any new onset of numbness, tingling or weakness, difficulty with speech or facial droop.

## 2022-11-08 NOTE — TELEPHONE ENCOUNTER
Patient called stating that she is having swelling in her right hand. Patient stated that her brace isn't helping and that after doing everyday things she is having some swelling in her right index finger, middle finger, and thumb. Also, patient states that a lump appears as well.I informed the patient about ice and elevation to help the swelling and to take tylenol. Patient asked if there was another brace that she could try or if she is to continue with the one she has now? Patient also asked if we can refill her Mobic?

## 2022-11-09 RX ORDER — MELOXICAM 15 MG/1
15 TABLET ORAL DAILY
Qty: 30 TABLET | Refills: 2 | Status: SHIPPED | OUTPATIENT
Start: 2022-11-09 | End: 2023-01-26

## 2022-11-10 ENCOUNTER — TELEPHONE (OUTPATIENT)
Dept: NEUROLOGY | Facility: CLINIC | Age: 57
End: 2022-11-10
Payer: MEDICARE

## 2022-11-10 DIAGNOSIS — G45.9 TIA (TRANSIENT ISCHEMIC ATTACK): Primary | ICD-10-CM

## 2022-11-10 NOTE — TELEPHONE ENCOUNTER
Pt is requesting a referral to home health due to her having difficulties w/ her hands since having TIA.  She reports difficulties doing daily tasks due to her hands being numb.

## 2022-11-16 ENCOUNTER — TELEPHONE (OUTPATIENT)
Dept: NEUROLOGY | Facility: CLINIC | Age: 57
End: 2022-11-16
Payer: MEDICARE

## 2022-11-16 NOTE — TELEPHONE ENCOUNTER
Pt called to report since her stroke she has been experiencing a tingling sensation and increased anger and agitation  She reports her physiatrist started her on Vistaril.  She is inquiring on if there is anything we can do or recommend to help.  Cb# [phone]

## 2022-11-16 NOTE — TELEPHONE ENCOUNTER
Patient should continue follow up with psychiatrist for medication changes. Nothing on previous imaging revealed anything that would cause this.

## 2022-11-17 ENCOUNTER — OFFICE VISIT (OUTPATIENT)
Dept: ORTHOPEDICS | Facility: CLINIC | Age: 57
End: 2022-11-17
Payer: MEDICARE

## 2022-11-17 ENCOUNTER — HOSPITAL ENCOUNTER (OUTPATIENT)
Dept: RADIOLOGY | Facility: CLINIC | Age: 57
Discharge: HOME | End: 2022-11-17
Attending: PHYSICIAN ASSISTANT
Payer: MEDICARE

## 2022-11-17 VITALS
SYSTOLIC BLOOD PRESSURE: 120 MMHG | HEART RATE: 102 BPM | BODY MASS INDEX: 26.62 KG/M2 | DIASTOLIC BLOOD PRESSURE: 78 MMHG | WEIGHT: 141 LBS | HEIGHT: 61 IN

## 2022-11-17 DIAGNOSIS — M25.562 LEFT KNEE PAIN, UNSPECIFIED CHRONICITY: ICD-10-CM

## 2022-11-17 DIAGNOSIS — Z96.652 AFTERCARE FOLLOWING LEFT KNEE JOINT REPLACEMENT SURGERY: ICD-10-CM

## 2022-11-17 DIAGNOSIS — Z96.652 S/P TKR (TOTAL KNEE REPLACEMENT), LEFT: ICD-10-CM

## 2022-11-17 DIAGNOSIS — Z47.1 AFTERCARE FOLLOWING LEFT KNEE JOINT REPLACEMENT SURGERY: ICD-10-CM

## 2022-11-17 DIAGNOSIS — M25.562 LEFT KNEE PAIN, UNSPECIFIED CHRONICITY: Primary | ICD-10-CM

## 2022-11-17 PROCEDURE — 99213 OFFICE O/P EST LOW 20 MIN: CPT | Mod: ,,, | Performed by: PHYSICIAN ASSISTANT

## 2022-11-17 PROCEDURE — 73564 XR KNEE COMP 4 OR MORE VIEWS LEFT: ICD-10-PCS | Mod: LT,,, | Performed by: PHYSICIAN ASSISTANT

## 2022-11-17 PROCEDURE — 99213 PR OFFICE/OUTPT VISIT, EST, LEVL III, 20-29 MIN: ICD-10-PCS | Mod: ,,, | Performed by: PHYSICIAN ASSISTANT

## 2022-11-17 PROCEDURE — 73564 X-RAY EXAM KNEE 4 OR MORE: CPT | Mod: LT,,, | Performed by: PHYSICIAN ASSISTANT

## 2022-11-18 NOTE — PROGRESS NOTES
"Chief Complaint:   Chief Complaint   Patient presents with    Left Knee - Pain, Injury    Injury     lt knee doi 11/15/22 in the afternoon states she had caught the curb, states she has been having some swelling, states she felt a pop, has been ice/elevation.        History of present illness:    This is a 57 y.o. year old female who complains of left knee discomfort.    Patient states she has stumbled but she did not fall to the ground.    States she is having some global knee pain which is improving but she wants to have it looked at she had a knee revision in the remote past    Review of Systems:    Constitution:   Denies chills, fever, and sweats.  HENT:   Denies headaches or blurry vision.  Cardiovascular:  Denies chest pain or irregular heart beat.  Respiratory:   Denies cough or shortness of breath.  Gastrointestinal:  Denies abdominal pain, nausea, or vomiting.  Musculoskeletal:   Denies muscle cramps.  Neurological:   Denies dizziness or focal weakness.  Psychiatric/Behavior: Normal mental status.  Hematology/Lymph:  Denies bleeding problem or easy bruising/bleeding.  Skin:    Denies rash or suspicious lesions.    Examination:    Vital Signs:    Vitals:    11/17/22 1432   BP: 120/78   Pulse: 102   Weight: 64 kg (141 lb)   Height: 5' 1" (1.549 m)   PainSc:   7       Body mass index is 26.64 kg/m².    Constitution:   Well-developed, well nourished patient in no acute distress.  Neurological:   Alert and oriented x 3 and cooperative to examination.     Psychiatric/Behavior: Normal mental status.  Respiratory:   No shortness of breath.  Eyes:    Extraoccular muscles intact  Skin:    No scars, rash or suspicious lesions.    Physical Exam:       General Musculoskeletal Exam   Gait: antalgic       Left Knee Exam     Inspection   Erythema: absent  Effusion: present  Deformity: present  Bruising: absent    Tenderness   No tenderness to palpation of the left knee    Crepitus   No crepitus range of motion    Range of " Motion     0-115    Tests   Ligament Examination   MCL - Valgus: normal (0 to 2mm)  LCL - Varus: normal  No AP instability with a good endpoint with posterior drawer  Patella   Passive Patellar Tilt: neutral    Other   Sensation: normal    Muscle Strength   Right Lower Extremity   Quadriceps:  5/5   Hamstrin/5     Imaging: X-rays ordered and images interpreted today personally by me of left knee four views   Intact prosthesis in good position with no signs of loosening  There is no acute bony abnormalities noted on x-ray        Assessment: Left knee pain, unspecified chronicity  -     X-Ray Knee Complete 4 or More Views Left; Future; Expected date: 2022    Aftercare following left knee joint replacement surgery    S/P TKR (total knee replacement), left         Plan:  Patient will gradually increase her activity as tolerated.      She has a follow-up appointment with Dr. Pittman in a few weeks she will keep this appointment for further evaluation if needed          DISCLAIMER: This note may have been dictated using voice recognition software and may contain grammatical errors.     NOTE: Consult report sent to referring provider via "[employer].

## 2022-11-28 ENCOUNTER — EXTERNAL HOME HEALTH (OUTPATIENT)
Dept: HOME HEALTH SERVICES | Facility: HOSPITAL | Age: 57
End: 2022-11-28
Payer: MEDICARE

## 2022-12-30 ENCOUNTER — LAB REQUISITION (OUTPATIENT)
Dept: LAB | Facility: HOSPITAL | Age: 57
End: 2022-12-30
Payer: MEDICARE

## 2022-12-30 DIAGNOSIS — E11.9 TYPE 2 DIABETES MELLITUS WITHOUT COMPLICATIONS: ICD-10-CM

## 2022-12-30 DIAGNOSIS — N39.0 URINARY TRACT INFECTION, SITE NOT SPECIFIED: ICD-10-CM

## 2022-12-30 DIAGNOSIS — G45.9 TRANSIENT CEREBRAL ISCHEMIC ATTACK, UNSPECIFIED: ICD-10-CM

## 2022-12-30 DIAGNOSIS — J44.9 CHRONIC OBSTRUCTIVE PULMONARY DISEASE, UNSPECIFIED: ICD-10-CM

## 2022-12-30 DIAGNOSIS — F31.9 BIPOLAR DISORDER, UNSPECIFIED: ICD-10-CM

## 2022-12-30 LAB
ALBUMIN SERPL-MCNC: 3.9 G/DL (ref 3.5–5)
ALBUMIN/GLOB SERPL: 1.9 RATIO (ref 1.1–2)
ALP SERPL-CCNC: 78 UNIT/L (ref 40–150)
ALT SERPL-CCNC: 17 UNIT/L (ref 0–55)
APPEARANCE UR: CLEAR
AST SERPL-CCNC: 15 UNIT/L (ref 5–34)
BACTERIA #/AREA URNS AUTO: NORMAL /HPF
BASOPHILS # BLD AUTO: 0.06 X10(3)/MCL (ref 0–0.2)
BASOPHILS NFR BLD AUTO: 1 %
BILIRUB UR QL STRIP.AUTO: NEGATIVE MG/DL
BILIRUBIN DIRECT+TOT PNL SERPL-MCNC: 0.3 MG/DL
BUN SERPL-MCNC: 13.1 MG/DL (ref 9.8–20.1)
CALCIUM SERPL-MCNC: 8.5 MG/DL (ref 8.4–10.2)
CHLORIDE SERPL-SCNC: 101 MMOL/L (ref 98–107)
CO2 SERPL-SCNC: 22 MMOL/L (ref 22–29)
COLOR UR AUTO: YELLOW
CREAT SERPL-MCNC: 0.72 MG/DL (ref 0.55–1.02)
EOSINOPHIL # BLD AUTO: 0.37 X10(3)/MCL (ref 0–0.9)
EOSINOPHIL NFR BLD AUTO: 6.3 %
ERYTHROCYTE [DISTWIDTH] IN BLOOD BY AUTOMATED COUNT: 14 % (ref 11–14.5)
GFR SERPLBLD CREATININE-BSD FMLA CKD-EPI: >60 MLS/MIN/1.73/M2
GLOBULIN SER-MCNC: 2.1 GM/DL (ref 2.4–3.5)
GLUCOSE SERPL-MCNC: 89 MG/DL (ref 74–100)
GLUCOSE UR QL STRIP.AUTO: ABNORMAL MG/DL
HCT VFR BLD AUTO: 43.1 % (ref 37–47)
HGB BLD-MCNC: 13.4 GM/DL (ref 12–16)
IMM GRANULOCYTES # BLD AUTO: 0.02 X10(3)/MCL (ref 0–0.04)
IMM GRANULOCYTES NFR BLD AUTO: 0.3 %
KETONES UR QL STRIP.AUTO: NEGATIVE MG/DL
LEUKOCYTE ESTERASE UR QL STRIP.AUTO: NEGATIVE UNIT/L
LYMPHOCYTES # BLD AUTO: 2.32 X10(3)/MCL (ref 0.6–4.6)
LYMPHOCYTES NFR BLD AUTO: 39.5 %
MCH RBC QN AUTO: 27.8 PG
MCHC RBC AUTO-ENTMCNC: 31.1 MG/DL (ref 33–36)
MCV RBC AUTO: 89.4 FL (ref 80–94)
MONOCYTES # BLD AUTO: 0.47 X10(3)/MCL (ref 0.1–1.3)
MONOCYTES NFR BLD AUTO: 8 %
NEUTROPHILS # BLD AUTO: 2.63 X10(3)/MCL (ref 2.1–9.2)
NEUTROPHILS NFR BLD AUTO: 44.9 %
NITRITE UR QL STRIP.AUTO: NEGATIVE
NRBC BLD AUTO-RTO: 0 % (ref 0–1)
PH UR STRIP.AUTO: 5 [PH]
PLATELET # BLD AUTO: 330 X10(3)/MCL (ref 140–371)
PMV BLD AUTO: 9.2 FL (ref 9.4–12.4)
POTASSIUM SERPL-SCNC: 4.4 MMOL/L (ref 3.5–5.1)
PROT SERPL-MCNC: 6 GM/DL (ref 6.4–8.3)
PROT UR QL STRIP.AUTO: NEGATIVE MG/DL
RBC # BLD AUTO: 4.82 X10(6)/MCL (ref 4.2–5.4)
RBC #/AREA URNS AUTO: <5 /HPF
RBC UR QL AUTO: NEGATIVE UNIT/L
SODIUM SERPL-SCNC: 133 MMOL/L (ref 136–145)
SP GR UR STRIP.AUTO: 1.01 (ref 1–1.03)
SQUAMOUS #/AREA URNS AUTO: <5 /HPF
UROBILINOGEN UR STRIP-ACNC: 0.2 MG/DL
WBC # SPEC AUTO: 5.9 X10(3)/MCL (ref 4.5–11.5)
WBC #/AREA URNS AUTO: <5 /HPF

## 2022-12-30 PROCEDURE — 81001 URINALYSIS AUTO W/SCOPE: CPT | Performed by: NURSE PRACTITIONER

## 2022-12-30 PROCEDURE — 85025 COMPLETE CBC W/AUTO DIFF WBC: CPT | Performed by: NURSE PRACTITIONER

## 2022-12-30 PROCEDURE — 80053 COMPREHEN METABOLIC PANEL: CPT | Performed by: NURSE PRACTITIONER

## 2023-01-11 PROCEDURE — G0179 MD RECERTIFICATION HHA PT: HCPCS | Mod: ,,, | Performed by: NURSE PRACTITIONER

## 2023-01-11 PROCEDURE — G0179 PR HOME HEALTH MD RECERTIFICATION: ICD-10-PCS | Mod: ,,, | Performed by: NURSE PRACTITIONER

## 2023-01-19 ENCOUNTER — TELEPHONE (OUTPATIENT)
Dept: NEUROLOGY | Facility: CLINIC | Age: 58
End: 2023-01-19
Payer: MEDICARE

## 2023-01-19 NOTE — TELEPHONE ENCOUNTER
----- Message from Erin Fry sent at 2023  1:40 PM CST -----  Regarding: appt questions  To:         Office  From:       Geri Martin  Phone:      [phone]  Patient:    Same  :        1965  RegDr:      Dr Renetta Roque  Ref:        She is scheduled for an  appointment and not sure of the  details - would like a call back as  she may not be able to attend.    ClrID:    [id number]    --------------------------------------  Message History  Account: [id number]  Taken:  2023 11:42a [facility]  Serial#: [id number]  ======================================

## 2023-01-26 ENCOUNTER — OFFICE VISIT (OUTPATIENT)
Dept: NEUROLOGY | Facility: CLINIC | Age: 58
End: 2023-01-26
Payer: MEDICARE

## 2023-01-26 VITALS
WEIGHT: 141 LBS | HEIGHT: 61 IN | SYSTOLIC BLOOD PRESSURE: 98 MMHG | HEART RATE: 96 BPM | BODY MASS INDEX: 26.62 KG/M2 | DIASTOLIC BLOOD PRESSURE: 76 MMHG

## 2023-01-26 DIAGNOSIS — G45.9 TIA (TRANSIENT ISCHEMIC ATTACK): Primary | ICD-10-CM

## 2023-01-26 PROCEDURE — 99213 OFFICE O/P EST LOW 20 MIN: CPT | Mod: S$PBB,,, | Performed by: NURSE PRACTITIONER

## 2023-01-26 PROCEDURE — 99999 PR PBB SHADOW E&M-EST. PATIENT-LVL IV: CPT | Mod: PBBFAC,,, | Performed by: NURSE PRACTITIONER

## 2023-01-26 PROCEDURE — 99213 PR OFFICE/OUTPT VISIT, EST, LEVL III, 20-29 MIN: ICD-10-PCS | Mod: S$PBB,,, | Performed by: NURSE PRACTITIONER

## 2023-01-26 PROCEDURE — 99999 PR PBB SHADOW E&M-EST. PATIENT-LVL IV: ICD-10-PCS | Mod: PBBFAC,,, | Performed by: NURSE PRACTITIONER

## 2023-01-26 PROCEDURE — 99214 OFFICE O/P EST MOD 30 MIN: CPT | Mod: PBBFAC | Performed by: NURSE PRACTITIONER

## 2023-01-26 RX ORDER — ATORVASTATIN CALCIUM 40 MG/1
40 TABLET, FILM COATED ORAL DAILY
COMMUNITY

## 2023-01-26 RX ORDER — CELECOXIB 100 MG/1
100 CAPSULE ORAL 3 TIMES DAILY
COMMUNITY

## 2023-01-26 NOTE — PROGRESS NOTES
Subjective:       Patient ID: Faith Jin is a 57 y.o. female.    Chief Complaint:  Transient Ischemic Attack (3 month f/u for TIA. Patient denies all deficits however about 2 weeks ago she had a fall and hit her head on the corner of a tv tray and has been getting headaches, she did not go to ED to get evaluated but did speak to her PCP and she follows up with them tomorrow.)      History of Present Illness  Patient presents for follow up of TIA that occurred 9/13/22. Presented to ED at that time with left sided weakness, TPA given. MRI brain revealed no stroke and MRA head/neck were normal. Today patient denies any new onset of numbness, tingling or weakness. Admits to a fall about 2 weeks ago. Tripped getting out of a chair. Fell and hit the back of her head on the corner of a tv tray. Denies any loss of consciousness at that time or any neurological symptoms. Reports initially had some headaches following fall and a knot to the back of her head. States she is no longer experiencing headaches. Taking ASA/statin with no side effects. Has been following up with her psychiatrist, Dr. Natacha Smalls, monthly for medication adjustments. Also reports she is participating in [facility] smoking cessation program and has decreased her tobacco intake drastically.             Past Medical History:   Diagnosis Date    Bipolar disorder     COPD (chronic obstructive pulmonary disease)     Depression     Lumbar degenerative disc disease     Mixed hyperlipidemia     Muscle spasms of both lower extremities     Muscle spasms of neck     Neuropathy     TIA (transient ischemic attack)        Past Surgical History:   Procedure Laterality Date    ANTERIOR CRUCIATE LIGAMENT REPAIR Left     TOTAL KNEE ARTHROPLASTY Left     WRIST SURGERY Right        Family History   Problem Relation Age of Onset    Coronary artery disease Mother     Diabetes Mother     Coronary artery disease Father        Social History     Socioeconomic History     Marital status:    Tobacco Use    Smoking status: Some Days     Types: Vaping with nicotine    Smokeless tobacco: Never   Substance and Sexual Activity    Alcohol use: Not Currently     Comment: stopped in     Drug use: Not Currently     Types: Marijuana, Cocaine     Comment: stopped in     Sexual activity: Not Currently       Current Outpatient Medications   Medication Sig Dispense Refill    aspirin (ECOTRIN) 81 MG EC tablet Take 1 tablet (81 mg total) by mouth once daily. (Patient taking differently: Take 325 mg by mouth once daily.) 90 tablet 3    atorvastatin (LIPITOR) 40 MG tablet Take 40 mg by mouth once daily.      azelastine (ASTELIN) 137 mcg (0.1 %) nasal spray SMARTSI Spray(s) Both Nares Every Morning      benztropine (COGENTIN) 1 MG tablet Take 1 mg by mouth 3 (three) times daily.      buPROPion (WELLBUTRIN XL) 300 MG 24 hr tablet Take 300 mg by mouth every morning.      carBAMazepine (TEGRETOL XR) 400 MG Tb12 Take 400 mg by mouth 2 (two) times daily.      clonazePAM (KLONOPIN) 0.5 MG tablet Take 0.5 mg by mouth nightly.      dexlansoprazole (DEXILANT) 60 mg capsule Take 1 capsule by mouth once daily.      DULoxetine (CYMBALTA) 60 MG capsule Take 60 mg by mouth 2 (two) times daily.      famotidine (PEPCID) 20 MG tablet Take 20 mg by mouth daily as needed.      fluticasone propionate (FLONASE) 50 mcg/actuation nasal spray 1 spray by Each Nostril route nightly.      furosemide (LASIX) 20 MG tablet Take 20 mg by mouth once daily.      icosapent ethyL (VASCEPA) 1 gram Cap Take 2 capsules by mouth 2 (two) times daily.      metFORMIN (GLUCOPHAGE) 500 MG tablet Take 500 mg by mouth once daily.      MOVANTIK 25 mg tablet Take 25 mg by mouth once daily.      nicotine (NICODERM CQ) 21 mg/24 hr 1 patch once daily.      paliperidone palmitate (INVEGA SUSTENNA) 234 mg/1.5 mL Syrg injection Inject 234 mg into the muscle.      pregabalin (LYRICA) 75 MG capsule Take 75 mg by mouth 3 (three) times  daily.      RESTASIS 0.05 % ophthalmic emulsion Place 1 drop into both eyes 2 (two) times daily.      tiZANidine (ZANAFLEX) 4 MG tablet take 1 tablet BY MOUTH EVERY 8 HOURS AS NEEDED FOR MUSCLE SPASMS Strength: 4 mg 90 tablet 3    topiramate (TOPAMAX) 200 MG Tab Take 200 mg by mouth 2 (two) times daily.      valACYclovir (VALTREX) 500 MG tablet Take 500 mg by mouth 2 (two) times daily.      VRAYLAR 3 mg Cap Take 1 capsule by mouth once daily.       No current facility-administered medications for this visit.       Review of patient's allergies indicates:   Allergen Reactions    Adhesive      Other reaction(s): skin tears easily with adhesive    Cephalexin Nausea And Vomiting    Erythromycin Nausea And Vomiting    Lithium Nausea And Vomiting    Naproxen Nausea And Vomiting          Review of Systems  Review of Systems   Neurological:  Negative for dizziness, facial asymmetry, weakness, light-headedness and headaches.   All other systems reviewed and are negative.    Objective:      Neurologic Exam     Mental Status   Oriented to person, place, and time.   Speech: speech is normal   Level of consciousness: alert    Cranial Nerves   Cranial nerves II through XII intact.     Motor Exam   Muscle bulk: normal    Strength   Strength 5/5 throughout.     Sensory Exam   Light touch normal.     Gait, Coordination, and Reflexes     Gait  Gait: normal    Physical Exam  Vitals and nursing note reviewed.   Pulmonary:      Effort: Pulmonary effort is normal.   Neurological:      Mental Status: She is oriented to person, place, and time.      Cranial Nerves: Cranial nerves 2-12 are intact.      Motor: Motor strength is normal.      Gait: Gait is intact.   Psychiatric:         Speech: Speech normal.         Assessment:        1. TIA (transient ischemic attack)        Plan:     Secondary stroke prevention measures discussed. Education provided on signs and symptoms of stroke; advised to call 9-1-1 with any new onset of numbness,  tingling or weakness, difficulty with speech or facial droop.    Continue ASA  Continue management of HLD per PCP  Continue smoking cessation program

## 2023-02-01 ENCOUNTER — TELEPHONE (OUTPATIENT)
Dept: ORTHOPEDICS | Facility: CLINIC | Age: 58
End: 2023-02-01
Payer: MEDICARE

## 2023-02-01 NOTE — TELEPHONE ENCOUNTER
Patient called  and left a message stating that she recently began having a sharp pain in her right wrist. Patient states that her wrist is swelling.   Per Dr. Bhatt I called patient back to make her an appointment for 02/06/23. No answer. Left the patient a message.

## 2023-02-06 ENCOUNTER — HOSPITAL ENCOUNTER (OUTPATIENT)
Dept: RADIOLOGY | Facility: CLINIC | Age: 58
Discharge: HOME | End: 2023-02-06
Attending: STUDENT IN AN ORGANIZED HEALTH CARE EDUCATION/TRAINING PROGRAM
Payer: MEDICARE

## 2023-02-06 ENCOUNTER — TELEPHONE (OUTPATIENT)
Dept: ORTHOPEDICS | Facility: CLINIC | Age: 58
End: 2023-02-06

## 2023-02-06 ENCOUNTER — OFFICE VISIT (OUTPATIENT)
Dept: ORTHOPEDICS | Facility: CLINIC | Age: 58
End: 2023-02-06
Payer: MEDICARE

## 2023-02-06 VITALS — HEIGHT: 62 IN | WEIGHT: 153 LBS | BODY MASS INDEX: 28.16 KG/M2

## 2023-02-06 DIAGNOSIS — M19.131 POST-TRAUMATIC OSTEOARTHRITIS OF RIGHT WRIST: ICD-10-CM

## 2023-02-06 DIAGNOSIS — S69.91XA INJURY OF RIGHT WRIST, INITIAL ENCOUNTER: ICD-10-CM

## 2023-02-06 DIAGNOSIS — S69.91XA INJURY OF RIGHT WRIST, INITIAL ENCOUNTER: Primary | ICD-10-CM

## 2023-02-06 PROCEDURE — 99213 PR OFFICE/OUTPT VISIT, EST, LEVL III, 20-29 MIN: ICD-10-PCS | Mod: 25,,, | Performed by: STUDENT IN AN ORGANIZED HEALTH CARE EDUCATION/TRAINING PROGRAM

## 2023-02-06 PROCEDURE — 99213 OFFICE O/P EST LOW 20 MIN: CPT | Mod: 25,,, | Performed by: STUDENT IN AN ORGANIZED HEALTH CARE EDUCATION/TRAINING PROGRAM

## 2023-02-06 PROCEDURE — 20605 INTERMEDIATE JOINT ASPIRATION/INJECTION: R RADIOCARPAL: ICD-10-PCS | Mod: RT,,, | Performed by: STUDENT IN AN ORGANIZED HEALTH CARE EDUCATION/TRAINING PROGRAM

## 2023-02-06 PROCEDURE — 73110 X-RAY EXAM OF WRIST: CPT | Mod: RT,,, | Performed by: STUDENT IN AN ORGANIZED HEALTH CARE EDUCATION/TRAINING PROGRAM

## 2023-02-06 PROCEDURE — 20605 DRAIN/INJ JOINT/BURSA W/O US: CPT | Mod: RT,,, | Performed by: STUDENT IN AN ORGANIZED HEALTH CARE EDUCATION/TRAINING PROGRAM

## 2023-02-06 PROCEDURE — 73110 XR WRIST COMPLETE 3 VIEWS RIGHT: ICD-10-PCS | Mod: RT,,, | Performed by: STUDENT IN AN ORGANIZED HEALTH CARE EDUCATION/TRAINING PROGRAM

## 2023-02-06 RX ADMIN — LIDOCAINE HYDROCHLORIDE 1 ML: 10 INJECTION INFILTRATION; PERINEURAL at 02:02

## 2023-02-06 RX ADMIN — BETAMETHASONE SODIUM PHOSPHATE AND BETAMETHASONE ACETATE 6 MG: 3; 3 INJECTION, SUSPENSION INTRA-ARTICULAR; INTRALESIONAL; INTRAMUSCULAR; SOFT TISSUE at 02:02

## 2023-02-06 NOTE — TELEPHONE ENCOUNTER
Patient called stating that she is at [employer] doing the paper work and it if she is on Home Health. Patient called asking if she can do Home Health PT for insurance purposes since she is on Home Health.     Per Dr. Bhatt she needs to get the brace from [employer] but she can do physical therapy with Home Health.

## 2023-02-07 RX ORDER — BETAMETHASONE SODIUM PHOSPHATE AND BETAMETHASONE ACETATE 3; 3 MG/ML; MG/ML
6 INJECTION, SUSPENSION INTRA-ARTICULAR; INTRALESIONAL; INTRAMUSCULAR; SOFT TISSUE
Status: DISCONTINUED | OUTPATIENT
Start: 2023-02-06 | End: 2023-02-07 | Stop reason: HOSPADM

## 2023-02-07 RX ORDER — LIDOCAINE HYDROCHLORIDE 10 MG/ML
1 INJECTION INFILTRATION; PERINEURAL
Status: DISCONTINUED | OUTPATIENT
Start: 2023-02-06 | End: 2023-02-07 | Stop reason: HOSPADM

## 2023-02-07 NOTE — TELEPHONE ENCOUNTER
02/07/23 - Patient called stating that she received the brace from [facility]. Patient states that her current Home Health agency is [facility]. I explained to her that I will send the order for physical therapy to her home health agency.     Patient states that she is uncomfortable and having pain. Patient is asking if we can send in medication for her pain? States that she forgot to ask at her appointment on yesterday.     I explained to her that I would ask Dr. Bhatt about the medication and call her back when I have an answer. Patient voiced a clear understanding.

## 2023-02-07 NOTE — PROGRESS NOTES
Chief Complaint:  right thumb pain, numbness    Consulting Physician: No ref. provider found    History of present illness:     Patient is a 57-year-old female on disability who presents for follow-up for right wrist pain.  Saw in my clinic last send her EMG for possible carpal tunnel however she has not been able to get in get her EMG.  Her main complaint today is right wrist pain.  The pain is localized over the radiocarpal joint is a sharp pain worse with motion.  She tried ice and elevation which helps some.  Check over-the-counter ibuprofen which helped a little but not fully.  She is never had an injection into the right wrist.  She does not have any recent injury to his right wrist.  She does remember trying to get out of a chair and maybe hearing a pop.    Past Medical History:   Diagnosis Date    Bipolar disorder     COPD (chronic obstructive pulmonary disease)     Depression     Lumbar degenerative disc disease     Mixed hyperlipidemia     Muscle spasms of both lower extremities     Muscle spasms of neck     Neuropathy     TIA (transient ischemic attack)        Past Surgical History:   Procedure Laterality Date    ANTERIOR CRUCIATE LIGAMENT REPAIR Left     TOTAL KNEE ARTHROPLASTY Left     WRIST SURGERY Right        Current Outpatient Medications   Medication Sig    aspirin (ECOTRIN) 81 MG EC tablet Take 1 tablet (81 mg total) by mouth once daily. (Patient taking differently: Take 325 mg by mouth once daily.)    atorvastatin (LIPITOR) 40 MG tablet Take 40 mg by mouth once daily.    azelastine (ASTELIN) 137 mcg (0.1 %) nasal spray SMARTSI Spray(s) Both Nares Every Morning    benztropine (COGENTIN) 1 MG tablet Take 1 mg by mouth 3 (three) times daily.    buPROPion (WELLBUTRIN XL) 300 MG 24 hr tablet Take 300 mg by mouth every morning.    carBAMazepine (TEGRETOL XR) 400 MG Tb12 Take 400 mg by mouth 2 (two) times daily.    celecoxib (CELEBREX) 100 MG capsule Take 100 mg by mouth 3 (three) times daily.     clonazePAM (KLONOPIN) 0.5 MG tablet Take 0.5 mg by mouth nightly.    dexlansoprazole (DEXILANT) 60 mg capsule Take 1 capsule by mouth once daily.    DULoxetine (CYMBALTA) 60 MG capsule Take 60 mg by mouth 2 (two) times daily.    famotidine (PEPCID) 20 MG tablet Take 20 mg by mouth daily as needed.    fluticasone propionate (FLONASE) 50 mcg/actuation nasal spray 1 spray by Each Nostril route nightly.    furosemide (LASIX) 20 MG tablet Take 20 mg by mouth once daily.    icosapent ethyL (VASCEPA) 1 gram Cap Take 2 capsules by mouth 2 (two) times daily.    metFORMIN (GLUCOPHAGE) 500 MG tablet Take 500 mg by mouth once daily.    MOVANTIK 25 mg tablet Take 25 mg by mouth once daily.    nicotine (NICODERM CQ) 21 mg/24 hr 1 patch once daily.    paliperidone palmitate (INVEGA SUSTENNA) 234 mg/1.5 mL Syrg injection Inject 234 mg into the muscle.    pregabalin (LYRICA) 75 MG capsule Take 75 mg by mouth 3 (three) times daily.    RESTASIS 0.05 % ophthalmic emulsion Place 1 drop into both eyes 2 (two) times daily.    tiZANidine (ZANAFLEX) 4 MG tablet take 1 tablet BY MOUTH EVERY 8 HOURS AS NEEDED FOR MUSCLE SPASMS Strength: 4 mg    topiramate (TOPAMAX) 200 MG Tab Take 200 mg by mouth 2 (two) times daily.    valACYclovir (VALTREX) 500 MG tablet Take 500 mg by mouth 2 (two) times daily.    VRAYLAR 3 mg Cap Take 1 capsule by mouth once daily.     No current facility-administered medications for this visit.       Review of patient's allergies indicates:   Allergen Reactions    Adhesive      Other reaction(s): skin tears easily with adhesive    Cephalexin Nausea And Vomiting    Erythromycin Nausea And Vomiting    Lithium Nausea And Vomiting    Naproxen Nausea And Vomiting       Family History   Problem Relation Age of Onset    Coronary artery disease Mother     Diabetes Mother     Coronary artery disease Father        Social History     Socioeconomic History    Marital status:    Tobacco Use    Smoking status: Some Days      "Types: Vaping with nicotine    Smokeless tobacco: Never   Substance and Sexual Activity    Alcohol use: Not Currently     Comment: stopped in 2005    Drug use: Not Currently     Types: Marijuana, Cocaine     Comment: stopped in 2005    Sexual activity: Not Currently       Review of Systems:    Constitution:   Denies chills, fever, and sweats.  HENT:   Denies headaches or blurry vision.  Cardiovascular:  Denies chest pain or irregular heart beat.  Respiratory:   Denies cough or shortness of breath.  Gastrointestinal:  Denies abdominal pain, nausea, or vomiting.  Musculoskeletal:   Denies muscle cramps.  Neurological:   Denies dizziness or focal weakness.  Psychiatric/Behavior: Normal mental status.  Hematology/Lymph:  Denies bleeding problem or easy bruising/bleeding.  Skin:    Denies rash or suspicious lesions.    Examination:    Vital Signs:    Vitals:    02/06/23 1352   Weight: 69.4 kg (153 lb)   Height: 5' 2" (1.575 m)   PainSc:   8       Body mass index is 27.98 kg/m².    Constitution:   Well-developed, well nourished patient in no acute distress.  Neurological:   Alert and oriented x 3 and cooperative to examination.     Psychiatric/Behavior: Normal mental status.  Respiratory:   No shortness of breath.  Eyes:    Extraoccular muscles intact  Skin:    No scars, rash or suspicious lesions.    MSK:     Right hand:  Previous surgical incision over the dorsum of the wrist is healed without signs of infection .  Tenderness to palpation over the radiocarpal joint over the 3 4 portal Tenderness to palpation over the thumb CMC joint.  Grind test is positive.  Tenderness to palpation over the A1 pulley of the thumb.  There is a palpable catching with flexion extension of the thumb.  She is able make a fist extend her digits.  She is completely numb to the thumb index and long finger.  Tinel's is positive at the wrist over the median nerve, Phalen's and Durkan's is positive.  She has sensation in her ring and small " finger.  Mild Tinel over the cubital tunnel.  Flexion and compression cubital tunnel is mildly positive.  She has supple range of motion of the joints in her hand.  Her hand is warm well perfused    Imaging:    X-ray of the right wrist shows osteoarthritis with previous scapholunate ligament repair     Assessment:     Right wrist osteoarthritis    Plan:   She is developing osteoarthritis of the wrist.  This could be related to her previous scapholunate ligament injury.  I will treat this conservatively by giving her an injection into the radiocarpal joint today.  Recommend getting the EMG.  I can see her back after the EMG to discuss treatment for her compressive neuropathy    Follow Up:  after EMG  Xray at next visit:  none

## 2023-02-07 NOTE — PROCEDURES
Intermediate Joint Aspiration/Injection: R radiocarpal    Date/Time: 2/6/2023 2:00 PM  Performed by: Canelo Bhatt MD  Authorized by: Canelo Bhatt MD     Consent Done?:  Yes (Verbal)  Indications:  Arthritis  Timeout: Prior to procedure the correct patient, procedure, and site was verified      Location:  Wrist  Site:  R radiocarpal  Ultrasonic Guidance for needle placement: No  Needle size:  25 G  Approach:  Dorsal  Medications:  1 mL LIDOcaine HCL 10 mg/ml (1%) 10 mg/mL (1 %); 6 mg betamethasone acetate-betamethasone sodium phosphate 6 mg/mL  Patient tolerance:  Patient tolerated the procedure well with no immediate complications

## 2023-02-07 NOTE — TELEPHONE ENCOUNTER
02/07/23 @ 2:46 PM -- Informed patient of Dr. Bhatt's message listed below. Patient voiced a clear understanding.

## 2023-02-08 ENCOUNTER — EXTERNAL HOME HEALTH (OUTPATIENT)
Dept: HOME HEALTH SERVICES | Facility: HOSPITAL | Age: 58
End: 2023-02-08
Payer: MEDICARE

## 2023-02-13 ENCOUNTER — TELEPHONE (OUTPATIENT)
Dept: ORTHOPEDICS | Facility: CLINIC | Age: 58
End: 2023-02-13
Payer: MEDICARE

## 2023-02-13 NOTE — TELEPHONE ENCOUNTER
2/10/23 @ 2:37 PM - Patient called and left a message stating that she has not completed her EMG and asking how long does she need to wear the brace?   We were closed at the time of the message due to closing at 12 PM    Before I could return patients call on 2/13/23 due to being in clinic patient called back.     02/13/23 @ 8:25AM -- Patient called back stating that she was returning a phone call. I explained that she should wear the brace until after them EMG. Patient states that she has not been scheduled for the EMG. States that she believes she was referred to Dr. Roque.

## 2023-02-14 ENCOUNTER — OFFICE VISIT (OUTPATIENT)
Dept: ORTHOPEDICS | Facility: CLINIC | Age: 58
End: 2023-02-14
Payer: MEDICARE

## 2023-02-14 ENCOUNTER — LAB VISIT (OUTPATIENT)
Dept: LAB | Facility: HOSPITAL | Age: 58
End: 2023-02-14
Attending: ORTHOPAEDIC SURGERY
Payer: MEDICARE

## 2023-02-14 VITALS
HEIGHT: 62 IN | WEIGHT: 153 LBS | HEART RATE: 88 BPM | SYSTOLIC BLOOD PRESSURE: 90 MMHG | DIASTOLIC BLOOD PRESSURE: 65 MMHG | BODY MASS INDEX: 28.16 KG/M2

## 2023-02-14 DIAGNOSIS — T84.093D FAILED TOTAL LEFT KNEE REPLACEMENT, SUBSEQUENT ENCOUNTER: ICD-10-CM

## 2023-02-14 DIAGNOSIS — Z47.1 AFTERCARE FOLLOWING LEFT KNEE JOINT REPLACEMENT SURGERY: ICD-10-CM

## 2023-02-14 DIAGNOSIS — T84.093D FAILED TOTAL LEFT KNEE REPLACEMENT, SUBSEQUENT ENCOUNTER: Primary | ICD-10-CM

## 2023-02-14 DIAGNOSIS — Z96.652 AFTERCARE FOLLOWING LEFT KNEE JOINT REPLACEMENT SURGERY: ICD-10-CM

## 2023-02-14 LAB
BASOPHILS # BLD AUTO: 0.08 X10(3)/MCL (ref 0–0.2)
BASOPHILS NFR BLD AUTO: 1.4 %
CRP SERPL HS-MCNC: 2 MG/L
EOSINOPHIL # BLD AUTO: 0.26 X10(3)/MCL (ref 0–0.9)
EOSINOPHIL NFR BLD AUTO: 4.4 %
ERYTHROCYTE [DISTWIDTH] IN BLOOD BY AUTOMATED COUNT: 14.8 % (ref 11.5–17)
ERYTHROCYTE [SEDIMENTATION RATE] IN BLOOD: 1 MM/HR (ref 0–20)
HCT VFR BLD AUTO: 39.3 % (ref 37–47)
HGB BLD-MCNC: 12.9 GM/DL (ref 12–16)
IMM GRANULOCYTES # BLD AUTO: 0.03 X10(3)/MCL (ref 0–0.04)
IMM GRANULOCYTES NFR BLD AUTO: 0.5 %
LYMPHOCYTES # BLD AUTO: 2.33 X10(3)/MCL (ref 0.6–4.6)
LYMPHOCYTES NFR BLD AUTO: 39.6 %
MCH RBC QN AUTO: 28 PG
MCHC RBC AUTO-ENTMCNC: 32.8 MG/DL (ref 33–36)
MCV RBC AUTO: 85.2 FL (ref 80–94)
MONOCYTES # BLD AUTO: 0.48 X10(3)/MCL (ref 0.1–1.3)
MONOCYTES NFR BLD AUTO: 8.2 %
NEUTROPHILS # BLD AUTO: 2.7 X10(3)/MCL (ref 2.1–9.2)
NEUTROPHILS NFR BLD AUTO: 45.9 %
NRBC BLD AUTO-RTO: 0 %
PLATELET # BLD AUTO: 277 X10(3)/MCL (ref 130–400)
PMV BLD AUTO: 8.6 FL (ref 7.4–10.4)
RBC # BLD AUTO: 4.61 X10(6)/MCL (ref 4.2–5.4)
WBC # SPEC AUTO: 5.9 X10(3)/MCL (ref 4.5–11.5)

## 2023-02-14 PROCEDURE — 99213 PR OFFICE/OUTPT VISIT, EST, LEVL III, 20-29 MIN: ICD-10-PCS | Mod: ,,, | Performed by: ORTHOPAEDIC SURGERY

## 2023-02-14 PROCEDURE — 86141 C-REACTIVE PROTEIN HS: CPT

## 2023-02-14 PROCEDURE — 36415 COLL VENOUS BLD VENIPUNCTURE: CPT

## 2023-02-14 PROCEDURE — 85651 RBC SED RATE NONAUTOMATED: CPT

## 2023-02-14 PROCEDURE — 99213 OFFICE O/P EST LOW 20 MIN: CPT | Mod: ,,, | Performed by: ORTHOPAEDIC SURGERY

## 2023-02-14 PROCEDURE — 85025 COMPLETE CBC W/AUTO DIFF WBC: CPT

## 2023-02-14 NOTE — PROGRESS NOTES
Chief Complaint:   Chief Complaint   Patient presents with    Follow-up     F/u for HARLAN knee pain and swelling, pt states pain is about the same, states pain has not gotten better, pt states she hurting a lot today,        History of present illness:  58-year-old female presents today for follow-up after total knee arthroplasty revision.  Patient is 4 years out.  Overall she is done fairly well until recently.  She is having worsening pain to the left knee.  Says the knee feels loose.  Physical somewhat lax.  Feels that when she walks in extension, the knee wants to click or possibly give way.    Past Medical History:   Diagnosis Date    Bipolar disorder     COPD (chronic obstructive pulmonary disease)     Depression     Lumbar degenerative disc disease     Mixed hyperlipidemia     Muscle spasms of both lower extremities     Muscle spasms of neck     Neuropathy     TIA (transient ischemic attack)        Past Surgical History:   Procedure Laterality Date    ANTERIOR CRUCIATE LIGAMENT REPAIR Left     TOTAL KNEE ARTHROPLASTY Left     WRIST SURGERY Right        Current Outpatient Medications   Medication Sig    aspirin (ECOTRIN) 81 MG EC tablet Take 1 tablet (81 mg total) by mouth once daily. (Patient taking differently: Take 325 mg by mouth once daily.)    atorvastatin (LIPITOR) 40 MG tablet Take 40 mg by mouth once daily.    azelastine (ASTELIN) 137 mcg (0.1 %) nasal spray SMARTSI Spray(s) Both Nares Every Morning    benztropine (COGENTIN) 1 MG tablet Take 1 mg by mouth 3 (three) times daily.    buPROPion (WELLBUTRIN XL) 300 MG 24 hr tablet Take 300 mg by mouth every morning.    carBAMazepine (TEGRETOL XR) 400 MG Tb12 Take 400 mg by mouth 2 (two) times daily.    clonazePAM (KLONOPIN) 0.5 MG tablet Take 0.5 mg by mouth nightly.    dexlansoprazole (DEXILANT) 60 mg capsule Take 1 capsule by mouth once daily.    DULoxetine (CYMBALTA) 60 MG capsule Take 60 mg by mouth 2 (two) times daily.    famotidine (PEPCID) 20 MG  tablet Take 20 mg by mouth daily as needed.    fluticasone propionate (FLONASE) 50 mcg/actuation nasal spray 1 spray by Each Nostril route nightly.    furosemide (LASIX) 20 MG tablet Take 20 mg by mouth once daily.    icosapent ethyL (VASCEPA) 1 gram Cap Take 2 capsules by mouth 2 (two) times daily.    metFORMIN (GLUCOPHAGE) 500 MG tablet Take 500 mg by mouth once daily.    MOVANTIK 25 mg tablet Take 25 mg by mouth once daily.    nicotine (NICODERM CQ) 21 mg/24 hr 1 patch once daily.    paliperidone palmitate (INVEGA SUSTENNA) 234 mg/1.5 mL Syrg injection Inject 234 mg into the muscle.    pregabalin (LYRICA) 75 MG capsule Take 75 mg by mouth 3 (three) times daily.    RESTASIS 0.05 % ophthalmic emulsion Place 1 drop into both eyes 2 (two) times daily.    topiramate (TOPAMAX) 200 MG Tab Take 200 mg by mouth 2 (two) times daily.    valACYclovir (VALTREX) 500 MG tablet Take 500 mg by mouth 2 (two) times daily.    VRAYLAR 3 mg Cap Take 1 capsule by mouth once daily.    celecoxib (CELEBREX) 100 MG capsule Take 100 mg by mouth 3 (three) times daily.    tiZANidine (ZANAFLEX) 4 MG tablet take 1 tablet BY MOUTH EVERY 8 HOURS AS NEEDED FOR MUSCLE SPASMS     No current facility-administered medications for this visit.       Review of patient's allergies indicates:   Allergen Reactions    Adhesive      Other reaction(s): skin tears easily with adhesive    Cephalexin Nausea And Vomiting    Erythromycin Nausea And Vomiting    Lithium Nausea And Vomiting    Naproxen Nausea And Vomiting       Family History   Problem Relation Age of Onset    Coronary artery disease Mother     Diabetes Mother     Coronary artery disease Father        Social History     Socioeconomic History    Marital status:    Tobacco Use    Smoking status: Some Days     Types: Vaping with nicotine    Smokeless tobacco: Never   Substance and Sexual Activity    Alcohol use: Not Currently     Comment: stopped in 2005    Drug use: Not Currently     Types:  Marijuana, Cocaine     Comment: stopped in 2005    Sexual activity: Not Currently           Review of Systems:    Constitution: Negative for chills, fever, and sweats.  Negative for unexplained weight loss.    HENT:  Negative for headaches and blurry vision.    Cardiovascular:Negative for chest pain or irregular heart beat. Negative for hypertension.    Respiratory:  Negative for cough and shortness of breath.    Gastrointestinal: Negative for abdominal pain, heartburn, melena, nausea, and vomitting.    Genitourinary:  Negative bladder incontinence and dysuria.    Musculoskeletal:  See HPI    Neurological: Negative for numbness.    Psychiatric/Behavioral: Negative for depression.  The patient is not nervous/anxious.      Endocrine: Negative for polyuria    Hematologic/Lymphatic: Negative for bleeding problem.  Does not bruise/bleed easily.    Skin: Negative for poor would healing and rash      Physical Examination:    Vital Signs:    Vitals:    02/14/23 0931   BP: 90/65   Pulse: 88       Body mass index is 27.98 kg/m².    General: No acute distress, alert and oriented, healthy appearing    HEENT: Head is atraumatic, mucous membranes are moist    Neck: Supples, no JVD    Cardiovascular: Palpable dorsalis pedis and posterior tibial pulses, regular rate and rhythm to those pulses    Lungs: Breathing non-labored    Skin: no rashes appreciated    Neurologic: Can flex and extend knees, ankles, and toes. Sensation is grossly intact    Left knee:  Patient's incisions clean and dry.  She gets full extension.  Flexion of 110.  She has a very small amount of laxity at extension.  No laxity at mid flexion full flexion    X-rays:  X-rays were reviewed.  No displaced fracture.  No loosening of implants noted     Assessment::  Status post total knee arthroplasty with some ongoing pain    Plan:  Plan to work her up for infection.  Long term, the patient has developed small amount of extension laxity.  Her ligaments are stable in  long term.  If her workup for infection negative she continues to have an issue, we could try a poly swap with a slightly increased constraint.    This note was created using [employer] voice recognition software that occasionally misinterpreted phrases or words.    Consult note is delivered via Epic messaging service.

## 2023-02-14 NOTE — TELEPHONE ENCOUNTER
02/14/23 - I called Dr. Roque's office who states that she no showed the appointment on 01/05/23 so she needs to call them to reschedule.     I called patient and informed her that she should call to schedule and I gave her the number. Patient states that she called yesterday and is waiting to hear back.

## 2023-02-15 ENCOUNTER — TELEPHONE (OUTPATIENT)
Dept: ORTHOPEDICS | Facility: CLINIC | Age: 58
End: 2023-02-15
Payer: MEDICARE

## 2023-02-15 NOTE — TELEPHONE ENCOUNTER
02/15/23 @ 2:14 PM - Patient called and left a message stating that she has been calling Dr. Roque's office for 3 days to schedule the EMG and has not heard back.     02/15/23 @ 4:38 PM - I called patient back who states that she still has not heard from them. I explained that I will call Dr. Roque's office tomorrow to see if they are able to schedule an appointment for EMG. Patient voiced a clear understanding.

## 2023-02-16 NOTE — TELEPHONE ENCOUNTER
02/16/23 @ 9:25AM - I called Dr. Roque's office again. States that they have made her an appointment for 04/06/23.   I called patient back to inform her and she states that she did get a voice mail about that. I explained to her that once we receive the EMG results we will call her to schedule a follow up appointment with Dr. Bhatt. Patient voiced a clear understanding.

## 2023-03-07 ENCOUNTER — OFFICE VISIT (OUTPATIENT)
Dept: ORTHOPEDICS | Facility: CLINIC | Age: 58
End: 2023-03-07
Payer: MEDICARE

## 2023-03-07 ENCOUNTER — DOCUMENT SCAN (OUTPATIENT)
Dept: HOME HEALTH SERVICES | Facility: HOSPITAL | Age: 58
End: 2023-03-07
Payer: MEDICARE

## 2023-03-07 VITALS
SYSTOLIC BLOOD PRESSURE: 111 MMHG | DIASTOLIC BLOOD PRESSURE: 81 MMHG | HEIGHT: 62 IN | WEIGHT: 156 LBS | HEART RATE: 81 BPM | BODY MASS INDEX: 28.71 KG/M2

## 2023-03-07 DIAGNOSIS — T84.093D FAILED TOTAL LEFT KNEE REPLACEMENT, SUBSEQUENT ENCOUNTER: Primary | ICD-10-CM

## 2023-03-07 PROCEDURE — 99213 OFFICE O/P EST LOW 20 MIN: CPT | Mod: ,,, | Performed by: ORTHOPAEDIC SURGERY

## 2023-03-07 PROCEDURE — 99213 PR OFFICE/OUTPT VISIT, EST, LEVL III, 20-29 MIN: ICD-10-PCS | Mod: ,,, | Performed by: ORTHOPAEDIC SURGERY

## 2023-03-07 NOTE — PROGRESS NOTES
Chief Complaint:   Chief Complaint   Patient presents with    Results     Donavon Knee pain---Pt is present to discuss her lab results.       History of present illness:  58-year-old male redness or follow-up painful tunnel left side.  Patient has a history of a revision of the left knee for instability.  She is here today for blood work to evaluate for possible underlying infection    Past Medical History:   Diagnosis Date    Bipolar disorder     COPD (chronic obstructive pulmonary disease)     Depression     Lumbar degenerative disc disease     Mixed hyperlipidemia     Muscle spasms of both lower extremities     Muscle spasms of neck     Neuropathy     TIA (transient ischemic attack)        Past Surgical History:   Procedure Laterality Date    ANTERIOR CRUCIATE LIGAMENT REPAIR Left     TOTAL KNEE ARTHROPLASTY Left     WRIST SURGERY Right        Current Outpatient Medications   Medication Sig    aspirin (ECOTRIN) 81 MG EC tablet Take 1 tablet (81 mg total) by mouth once daily. (Patient taking differently: Take 325 mg by mouth once daily.)    atorvastatin (LIPITOR) 40 MG tablet Take 40 mg by mouth once daily.    azelastine (ASTELIN) 137 mcg (0.1 %) nasal spray SMARTSI Spray(s) Both Nares Every Morning    benztropine (COGENTIN) 1 MG tablet Take 1 mg by mouth 3 (three) times daily.    buPROPion (WELLBUTRIN XL) 300 MG 24 hr tablet Take 300 mg by mouth every morning.    carBAMazepine (TEGRETOL XR) 400 MG Tb12 Take 400 mg by mouth 2 (two) times daily.    celecoxib (CELEBREX) 100 MG capsule Take 100 mg by mouth 3 (three) times daily.    clonazePAM (KLONOPIN) 0.5 MG tablet Take 0.5 mg by mouth nightly.    dexlansoprazole (DEXILANT) 60 mg capsule Take 1 capsule by mouth once daily.    DULoxetine (CYMBALTA) 60 MG capsule Take 60 mg by mouth 2 (two) times daily.    famotidine (PEPCID) 20 MG tablet Take 20 mg by mouth daily as needed.    fluticasone propionate (FLONASE) 50 mcg/actuation nasal spray 1 spray by Each Nostril  route nightly.    furosemide (LASIX) 20 MG tablet Take 20 mg by mouth once daily.    icosapent ethyL (VASCEPA) 1 gram Cap Take 2 capsules by mouth 2 (two) times daily.    metFORMIN (GLUCOPHAGE) 500 MG tablet Take 500 mg by mouth once daily.    MOVANTIK 25 mg tablet Take 25 mg by mouth once daily.    nicotine (NICODERM CQ) 21 mg/24 hr 1 patch once daily.    paliperidone palmitate (INVEGA SUSTENNA) 234 mg/1.5 mL Syrg injection Inject 234 mg into the muscle.    pregabalin (LYRICA) 75 MG capsule Take 75 mg by mouth 3 (three) times daily.    RESTASIS 0.05 % ophthalmic emulsion Place 1 drop into both eyes 2 (two) times daily.    tiZANidine (ZANAFLEX) 4 MG tablet take 1 tablet BY MOUTH EVERY 8 HOURS AS NEEDED FOR MUSCLE SPASMS    topiramate (TOPAMAX) 200 MG Tab Take 200 mg by mouth 2 (two) times daily.    valACYclovir (VALTREX) 500 MG tablet Take 500 mg by mouth 2 (two) times daily.    VRAYLAR 3 mg Cap Take 1 capsule by mouth once daily.     No current facility-administered medications for this visit.       Review of patient's allergies indicates:   Allergen Reactions    Adhesive      Other reaction(s): skin tears easily with adhesive    Cephalexin Nausea And Vomiting    Erythromycin Nausea And Vomiting    Lithium Nausea And Vomiting    Naproxen Nausea And Vomiting       Family History   Problem Relation Age of Onset    Coronary artery disease Mother     Diabetes Mother     Coronary artery disease Father        Social History     Socioeconomic History    Marital status:    Tobacco Use    Smoking status: Some Days     Types: Vaping with nicotine    Smokeless tobacco: Never   Substance and Sexual Activity    Alcohol use: Not Currently     Comment: stopped in 2005    Drug use: Not Currently     Types: Marijuana, Cocaine     Comment: stopped in 2005    Sexual activity: Not Currently           Review of Systems:    Constitution: Negative for chills, fever, and sweats.  Negative for unexplained weight loss.    HENT:   Negative for headaches and blurry vision.    Cardiovascular:Negative for chest pain or irregular heart beat. Negative for hypertension.    Respiratory:  Negative for cough and shortness of breath.    Gastrointestinal: Negative for abdominal pain, heartburn, melena, nausea, and vomitting.    Genitourinary:  Negative bladder incontinence and dysuria.    Musculoskeletal:  See HPI    Neurological: Negative for numbness.    Psychiatric/Behavioral: Negative for depression.  The patient is not nervous/anxious.      Endocrine: Negative for polyuria    Hematologic/Lymphatic: Negative for bleeding problem.  Does not bruise/bleed easily.    Skin: Negative for poor would healing and rash      Physical Examination:    Vital Signs:    Vitals:    03/07/23 0953   BP: 111/81   Pulse: 81       Body mass index is 28.53 kg/m².    General: No acute distress, alert and oriented, healthy appearing    HEENT: Head is atraumatic, mucous membranes are moist    Neck: Supples, no JVD    Cardiovascular: Palpable dorsalis pedis and posterior tibial pulses, regular rate and rhythm to those pulses    Lungs: Breathing non-labored    Skin: no rashes appreciated    Neurologic: Can flex and extend knees, ankles, and toes. Sensation is grossly intact    Left knee:  Patient is to have issues with instability of this left knee.  She has instability in extension as well as mid flexion full flexion.    X-rays:      Assessment::  Painful knee arthroplasty with mild instability    Plan:  He has had a remarkable patient.  Her blood work is normal.  There is no signs infection long-term, we discussed revision of this knee.  She is going to try bracing.  In addition standpoint, would plan for a poly swap with the increased constraint.    This note was created using [employer] voice recognition software that occasionally misinterpreted phrases or words.    Consult note is delivered via Epic messaging service.

## 2023-03-12 PROCEDURE — G0179 MD RECERTIFICATION HHA PT: HCPCS | Mod: ,,, | Performed by: NURSE PRACTITIONER

## 2023-03-12 PROCEDURE — G0179 PR HOME HEALTH MD RECERTIFICATION: ICD-10-PCS | Mod: ,,, | Performed by: NURSE PRACTITIONER

## 2023-03-31 ENCOUNTER — EXTERNAL HOME HEALTH (OUTPATIENT)
Dept: HOME HEALTH SERVICES | Facility: HOSPITAL | Age: 58
End: 2023-03-31
Payer: MEDICARE

## 2023-04-06 ENCOUNTER — PROCEDURE VISIT (OUTPATIENT)
Dept: NEUROLOGY | Facility: CLINIC | Age: 58
End: 2023-04-06
Payer: MEDICARE

## 2023-04-06 DIAGNOSIS — G56.01 RIGHT CARPAL TUNNEL SYNDROME: ICD-10-CM

## 2023-04-06 DIAGNOSIS — M65.311 TRIGGER THUMB OF RIGHT HAND: ICD-10-CM

## 2023-04-06 DIAGNOSIS — M18.11 PRIMARY OSTEOARTHRITIS OF FIRST CARPOMETACARPAL JOINT OF RIGHT HAND: ICD-10-CM

## 2023-04-06 PROCEDURE — 95885 MUSC TST DONE W/NERV TST LIM: CPT | Mod: 26,59,S$PBB, | Performed by: PSYCHIATRY & NEUROLOGY

## 2023-04-06 PROCEDURE — 95886 MUSC TEST DONE W/N TEST COMP: CPT | Mod: 26,S$PBB,, | Performed by: PSYCHIATRY & NEUROLOGY

## 2023-04-06 PROCEDURE — 95886 MUSC TEST DONE W/N TEST COMP: CPT | Mod: PBBFAC | Performed by: PSYCHIATRY & NEUROLOGY

## 2023-04-06 PROCEDURE — 95885 PR MUSC TST DONE W/NERV TST LIM: ICD-10-PCS | Mod: 26,59,S$PBB, | Performed by: PSYCHIATRY & NEUROLOGY

## 2023-04-06 PROCEDURE — 95911 NRV CNDJ TEST 9-10 STUDIES: CPT | Mod: 26,S$PBB,, | Performed by: PSYCHIATRY & NEUROLOGY

## 2023-04-06 PROCEDURE — 95911 PR NERVE CONDUCTION STUDY; 9-10 STUDIES: ICD-10-PCS | Mod: 26,S$PBB,, | Performed by: PSYCHIATRY & NEUROLOGY

## 2023-04-06 PROCEDURE — 95911 NRV CNDJ TEST 9-10 STUDIES: CPT | Mod: PBBFAC | Performed by: PSYCHIATRY & NEUROLOGY

## 2023-04-06 PROCEDURE — 95885 MUSC TST DONE W/NERV TST LIM: CPT | Mod: PBBFAC | Performed by: PSYCHIATRY & NEUROLOGY

## 2023-04-06 PROCEDURE — 95886 PR EMG COMPLETE, W/ NERVE CONDUCTION STUDIES, 5+ MUSCLES: ICD-10-PCS | Mod: 26,S$PBB,, | Performed by: PSYCHIATRY & NEUROLOGY

## 2023-04-06 NOTE — PROCEDURES
Procedures    EMG/NCS report      Referring Physician:  Dr. Bhatt      HPI:  58-year-old woman complaining of numbness and tingling in both of her hands, worse on the right side      Exam:  Limited neurologic examination shows resting tremor, FDI and APB weakness on the right      Nerve conduction study findings:  Left median antidromic sensory nerve conduction study showed normal peak latency and normal amplitude.  Right median antidromic sensory nerve conduction study showed prolonged peak latency and normal amplitude.  Left ulnar antidromic sensory nerve conduction study showed normal peak latencies and normal amplitude.  Right ulnar antidromic sensory nerve conduction study showed normal peak latencies and normal amplitude.  Bilateral radial antidromic sensory nerve conduction study showed normal peak latencies and normal amplitudes.  Right median orthodromic sensory nerve conduction study showed prolonged peak latencies and low amplitude.  Left median orthodromic sensory nerve conduction study showed normal peak latencies and normal amplitude.  Bilateral ulnar orthodromic sensory nerve conduction study showed normal peak latencies and normal amplitudes.  Left median motor nerve conduction study showed normal distal motor latencies, normal CMAP amplitudes, normal motor nerve conduction velocity.  Right median motor nerve conduction study showed normal distal motor latencies, normal CMAP amplitudes, slow motor nerve conduction velocity.  Left ulnar motor nerve conduction study showed normal distal motor latencies, normal CMAP amplitudes, normal motor nerve conduction velocities.  Right ulnar motor nerve conduction study showed normal distal motor latencies, normal CMAP amplitudes, slowing of motor nerve conduction velocity across the elbow.  F-wave minimal latencies are within normal limits.      EMG findings:  Concentric needle EMG was performed on bilateral 1st dorsal interosseous, abductor pollicis brevis,  right extensor digitorum communis, flexor carpi radialis, triceps, biceps, deltoid, cervical paraspinals.  The right 1st dorsal interosseous and abductor pollicis brevis showed high-amplitude motor unit action potentials with polyphasia.  All other muscles tested were normal.      Impression:  This is an abnormal electrophysiological study.  This study shows electrodiagnostic evidence of severe right median neuropathy at the wrist i.e. carpal tunnel syndrome.  There is also electrodiagnostic evidence of moderate right ulnar neuropathy at the elbow.

## 2023-04-11 ENCOUNTER — TELEPHONE (OUTPATIENT)
Dept: ORTHOPEDICS | Facility: CLINIC | Age: 58
End: 2023-04-11
Payer: MEDICARE

## 2023-04-11 NOTE — TELEPHONE ENCOUNTER
I called the patient and informed her that we have received her EMG/NCS results. I scheduled the patient a follow up appointment with Dr. Bhatt to discuss results. Patient states that she is still wearing the brace for support since she is still having pain.     Patient voiced a clear understanding.

## 2023-04-18 ENCOUNTER — OFFICE VISIT (OUTPATIENT)
Dept: ORTHOPEDICS | Facility: CLINIC | Age: 58
End: 2023-04-18
Payer: MEDICARE

## 2023-04-18 VITALS
DIASTOLIC BLOOD PRESSURE: 82 MMHG | HEIGHT: 62 IN | HEART RATE: 95 BPM | SYSTOLIC BLOOD PRESSURE: 127 MMHG | TEMPERATURE: 97 F | BODY MASS INDEX: 29.11 KG/M2 | WEIGHT: 158.19 LBS

## 2023-04-18 DIAGNOSIS — M17.11 PRIMARY OSTEOARTHRITIS OF RIGHT KNEE: Primary | ICD-10-CM

## 2023-04-18 DIAGNOSIS — T84.093D FAILED TOTAL LEFT KNEE REPLACEMENT, SUBSEQUENT ENCOUNTER: ICD-10-CM

## 2023-04-18 PROCEDURE — 99213 PR OFFICE/OUTPT VISIT, EST, LEVL III, 20-29 MIN: ICD-10-PCS | Mod: 25,,, | Performed by: ORTHOPAEDIC SURGERY

## 2023-04-18 PROCEDURE — 20610 DRAIN/INJ JOINT/BURSA W/O US: CPT | Mod: RT,,, | Performed by: ORTHOPAEDIC SURGERY

## 2023-04-18 PROCEDURE — 20610 LARGE JOINT ASPIRATION/INJECTION: R KNEE: ICD-10-PCS | Mod: RT,,, | Performed by: ORTHOPAEDIC SURGERY

## 2023-04-18 PROCEDURE — 99213 OFFICE O/P EST LOW 20 MIN: CPT | Mod: 25,,, | Performed by: ORTHOPAEDIC SURGERY

## 2023-04-18 RX ORDER — LIDOCAINE HYDROCHLORIDE 20 MG/ML
5 INJECTION, SOLUTION EPIDURAL; INFILTRATION; INTRACAUDAL; PERINEURAL
Status: DISCONTINUED | OUTPATIENT
Start: 2023-04-18 | End: 2023-04-18 | Stop reason: HOSPADM

## 2023-04-18 RX ORDER — NEOMYCIN SULFATE, POLYMYXIN B SULFATE AND DEXAMETHASONE 3.5; 10000; 1 MG/ML; [USP'U]/ML; MG/ML
1 SUSPENSION/ DROPS OPHTHALMIC
COMMUNITY
Start: 2023-03-04 | End: 2024-02-15

## 2023-04-18 RX ORDER — BETAMETHASONE SODIUM PHOSPHATE AND BETAMETHASONE ACETATE 3; 3 MG/ML; MG/ML
12 INJECTION, SUSPENSION INTRA-ARTICULAR; INTRALESIONAL; INTRAMUSCULAR; SOFT TISSUE
Status: DISCONTINUED | OUTPATIENT
Start: 2023-04-18 | End: 2023-04-18 | Stop reason: HOSPADM

## 2023-04-18 RX ORDER — ALBUTEROL SULFATE 90 UG/1
AEROSOL, METERED RESPIRATORY (INHALATION)
COMMUNITY
Start: 2023-02-03

## 2023-04-18 RX ORDER — LORATADINE 10 MG/1
10 TABLET ORAL DAILY PRN
COMMUNITY
Start: 2022-12-29

## 2023-04-18 RX ORDER — HYDROXYZINE PAMOATE 25 MG/1
25 CAPSULE ORAL DAILY
COMMUNITY
Start: 2023-04-12

## 2023-04-18 RX ORDER — DICLOFENAC SODIUM 10 MG/G
GEL TOPICAL
COMMUNITY
Start: 2023-03-07 | End: 2024-03-29

## 2023-04-18 RX ORDER — ESTRADIOL 0.1 MG/G
CREAM VAGINAL
COMMUNITY
Start: 2023-01-31

## 2023-04-18 RX ORDER — EMPAGLIFLOZIN 25 MG/1
25 TABLET, FILM COATED ORAL DAILY
COMMUNITY
Start: 2023-04-12

## 2023-04-18 RX ADMIN — LIDOCAINE HYDROCHLORIDE 5 ML: 20 INJECTION, SOLUTION EPIDURAL; INFILTRATION; INTRACAUDAL; PERINEURAL at 10:04

## 2023-04-18 RX ADMIN — BETAMETHASONE SODIUM PHOSPHATE AND BETAMETHASONE ACETATE 12 MG: 3; 3 INJECTION, SUSPENSION INTRA-ARTICULAR; INTRALESIONAL; INTRAMUSCULAR; SOFT TISSUE at 10:04

## 2023-04-18 NOTE — PROCEDURES
Large Joint Aspiration/Injection: R knee    Date/Time: 4/18/2023 10:15 AM  Performed by: KRISTI Loo  Authorized by: Mio Pittman MD     Consent Done?:  Yes (Verbal)  Indications:  Arthritis  Timeout: prior to procedure the correct patient, procedure, and site was verified    Prep: patient was prepped and draped in usual sterile fashion      Details:  Needle Size:  22 G  Approach:  Anterolateral  Location:  Knee  Site:  R knee  Medications:  5 mL LIDOcaine (PF) 20 mg/mL (2%) 20 mg/mL (2 %); 12 mg betamethasone acetate-betamethasone sodium phosphate 6 mg/mL

## 2023-04-18 NOTE — PROGRESS NOTES
Chief Complaint:   Chief Complaint   Patient presents with    Right Knee - Pain, Follow-up    Left Knee - Pain, Follow-up    Follow-up     F/u jyotsna knee pain ,pt wearing LT knee compression sleeve that helps with stabilization. pt states pain in both knees today, Lt knee pain worse, pt taking celebrex and tylenol PRN. pt had a Lt TKA around 4 years ago she states,no recent injury or sx to knees.       History of present illness:  58-year-old female presents today for follow-up after liver revision of her left knee for instability.  She is also complaining of some right knee pain.  Patient has previously gotten diagnosed with arthritis of the right knee is requesting repeat injection.  With regards to the left knee, patient got as knee sleeve this is significantly improved her symptoms    Past Medical History:   Diagnosis Date    Bipolar disorder     Carpal tunnel syndrome     COPD (chronic obstructive pulmonary disease)     Depression     Lumbar degenerative disc disease     Mixed hyperlipidemia     Muscle spasms of both lower extremities     Muscle spasms of neck     Neuropathy     TIA (transient ischemic attack)     Ulnar nerve external compression syndrome        Past Surgical History:   Procedure Laterality Date    ANTERIOR CRUCIATE LIGAMENT REPAIR Left     FRACTURE SURGERY  2017    [facility]    JOINT REPLACEMENT  2016    [facility]    TOTAL KNEE ARTHROPLASTY Left     WRIST SURGERY Right        Current Outpatient Medications   Medication Sig    albuterol (PROVENTIL/VENTOLIN HFA) 90 mcg/actuation inhaler INHALE TWO PUFFS INTO THE LUNGS EVERY FOUR TO SIX HOURS AS NEEDED FOR SHORTNESS OF BREATH    aspirin (ECOTRIN) 81 MG EC tablet Take 1 tablet (81 mg total) by mouth once daily. (Patient taking differently: Take 325 mg by mouth once daily.)    atorvastatin (LIPITOR) 40 MG tablet Take 40 mg by mouth once daily.    azelastine (ASTELIN) 137 mcg (0.1 %) nasal spray SMARTSI Spray(s) Both Nares Every Morning     benztropine (COGENTIN) 1 MG tablet Take 1 mg by mouth 3 (three) times daily.    buPROPion (WELLBUTRIN XL) 300 MG 24 hr tablet Take 300 mg by mouth every morning.    carBAMazepine (TEGRETOL XR) 400 MG Tb12 Take 400 mg by mouth 2 (two) times daily.    celecoxib (CELEBREX) 100 MG capsule Take 100 mg by mouth 3 (three) times daily.    clonazePAM (KLONOPIN) 0.5 MG tablet Take 0.5 mg by mouth nightly.    dexlansoprazole (DEXILANT) 60 mg capsule Take 1 capsule by mouth once daily.    diclofenac sodium (VOLTAREN) 1 % Gel Apply topically.    DULoxetine (CYMBALTA) 60 MG capsule Take 60 mg by mouth 2 (two) times daily.    estradioL (ESTRACE) 0.01 % (0.1 mg/gram) vaginal cream insert 0.5 gram by vaginal route 2 times per week    famotidine (PEPCID) 20 MG tablet Take 20 mg by mouth daily as needed.    fluticasone propionate (FLONASE) 50 mcg/actuation nasal spray 1 spray by Each Nostril route nightly.    furosemide (LASIX) 20 MG tablet Take 20 mg by mouth once daily.    hydrOXYzine pamoate (VISTARIL) 25 MG Cap Take by mouth.    icosapent ethyL (VASCEPA) 1 gram Cap Take 2 capsules by mouth 2 (two) times daily.    JARDIANCE 25 mg tablet Take by mouth.    loratadine (CLARITIN) 10 mg tablet Take 10 mg by mouth.    metFORMIN (GLUCOPHAGE) 500 MG tablet Take 500 mg by mouth once daily.    MOVANTIK 25 mg tablet Take 25 mg by mouth once daily.    neomycin-polymyxin-dexamethasone (MAXITROL) 3.5mg/mL-10,000 unit/mL-0.1 % DrpS     paliperidone palmitate (INVEGA SUSTENNA) 234 mg/1.5 mL Syrg injection Inject 234 mg into the muscle.    pregabalin (LYRICA) 75 MG capsule Take 75 mg by mouth 3 (three) times daily.    RESTASIS 0.05 % ophthalmic emulsion Place 1 drop into both eyes 2 (two) times daily.    tiZANidine (ZANAFLEX) 4 MG tablet take 1 tablet BY MOUTH EVERY 8 HOURS AS NEEDED FOR MUSCLE SPASMS    topiramate (TOPAMAX) 200 MG Tab Take 200 mg by mouth 2 (two) times daily.    valACYclovir (VALTREX) 500 MG tablet Take 500 mg by mouth 2 (two)  times daily.    VRAYLAR 3 mg Cap Take 1 capsule by mouth once daily.    nicotine (NICODERM CQ) 21 mg/24 hr 1 patch once daily.     No current facility-administered medications for this visit.       Review of patient's allergies indicates:   Allergen Reactions    Adhesive      Other reaction(s): skin tears easily with adhesive    Cephalexin Nausea And Vomiting    Erythromycin Nausea And Vomiting    Lithium Nausea And Vomiting    Naproxen Nausea And Vomiting       Family History   Problem Relation Age of Onset    Coronary artery disease Mother     Diabetes Mother     Arthritis Mother         Back , hands    Early death Mother         Diabetes    Heart disease Mother     Hypertension Mother     Kidney disease Mother     Stroke Mother     Vision loss Mother     Coronary artery disease Father     Asthma Father         Back    Heart disease Father     Hypertension Father     Vision loss Father     Arthritis Sister         Not sure    Asthma Sister         Finger, shoulder    Mental illness Sister     Vision loss Sister     Arthritis Brother         Hip    Birth defects Brother         Special Needs/ Downs Syndrome    Heart disease Brother     Learning disabilities Brother     Mental retardation Brother        Social History     Socioeconomic History    Marital status:    Tobacco Use    Smoking status: Never     Passive exposure: Yes    Smokeless tobacco: Never   Substance and Sexual Activity    Alcohol use: Not Currently     Comment: stopped in 2005    Drug use: Not Currently     Types: Marijuana, Cocaine     Comment: Nothing    Sexual activity: Not Currently     Partners: Male     Birth control/protection: None     Comment: Abstinence           Review of Systems:    Constitution: Negative for chills, fever, and sweats.  Negative for unexplained weight loss.    HENT:  Negative for headaches and blurry vision.    Cardiovascular:Negative for chest pain or irregular heart beat. Negative for  hypertension.    Respiratory:  Negative for cough and shortness of breath.    Gastrointestinal: Negative for abdominal pain, heartburn, melena, nausea, and vomitting.    Genitourinary:  Negative bladder incontinence and dysuria.    Musculoskeletal:  See HPI    Neurological: Negative for numbness.    Psychiatric/Behavioral: Negative for depression.  The patient is not nervous/anxious.      Endocrine: Negative for polyuria    Hematologic/Lymphatic: Negative for bleeding problem.  Does not bruise/bleed easily.    Skin: Negative for poor would healing and rash      Physical Examination:    Vital Signs:    Vitals:    04/18/23 1104   BP: 127/82   Pulse: 95   Temp: 97.2 °F (36.2 °C)       Body mass index is 28.94 kg/m².    General: No acute distress, alert and oriented, healthy appearing    HEENT: Head is atraumatic, mucous membranes are moist    Neck: Supples, no JVD    Cardiovascular: Palpable dorsalis pedis and posterior tibial pulses, regular rate and rhythm to those pulses    Lungs: Breathing non-labored    Skin: no rashes appreciated    Neurologic: Can flex and extend knees, ankles, and toes. Sensation is grossly intact    Left knee:  Patient has had some persistent mild global instability of the left knee.  No signs of infection.  Right knee:  Crepitus range of motion right knee.  Brisk cap refill distally.  She gets full extension.  Overall mild varus alignment noted    X-rays:      Assessment::  Left knee instability  Right knee osteoarthritis    Plan:  Discussed all treatment options the patient.  The knee sleeve was working with the left knee she is satisfied with this.  The right knee has osteoarthritis.  We will try a cortisone injection to calm this down.    This note was created using [employer] voice recognition software that occasionally misinterpreted phrases or words.    Consult note is delivered via Epic messaging service.

## 2023-04-24 ENCOUNTER — HOSPITAL ENCOUNTER (OUTPATIENT)
Dept: RADIOLOGY | Facility: HOSPITAL | Age: 58
Discharge: HOME | End: 2023-04-24
Attending: STUDENT IN AN ORGANIZED HEALTH CARE EDUCATION/TRAINING PROGRAM
Payer: MEDICARE

## 2023-04-24 ENCOUNTER — OFFICE VISIT (OUTPATIENT)
Dept: ORTHOPEDICS | Facility: CLINIC | Age: 58
End: 2023-04-24
Payer: MEDICARE

## 2023-04-24 VITALS
BODY MASS INDEX: 28.16 KG/M2 | DIASTOLIC BLOOD PRESSURE: 61 MMHG | HEART RATE: 112 BPM | SYSTOLIC BLOOD PRESSURE: 85 MMHG | HEIGHT: 62 IN | WEIGHT: 153 LBS

## 2023-04-24 DIAGNOSIS — G56.21 CUBITAL TUNNEL SYNDROME ON RIGHT: ICD-10-CM

## 2023-04-24 DIAGNOSIS — G56.01 RIGHT CARPAL TUNNEL SYNDROME: Primary | ICD-10-CM

## 2023-04-24 DIAGNOSIS — G56.01 RIGHT CARPAL TUNNEL SYNDROME: ICD-10-CM

## 2023-04-24 PROCEDURE — 99214 OFFICE O/P EST MOD 30 MIN: CPT | Mod: ,,, | Performed by: STUDENT IN AN ORGANIZED HEALTH CARE EDUCATION/TRAINING PROGRAM

## 2023-04-24 PROCEDURE — 71046 X-RAY EXAM CHEST 2 VIEWS: CPT | Mod: TC

## 2023-04-24 PROCEDURE — 99214 PR OFFICE/OUTPT VISIT, EST, LEVL IV, 30-39 MIN: ICD-10-PCS | Mod: ,,, | Performed by: STUDENT IN AN ORGANIZED HEALTH CARE EDUCATION/TRAINING PROGRAM

## 2023-04-24 RX ORDER — SODIUM CHLORIDE 9 MG/ML
INJECTION, SOLUTION INTRAVENOUS CONTINUOUS
Status: CANCELLED | OUTPATIENT
Start: 2023-04-24

## 2023-04-24 NOTE — H&P (VIEW-ONLY)
Chief Complaint:  right hand numbness    Consulting Physician: No ref. provider found    History of present illness:     Patient is a 57-year-old female on disability who presents for follow-up for right hand numbness.  I have been treating her in my clinic for the past 7 months.  We started with conservative treatment for her right carpal tunnel syndrome.  I gave her a nighttime brace which she is been wearing.  This helped initially but now her numbness persists throughout the day.  She feels like her hand is becoming clumsy.  She is numbness in all 5 fingers.  Got a EMG she is here for the results.     When I saw her in my clinic last time I also gave her a injection into the radiocarpal joint which has overall worked well for her.  She has a history of a scapholunate ligament reconstruction many years ago and so she has some wrist pain at baseline but overall this is controlled.  She also has right thumb CMC osteoarthritis her pain has been controlled after an injection that I gave her as well as bracing.  Her main issue today is the nerve complaints such as the numbness in the hand in the weakness.  She denies any neck pain radiating down to her upper extremity.  She has a history of a transient ischemic attack for which she is on aspirin.  She also has a history of COPD.    Past Medical History:   Diagnosis Date    Bipolar disorder     Carpal tunnel syndrome     COPD (chronic obstructive pulmonary disease)     Depression     Lumbar degenerative disc disease     Mixed hyperlipidemia     Muscle spasms of both lower extremities     Muscle spasms of neck     Neuropathy     TIA (transient ischemic attack)     Ulnar nerve external compression syndrome        Past Surgical History:   Procedure Laterality Date    ANTERIOR CRUCIATE LIGAMENT REPAIR Left     FRACTURE SURGERY  2017    [facility]    JOINT REPLACEMENT  2016    [facility]    TOTAL KNEE ARTHROPLASTY Left     WRIST SURGERY Right        Current Outpatient  Medications   Medication Sig    albuterol (PROVENTIL/VENTOLIN HFA) 90 mcg/actuation inhaler INHALE TWO PUFFS INTO THE LUNGS EVERY FOUR TO SIX HOURS AS NEEDED FOR SHORTNESS OF BREATH    aspirin (ECOTRIN) 81 MG EC tablet Take 1 tablet (81 mg total) by mouth once daily. (Patient taking differently: Take 325 mg by mouth once daily.)    atorvastatin (LIPITOR) 40 MG tablet Take 40 mg by mouth once daily.    azelastine (ASTELIN) 137 mcg (0.1 %) nasal spray SMARTSI Spray(s) Both Nares Every Morning    benztropine (COGENTIN) 1 MG tablet Take 1 mg by mouth 3 (three) times daily.    buPROPion (WELLBUTRIN XL) 300 MG 24 hr tablet Take 300 mg by mouth every morning.    carBAMazepine (TEGRETOL XR) 400 MG Tb12 Take 400 mg by mouth 2 (two) times daily.    celecoxib (CELEBREX) 100 MG capsule Take 100 mg by mouth 3 (three) times daily.    clonazePAM (KLONOPIN) 0.5 MG tablet Take 0.5 mg by mouth nightly.    dexlansoprazole (DEXILANT) 60 mg capsule Take 1 capsule by mouth once daily.    diclofenac sodium (VOLTAREN) 1 % Gel Apply topically.    DULoxetine (CYMBALTA) 60 MG capsule Take 60 mg by mouth 2 (two) times daily.    estradioL (ESTRACE) 0.01 % (0.1 mg/gram) vaginal cream insert 0.5 gram by vaginal route 2 times per week    famotidine (PEPCID) 20 MG tablet Take 20 mg by mouth daily as needed.    fluticasone propionate (FLONASE) 50 mcg/actuation nasal spray 1 spray by Each Nostril route nightly.    furosemide (LASIX) 20 MG tablet Take 20 mg by mouth once daily.    hydrOXYzine pamoate (VISTARIL) 25 MG Cap Take by mouth.    icosapent ethyL (VASCEPA) 1 gram Cap Take 2 capsules by mouth 2 (two) times daily.    JARDIANCE 25 mg tablet Take by mouth.    loratadine (CLARITIN) 10 mg tablet Take 10 mg by mouth.    metFORMIN (GLUCOPHAGE) 500 MG tablet Take 500 mg by mouth once daily.    MOVANTIK 25 mg tablet Take 25 mg by mouth once daily.    neomycin-polymyxin-dexamethasone (MAXITROL) 3.5mg/mL-10,000 unit/mL-0.1 % DrpS     nicotine (NICODERM  CQ) 21 mg/24 hr 1 patch once daily.    paliperidone palmitate (INVEGA SUSTENNA) 234 mg/1.5 mL Syrg injection Inject 234 mg into the muscle.    pregabalin (LYRICA) 75 MG capsule Take 75 mg by mouth 3 (three) times daily.    RESTASIS 0.05 % ophthalmic emulsion Place 1 drop into both eyes 2 (two) times daily.    tiZANidine (ZANAFLEX) 4 MG tablet take 1 tablet BY MOUTH EVERY 8 HOURS AS NEEDED FOR MUSCLE SPASMS    topiramate (TOPAMAX) 200 MG Tab Take 200 mg by mouth 2 (two) times daily.    valACYclovir (VALTREX) 500 MG tablet Take 500 mg by mouth 2 (two) times daily.    VRAYLAR 3 mg Cap Take 1 capsule by mouth once daily.     No current facility-administered medications for this visit.       Review of patient's allergies indicates:   Allergen Reactions    Adhesive      Other reaction(s): skin tears easily with adhesive    Cephalexin Nausea And Vomiting    Erythromycin Nausea And Vomiting    Lithium Nausea And Vomiting    Naproxen Nausea And Vomiting       Family History   Problem Relation Age of Onset    Coronary artery disease Mother     Diabetes Mother     Arthritis Mother         Back , hands    Early death Mother         Diabetes    Heart disease Mother     Hypertension Mother     Kidney disease Mother     Stroke Mother     Vision loss Mother     Coronary artery disease Father     Asthma Father         Back    Heart disease Father     Hypertension Father     Vision loss Father     Arthritis Sister         Not sure    Asthma Sister         Finger, shoulder    Mental illness Sister     Vision loss Sister     Arthritis Brother         Hip    Birth defects Brother         Special Needs/ Downs Syndrome    Heart disease Brother     Learning disabilities Brother     Mental retardation Brother        Social History     Socioeconomic History    Marital status:    Tobacco Use    Smoking status: Never     Passive exposure: Yes    Smokeless tobacco: Never   Substance and Sexual Activity    Alcohol use: Not Currently      "Comment: stopped in 2005    Drug use: Not Currently     Types: Marijuana, Cocaine     Comment: Nothing    Sexual activity: Not Currently     Partners: Male     Birth control/protection: None     Comment: Abstinence       Review of Systems:    Constitution:   Denies chills, fever, and sweats.  HENT:   Denies headaches or blurry vision.  Cardiovascular:  Denies chest pain or irregular heart beat.  Respiratory:   Denies cough or shortness of breath.  Gastrointestinal:  Denies abdominal pain, nausea, or vomiting.  Musculoskeletal:   Denies muscle cramps.  Neurological:   Denies dizziness or focal weakness.  Psychiatric/Behavior: Normal mental status.  Hematology/Lymph:  Denies bleeding problem or easy bruising/bleeding.  Skin:    Denies rash or suspicious lesions.    Examination:    Vital Signs:    Vitals:    04/24/23 0820 04/24/23 0824   BP: (!) 85/61    Pulse: (!) 112    Weight: 69.4 kg (153 lb)    Height: 5' 2" (1.575 m)    PainSc:    7       Body mass index is 27.98 kg/m².    Constitution:   Well-developed, well nourished patient in no acute distress.  Neurological:   Alert and oriented x 3 and cooperative to examination.     Psychiatric/Behavior: Normal mental status.  Respiratory:   No shortness of breath.  Eyes:    Extraoccular muscles intact  Skin:    No scars, rash or suspicious lesions.    MSK:     Right upper extremity:  Previous surgical incision over the dorsum of the wrist is healed without signs of infection .  Mild Tenderness to palpation over the radiocarpal joint over the 3 4 portal mild Tenderness to palpation over the thumb CMC joint.  Grind test is positive.  No Tenderness to palpation over the A1 pulley of the thumb. .  She is able make a fist extend her digits.  She is completely numb to the thumb index and long finger.  Two-point discrimination is greater than 15 in the thumb index and middle finger.  It is 12 in the ring and small fingers.  Tinel's is positive at the wrist over the median nerve, " Phalen's and Durkan's is positive.  There is subtle thenar atrophy with subtle sick atrophy.  Apb strength is 4/5 compared to the contralateral side.  First dorsal interosseous strength is 4/5 compared to the contralateral side.   Mild Tinel over the cubital tunnel.  Flexion and compression cubital tunnel is positive.  She has full range of motion of the joints in her hand, wrist, elbow.  No ulnar nerve subluxation.  Radial pulses 2+.  Her hand is warm well perfused    Spurling and reverse Spurling are negative    Imaging:    X-ray of the right wrist shows osteoarthritis with previous scapholunate ligament repair    EMG and nerve conduction study shows right carpal tunnel syndrome and cubital tunnel syndrome     Assessment:     Right carpal tunnel syndrome  Right cubital tunnel syndrome  Right wrist radiocarpal arthritis  Right thumb CMC osteoarthritis    Plan:   Her primary complaints today are compression of her median nerve at the carpal tunnel and ulnar nerve at the cubital tunnel.  She is tried failed conservative treatment for this.  I think she would benefit from a decompression of the ulnar nerve at the elbow as well as the decompression of the median nerve at the wrist.  I will book her for a right carpal tunnel release and right endoscopic cubital tunnel release for 05/05/2023.    I went over risks in detail with this patient.  I explained that 1 of the biggest risks after these operations is incomplete release and or persistence of her nerve symptoms.  I explained that my preferred method of ulnar nerve decompression is using an endoscope however if her nerve subluxates I would do an open release so that I can transpose the nerve.  If at any point during the operation I do not get adequate visualization with the endoscope I would also convert to an open release.  Other risks associated with these procedures include nerve injury, numbness around the incision sites, pillar pain, infection or wound  complications, hematoma    I explained that surgery and the nature of their condition are not without risks. These include, but are not limited to, bleeding, infection, neurovascular compromise, wound complications, scarring, cosmetic defects, need for later and/or repeated surgeries, pain, loss of ROM, loss of function, deformity, functional abnormalities, stiffness, thromboembolic complications, compartment syndrome, loss of limb, loss of life, anesthetic complications, and other imponderables. I explained that these can occur despite the adequacy of treatments rendered, and that their risks are heightened given the nature of their condition. They verbalized understanding. They would like to continue with surgery at this time. If appropriate family was involved with surgical discussion.      Follow Up:  after surgery  Xray at next visit:  none

## 2023-04-26 ENCOUNTER — OFFICE VISIT (OUTPATIENT)
Dept: NEUROLOGY | Facility: CLINIC | Age: 58
End: 2023-04-26
Payer: MEDICARE

## 2023-04-26 VITALS
HEART RATE: 84 BPM | DIASTOLIC BLOOD PRESSURE: 78 MMHG | WEIGHT: 153 LBS | BODY MASS INDEX: 28.16 KG/M2 | HEIGHT: 62 IN | SYSTOLIC BLOOD PRESSURE: 94 MMHG

## 2023-04-26 DIAGNOSIS — G45.9 TRANSIENT ISCHEMIC ATTACK (TIA): Primary | ICD-10-CM

## 2023-04-26 PROCEDURE — 99213 PR OFFICE/OUTPT VISIT, EST, LEVL III, 20-29 MIN: ICD-10-PCS | Mod: S$PBB,,, | Performed by: NURSE PRACTITIONER

## 2023-04-26 PROCEDURE — 99999 PR PBB SHADOW E&M-EST. PATIENT-LVL V: CPT | Mod: PBBFAC,,, | Performed by: NURSE PRACTITIONER

## 2023-04-26 PROCEDURE — 99213 OFFICE O/P EST LOW 20 MIN: CPT | Mod: S$PBB,,, | Performed by: NURSE PRACTITIONER

## 2023-04-26 PROCEDURE — 99999 PR PBB SHADOW E&M-EST. PATIENT-LVL V: ICD-10-PCS | Mod: PBBFAC,,, | Performed by: NURSE PRACTITIONER

## 2023-04-26 PROCEDURE — 99215 OFFICE O/P EST HI 40 MIN: CPT | Mod: PBBFAC | Performed by: NURSE PRACTITIONER

## 2023-04-26 RX ORDER — PREGABALIN 100 MG/1
100 CAPSULE ORAL 3 TIMES DAILY
COMMUNITY
Start: 2023-04-12

## 2023-04-26 RX ORDER — ASPIRIN 325 MG
325 TABLET ORAL DAILY
COMMUNITY

## 2023-04-26 NOTE — PROGRESS NOTES
Subjective:      Patient ID: Faith Jin is a 58 y.o. female.    Chief Complaint:  Transient Ischemic Attack (3 month follow up for TIA, patient denies any stroke like deficits.)      History of Present Illness  Patient presents for follow up of TIA. Patient denies any new onset of numbness, tingling or weakness. Denies any recent falls. Will be having carpal tunnel surgery on May 5. Taking ASA/statin. Reports some mild headaches but feels as if they may be related to stress. States that her shoulder muscles are tight at times. Tizanidine is helpful.        Past Medical History:   Diagnosis Date    Bipolar disorder     Carpal tunnel syndrome     COPD (chronic obstructive pulmonary disease)     Depression     Lumbar degenerative disc disease     Mixed hyperlipidemia     Muscle spasms of both lower extremities     Muscle spasms of neck     Neuropathy     TIA (transient ischemic attack)     Ulnar nerve external compression syndrome        Past Surgical History:   Procedure Laterality Date    ANTERIOR CRUCIATE LIGAMENT REPAIR Left     FRACTURE SURGERY  2017    [facility]    JOINT REPLACEMENT  2016    [facility]    TOTAL KNEE ARTHROPLASTY Left     WRIST SURGERY Right        Family History   Problem Relation Age of Onset    Coronary artery disease Mother     Diabetes Mother     Arthritis Mother         Back , hands    Early death Mother         Diabetes    Heart disease Mother     Hypertension Mother     Kidney disease Mother     Stroke Mother     Vision loss Mother     Coronary artery disease Father     Asthma Father         Back    Heart disease Father     Hypertension Father     Vision loss Father     Arthritis Sister         Not sure    Asthma Sister         Finger, shoulder    Mental illness Sister     Vision loss Sister     Arthritis Brother         Hip    Birth defects Brother         Special Needs/ Downs Syndrome    Heart disease Brother     Learning disabilities Brother     Mental retardation Brother         Social History     Socioeconomic History    Marital status:    Tobacco Use    Smoking status: Every Day     Types: Vaping with nicotine, Cigarettes     Passive exposure: Yes    Smokeless tobacco: Never   Substance and Sexual Activity    Alcohol use: Not Currently     Comment: stopped in     Drug use: Not Currently     Types: Marijuana, Cocaine     Comment: Nothing    Sexual activity: Not Currently     Partners: Male     Birth control/protection: None     Comment: Abstinence       Current Outpatient Medications   Medication Sig Dispense Refill    albuterol (PROVENTIL/VENTOLIN HFA) 90 mcg/actuation inhaler INHALE TWO PUFFS INTO THE LUNGS EVERY FOUR TO SIX HOURS AS NEEDED FOR SHORTNESS OF BREATH      aspirin 325 MG tablet Take 325 mg by mouth once daily.      atorvastatin (LIPITOR) 40 MG tablet Take 40 mg by mouth once daily.      azelastine (ASTELIN) 137 mcg (0.1 %) nasal spray SMARTSI Spray(s) Both Nares Every Morning      benztropine (COGENTIN) 1 MG tablet Take 1 mg by mouth 3 (three) times daily.      buPROPion (WELLBUTRIN XL) 300 MG 24 hr tablet Take 300 mg by mouth every morning.      carBAMazepine (TEGRETOL XR) 400 MG Tb12 Take 400 mg by mouth 2 (two) times daily.      celecoxib (CELEBREX) 100 MG capsule Take 100 mg by mouth 3 (three) times daily.      clonazePAM (KLONOPIN) 0.5 MG tablet Take 0.5 mg by mouth nightly.      dexlansoprazole (DEXILANT) 60 mg capsule Take 1 capsule by mouth once daily.      diclofenac sodium (VOLTAREN) 1 % Gel Apply topically.      DULoxetine (CYMBALTA) 60 MG capsule Take 60 mg by mouth 2 (two) times daily.      estradioL (ESTRACE) 0.01 % (0.1 mg/gram) vaginal cream insert 0.5 gram by vaginal route 2 times per week      famotidine (PEPCID) 20 MG tablet Take 20 mg by mouth daily as needed.      fluticasone propionate (FLONASE) 50 mcg/actuation nasal spray 1 spray by Each Nostril route nightly.      furosemide (LASIX) 20 MG tablet Take 20 mg by mouth once daily.       hydrOXYzine pamoate (VISTARIL) 25 MG Cap Take by mouth.      icosapent ethyL (VASCEPA) 1 gram Cap Take 2 capsules by mouth 2 (two) times daily.      JARDIANCE 25 mg tablet Take by mouth.      loratadine (CLARITIN) 10 mg tablet Take 10 mg by mouth.      metFORMIN (GLUCOPHAGE) 500 MG tablet Take 500 mg by mouth once daily.      MOVANTIK 25 mg tablet Take 25 mg by mouth once daily.      neomycin-polymyxin-dexamethasone (MAXITROL) 3.5mg/mL-10,000 unit/mL-0.1 % DrpS       nicotine (NICODERM CQ) 21 mg/24 hr 1 patch once daily.      paliperidone palmitate (INVEGA SUSTENNA) 234 mg/1.5 mL Syrg injection Inject 234 mg into the muscle.      pregabalin (LYRICA) 100 MG capsule Take 100 mg by mouth 3 (three) times daily.      RESTASIS 0.05 % ophthalmic emulsion Place 1 drop into both eyes 2 (two) times daily.      tiZANidine (ZANAFLEX) 4 MG tablet take 1 tablet BY MOUTH EVERY 8 HOURS AS NEEDED FOR MUSCLE SPASMS 90 tablet 3    topiramate (TOPAMAX) 200 MG Tab Take 200 mg by mouth 2 (two) times daily.      valACYclovir (VALTREX) 500 MG tablet Take 500 mg by mouth 2 (two) times daily.      VRAYLAR 3 mg Cap Take 1 capsule by mouth once daily.       No current facility-administered medications for this visit.       Review of patient's allergies indicates:   Allergen Reactions    Adhesive      Other reaction(s): skin tears easily with adhesive    Cephalexin Nausea And Vomiting    Erythromycin Nausea And Vomiting    Lithium Nausea And Vomiting    Naproxen Nausea And Vomiting        Vitals:    04/26/23 0904   BP: 94/78   Pulse: 84         Review of Systems  Review of Systems   Neurological:  Negative for dizziness, facial asymmetry, speech difficulty, weakness, light-headedness and headaches.   All other systems reviewed and are negative.  Objective:     Neurologic Exam     Mental Status   Oriented to person, place, and time.   Speech: speech is normal   Level of consciousness: alert    Cranial Nerves   Cranial nerves II through XII  intact.     Motor Exam   Muscle bulk: normal    Strength   Strength 5/5 throughout.     Sensory Exam   Light touch normal.     Gait, Coordination, and Reflexes     Gait  Gait: normal    Physical Exam  Vitals and nursing note reviewed.   Cardiovascular:      Rate and Rhythm: Normal rate.   Pulmonary:      Effort: Pulmonary effort is normal.   Neurological:      Mental Status: She is oriented to person, place, and time.      Cranial Nerves: Cranial nerves 2-12 are intact.      Motor: Motor strength is normal.      Gait: Gait is intact.   Psychiatric:         Speech: Speech normal.        Assessment:     1. Transient ischemic attack (TIA)        Plan:     Continue ASA/statin  Continue Tizanidine prn

## 2023-05-01 ENCOUNTER — TELEPHONE (OUTPATIENT)
Dept: ORTHOPEDICS | Facility: CLINIC | Age: 58
End: 2023-05-01
Payer: MEDICARE

## 2023-05-01 NOTE — TELEPHONE ENCOUNTER
Patient called and left a VM on a phone in trauma stating that since she stopped the Celebrex she is having pain.     I spoke to pre-admit and they said she can take tylenol not ibuprofen.     I called the patient and explained to her that what we recommend at this time is extra strength tylenol as directed on the bottle. Patient voiced a clear understanding.

## 2023-05-03 ENCOUNTER — TELEPHONE (OUTPATIENT)
Dept: ORTHOPEDICS | Facility: CLINIC | Age: 58
End: 2023-05-03
Payer: MEDICARE

## 2023-05-03 ENCOUNTER — TELEPHONE (OUTPATIENT)
Dept: PREADMISSION TESTING | Facility: HOSPITAL | Age: 58
End: 2023-05-03
Payer: MEDICARE

## 2023-05-03 ENCOUNTER — ANESTHESIA EVENT (OUTPATIENT)
Dept: SURGERY | Facility: HOSPITAL | Age: 58
End: 2023-05-03
Payer: MEDICARE

## 2023-05-03 NOTE — TELEPHONE ENCOUNTER
Did you get the cardiac clearance from the cardiologost?  Her surgery is on Friday and we need it before she can have her surgery since your office requested it.

## 2023-05-03 NOTE — TELEPHONE ENCOUNTER
I spoke with the nurse at [facility] who states that they have not seen the patient in years and they have only seen her for smoking cessation so they can not give clearance. I called the patient's PCP who states that she does not have any known heart issues so she is willing to clear her for Sx. PCP reviewed recent EKG, chest XR, and labs. PCP has faxed us a surgery clearance.     Are you ok with proceeding with only PCP clearance for surgery and canceling the cardiology clearance?

## 2023-05-04 NOTE — TELEPHONE ENCOUNTER
Dr. Bhatt canceling cardiac clearance has been documented in the chart. Please let me know if I need to do anything else.

## 2023-05-05 ENCOUNTER — ANESTHESIA (OUTPATIENT)
Dept: SURGERY | Facility: HOSPITAL | Age: 58
End: 2023-05-05
Payer: MEDICARE

## 2023-05-05 ENCOUNTER — HOSPITAL ENCOUNTER (OUTPATIENT)
Facility: HOSPITAL | Age: 58
Discharge: HOME | End: 2023-05-05
Attending: STUDENT IN AN ORGANIZED HEALTH CARE EDUCATION/TRAINING PROGRAM | Admitting: STUDENT IN AN ORGANIZED HEALTH CARE EDUCATION/TRAINING PROGRAM
Payer: MEDICARE

## 2023-05-05 DIAGNOSIS — G56.21 CUBITAL TUNNEL SYNDROME ON RIGHT: ICD-10-CM

## 2023-05-05 DIAGNOSIS — G56.01 RIGHT CARPAL TUNNEL SYNDROME: ICD-10-CM

## 2023-05-05 LAB — POCT GLUCOSE: 116 MG/DL (ref 70–110)

## 2023-05-05 PROCEDURE — 37000008 HC ANESTHESIA 1ST 15 MINUTES: Performed by: STUDENT IN AN ORGANIZED HEALTH CARE EDUCATION/TRAINING PROGRAM

## 2023-05-05 PROCEDURE — 63600175 PHARM REV CODE 636 W HCPCS: Performed by: NURSE ANESTHETIST, CERTIFIED REGISTERED

## 2023-05-05 PROCEDURE — D9220A PRA ANESTHESIA: Mod: ANES,,, | Performed by: ANESTHESIOLOGY

## 2023-05-05 PROCEDURE — D9220A PRA ANESTHESIA: ICD-10-PCS | Mod: CRNA,,, | Performed by: NURSE ANESTHETIST, CERTIFIED REGISTERED

## 2023-05-05 PROCEDURE — 26055 PR INCISE FINGER TENDON SHEATH: ICD-10-PCS | Mod: 51,F5,, | Performed by: STUDENT IN AN ORGANIZED HEALTH CARE EDUCATION/TRAINING PROGRAM

## 2023-05-05 PROCEDURE — 25000003 PHARM REV CODE 250: Performed by: STUDENT IN AN ORGANIZED HEALTH CARE EDUCATION/TRAINING PROGRAM

## 2023-05-05 PROCEDURE — 63600175 PHARM REV CODE 636 W HCPCS: Performed by: ANESTHESIOLOGY

## 2023-05-05 PROCEDURE — 71000016 HC POSTOP RECOV ADDL HR: Performed by: STUDENT IN AN ORGANIZED HEALTH CARE EDUCATION/TRAINING PROGRAM

## 2023-05-05 PROCEDURE — 71000015 HC POSTOP RECOV 1ST HR: Performed by: STUDENT IN AN ORGANIZED HEALTH CARE EDUCATION/TRAINING PROGRAM

## 2023-05-05 PROCEDURE — 25000003 PHARM REV CODE 250: Performed by: ANESTHESIOLOGY

## 2023-05-05 PROCEDURE — 29999 PR ARTHROSCOPIC DECOMPRESSION OF NERVE: ICD-10-PCS | Mod: RT,,, | Performed by: STUDENT IN AN ORGANIZED HEALTH CARE EDUCATION/TRAINING PROGRAM

## 2023-05-05 PROCEDURE — 36000706: Performed by: STUDENT IN AN ORGANIZED HEALTH CARE EDUCATION/TRAINING PROGRAM

## 2023-05-05 PROCEDURE — 26055 INCISE FINGER TENDON SHEATH: CPT | Mod: 51,F5,, | Performed by: STUDENT IN AN ORGANIZED HEALTH CARE EDUCATION/TRAINING PROGRAM

## 2023-05-05 PROCEDURE — 64721 CARPAL TUNNEL SURGERY: CPT | Mod: RT,,, | Performed by: STUDENT IN AN ORGANIZED HEALTH CARE EDUCATION/TRAINING PROGRAM

## 2023-05-05 PROCEDURE — 82962 GLUCOSE BLOOD TEST: CPT | Performed by: STUDENT IN AN ORGANIZED HEALTH CARE EDUCATION/TRAINING PROGRAM

## 2023-05-05 PROCEDURE — D9220A PRA ANESTHESIA: ICD-10-PCS | Mod: ANES,,, | Performed by: ANESTHESIOLOGY

## 2023-05-05 PROCEDURE — D9220A PRA ANESTHESIA: Mod: CRNA,,, | Performed by: NURSE ANESTHETIST, CERTIFIED REGISTERED

## 2023-05-05 PROCEDURE — 76942 ECHO GUIDE FOR BIOPSY: CPT | Performed by: ANESTHESIOLOGY

## 2023-05-05 PROCEDURE — 29999 UNLISTED PX ARTHROSCOPY: CPT | Mod: RT,,, | Performed by: STUDENT IN AN ORGANIZED HEALTH CARE EDUCATION/TRAINING PROGRAM

## 2023-05-05 PROCEDURE — 37000009 HC ANESTHESIA EA ADD 15 MINS: Performed by: STUDENT IN AN ORGANIZED HEALTH CARE EDUCATION/TRAINING PROGRAM

## 2023-05-05 PROCEDURE — 36000707: Performed by: STUDENT IN AN ORGANIZED HEALTH CARE EDUCATION/TRAINING PROGRAM

## 2023-05-05 PROCEDURE — C1729 CATH, DRAINAGE: HCPCS | Performed by: STUDENT IN AN ORGANIZED HEALTH CARE EDUCATION/TRAINING PROGRAM

## 2023-05-05 PROCEDURE — 64721 PR REVISE MEDIAN N/CARPAL TUNNEL SURG: ICD-10-PCS | Mod: RT,,, | Performed by: STUDENT IN AN ORGANIZED HEALTH CARE EDUCATION/TRAINING PROGRAM

## 2023-05-05 PROCEDURE — 25000003 PHARM REV CODE 250: Performed by: NURSE ANESTHETIST, CERTIFIED REGISTERED

## 2023-05-05 PROCEDURE — 63600175 PHARM REV CODE 636 W HCPCS: Performed by: STUDENT IN AN ORGANIZED HEALTH CARE EDUCATION/TRAINING PROGRAM

## 2023-05-05 RX ORDER — ONDANSETRON 2 MG/ML
4 INJECTION INTRAMUSCULAR; INTRAVENOUS EVERY 6 HOURS PRN
Status: CANCELLED | OUTPATIENT
Start: 2023-05-05

## 2023-05-05 RX ORDER — SODIUM CHLORIDE 9 MG/ML
INJECTION, SOLUTION INTRAVENOUS CONTINUOUS
Status: DISCONTINUED | OUTPATIENT
Start: 2023-05-05 | End: 2023-05-05 | Stop reason: HOSPADM

## 2023-05-05 RX ORDER — MIDAZOLAM HYDROCHLORIDE 1 MG/ML
2 INJECTION INTRAMUSCULAR; INTRAVENOUS
Status: DISCONTINUED | OUTPATIENT
Start: 2023-05-05 | End: 2023-05-05 | Stop reason: HOSPADM

## 2023-05-05 RX ORDER — HYDROCODONE BITARTRATE AND ACETAMINOPHEN 5; 325 MG/1; MG/1
1 TABLET ORAL EVERY 4 HOURS PRN
Status: CANCELLED | OUTPATIENT
Start: 2023-05-05

## 2023-05-05 RX ORDER — LIDOCAINE HYDROCHLORIDE 10 MG/ML
INJECTION, SOLUTION EPIDURAL; INFILTRATION; INTRACAUDAL; PERINEURAL
Status: DISCONTINUED | OUTPATIENT
Start: 2023-05-05 | End: 2023-05-05

## 2023-05-05 RX ORDER — LIDOCAINE HYDROCHLORIDE 10 MG/ML
INJECTION INFILTRATION; PERINEURAL
Status: COMPLETED
Start: 2023-05-05 | End: 2023-05-05

## 2023-05-05 RX ORDER — MEPERIDINE HYDROCHLORIDE 25 MG/ML
6.25 INJECTION INTRAMUSCULAR; INTRAVENOUS; SUBCUTANEOUS ONCE AS NEEDED
Status: CANCELLED | OUTPATIENT
Start: 2023-05-05 | End: 2023-05-06

## 2023-05-05 RX ORDER — ONDANSETRON 2 MG/ML
4 INJECTION INTRAMUSCULAR; INTRAVENOUS DAILY PRN
Status: CANCELLED | OUTPATIENT
Start: 2023-05-05

## 2023-05-05 RX ORDER — SODIUM CHLORIDE, SODIUM LACTATE, POTASSIUM CHLORIDE, CALCIUM CHLORIDE 600; 310; 30; 20 MG/100ML; MG/100ML; MG/100ML; MG/100ML
1000 INJECTION, SOLUTION INTRAVENOUS ONCE
Status: DISCONTINUED | OUTPATIENT
Start: 2023-05-05 | End: 2023-05-05 | Stop reason: HOSPADM

## 2023-05-05 RX ORDER — ROPIVACAINE HYDROCHLORIDE 5 MG/ML
INJECTION, SOLUTION EPIDURAL; INFILTRATION; PERINEURAL
Status: COMPLETED
Start: 2023-05-05 | End: 2023-05-05

## 2023-05-05 RX ORDER — MORPHINE SULFATE 4 MG/ML
4 INJECTION, SOLUTION INTRAMUSCULAR; INTRAVENOUS
Status: CANCELLED | OUTPATIENT
Start: 2023-05-05

## 2023-05-05 RX ORDER — SODIUM CHLORIDE 9 MG/ML
INJECTION, SOLUTION INTRAVENOUS CONTINUOUS
Status: CANCELLED | OUTPATIENT
Start: 2023-05-05

## 2023-05-05 RX ORDER — SODIUM CHLORIDE, SODIUM GLUCONATE, SODIUM ACETATE, POTASSIUM CHLORIDE AND MAGNESIUM CHLORIDE 30; 37; 368; 526; 502 MG/100ML; MG/100ML; MG/100ML; MG/100ML; MG/100ML
INJECTION, SOLUTION INTRAVENOUS CONTINUOUS
Status: DISCONTINUED | OUTPATIENT
Start: 2023-05-05 | End: 2023-05-05 | Stop reason: HOSPADM

## 2023-05-05 RX ORDER — HYDROCODONE BITARTRATE AND ACETAMINOPHEN 5; 325 MG/1; MG/1
1 TABLET ORAL
Status: DISCONTINUED | OUTPATIENT
Start: 2023-05-05 | End: 2023-05-05 | Stop reason: HOSPADM

## 2023-05-05 RX ORDER — ROPIVACAINE HYDROCHLORIDE 5 MG/ML
INJECTION, SOLUTION EPIDURAL; INFILTRATION; PERINEURAL
Status: DISCONTINUED | OUTPATIENT
Start: 2023-05-05 | End: 2023-05-05

## 2023-05-05 RX ORDER — ACETAMINOPHEN 500 MG
1000 TABLET ORAL
Status: DISCONTINUED | OUTPATIENT
Start: 2023-05-05 | End: 2023-05-05 | Stop reason: HOSPADM

## 2023-05-05 RX ORDER — OXYCODONE AND ACETAMINOPHEN 5; 325 MG/1; MG/1
1 TABLET ORAL EVERY 4 HOURS PRN
Qty: 28 TABLET | Refills: 0 | Status: SHIPPED | OUTPATIENT
Start: 2023-05-05 | End: 2023-05-10 | Stop reason: SDUPTHER

## 2023-05-05 RX ORDER — KETAMINE HYDROCHLORIDE 50 MG/ML
INJECTION, SOLUTION INTRAMUSCULAR; INTRAVENOUS
Status: DISCONTINUED | OUTPATIENT
Start: 2023-05-05 | End: 2023-05-05

## 2023-05-05 RX ORDER — PROPOFOL 10 MG/ML
VIAL (ML) INTRAVENOUS CONTINUOUS PRN
Status: DISCONTINUED | OUTPATIENT
Start: 2023-05-05 | End: 2023-05-05

## 2023-05-05 RX ORDER — LIDOCAINE HYDROCHLORIDE 10 MG/ML
INJECTION, SOLUTION EPIDURAL; INFILTRATION; INTRACAUDAL; PERINEURAL
Status: DISCONTINUED | OUTPATIENT
Start: 2023-05-05 | End: 2023-05-05 | Stop reason: HOSPADM

## 2023-05-05 RX ORDER — HYDROMORPHONE HYDROCHLORIDE 2 MG/ML
0.4 INJECTION, SOLUTION INTRAMUSCULAR; INTRAVENOUS; SUBCUTANEOUS EVERY 5 MIN PRN
Status: CANCELLED | OUTPATIENT
Start: 2023-05-05

## 2023-05-05 RX ORDER — ONDANSETRON 4 MG/1
4 TABLET, ORALLY DISINTEGRATING ORAL
Status: COMPLETED | OUTPATIENT
Start: 2023-05-05 | End: 2023-05-05

## 2023-05-05 RX ORDER — METOCLOPRAMIDE HYDROCHLORIDE 5 MG/ML
10 INJECTION INTRAMUSCULAR; INTRAVENOUS EVERY 6 HOURS PRN
Status: CANCELLED | OUTPATIENT
Start: 2023-05-05

## 2023-05-05 RX ORDER — METHOCARBAMOL 500 MG/1
500 TABLET, FILM COATED ORAL EVERY 6 HOURS PRN
Status: CANCELLED | OUTPATIENT
Start: 2023-05-05

## 2023-05-05 RX ORDER — GABAPENTIN 300 MG/1
300 CAPSULE ORAL
Status: DISCONTINUED | OUTPATIENT
Start: 2023-05-05 | End: 2023-05-05 | Stop reason: HOSPADM

## 2023-05-05 RX ORDER — PROCHLORPERAZINE EDISYLATE 5 MG/ML
5 INJECTION INTRAMUSCULAR; INTRAVENOUS EVERY 30 MIN PRN
Status: CANCELLED | OUTPATIENT
Start: 2023-05-05

## 2023-05-05 RX ORDER — CELECOXIB 200 MG/1
200 CAPSULE ORAL
Status: DISCONTINUED | OUTPATIENT
Start: 2023-05-05 | End: 2023-05-05 | Stop reason: HOSPADM

## 2023-05-05 RX ADMIN — PROPOFOL 100 MCG/KG/MIN: 10 INJECTION, EMULSION INTRAVENOUS at 08:05

## 2023-05-05 RX ADMIN — HYDROCODONE BITARTRATE AND ACETAMINOPHEN 1 TABLET: 5; 325 TABLET ORAL at 10:05

## 2023-05-05 RX ADMIN — GABAPENTIN 300 MG: 300 CAPSULE ORAL at 07:05

## 2023-05-05 RX ADMIN — SODIUM CHLORIDE, SODIUM GLUCONATE, SODIUM ACETATE, POTASSIUM CHLORIDE AND MAGNESIUM CHLORIDE: 526; 502; 368; 37; 30 INJECTION, SOLUTION INTRAVENOUS at 07:05

## 2023-05-05 RX ADMIN — CEFAZOLIN 2 G: 2 INJECTION, POWDER, FOR SOLUTION INTRAMUSCULAR; INTRAVENOUS at 08:05

## 2023-05-05 RX ADMIN — CELECOXIB 200 MG: 200 CAPSULE ORAL at 07:05

## 2023-05-05 RX ADMIN — ROPIVACAINE HYDROCHLORIDE 30 ML: 5 INJECTION, SOLUTION EPIDURAL; INFILTRATION; PERINEURAL at 07:05

## 2023-05-05 RX ADMIN — SODIUM CHLORIDE, SODIUM GLUCONATE, SODIUM ACETATE, POTASSIUM CHLORIDE AND MAGNESIUM CHLORIDE: 526; 502; 368; 37; 30 INJECTION, SOLUTION INTRAVENOUS at 08:05

## 2023-05-05 RX ADMIN — KETAMINE HYDROCHLORIDE 10 MG: 50 INJECTION INTRAMUSCULAR; INTRAVENOUS at 08:05

## 2023-05-05 RX ADMIN — LIDOCAINE HYDROCHLORIDE 50 MG: 10 INJECTION, SOLUTION EPIDURAL; INFILTRATION; INTRACAUDAL; PERINEURAL at 08:05

## 2023-05-05 RX ADMIN — ONDANSETRON 4 MG: 4 TABLET, ORALLY DISINTEGRATING ORAL at 07:05

## 2023-05-05 RX ADMIN — ACETAMINOPHEN 1000 MG: 500 TABLET, FILM COATED ORAL at 07:05

## 2023-05-05 NOTE — ANESTHESIA PREPROCEDURE EVALUATION
05/05/2023  Faith Jin is a 58 y.o., female with ----------------------------  Bipolar disorder  Carpal tunnel syndrome  COPD (chronic obstructive pulmonary disease)  Depression  Dry eyes  Lumbar degenerative disc disease  Mixed hyperlipidemia  Muscle spasms of both lower extremities  Muscle spasms of neck  Neuropathy  TIA (transient ischemic attack)  Ulnar nerve external compression syndrome    And ----------------------------  Anterior cruciate ligament repair  Fracture surgery      Comment:  [facility]  Joint replacement      Comment:  revision total knee ledt  Shoulder arthroscopy w/ rotator cuff repair  Total knee arthroplasty  Wrist surgery    Presents for right CTR.      Pre-op Assessment    I have reviewed the NPO Status.      Review of Systems     Latest Reference Range & Units 04/24/23 09:50   Hemoglobin 12.0 - 16.0 g/dL 13.8   Hematocrit 37.0 - 47.0 % 42.5   Platelets 130 - 400 x10(3)/mcL 321   Sodium 136 - 145 mmol/L 135 (L)   Potassium 3.5 - 5.1 mmol/L 4.2   Chloride 98 - 107 mmol/L 102   CO2 23 - 31 mmol/L 23   BUN 9.8 - 20.1 mg/dL 10.2   Creatinine 0.55 - 1.02 mg/dL 0.80   eGFR mls/min/1.73/m2 >60   Glucose 82 - 115 mg/dL 112   (L): Data is abnormally low       Anesthesia Plan  Type of Anesthesia, risks & benefits discussed:    Anesthesia Type: Regional  Intra-op Monitoring Plan: Standard ASA Monitors  Post Op Pain Control Plan: multimodal analgesia, peripheral nerve block and IV/PO Opioids PRN  Induction:  IV  Informed Consent: Informed consent signed with the Patient and all parties understand the risks and agree with anesthesia plan.  All questions answered.   ASA Score: 3  Day of Surgery Review of History & Physical: H&P Update referred to the surgeon/provider.  Anesthesia Plan Notes: Premedication: Midazolam  Regional: Supraclavicular vs interscalene nerve block for surgical  anesthesia - Ropiv 0.5% 30mls  Special Technique: Preemptive analgesia with neurontin, zofran, acetaminophen and celebrex or tramadol  Nasal Cannula vs O2 via facemask- consider Supernova Nasal CPAP for obese or GERMAINE patients  PONV prophylaxis  Pt may go directly to Phase 2 if criteria met    Ready For Surgery From Anesthesia Perspective.     .

## 2023-05-05 NOTE — OP NOTE
Operative Note    Patient Information:  Faith Jin    Date of Surgery:  05/05/2023    Surgeon:  Canelo Bhatt MD    Assistant:  None    Pre-operative Diagnosis:  Right Carpal tunnel and Cubital tunnel syndromes    Post-operative Diagnosis:  same    Procedure Performed:  Right Carpal tunnel release (CPT 75307)  Right Endoscopic in situ cubital tunnel release (CPT 862885390)    Anesthesia:  MAC and regional block    Complications:  None    Blood Loss:  Minimal    Specimens:  None    Implants:  None    Indications for Procedure:  Faith Jin is a 58 y.o. female that has right carpal and cubital tunnel syndromes. She also has right trigger thumb that I injected. It got better but returned.     I explained that surgery and the nature of their condition are not without risks. These include, but are not limited to, bleeding, hematoma, infection, neurovascular compromise, wound complications, scarring, cosmetic defects, need for later and/or repeated surgeries, pain, loss of ROM, loss of function, deformity, functional abnormalities, stiffness, thromboembolic complications, compartment syndrome, loss of limb, loss of life, anesthetic complications, and other imponderables. I explained that these can occur despite the adequacy of treatments rendered, and that their risks are heightened given the nature of their condition. They verbalized understanding. They would like to continue with surgery at this time. If appropriate family was involved with surgical discussion.  The patient expressed understanding of and agreement with the plan. Informed consent was obtained and signed prior going to the operative room.    Procedure in Detail:  Patient was brought to the operating room and placed under anesthesia by the anesthesia department without difficulty. The patient was then placed in the supine position on the operating room table.     Time out was performed and all parties present agreed with correct  patient, correct procedure, correct side, correct site. The operative extremity was prepped and draped in standard normal fashion.     Pre-incision antibiotics were administered prior to skin incision.    A curvilinear incision was made over the medial elbow between the medial epicondyle and olecranon.  Dissection was carefully taken through subcutaneous tissue with care to avoid any injury to crossing cutaneous branches specifically the medial antebrachial cutaneous nerve.  Dissection was carried down to the ulnar nerve at the medial epicondyle.  The ligament of Mantilla was released and the nerve was freed at the level of the medial epicondyle.  After complete release of the nerve through the incision, a nasal speculum was placed into the wound distally.  Under endoscopic visualization, the FCU fascia was carefully released.  Speculum was removed and reinserted in a deeper layer to expose the ulnar nerve and its sheath.  The sheath above the ulnar nerve was carefully released to the level of the 1st motor branch under endoscopic visualization.    The speculum was removed and inserted proximally.  Under endoscopic visualization the arcade of [place] was released.  Sheath above the ulnar nerve was also released to the mid brachium.    The elbow was ranged and the nerve was found to not sublux.    The wound was copiously irrigated.  A Hemovac drain was placed.  The skin was closed with 3-0 Monocryl and 3-0 nylon suture.    An approximately 2.0 centimeter longitudinal incision was made beginning 0.5 centimeters distal to the volar wrist crease extending distalward in line with the radial aspect of the ring finger ray.  Careful dissection was performed in order to preserve and to protect the palmar cutaneous branch of the median nerve. Bipolar electrocautery was used for hemostasis.  The palmar aponeurosis was divided longitudinally to expose the underlying convergence of the volar carpal ligament and the thenar  fascia. The transverse carpal ligament was exposed.  Under direct visualization the transverse carpal ligament was divided from its distal most aspect to the level of the volar wrist crease with care is to protect the median nerve, the ulnar neurovascular structures, the superficial palmar arch, and the thenar motor branch of the median nerve.  The distal 3 to 4 centimeters of the antebrachial forearm fascia were divided longitudinally under direct visualization using curved tenotomy scissors contiguous with the transverse carpal ligament incision site. The nerve was observed to be free of compression or constriction from the surrounding tissues throughout its course in the distal forearm to the mid-palm.     The median nerve was noted to be intact and was without evidence of constrictive deformity.  There was no evidence of significant flexor tenosynovitis nor evidence of an intra-carpal canal mass.    A chevron incision was made over the A1 pulley of the thumb.  Dissection was carried through subcutaneous tissue.  Care was taken to avoid injury to any digital nerves.  The flexor tendon sheath was identified and the A1 pulley was carefully incised with a knife in its entirety both proximally and distally with scissors.      Incisions in the hand were copiously irrigated and closed with 4-0 nylon and 5-0 plain gut suture. A soft dressing was applied.    Patient was woken up without complications and transferred to the recovery room in stable condition.       Post-operative Plan:  The patient tolerated the procedure and anesthesia well and was taken to the postoperative care unit in good and stable condition.  Postoperatively, the digits were pink and well-perfused and the hand compartments and soft tissues were supple.  The dressings were clean and dry.     Postoperative discharge instructions were reviewed with the patient and family / responsible party accompanying the patient today.  Instructions for strict hand  elevation over the next 48 hours and as needed to minimize swelling, loosening of the dressing as needed for swelling or tightness, general wound care instructions, activity restrictions, and postoperative medication use / analgesic use have been advised.  The patient has been instructed to keep the incision dry and to keep dressing on until clinic follow up in 2 weeks. Drain will be pulled by the patient on POD 1.      Activity and work restrictions have been advised for the followin. No work for 2 to 7 days postoperatively tolerated; 2. Incision is to remain covered at all times while at work; 3. No lifting, pushing, pulling of greater than 1 pound for 2 weeks, advancing to no more than 2 to 3 pounds until week 4, then the patient may progress with activity as tolerated with anticipated discharge to full activity at 6 to 8 weeks postoperatively; 4.  no repetitive use of the hands and wrists for 4 weeks postoperatively; 5. No climbing or use of heavy machinery until released to full activity.

## 2023-05-05 NOTE — PLAN OF CARE
Preparing patient for discharge. Patient reports improvement in pain. Patient and friend verbalize understanding of discharge instructions.

## 2023-05-05 NOTE — ANESTHESIA PROCEDURE NOTES
Peripheral Block    Patient location during procedure: pre-op    Reason for block: primary anesthetic    Diagnosis: Right carpal and cubital tunnel syndrome   Start time: 5/5/2023 7:48 AM  Timeout: 5/5/2023 7:46 AM   End time: 5/5/2023 7:49 AM    Staffing  Authorizing Provider: Hailey Pearce MD  Performing Provider: Hailey Pearce MD    Preanesthetic Checklist  Completed: patient identified, IV checked, site marked, risks and benefits discussed, surgical consent, monitors and equipment checked, pre-op evaluation and timeout performed  Peripheral Block  Patient position: supine  Prep: ChloraPrep  Patient monitoring: heart rate, cardiac monitor, continuous pulse ox, continuous capnometry and frequent blood pressure checks  Block type: supraclavicular  Laterality: right  Injection technique: single shot  Needle  Needle type: Stimuplex   Needle gauge: 22 G  Needle length: 2 in  Needle localization: anatomical landmarks and ultrasound guidance   -ultrasound image captured on disc.  Assessment  Injection assessment: negative aspiration, negative parasthesia and local visualized surrounding nerve  Paresthesia pain: none  Heart rate change: no  Slow fractionated injection: yes  Pain Tolerance: comfortable throughout block and no complaints  Medications:    Medications: ropivacaine (NAROPIN) injection 0.5% - Perineural   30 mL - 5/5/2023 7:49:00 AM

## 2023-05-05 NOTE — TRANSFER OF CARE
"Anesthesia Transfer of Care Note    Patient: Faith Jin    Procedure(s) Performed: Procedure(s) (LRB):  RELEASE, CARPAL TUNNEL (Right)  TRANSPOSITION, NERVE, ULNAR  Endoscopic (Right)    Patient location: OPS    Anesthesia Type: regional    Transport from OR: Transported from OR on room air with adequate spontaneous ventilation    Post pain: adequate analgesia    Post assessment: no apparent anesthetic complications    Post vital signs: stable    Level of consciousness: awake    Nausea/Vomiting: no nausea/vomiting    Complications: none    Transfer of care protocol was followed      Last vitals:   Visit Vitals  /67   Pulse 80   Temp 36 °C (96.8 °F)   Resp 18   Ht 5' 3" (1.6 m)   Wt 70.9 kg (156 lb 4.9 oz)   LMP  (LMP Unknown)   SpO2 98%   Breastfeeding No   BMI 27.69 kg/m²     "

## 2023-05-05 NOTE — INTERVAL H&P NOTE
The patient has been examined and the H&P has been reviewed:    I concur with the findings and changes have been noted since the H&P was written:  Patient is complaining of right trigger thumb today.  I injected her in clinic in the past which helped for a short period of time however it returned.  She would like for me to release this right trigger thumb today.  In addition to performing the right carpal tunnel and cubital tunnel releases I will release the right trigger thumb.  I went over risks with her and added this to the consent.  I explained that there is a risk of recurrence of triggering, stiffness, nerve injury, wound complications.  She understands and would like to proceed    Surgery risks, benefits and alternative options discussed and understood by patient/family.          There are no hospital problems to display for this patient.

## 2023-05-05 NOTE — ANESTHESIA POSTPROCEDURE EVALUATION
Anesthesia Post Evaluation    Patient: Faith Jin    Procedure(s) Performed: Procedure(s) (LRB):  RELEASE, CARPAL TUNNEL (Right)  TRANSPOSITION, NERVE, ULNAR  Endoscopic (Right)    Final Anesthesia Type: regional      Patient location during evaluation: [facility]  Patient participation: Yes- Able to Participate  Level of consciousness: awake and alert  Post-procedure vital signs: reviewed and stable  Pain management: adequate  Airway patency: patent    PONV status at discharge: No PONV  Anesthetic complications: no      Cardiovascular status: blood pressure returned to baseline and hemodynamically stable  Respiratory status: unassisted and spontaneous ventilation  Hydration status: euvolemic  Follow-up not needed.          Vitals Value Taken Time   /82 05/05/23 1050   Temp 36 °C (96.8 °F) 05/05/23 0946   Pulse 75 05/05/23 1053   Resp 17 05/05/23 1050   SpO2 98 % 05/05/23 1053   Vitals shown include unvalidated device data.      No case tracking events are documented in the log.      Pain/Guevara Score: Pain Rating Prior to Med Admin: 6 (5/5/2023 10:10 AM)  Pain Rating Post Med Admin: 1 (5/5/2023 10:59 AM)  Modified Guevara Score: 18 (5/5/2023 10:59 AM)

## 2023-05-06 ENCOUNTER — NURSE TRIAGE (OUTPATIENT)
Dept: ADMINISTRATIVE | Facility: CLINIC | Age: 58
End: 2023-05-06
Payer: MEDICARE

## 2023-05-06 NOTE — TELEPHONE ENCOUNTER
Patient states she was instructed to remove the Hemovac drain this morning; had carpal tunnel surgery yesterday. The soft wrap dressing is now loose. Denies swelling; has been using ice. Denies fever; temp is 98.4 degrees. Instructed patient to gently tighten the wrap back by unrolling it; leave the gauze and other dressing undisturbed. Instructed to call back with further questions.       Reason for Disposition   Normal splint care, questions about    Additional Information   Negative: Sounds like a life-threatening emergency to the triager   Negative: [1] Numbness (loss of sensation) of fingers or toes AND [2] persists > 1 hour after loosening elastic bandage or Velcro   Negative: [1] Tingling (pins and needles sensation) of fingers or toes AND [2] persists > 1 hour after loosening elastic bandage or Velcro   Negative: [1] Blueness or pallor of fingers or toes (compared to noninjured side) AND [2] persists > 1 hour after loosening elastic bandage or Velcro   Negative: Patient sounds very sick or weak to the triager   Negative: [1] SEVERE pain AND [2] not improved one hour after pain medicine, elevation, and loosening elastic bandage or Velcro   Negative: [1] Increasing pain under splint AND [2] splint put on > 72 hours ago   Negative: [1] Caller has URGENT question AND [2] triager unable to answer question   Negative: Splint breaks or cracks   Negative: Edge of splint is causing a sore   Negative: [1] Plaster splint gets wet AND [2] is soft after attempted drying   Negative: [1] Splint lining gets wet AND [2] metal pins (sticking out from skin) are touching wet area   Negative: [1] Caller has NON-URGENT question AND [2] triager unable to answer question   Negative: [1] Fingers or toes are swollen (compared to noninjured side) AND [2] persists > 24 hours after loosening elastic bandage or Velcro   Negative: Splint removal date, questions about   Negative: Pain from fractures (broken bones) or sprains   Negative:  Symptoms from tight splint held in place by elastic bandage   Negative: Symptoms from tight elastic bandage without a splint (e.g., sprained ankle or knee)   Negative: Symptoms from tight splinting device (e.g., ankle air cast, knee immobilizer, shoulder immobilizer, walking boot)    Protocols used: Post-Op Symptoms and [id number], Splint Symptoms and [id number]

## 2023-05-08 VITALS
HEIGHT: 63 IN | OXYGEN SATURATION: 99 % | RESPIRATION RATE: 17 BRPM | DIASTOLIC BLOOD PRESSURE: 82 MMHG | WEIGHT: 156.31 LBS | BODY MASS INDEX: 27.7 KG/M2 | SYSTOLIC BLOOD PRESSURE: 147 MMHG | HEART RATE: 74 BPM | TEMPERATURE: 97 F

## 2023-05-09 ENCOUNTER — TELEPHONE (OUTPATIENT)
Dept: ORTHOPEDICS | Facility: CLINIC | Age: 58
End: 2023-05-09
Payer: MEDICARE

## 2023-05-09 NOTE — TELEPHONE ENCOUNTER
Patient had surgery on 5/5/23. I called the patient to see how she is doing since surgery. Patient did not answer. Went straight to  and cannot leave a VM.

## 2023-05-09 NOTE — TELEPHONE ENCOUNTER
Patient called back. Patient states that she is doing good. Having a little itching, minor pain, and minimal swelling. But doing well with elevation and medications.     Went over the following instructions with the patient:   [x] Keep the post-op splint on until the follow-up appointment. Keep it clean, dry, and intact until clinic visit.  [x] Refill process   [x] Strict elevation for 48-72 hours post-op and as needed to minimize swelling.  [x] No lifting, pushing, pulling with operative extremity.    Patient also states that her Home Health nurse had to re wrap her post op splint.     Patient voiced a clear understanding.

## 2023-05-10 DIAGNOSIS — G56.21 CUBITAL TUNNEL SYNDROME ON RIGHT: ICD-10-CM

## 2023-05-10 DIAGNOSIS — G56.01 RIGHT CARPAL TUNNEL SYNDROME: Primary | ICD-10-CM

## 2023-05-10 RX ORDER — OXYCODONE AND ACETAMINOPHEN 5; 325 MG/1; MG/1
1 TABLET ORAL EVERY 4 HOURS PRN
Qty: 28 TABLET | Refills: 0 | Status: SHIPPED | OUTPATIENT
Start: 2023-05-11 | End: 2023-05-17 | Stop reason: SDUPTHER

## 2023-05-10 NOTE — TELEPHONE ENCOUNTER
I called the patient and informed her that Dr. Bhatt has singed off on her refill request so she can check with her pharmacy tomorrow or Friday since they will not release till closer to the 7 days. Patient voiced a clear understanding.

## 2023-05-10 NOTE — TELEPHONE ENCOUNTER
Patient called requesting a refill of pain medicine.  I informed her that I will route the request to Dr. Bhatt. I explained that it may take 24-48 hours for the new prescription to be sent to the pharmacy. I explained that I will call and inform her once Dr. Bhatt signs off and it is routed to the pharmacy. I confirmed her pharmacy. Patient voiced a clear understanding.

## 2023-05-17 DIAGNOSIS — G56.01 RIGHT CARPAL TUNNEL SYNDROME: ICD-10-CM

## 2023-05-17 DIAGNOSIS — G56.21 CUBITAL TUNNEL SYNDROME ON RIGHT: ICD-10-CM

## 2023-05-17 RX ORDER — OXYCODONE AND ACETAMINOPHEN 5; 325 MG/1; MG/1
1 TABLET ORAL EVERY 4 HOURS PRN
Qty: 28 TABLET | Refills: 0 | Status: SHIPPED | OUTPATIENT
Start: 2023-05-17 | End: 2023-05-24 | Stop reason: SDUPTHER

## 2023-05-17 NOTE — TELEPHONE ENCOUNTER
I called the patient and informed her that Dr. Bhatt has singed off on her refill request so she can check with her pharmacy. Patient voiced a clear understanding.

## 2023-05-17 NOTE — TELEPHONE ENCOUNTER
Patient called and LVM requesting a refill of pain medicine. I called her back to inform her that I will route the request to Dr. Bhatt and that it can take 24-48 hours. Pt did not answer LVM.

## 2023-05-21 ENCOUNTER — NURSE TRIAGE (OUTPATIENT)
Dept: ADMINISTRATIVE | Facility: CLINIC | Age: 58
End: 2023-05-21
Payer: MEDICARE

## 2023-05-22 ENCOUNTER — TELEPHONE (OUTPATIENT)
Dept: ORTHOPEDICS | Facility: CLINIC | Age: 58
End: 2023-05-22
Payer: MEDICARE

## 2023-05-22 NOTE — TELEPHONE ENCOUNTER
Pt called over the weekend. I called pt to check on her today. Pt denies any fever. No redness that she can see from the part of her arm that is not wrapped. Pt states that she can see a few spots of blood through the dressing. Not many and it is not saturated. Amount that she can see is not increasing. Pt has an appointment on 5/24/23. I explained that she can leave it on until then since it does not look like it is still bleeding. I instructed her to call back if the amount that she can see increases. Pt voiced a clear understanding.

## 2023-05-22 NOTE — TELEPHONE ENCOUNTER
R thumb sx x 16 day 7/10, pt states that she took oxycodone x 15 minutes prior to triage call. Caller also states that she notice blood to dressing, denies bleeding at this time.  No redness or drainage noted at this time. Pt also denies fever.  Pt advised per protocol and verbalized understanding.     Reason for Disposition   [1] INCREASING pain in incision AND [2] > 2 days (48 hours) since surgery    Additional Information   Negative: [1] Major abdominal surgical incision AND [2] wound gaping open AND [3] visible internal organs   Negative: Sounds like a life-threatening emergency to the triager   Negative: [1] Bleeding from incision AND [2] won't stop after 10 minutes of direct pressure   Negative: [1] Bleeding (more than a few drops) from incision AND [2] tracheostomy or blood vessel surgery (e.g., carotid endarterectomy, femoral bypass graft, kidney dialysis fistula)   Negative: [1] Widespread rash AND [2] bright red, sunburn-like   Negative: Severe pain in the incision   Negative: [1] Incision gaping open AND [2] < 48 hours since wound re-opened   Negative: [1] Incision gaping open AND [2] length of opening > 2 inches (5 cm)   Negative: Patient sounds very sick or weak to the triager   Negative: Sounds like a serious complication to the triager   Negative: Fever > 100.4 F (38.0 C)   Negative: [1] Incision looks infected (spreading redness, pain) AND [2] fever > 99.5 F (37.5 C)   Negative: [1] Incision looks infected (spreading redness, pain) AND [2] large red area (> 2 in. or 5 cm)   Negative: [1] Incision looks infected (spreading redness, pain) AND [2] face wound   Negative: [1] Red streak runs from the incision AND [2] longer than 1 inch (2.5 cm)   Negative: [1] Pus or bad-smelling fluid draining from incision AND [2] no fever   Negative: [1] Post-op pain AND [2] not controlled with pain medications   Negative: Dressing soaked with blood or body fluid (e.g., drainage)   Negative: [1] Scant bleeding (e.g.,  few drops) from incision AND AND [2] tracheostomy or blood vessel surgery (e.g., carotid endarterectomy, femoral bypass graft, kidney dialysis fistula)   Negative: [1] Raised bruise and [2] size > 2 inches (5 cm) and expanding   Negative: [1] Caller has URGENT question AND [2] triager unable to answer question    Protocols used: Post-Op Incision Symptoms and [id number]

## 2023-05-24 ENCOUNTER — OFFICE VISIT (OUTPATIENT)
Dept: ORTHOPEDICS | Facility: CLINIC | Age: 58
End: 2023-05-24
Payer: MEDICARE

## 2023-05-24 VITALS — HEIGHT: 63 IN | BODY MASS INDEX: 27.64 KG/M2 | WEIGHT: 156 LBS

## 2023-05-24 DIAGNOSIS — G56.21 CUBITAL TUNNEL SYNDROME ON RIGHT: ICD-10-CM

## 2023-05-24 DIAGNOSIS — G56.01 RIGHT CARPAL TUNNEL SYNDROME: ICD-10-CM

## 2023-05-24 DIAGNOSIS — G56.21 CUBITAL TUNNEL SYNDROME ON RIGHT: Primary | ICD-10-CM

## 2023-05-24 PROCEDURE — 99024 POSTOP FOLLOW-UP VISIT: CPT | Mod: POP,,, | Performed by: STUDENT IN AN ORGANIZED HEALTH CARE EDUCATION/TRAINING PROGRAM

## 2023-05-24 PROCEDURE — 99024 PR POST-OP FOLLOW-UP VISIT: ICD-10-PCS | Mod: POP,,, | Performed by: STUDENT IN AN ORGANIZED HEALTH CARE EDUCATION/TRAINING PROGRAM

## 2023-05-24 NOTE — PROGRESS NOTES
"Postop Clinic Note    Surgery:  Right Carpal and cubital tunnel releases, thumb A1 pulley release on 5/5/23    History of present illness:    Patient is doing well. No fevers or chills. Numbness in hand is improving. Night time symptoms improving.  She is very happy with her progress thus far      Past Surgical History:   Procedure Laterality Date    ANTERIOR CRUCIATE LIGAMENT REPAIR Left     CARPAL TUNNEL RELEASE Right 5/5/2023    Procedure: RELEASE, CARPAL TUNNEL;  Surgeon: Canelo Bhatt MD;  Location: [facility];  Service: Orthopedics;  Laterality: Right;    FRACTURE SURGERY  2017    Mauro Cárdenas    JOINT REPLACEMENT  2016    revision total knee ledt    SHOULDER ARTHROSCOPY W/ ROTATOR CUFF REPAIR Left     TOTAL KNEE ARTHROPLASTY Left     ULNAR NERVE TRANSPOSITION Right 5/5/2023    Procedure: TRANSPOSITION, NERVE, ULNAR  Endoscopic;  Surgeon: Canelo Bhatt MD;  Location: [facility];  Service: Orthopedics;  Laterality: Right;  Endoscopic    WRIST SURGERY Right            Examination:    Vital Signs:    Vitals:    05/24/23 0837   Weight: 70.8 kg (156 lb)   Height: 5' 3" (1.6 m)       Body mass index is 27.63 kg/m².    Constitution:   Well-developed, well nourished patient in no acute distress.  Neurological:   Alert and oriented x 3 and cooperative to examination.     Psychiatric/Behavior: Normal mental status.  Respiratory:   No shortness of breath.  Eyes:    Extraoccular muscles intact  Skin:    No scars, rash or suspicious lesions.    MSK:   Right Upper Extremity:  Surgical incision is healing well with sutures in place no evidence of infection.  Two-point discrimination is 5mm in all 5 digits.  She can make a fist slowly and extend her digits although the thumb is still slightly stiff.  No catching or locking.  She has full elbow range of motion including 0-155 degrees.  Radial pulses 2+ hand is warm well perfused    Imaging:   No new imaging     Assessment: s/p above surgeries    Plan:  Patient is doing well.  " Sutures out today.  Therapy for nerve gliding exercises and thumb range of motion.  Patient was counseled on scar massage.  I educated the patient that full nerve recovery may take anywhere from 12-18 months.  She can start easing into activities as tolerated.  For the next 2 weeks while her wound is still maturing I would like her lifting no more than 5 lb.  After 2 weeks she can progress to activities as tolerated with range of motion as tolerated    Follow Up: 6 weeks  Xray at next visit:  None

## 2023-05-25 DIAGNOSIS — G56.01 RIGHT CARPAL TUNNEL SYNDROME: ICD-10-CM

## 2023-05-25 DIAGNOSIS — G56.21 CUBITAL TUNNEL SYNDROME ON RIGHT: ICD-10-CM

## 2023-05-26 RX ORDER — OXYCODONE AND ACETAMINOPHEN 5; 325 MG/1; MG/1
1 TABLET ORAL EVERY 6 HOURS PRN
Qty: 28 TABLET | Refills: 0 | Status: SHIPPED | OUTPATIENT
Start: 2023-05-26 | End: 2023-06-01 | Stop reason: SDUPTHER

## 2023-05-26 RX ORDER — OXYCODONE AND ACETAMINOPHEN 5; 325 MG/1; MG/1
1 TABLET ORAL EVERY 4 HOURS PRN
Qty: 28 TABLET | Refills: 0 | Status: SHIPPED | OUTPATIENT
Start: 2023-05-26 | End: 2023-06-02

## 2023-05-30 NOTE — TELEPHONE ENCOUNTER
I called the patient and informed her that Dr. Bhatt has signed off on her refill request so she can check with her pharmacy. Patient voiced a clear understanding.

## 2023-05-31 DIAGNOSIS — G56.01 RIGHT CARPAL TUNNEL SYNDROME: Primary | ICD-10-CM

## 2023-05-31 DIAGNOSIS — G56.21 CUBITAL TUNNEL SYNDROME ON RIGHT: ICD-10-CM

## 2023-06-01 RX ORDER — OXYCODONE AND ACETAMINOPHEN 5; 325 MG/1; MG/1
1 TABLET ORAL EVERY 6 HOURS PRN
Qty: 28 TABLET | Refills: 0 | Status: SHIPPED | OUTPATIENT
Start: 2023-06-01 | End: 2023-06-07 | Stop reason: SDUPTHER

## 2023-06-01 NOTE — TELEPHONE ENCOUNTER
I called the patient and informed her that Dr. Bhatt has signed off on her refill request so she can check with his pharmacy. Patient voiced a clear understanding.

## 2023-06-07 DIAGNOSIS — G56.01 RIGHT CARPAL TUNNEL SYNDROME: ICD-10-CM

## 2023-06-07 DIAGNOSIS — G56.21 CUBITAL TUNNEL SYNDROME ON RIGHT: ICD-10-CM

## 2023-06-07 RX ORDER — OXYCODONE AND ACETAMINOPHEN 5; 325 MG/1; MG/1
1 TABLET ORAL EVERY 8 HOURS PRN
Qty: 21 TABLET | Refills: 0 | Status: SHIPPED | OUTPATIENT
Start: 2023-06-07 | End: 2023-06-15 | Stop reason: SDUPTHER

## 2023-06-14 ENCOUNTER — EXTERNAL HOME HEALTH (OUTPATIENT)
Dept: HOME HEALTH SERVICES | Facility: HOSPITAL | Age: 58
End: 2023-06-14
Payer: MEDICARE

## 2023-06-15 DIAGNOSIS — G56.01 RIGHT CARPAL TUNNEL SYNDROME: ICD-10-CM

## 2023-06-15 DIAGNOSIS — G56.21 CUBITAL TUNNEL SYNDROME ON RIGHT: Primary | ICD-10-CM

## 2023-06-15 RX ORDER — OXYCODONE AND ACETAMINOPHEN 5; 325 MG/1; MG/1
1 TABLET ORAL EVERY 12 HOURS PRN
Qty: 14 TABLET | Refills: 0 | Status: SHIPPED | OUTPATIENT
Start: 2023-06-15 | End: 2023-06-22

## 2023-06-15 NOTE — TELEPHONE ENCOUNTER
Patient called requesting a refill of pain medicine. I informed her that I will route the refill request to Dr. Bhatt. I explained that it may take up to 48 business hours for the new prescription to be signed off on and sent to the pharmacy. I explained that they should check with the pharmacy over the next 48 buisness hours to see if the prescription is ready for .  Pharmacy: [facility]  I explained that if they have any issues to call us back.   Patient voiced a clear understanding.     Post Op: 6 weeks

## 2023-06-16 ENCOUNTER — TELEPHONE (OUTPATIENT)
Dept: ORTHOPEDICS | Facility: CLINIC | Age: 58
End: 2023-06-16
Payer: MEDICARE

## 2023-06-16 NOTE — TELEPHONE ENCOUNTER
Pt called and LVM stating that she fell today 6/16/23.     I called her back. States that she hit her head and right elbow. I recommended she go to an urgent care to be evaluated. I instructed her to call me back on Monday to update me on what the urgent care stated. If she needs an appointment with Dr. Bhatt at that time I can schedule it. Pt voiced a clear understanding.

## 2023-06-21 DIAGNOSIS — G56.21 CUBITAL TUNNEL SYNDROME ON RIGHT: ICD-10-CM

## 2023-06-21 DIAGNOSIS — G56.01 RIGHT CARPAL TUNNEL SYNDROME: ICD-10-CM

## 2023-06-21 RX ORDER — OXYCODONE AND ACETAMINOPHEN 5; 325 MG/1; MG/1
1 TABLET ORAL EVERY 12 HOURS PRN
Qty: 14 TABLET | Refills: 0 | OUTPATIENT
Start: 2023-06-21 | End: 2023-06-28

## 2023-06-21 NOTE — TELEPHONE ENCOUNTER
Patient called and LVM requesting  a refill of pain medicine. Pt states that she is still having pain. I called back and informed  her that I will route the refill request to Dr. Bhatt. I explained that it may take up to 48 business hours for the new prescription to be signed off on and sent to the pharmacy. I explained that they should check with the pharmacy over the next 48 buisness hours to see if the prescription is ready for .  I explained that if they have any issues to call us back.   Patient voiced a clear understanding.     Post Op: 7 weeks out       Pt states that she did go to the urgent care after her fall last week. She states that she is doing better.

## 2023-06-21 NOTE — TELEPHONE ENCOUNTER
I called the pt and explained to her that Dr. Bhatt can not order any more pain medication since she is 7 weeks out from CTR surgery. Pt has an appointment scheduled on 7/3/23. I recommended she switch to OTC medication. Pt voiced a clear understanding.

## 2023-06-22 ENCOUNTER — DOCUMENT SCAN (OUTPATIENT)
Dept: HOME HEALTH SERVICES | Facility: HOSPITAL | Age: 58
End: 2023-06-22
Payer: MEDICARE

## 2023-06-26 ENCOUNTER — DOCUMENT SCAN (OUTPATIENT)
Dept: HOME HEALTH SERVICES | Facility: HOSPITAL | Age: 58
End: 2023-06-26
Payer: MEDICARE

## 2023-07-03 ENCOUNTER — OFFICE VISIT (OUTPATIENT)
Dept: ORTHOPEDICS | Facility: CLINIC | Age: 58
End: 2023-07-03
Payer: MEDICARE

## 2023-07-03 VITALS
HEART RATE: 87 BPM | SYSTOLIC BLOOD PRESSURE: 107 MMHG | HEIGHT: 63 IN | DIASTOLIC BLOOD PRESSURE: 64 MMHG | BODY MASS INDEX: 27.64 KG/M2 | WEIGHT: 156 LBS

## 2023-07-03 DIAGNOSIS — G56.21 CUBITAL TUNNEL SYNDROME ON RIGHT: Primary | ICD-10-CM

## 2023-07-03 PROCEDURE — 99024 PR POST-OP FOLLOW-UP VISIT: ICD-10-PCS | Mod: POP,,, | Performed by: STUDENT IN AN ORGANIZED HEALTH CARE EDUCATION/TRAINING PROGRAM

## 2023-07-03 PROCEDURE — 99024 POSTOP FOLLOW-UP VISIT: CPT | Mod: POP,,, | Performed by: STUDENT IN AN ORGANIZED HEALTH CARE EDUCATION/TRAINING PROGRAM

## 2023-07-03 NOTE — PROGRESS NOTES
"Postop Clinic Note    Surgery:  Right Carpal and cubital tunnel releases, thumb A1 pulley release on 5/5/23    History of present illness:    Patient is doing well. No fevers or chills. Numbness in hand has resolved. Night time symptoms have resolved.  She is very happy with her progress.  The only complaint she has his pain in her thumb.  She is pain both of the CMC joint and MP joint of the thumb.  This is worse with motion.  She has not been wearing a brace.      Past Surgical History:   Procedure Laterality Date    ANTERIOR CRUCIATE LIGAMENT REPAIR Left     CARPAL TUNNEL RELEASE Right 5/5/2023    Procedure: RELEASE, CARPAL TUNNEL;  Surgeon: Canelo Bhatt MD;  Location: [facility];  Service: Orthopedics;  Laterality: Right;    FRACTURE SURGERY  2017    Mauro Melia    JOINT REPLACEMENT  2016    revision total knee ledt    SHOULDER ARTHROSCOPY W/ ROTATOR CUFF REPAIR Left     TOTAL KNEE ARTHROPLASTY Left     ULNAR NERVE TRANSPOSITION Right 5/5/2023    Procedure: TRANSPOSITION, NERVE, ULNAR  Endoscopic;  Surgeon: Canelo Bhatt MD;  Location: [facility] OR;  Service: Orthopedics;  Laterality: Right;  Endoscopic    WRIST SURGERY Right            Examination:    Vital Signs:    Vitals:    07/03/23 0924   BP: 107/64   Pulse: 87   Weight: 70.8 kg (156 lb)   Height: 5' 3" (1.6 m)       Body mass index is 27.63 kg/m².    Constitution:   Well-developed, well nourished patient in no acute distress.  Neurological:   Alert and oriented x 3 and cooperative to examination.     Psychiatric/Behavior: Normal mental status.  Respiratory:   No shortness of breath.  Eyes:    Extraoccular muscles intact  Skin:    No scars, rash or suspicious lesions.    MSK:   Right Upper Extremity:  Surgical incisions over the elbow and wrist are healed without any evidence of infection.  Two-point discrimination is 5mm in all 5 digits.  She can make a fist and extend her.  No catching or locking.  She has full elbow range of motion including 0-155 degrees.  " She does have tenderness to palpation at the thumb CMC joint as well as at the MP joint.  Radial pulses 2+ hand is warm well perfused    Imaging:   No new imaging     Assessment: s/p above surgeries, right thumb CMC osteoarthritis    Plan:  Patient is doing well.  She may return to full activities as tolerated.  Regarding her CMC pain at the base of the thumb, I will give her a thumb CMC brace today.  Radiographically, she is very mild arthritis at this time.  We can try this for the next 2-3 months.  If this does not help with her pain, I can give her an injection the next time I see her    Follow Up:  Three-months   Xray at next visit:  None

## 2023-07-10 PROCEDURE — G0179 MD RECERTIFICATION HHA PT: HCPCS | Mod: ,,, | Performed by: NURSE PRACTITIONER

## 2023-07-10 PROCEDURE — G0179 PR HOME HEALTH MD RECERTIFICATION: ICD-10-PCS | Mod: ,,, | Performed by: NURSE PRACTITIONER

## 2023-07-18 ENCOUNTER — OFFICE VISIT (OUTPATIENT)
Dept: ORTHOPEDICS | Facility: CLINIC | Age: 58
End: 2023-07-18
Payer: MEDICARE

## 2023-07-18 VITALS
DIASTOLIC BLOOD PRESSURE: 76 MMHG | SYSTOLIC BLOOD PRESSURE: 108 MMHG | BODY MASS INDEX: 28.35 KG/M2 | WEIGHT: 160 LBS | HEART RATE: 90 BPM | HEIGHT: 63 IN

## 2023-07-18 DIAGNOSIS — M17.11 PRIMARY OSTEOARTHRITIS OF RIGHT KNEE: Primary | ICD-10-CM

## 2023-07-18 PROCEDURE — 20610 DRAIN/INJ JOINT/BURSA W/O US: CPT | Mod: RT,,, | Performed by: ORTHOPAEDIC SURGERY

## 2023-07-18 PROCEDURE — 20610 LARGE JOINT ASPIRATION/INJECTION: R KNEE: ICD-10-PCS | Mod: RT,,, | Performed by: ORTHOPAEDIC SURGERY

## 2023-07-18 PROCEDURE — 99213 PR OFFICE/OUTPT VISIT, EST, LEVL III, 20-29 MIN: ICD-10-PCS | Mod: 25,,, | Performed by: ORTHOPAEDIC SURGERY

## 2023-07-18 PROCEDURE — 99213 OFFICE O/P EST LOW 20 MIN: CPT | Mod: 25,,, | Performed by: ORTHOPAEDIC SURGERY

## 2023-07-18 RX ORDER — BETAMETHASONE SODIUM PHOSPHATE AND BETAMETHASONE ACETATE 3; 3 MG/ML; MG/ML
12 INJECTION, SUSPENSION INTRA-ARTICULAR; INTRALESIONAL; INTRAMUSCULAR; SOFT TISSUE
Status: DISCONTINUED | OUTPATIENT
Start: 2023-07-18 | End: 2023-07-18 | Stop reason: HOSPADM

## 2023-07-18 RX ORDER — MECLIZINE HYDROCHLORIDE 25 MG/1
25 TABLET ORAL EVERY 8 HOURS PRN
COMMUNITY
Start: 2023-07-12 | End: 2024-02-15

## 2023-07-18 RX ORDER — LIDOCAINE HYDROCHLORIDE 20 MG/ML
5 INJECTION, SOLUTION EPIDURAL; INFILTRATION; INTRACAUDAL; PERINEURAL
Status: DISCONTINUED | OUTPATIENT
Start: 2023-07-18 | End: 2023-07-18 | Stop reason: HOSPADM

## 2023-07-18 RX ORDER — DOXYCYCLINE 100 MG/1
100 CAPSULE ORAL 2 TIMES DAILY
COMMUNITY
Start: 2023-07-13 | End: 2024-01-10

## 2023-07-18 RX ORDER — METHYLPREDNISOLONE 4 MG/1
TABLET ORAL
COMMUNITY
Start: 2023-07-13 | End: 2024-01-10

## 2023-07-18 RX ADMIN — BETAMETHASONE SODIUM PHOSPHATE AND BETAMETHASONE ACETATE 12 MG: 3; 3 INJECTION, SUSPENSION INTRA-ARTICULAR; INTRALESIONAL; INTRAMUSCULAR; SOFT TISSUE at 10:07

## 2023-07-18 RX ADMIN — LIDOCAINE HYDROCHLORIDE 5 ML: 20 INJECTION, SOLUTION EPIDURAL; INFILTRATION; INTRACAUDAL; PERINEURAL at 10:07

## 2023-07-18 NOTE — PROCEDURES
Large Joint Aspiration/Injection: R knee    Date/Time: 7/18/2023 10:15 AM  Performed by: Mio Pittman MD  Authorized by: Mio Pittman MD     Consent Done?:  Yes (Verbal)  Indications:  Arthritis  Timeout: prior to procedure the correct patient, procedure, and site was verified    Prep: patient was prepped and draped in usual sterile fashion      Details:  Needle Size:  22 G  Approach:  Anterolateral  Location:  Knee  Site:  R knee  Medications:  5 mL LIDOcaine (PF) 20 mg/mL (2%) 20 mg/mL (2 %); 12 mg betamethasone acetate-betamethasone sodium phosphate 6 mg/mL

## 2023-07-18 NOTE — PROGRESS NOTES
Chief Complaint:   Chief Complaint   Patient presents with    Left Knee - Pain    Right Knee - Pain    Knee Pain     Pt states the injection helped to ease up her pain in the Rt knee. Pt states she is current taking antibiotics for upper respiratory infection and wanted to know if it will interfere on the cortisone effects. Pt states she has been experiencing pain in her Lt knee and presents a small bump on it which is tender to the touch.       History of present illness:  58-year-old female presents today for follow-up of a right knee osteoarthritis as well as failed total knee arthroplasty.  She is status post revision left knee.  Overall she is been wearing a brace.  She feels the knee is improved.  She is happy with her recovery.  She does have some very mild pain although nothing persistent with regards to the right knee, she did well with the injection.  She is requesting repeat injection today    Past Medical History:   Diagnosis Date    Bipolar disorder     Carpal tunnel syndrome     COPD (chronic obstructive pulmonary disease)     Depression     Dry eyes     Lumbar degenerative disc disease     Mixed hyperlipidemia     Muscle spasms of both lower extremities     Muscle spasms of neck     Neuropathy     TIA (transient ischemic attack) 2022    Ulnar nerve external compression syndrome        Past Surgical History:   Procedure Laterality Date    ANTERIOR CRUCIATE LIGAMENT REPAIR Left     CARPAL TUNNEL RELEASE Right 5/5/2023    Procedure: RELEASE, CARPAL TUNNEL;  Surgeon: Canelo Bhatt MD;  Location: [facility] OR;  Service: Orthopedics;  Laterality: Right;    FRACTURE SURGERY  2017    Mauro Cárdenas    JOINT REPLACEMENT  2016    revision total knee ledt    SHOULDER ARTHROSCOPY W/ ROTATOR CUFF REPAIR Left     TOTAL KNEE ARTHROPLASTY Left     ULNAR NERVE TRANSPOSITION Right 5/5/2023    Procedure: TRANSPOSITION, NERVE, ULNAR  Endoscopic;  Surgeon: Canelo Bhatt MD;  Location: [facility] OR;  Service: Orthopedics;   Laterality: Right;  Endoscopic    WRIST SURGERY Right        Current Outpatient Medications   Medication Sig    albuterol (PROVENTIL/VENTOLIN HFA) 90 mcg/actuation inhaler INHALE TWO PUFFS INTO THE LUNGS EVERY FOUR TO SIX HOURS AS NEEDED FOR SHORTNESS OF BREATH    aspirin 325 MG tablet Take 325 mg by mouth once daily.    atorvastatin (LIPITOR) 40 MG tablet Take 40 mg by mouth once daily.    azelastine (ASTELIN) 137 mcg (0.1 %) nasal spray daily as needed.    buPROPion (WELLBUTRIN XL) 300 MG 24 hr tablet Take 300 mg by mouth every morning.    carBAMazepine (TEGRETOL XR) 400 MG Tb12 Take 400 mg by mouth 2 (two) times daily.    celecoxib (CELEBREX) 100 MG capsule Take 100 mg by mouth 3 (three) times daily.    clonazePAM (KLONOPIN) 0.5 MG tablet Take 0.5 mg by mouth nightly.    dexlansoprazole (DEXILANT) 60 mg capsule Take 1 capsule by mouth once daily.    diclofenac sodium (VOLTAREN) 1 % Gel Apply topically as needed.    doxycycline (VIBRAMYCIN) 100 MG Cap Take 100 mg by mouth 2 (two) times daily.    DULoxetine (CYMBALTA) 60 MG capsule Take 60 mg by mouth 2 (two) times daily.    estradioL (ESTRACE) 0.01 % (0.1 mg/gram) vaginal cream as needed.    famotidine (PEPCID) 20 MG tablet Take 20 mg by mouth nightly.    fluticasone propionate (FLONASE) 50 mcg/actuation nasal spray 1 spray by Each Nostril route nightly.    furosemide (LASIX) 20 MG tablet Take 20 mg by mouth once daily.    hydrOXYzine pamoate (VISTARIL) 25 MG Cap Take 25 mg by mouth Daily.    icosapent ethyL (VASCEPA) 1 gram Cap Take 2 capsules by mouth 2 (two) times daily.    JARDIANCE 25 mg tablet Take 25 mg by mouth once daily.    loratadine (CLARITIN) 10 mg tablet Take 10 mg by mouth daily as needed.    meclizine (ANTIVERT) 25 mg tablet Take 25 mg by mouth every 8 (eight) hours as needed.    metFORMIN (GLUCOPHAGE) 500 MG tablet Take 500 mg by mouth 3 (three) times daily.    methylPREDNISolone (MEDROL DOSEPACK) 4 mg tablet Take by mouth.    MOVANTIK 25 mg  tablet Take 25 mg by mouth once daily.    neomycin-polymyxin-dexamethasone (MAXITROL) 3.5mg/mL-10,000 unit/mL-0.1 % DrpS Place 1 drop into both eyes as needed.    nicotine (NICODERM CQ) 21 mg/24 hr 1 patch once daily.    paliperidone palmitate (INVEGA SUSTENNA) 234 mg/1.5 mL Syrg injection Inject 234 mg into the muscle every 30 days.    pregabalin (LYRICA) 100 MG capsule Take 100 mg by mouth 3 (three) times daily.    RESTASIS 0.05 % ophthalmic emulsion Place 1 drop into both eyes 2 (two) times daily.    tiZANidine (ZANAFLEX) 4 MG tablet take 1 tablet BY MOUTH EVERY 8 HOURS AS NEEDED FOR MUSCLE SPASMS (Patient taking differently: Take 4 mg by mouth daily as needed. take 1 tablet BY MOUTH EVERY 8 HOURS AS NEEDED FOR MUSCLE SPASMS)    topiramate (TOPAMAX) 200 MG Tab Take 200 mg by mouth once daily.    valACYclovir (VALTREX) 500 MG tablet Take 500 mg by mouth 2 (two) times daily.    VRAYLAR 3 mg Cap Take 1 capsule by mouth once daily.    benztropine (COGENTIN) 1 MG tablet Take 1 mg by mouth as needed.     No current facility-administered medications for this visit.       Review of patient's allergies indicates:   Allergen Reactions    Adhesive      Other reaction(s): skin tears easily with adhesive    Cephalexin Nausea And Vomiting    Erythromycin Nausea And Vomiting    Lithium Nausea And Vomiting    Naproxen Nausea And Vomiting       Family History   Problem Relation Age of Onset    Coronary artery disease Mother     Diabetes Mother     Arthritis Mother         Back , hands    Early death Mother         Diabetes    Heart disease Mother     Hypertension Mother     Kidney disease Mother     Stroke Mother     Vision loss Mother     Coronary artery disease Father     Asthma Father         Back    Heart disease Father     Hypertension Father     Vision loss Father     Arthritis Sister         Not sure    Asthma Sister         Finger, shoulder    Mental illness Sister     Vision loss Sister     Arthritis Brother         Hip     Birth defects Brother         Special Needs/ Downs Syndrome    Heart disease Brother     Learning disabilities Brother     Mental retardation Brother        Social History     Socioeconomic History    Marital status:    Tobacco Use    Smoking status: Every Day     Packs/day: 0.25     Years: 42.00     Pack years: 10.50     Types: Vaping with nicotine, Cigarettes     Passive exposure: Yes    Smokeless tobacco: Never   Substance and Sexual Activity    Alcohol use: Not Currently     Comment: stopped in 2005    Drug use: Not Currently     Types: Marijuana, Cocaine     Comment: Nothing    Sexual activity: Not Currently     Partners: Male     Birth control/protection: None     Comment: Abstinence           Review of Systems:    Constitution: Negative for chills, fever, and sweats.  Negative for unexplained weight loss.    HENT:  Negative for headaches and blurry vision.    Cardiovascular:Negative for chest pain or irregular heart beat. Negative for hypertension.    Respiratory:  Negative for cough and shortness of breath.    Gastrointestinal: Negative for abdominal pain, heartburn, melena, nausea, and vomitting.    Genitourinary:  Negative bladder incontinence and dysuria.    Musculoskeletal:  See HPI    Neurological: Negative for numbness.    Psychiatric/Behavioral: Negative for depression.  The patient is not nervous/anxious.      Endocrine: Negative for polyuria    Hematologic/Lymphatic: Negative for bleeding problem.  Does not bruise/bleed easily.    Skin: Negative for poor would healing and rash      Physical Examination:    Vital Signs:    Vitals:    07/18/23 1000   BP: 108/76   Pulse: 90       Body mass index is 28.34 kg/m².    General: No acute distress, alert and oriented, healthy appearing    HEENT: Head is atraumatic, mucous membranes are moist    Neck: Supples, no JVD    Cardiovascular: Palpable dorsalis pedis and posterior tibial pulses, regular rate and rhythm to those pulses    Lungs: Breathing  non-labored    Skin: no rashes appreciated    Neurologic: Can flex and extend knees, ankles, and toes. Sensation is grossly intact    Bilateral knees:  Patient's left knee is stable.  He gets full extension.  No significant hyperextension.  Range of motion left knee without significant or severe pain  Right knee:  Brisk capillary refill distally.  Sensation intact distally.  Range of motion of the right knee with crepitus.    X-rays:       Assessment::s/p revision of the left knee.  Right knee osteoarthritis    Plan:  Plan to give another injection of the right knee is working well.  With regards to the left knee we will get her another brace to see if this continues to improve her symptoms.    This note was created using [employer] voice recognition software that occasionally misinterpreted phrases or words.    Consult note is delivered via Epic messaging service.

## 2023-07-28 ENCOUNTER — DOCUMENT SCAN (OUTPATIENT)
Dept: HOME HEALTH SERVICES | Facility: HOSPITAL | Age: 58
End: 2023-07-28
Payer: MEDICARE

## 2023-07-31 ENCOUNTER — EXTERNAL HOME HEALTH (OUTPATIENT)
Dept: HOME HEALTH SERVICES | Facility: HOSPITAL | Age: 58
End: 2023-07-31
Payer: MEDICARE

## 2023-09-07 ENCOUNTER — PATIENT MESSAGE (OUTPATIENT)
Dept: RESEARCH | Facility: HOSPITAL | Age: 58
End: 2023-09-07
Payer: MEDICARE

## 2023-09-08 PROCEDURE — G0179 MD RECERTIFICATION HHA PT: HCPCS | Mod: ,,, | Performed by: NURSE PRACTITIONER

## 2023-09-13 DIAGNOSIS — R13.14 DYSPHAGIA, PHARYNGOESOPHAGEAL PHASE: ICD-10-CM

## 2023-09-13 DIAGNOSIS — K21.9 ESOPHAGEAL REFLUX: Primary | ICD-10-CM

## 2023-10-06 ENCOUNTER — HOSPITAL ENCOUNTER (OUTPATIENT)
Dept: RADIOLOGY | Facility: HOSPITAL | Age: 58
Discharge: HOME | End: 2023-10-06
Attending: INTERNAL MEDICINE
Payer: MEDICARE

## 2023-10-06 DIAGNOSIS — R13.14 DYSPHAGIA, PHARYNGOESOPHAGEAL PHASE: ICD-10-CM

## 2023-10-06 DIAGNOSIS — K21.9 ESOPHAGEAL REFLUX: ICD-10-CM

## 2023-10-06 PROCEDURE — A9541 TC99M SULFUR COLLOID: HCPCS

## 2023-10-09 ENCOUNTER — EXTERNAL HOME HEALTH (OUTPATIENT)
Dept: HOME HEALTH SERVICES | Facility: HOSPITAL | Age: 58
End: 2023-10-09
Payer: MEDICARE

## 2023-10-09 ENCOUNTER — OFFICE VISIT (OUTPATIENT)
Dept: ORTHOPEDICS | Facility: CLINIC | Age: 58
End: 2023-10-09
Payer: MEDICARE

## 2023-10-09 ENCOUNTER — HOSPITAL ENCOUNTER (OUTPATIENT)
Dept: RADIOLOGY | Facility: CLINIC | Age: 58
Discharge: HOME | End: 2023-10-09
Attending: STUDENT IN AN ORGANIZED HEALTH CARE EDUCATION/TRAINING PROGRAM
Payer: MEDICARE

## 2023-10-09 VITALS
WEIGHT: 152 LBS | SYSTOLIC BLOOD PRESSURE: 124 MMHG | HEART RATE: 83 BPM | HEIGHT: 63 IN | BODY MASS INDEX: 26.93 KG/M2 | DIASTOLIC BLOOD PRESSURE: 81 MMHG

## 2023-10-09 DIAGNOSIS — G56.21 CUBITAL TUNNEL SYNDROME ON RIGHT: Primary | ICD-10-CM

## 2023-10-09 DIAGNOSIS — G56.21 CUBITAL TUNNEL SYNDROME ON RIGHT: ICD-10-CM

## 2023-10-09 PROCEDURE — 73130 X-RAY EXAM OF HAND: CPT | Mod: RT,,, | Performed by: STUDENT IN AN ORGANIZED HEALTH CARE EDUCATION/TRAINING PROGRAM

## 2023-10-09 PROCEDURE — 99213 PR OFFICE/OUTPT VISIT, EST, LEVL III, 20-29 MIN: ICD-10-PCS | Mod: ,,, | Performed by: STUDENT IN AN ORGANIZED HEALTH CARE EDUCATION/TRAINING PROGRAM

## 2023-10-09 PROCEDURE — 73130 XR HAND COMPLETE 3 VIEW RIGHT: ICD-10-PCS | Mod: RT,,, | Performed by: STUDENT IN AN ORGANIZED HEALTH CARE EDUCATION/TRAINING PROGRAM

## 2023-10-09 PROCEDURE — 99213 OFFICE O/P EST LOW 20 MIN: CPT | Mod: ,,, | Performed by: STUDENT IN AN ORGANIZED HEALTH CARE EDUCATION/TRAINING PROGRAM

## 2023-10-09 NOTE — PROGRESS NOTES
"Postop Clinic Note    Surgery:  Right Carpal and cubital tunnel releases, thumb A1 pulley release on 5/5/23    History of present illness:    Patient is doing great regarding the above surgery.  Her sensation has fully returned into her hand and she no longer has nocturnal symptoms that wake her up at nighttime.  She is doing very well regarding her sensation and strength in the hand.  She did Bang her wrist over the weekend and felt like she sprained her wrist.  She complains of pain localized to the radiocarpal joint especially on the radial side worse with motion.  She is been wearing a Velcro wrist brace which is giving her some relief but she still has pain.  She had a surgery performed on this wrist by an outside surgeon in 2007 which she overall did well from but she has intermittent wrist pain.  I have given her injection into his wrist in the past which helped her      Past Surgical History:   Procedure Laterality Date    ANTERIOR CRUCIATE LIGAMENT REPAIR Left     CARPAL TUNNEL RELEASE Right 5/5/2023    Procedure: RELEASE, CARPAL TUNNEL;  Surgeon: Canelo Bhatt MD;  Location: [facility];  Service: Orthopedics;  Laterality: Right;    FRACTURE SURGERY  2017    Mauro Cárdenas    JOINT REPLACEMENT  2016    revision total knee ledt    SHOULDER ARTHROSCOPY W/ ROTATOR CUFF REPAIR Left     TOTAL KNEE ARTHROPLASTY Left     ULNAR NERVE TRANSPOSITION Right 5/5/2023    Procedure: TRANSPOSITION, NERVE, ULNAR  Endoscopic;  Surgeon: Canelo Bhatt MD;  Location: [facility] OR;  Service: Orthopedics;  Laterality: Right;  Endoscopic    WRIST SURGERY Right            Examination:    Vital Signs:    Vitals:    10/09/23 1010   BP: 124/81   Pulse: 83   Weight: 68.9 kg (152 lb)   Height: 5' 3" (1.6 m)       Body mass index is 26.93 kg/m².    Constitution:   Well-developed, well nourished patient in no acute distress.  Neurological:   Alert and oriented x 3 and cooperative to examination.     Psychiatric/Behavior: Normal mental " status.  Respiratory:   No shortness of breath.  Eyes:    Extraoccular muscles intact  Skin:    No scars, rash or suspicious lesions.    MSK:   Right Upper Extremity:  Surgical incisions over the elbow and wrist are healed without any evidence of infection.  Two-point discrimination is 5mm in all 5 digits.  She can make a fist and extend her digits.  No catching or locking.  Apb strength is 5/5.  First dorsal interosseous strength is 5/5.  She has full elbow range of motion including 0-155 degrees.  Tenderness to palpation over the radiocarpal joint at the radioscaphoid joint.  She has pain with radial deviation..  Radial pulses 2+ hand is warm well perfused    Imaging:   No new imaging     Assessment: s/p above surgeries, right SLAC wrist, right thumb CMC arthritis    Plan:  She is doing great recovering from the nerve decompressive surgeries.  She does have early signs of SLAC wrist with pain at her wrist.  She has a previous scapholunate ligament reconstruction.  I will give her an injection into the wrist today.  She can continue wearing the Velcro brace for pain.  I discussed SLAC wrist and the development of arthritis.  I think she is doing well with the injections and conservative treatments we will continue conservative treatment for as long as this lasts for her.  She is very happy with her nerve decompressive surgeries and so I can see her back in 3 months to continue to monitor her recovery from her nerve surgery as well as of her SLAC wrist and thumb CMC arthritis    Follow Up:  Three-months   Xray at next visit:  None

## 2023-10-24 ENCOUNTER — HOSPITAL ENCOUNTER (OUTPATIENT)
Dept: RADIOLOGY | Facility: CLINIC | Age: 58
Discharge: HOME | End: 2023-10-24
Attending: ORTHOPAEDIC SURGERY
Payer: MEDICARE

## 2023-10-24 ENCOUNTER — OFFICE VISIT (OUTPATIENT)
Dept: ORTHOPEDICS | Facility: CLINIC | Age: 58
End: 2023-10-24
Payer: MEDICARE

## 2023-10-24 VITALS
BODY MASS INDEX: 28.35 KG/M2 | WEIGHT: 160 LBS | DIASTOLIC BLOOD PRESSURE: 72 MMHG | HEIGHT: 63 IN | SYSTOLIC BLOOD PRESSURE: 102 MMHG | HEART RATE: 80 BPM

## 2023-10-24 DIAGNOSIS — M75.82 ROTATOR CUFF TENDONITIS, LEFT: ICD-10-CM

## 2023-10-24 DIAGNOSIS — M25.512 ACUTE PAIN OF LEFT SHOULDER: Primary | ICD-10-CM

## 2023-10-24 DIAGNOSIS — M25.512 ACUTE PAIN OF LEFT SHOULDER: ICD-10-CM

## 2023-10-24 PROCEDURE — 99213 PR OFFICE/OUTPT VISIT, EST, LEVL III, 20-29 MIN: ICD-10-PCS | Mod: ,,, | Performed by: ORTHOPAEDIC SURGERY

## 2023-10-24 PROCEDURE — 20610 LARGE JOINT ASPIRATION/INJECTION: L SUBACROMIAL BURSA: ICD-10-PCS | Mod: LT,,, | Performed by: NURSE PRACTITIONER

## 2023-10-24 PROCEDURE — 73030 X-RAY EXAM OF SHOULDER: CPT | Mod: LT,,, | Performed by: ORTHOPAEDIC SURGERY

## 2023-10-24 PROCEDURE — 99213 OFFICE O/P EST LOW 20 MIN: CPT | Mod: ,,, | Performed by: ORTHOPAEDIC SURGERY

## 2023-10-24 PROCEDURE — 73030 XR SHOULDER COMPLETE 2 OR MORE VIEWS LEFT: ICD-10-PCS | Mod: LT,,, | Performed by: ORTHOPAEDIC SURGERY

## 2023-10-24 PROCEDURE — 20610 DRAIN/INJ JOINT/BURSA W/O US: CPT | Mod: LT,,, | Performed by: NURSE PRACTITIONER

## 2023-10-24 RX ORDER — BETAMETHASONE SODIUM PHOSPHATE AND BETAMETHASONE ACETATE 3; 3 MG/ML; MG/ML
12 INJECTION, SUSPENSION INTRA-ARTICULAR; INTRALESIONAL; INTRAMUSCULAR; SOFT TISSUE
Status: DISCONTINUED | OUTPATIENT
Start: 2023-10-24 | End: 2023-10-24 | Stop reason: HOSPADM

## 2023-10-24 RX ORDER — LIDOCAINE HYDROCHLORIDE 20 MG/ML
5 INJECTION, SOLUTION EPIDURAL; INFILTRATION; INTRACAUDAL; PERINEURAL
Status: DISCONTINUED | OUTPATIENT
Start: 2023-10-24 | End: 2023-10-24 | Stop reason: HOSPADM

## 2023-10-24 RX ADMIN — LIDOCAINE HYDROCHLORIDE 5 ML: 20 INJECTION, SOLUTION EPIDURAL; INFILTRATION; INTRACAUDAL; PERINEURAL at 09:10

## 2023-10-24 RX ADMIN — BETAMETHASONE SODIUM PHOSPHATE AND BETAMETHASONE ACETATE 12 MG: 3; 3 INJECTION, SUSPENSION INTRA-ARTICULAR; INTRALESIONAL; INTRAMUSCULAR; SOFT TISSUE at 09:10

## 2023-10-24 NOTE — PROGRESS NOTES
Chief Complaint:   Chief Complaint   Patient presents with    Right Knee - Follow-up     Pt states her knee is not bothering her enough to have a injection. Denies pain.     Left Shoulder - Pain     Pt states she has been experiencing constant pain in her Lt shoulder. Pain unable patient to sleep at night. Pt states she had a partial shoulder replacement 10 years ago. Pt is requesting an injection to ease up the discomfort.       History of present illness:  58-year-old female presents for evaluation and follow-up of right knee injection as well as left shoulder pain.  Patient got an injection of her knee about 2-3 months ago.  It did quite well.  She continues to do well and does not wish to have another injection of the right knee today.  With regards to her left shoulder, she has a history of a left shoulder scope in the past.  She notes significant pain and weakness to her left shoulder.    Past Medical History:   Diagnosis Date    Bipolar disorder     Carpal tunnel syndrome     COPD (chronic obstructive pulmonary disease)     Depression     Dry eyes     Lumbar degenerative disc disease     Mixed hyperlipidemia     Muscle spasms of both lower extremities     Muscle spasms of neck     Neuropathy     TIA (transient ischemic attack) 2022    Ulnar nerve external compression syndrome        Past Surgical History:   Procedure Laterality Date    ANTERIOR CRUCIATE LIGAMENT REPAIR Left     CARPAL TUNNEL RELEASE Right 5/5/2023    Procedure: RELEASE, CARPAL TUNNEL;  Surgeon: Canelo Bhtat MD;  Location: [facility] OR;  Service: Orthopedics;  Laterality: Right;    FRACTURE SURGERY  2017    Mauro Cárdenas    JOINT REPLACEMENT  2016    revision total knee ledt    SHOULDER ARTHROSCOPY W/ ROTATOR CUFF REPAIR Left     TOTAL KNEE ARTHROPLASTY Left     ULNAR NERVE TRANSPOSITION Right 5/5/2023    Procedure: TRANSPOSITION, NERVE, ULNAR  Endoscopic;  Surgeon: Canelo Bhatt MD;  Location: [facility] OR;  Service: Orthopedics;  Laterality: Right;   Endoscopic    WRIST SURGERY Right        Current Outpatient Medications   Medication Sig    albuterol (PROVENTIL/VENTOLIN HFA) 90 mcg/actuation inhaler INHALE TWO PUFFS INTO THE LUNGS EVERY FOUR TO SIX HOURS AS NEEDED FOR SHORTNESS OF BREATH    aspirin 325 MG tablet Take 325 mg by mouth once daily.    atorvastatin (LIPITOR) 40 MG tablet Take 40 mg by mouth once daily.    azelastine (ASTELIN) 137 mcg (0.1 %) nasal spray daily as needed.    buPROPion (WELLBUTRIN XL) 300 MG 24 hr tablet Take 300 mg by mouth every morning.    carBAMazepine (TEGRETOL XR) 400 MG Tb12 Take 400 mg by mouth 2 (two) times daily.    celecoxib (CELEBREX) 100 MG capsule Take 100 mg by mouth 3 (three) times daily.    clonazePAM (KLONOPIN) 0.5 MG tablet Take 0.5 mg by mouth nightly.    dexlansoprazole (DEXILANT) 60 mg capsule Take 1 capsule by mouth once daily.    diclofenac sodium (VOLTAREN) 1 % Gel Apply topically as needed.    doxycycline (VIBRAMYCIN) 100 MG Cap Take 100 mg by mouth 2 (two) times daily.    DULoxetine (CYMBALTA) 60 MG capsule Take 60 mg by mouth 2 (two) times daily.    estradioL (ESTRACE) 0.01 % (0.1 mg/gram) vaginal cream as needed.    famotidine (PEPCID) 20 MG tablet Take 20 mg by mouth nightly.    fluticasone propionate (FLONASE) 50 mcg/actuation nasal spray 1 spray by Each Nostril route nightly.    furosemide (LASIX) 20 MG tablet Take 20 mg by mouth once daily.    hydrOXYzine pamoate (VISTARIL) 25 MG Cap Take 25 mg by mouth Daily.    icosapent ethyL (VASCEPA) 1 gram Cap Take 2 capsules by mouth 2 (two) times daily.    JARDIANCE 25 mg tablet Take 25 mg by mouth once daily.    loratadine (CLARITIN) 10 mg tablet Take 10 mg by mouth daily as needed.    meclizine (ANTIVERT) 25 mg tablet Take 25 mg by mouth every 8 (eight) hours as needed.    metFORMIN (GLUCOPHAGE) 500 MG tablet Take 500 mg by mouth 3 (three) times daily.    methylPREDNISolone (MEDROL DOSEPACK) 4 mg tablet Take by mouth.    neomycin-polymyxin-dexamethasone  (MAXITROL) 3.5mg/mL-10,000 unit/mL-0.1 % DrpS Place 1 drop into both eyes as needed.    nicotine (NICODERM CQ) 21 mg/24 hr 1 patch once daily.    paliperidone palmitate (INVEGA SUSTENNA) 234 mg/1.5 mL Syrg injection Inject 234 mg into the muscle every 30 days.    pregabalin (LYRICA) 100 MG capsule Take 100 mg by mouth 3 (three) times daily.    RESTASIS 0.05 % ophthalmic emulsion Place 1 drop into both eyes 2 (two) times daily.    tiZANidine (ZANAFLEX) 4 MG tablet take 1 tablet BY MOUTH EVERY 8 HOURS AS NEEDED FOR MUSCLE SPASMS (Patient taking differently: Take 4 mg by mouth daily as needed. take 1 tablet BY MOUTH EVERY 8 HOURS AS NEEDED FOR MUSCLE SPASMS)    topiramate (TOPAMAX) 200 MG Tab Take 200 mg by mouth once daily.    valACYclovir (VALTREX) 500 MG tablet Take 500 mg by mouth 2 (two) times daily.    VRAYLAR 3 mg Cap Take 1 capsule by mouth once daily.    benztropine (COGENTIN) 1 MG tablet Take 1 mg by mouth as needed.    MOVANTIK 25 mg tablet Take 25 mg by mouth once daily.     No current facility-administered medications for this visit.       Review of patient's allergies indicates:   Allergen Reactions    Adhesive      Other reaction(s): skin tears easily with adhesive    Cephalexin Nausea And Vomiting    Erythromycin Nausea And Vomiting    Lithium Nausea And Vomiting    Naproxen Nausea And Vomiting       Family History   Problem Relation Age of Onset    Coronary artery disease Mother     Diabetes Mother     Arthritis Mother         Back , hands    Early death Mother         Diabetes    Heart disease Mother     Hypertension Mother     Kidney disease Mother     Stroke Mother     Vision loss Mother     Coronary artery disease Father     Asthma Father         Back    Heart disease Father     Hypertension Father     Vision loss Father     Arthritis Sister         Not sure    Asthma Sister         Finger, shoulder    Mental illness Sister     Vision loss Sister     Arthritis Brother         Hip    Birth defects  Brother         Special Needs/ Downs Syndrome    Heart disease Brother     Learning disabilities Brother     Mental retardation Brother        Social History     Socioeconomic History    Marital status:    Tobacco Use    Smoking status: Every Day     Current packs/day: 0.25     Average packs/day: 0.3 packs/day for 42.0 years (10.5 ttl pk-yrs)     Types: Vaping with nicotine, Cigarettes     Passive exposure: Yes    Smokeless tobacco: Never   Substance and Sexual Activity    Alcohol use: Not Currently     Comment: stopped in 2005    Drug use: Not Currently     Types: Marijuana, Cocaine     Comment: Nothing    Sexual activity: Not Currently     Partners: Male     Birth control/protection: None     Comment: Abstinence           Review of Systems:    Constitution: Negative for chills, fever, and sweats.  Negative for unexplained weight loss.    HENT:  Negative for headaches and blurry vision.    Cardiovascular:Negative for chest pain or irregular heart beat. Negative for hypertension.    Respiratory:  Negative for cough and shortness of breath.    Gastrointestinal: Negative for abdominal pain, heartburn, melena, nausea, and vomitting.    Genitourinary:  Negative bladder incontinence and dysuria.    Musculoskeletal:  See HPI    Neurological: Negative for numbness.    Psychiatric/Behavioral: Negative for depression.  The patient is not nervous/anxious.      Endocrine: Negative for polyuria    Hematologic/Lymphatic: Negative for bleeding problem.  Does not bruise/bleed easily.    Skin: Negative for poor would healing and rash      Physical Examination:    Vital Signs:    Vitals:    10/24/23 0936   BP: 102/72   Pulse: 80       Body mass index is 28.34 kg/m².    General: No acute distress, alert and oriented, healthy appearing    HEENT: Head is atraumatic, mucous membranes are moist    Neck: Supples, no JVD    Cardiovascular: Palpable dorsalis pedis and posterior tibial pulses, regular rate and rhythm to those  pulses    Lungs: Breathing non-labored    Skin: no rashes appreciated    Neurologic: Can flex and extend knees, ankles, and toes. Sensation is grossly intact    Left shoulder:  Well-healed portal incisions.  Range of motion left shoulder is somewhat limited.  She is stiff.  She only forward flex to about 100.  Break away strength to supraspinatus testing given pain    X-rays:  Three views left shoulder reviewed.  Patient's shoulder has no significant arthritic change.  No acute abnormality noted     Assessment::  Rotator cuff tendinitis versus small tear    Plan:  Get her into physical therapy.  Give her an injection today to calm this down.  See her back in 6-8 weeks.  MRI if she continues to have issues with his left shoulder    This note was created using PEVES[employer] voice recognition software that occasionally misinterpreted phrases or words.    Consult note is delivered via Epic messaging service.

## 2023-10-24 NOTE — PROCEDURES
Large Joint Aspiration/Injection: L subacromial bursa    Date/Time: 10/24/2023 9:15 AM    Performed by: Swetha Vallejo FNP  Authorized by: Mio Pittman MD    Consent Done?:  Yes (Verbal)  Indications:  Arthritis  Timeout: prior to procedure the correct patient, procedure, and site was verified    Prep: patient was prepped and draped in usual sterile fashion      Details:  Needle Size:  22 G  Approach:  Posterior  Location:  Shoulder  Site:  L subacromial bursa  Medications:  5 mL LIDOcaine (PF) 20 mg/mL (2%) 20 mg/mL (2 %); 12 mg betamethasone acetate-betamethasone sodium phosphate 6 mg/mL  Patient tolerance:  Patient tolerated the procedure well with no immediate complications

## 2023-11-02 ENCOUNTER — HOSPITAL ENCOUNTER (EMERGENCY)
Facility: HOSPITAL | Age: 58
Discharge: HOME | End: 2023-11-02
Attending: EMERGENCY MEDICINE
Payer: MEDICARE

## 2023-11-02 VITALS
TEMPERATURE: 98 F | BODY MASS INDEX: 28.53 KG/M2 | RESPIRATION RATE: 18 BRPM | OXYGEN SATURATION: 99 % | SYSTOLIC BLOOD PRESSURE: 112 MMHG | HEIGHT: 63 IN | WEIGHT: 161 LBS | HEART RATE: 74 BPM | DIASTOLIC BLOOD PRESSURE: 72 MMHG
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
## 2023-11-02 DIAGNOSIS — R07.89 LEFT-SIDED CHEST WALL PAIN: ICD-10-CM

## 2023-11-02 PROCEDURE — 99283 EMERGENCY DEPT VISIT LOW MDM: CPT

## 2023-11-02 NOTE — ED PROVIDER NOTES
Encounter Date: 11/2/2023       History     Chief Complaint   Patient presents with    Rib Injury     Pt c/o pain to left rib area s/p falling on a metal cart on yesterday.     58-year-old female tripped yesterday while pushing a laundry basket and fell and hit her left ribs on the edge of the laundry basket.  She complains of left rib pain.  No shortness of breath.  No pain to the head, neck, chest, abdomen, hips.  She also has some mild soreness to both knees and both feet.  History of knee replacement on the left.  Ambulatory without a limp.        Review of patient's allergies indicates:   Allergen Reactions    Adhesive      Other reaction(s): skin tears easily with adhesive    Cephalexin Nausea And Vomiting    Erythromycin Nausea And Vomiting    Lithium Nausea And Vomiting    Naproxen Nausea And Vomiting     Past Medical History:   Diagnosis Date    Bipolar disorder     Carpal tunnel syndrome     COPD (chronic obstructive pulmonary disease)     Depression     Dry eyes     Lumbar degenerative disc disease     Mixed hyperlipidemia     Muscle spasms of both lower extremities     Muscle spasms of neck     Neuropathy     TIA (transient ischemic attack) 2022    Ulnar nerve external compression syndrome      Past Surgical History:   Procedure Laterality Date    ANTERIOR CRUCIATE LIGAMENT REPAIR Left     CARPAL TUNNEL RELEASE Right 5/5/2023    Procedure: RELEASE, CARPAL TUNNEL;  Surgeon: Canelo Bhatt MD;  Location: [facility] OR;  Service: Orthopedics;  Laterality: Right;    FRACTURE SURGERY  2017    Mauro Cárdenas    JOINT REPLACEMENT  2016    revision total knee ledt    SHOULDER ARTHROSCOPY W/ ROTATOR CUFF REPAIR Left     TOTAL KNEE ARTHROPLASTY Left     ULNAR NERVE TRANSPOSITION Right 5/5/2023    Procedure: TRANSPOSITION, NERVE, ULNAR  Endoscopic;  Surgeon: Canelo Bhatt MD;  Location: [facility] OR;  Service: Orthopedics;  Laterality: Right;  Endoscopic    WRIST SURGERY Right      Family History   Problem Relation Age of Onset     Coronary artery disease Mother     Diabetes Mother     Arthritis Mother         Back , hands    Early death Mother         Diabetes    Heart disease Mother     Hypertension Mother     Kidney disease Mother     Stroke Mother     Vision loss Mother     Coronary artery disease Father     Asthma Father         Back    Heart disease Father     Hypertension Father     Vision loss Father     Arthritis Sister         Not sure    Asthma Sister         Finger, shoulder    Mental illness Sister     Vision loss Sister     Arthritis Brother         Hip    Birth defects Brother         Special Needs/ Downs Syndrome    Heart disease Brother     Learning disabilities Brother     Mental retardation Brother      Social History     Tobacco Use    Smoking status: Every Day     Current packs/day: 0.25     Average packs/day: 0.3 packs/day for 42.0 years (10.5 ttl pk-yrs)     Types: Vaping with nicotine, Cigarettes     Passive exposure: Yes    Smokeless tobacco: Never   Substance Use Topics    Alcohol use: Not Currently     Comment: stopped in 2005    Drug use: Not Currently     Types: Marijuana, Cocaine     Comment: Nothing     Review of Systems   Musculoskeletal:         Rib pain, knee pain, foot pain   All other systems reviewed and are negative.      Physical Exam     Initial Vitals [11/02/23 0854]   BP Pulse Resp Temp SpO2   111/73 78 18 97.7 °F (36.5 °C) 98 %      MAP       --         Physical Exam    Nursing note and vitals reviewed.  Constitutional: She appears well-developed and well-nourished. She is not diaphoretic. No distress.   HENT:   Head: Normocephalic and atraumatic.   Mouth/Throat: Oropharynx is clear and moist.   Eyes: Conjunctivae are normal. Pupils are equal, round, and reactive to light.   Neck: Neck supple.   Cardiovascular:  Normal rate, regular rhythm, normal heart sounds and intact distal pulses.           Pulmonary/Chest: Breath sounds normal. No respiratory distress. She has no wheezes. She has no rhonchi.  She has no rales.   Mild swelling noted to the ribs on the left with mild tenderness, no bruising or crepitus.   Abdominal: Abdomen is soft. She exhibits no distension. There is no abdominal tenderness. There is no guarding.   Musculoskeletal:         General: No tenderness or edema. Normal range of motion.      Cervical back: Neck supple.      Comments: Both knees ankle and feet are atraumatic, no effusion, no tenderness, full range of motion without pain, ambulatory without assistance.  Well-healed scar noted to the left knee.     Neurological: She is alert and oriented to person, place, and time.   Skin: Skin is warm and dry. Capillary refill takes less than 2 seconds. No rash noted.   Psychiatric: She has a normal mood and affect. Thought content normal.         ED Course   Procedures  Labs Reviewed - No data to display       Imaging Results              X-Ray Ribs 2 View Left (Final result)  Result time 11/02/23 10:01:42      Final result by Benji Hill MD (11/02/23 10:01:42)                   Impression:      No acute radiographic findings appreciated.      Electronically signed by: Benji Hill  Date:    11/02/2023  Time:    10:01               Narrative:    EXAMINATION:  XR RIBS 2 VIEW LEFT    CLINICAL HISTORY:  Unspecified fall, initial encounter    TECHNIQUE:  Frontal and oblique radiographs of the left ribs.    COMPARISON:  Chest radiography 04/24/2023    FINDINGS:  No acute left rib fracture identified.  No consolidation or pleural effusion in the left chest.                                    X-Rays:   Independently Interpreted Readings:   Other Readings:  Preliminary reading of the rib x-ray is negative.    Medications - No data to display  Medical Decision Making  58-year-old female tripped yesterday while pushing a laundry basket and fell and hit her left ribs on the edge of the laundry basket.  She complains of left rib pain.  No shortness of breath.  No pain to the head, neck, chest,  abdomen, hips.  She also has some mild soreness to both knees and both feet.  History of knee replacement on the left.  Ambulatory without a limp.      Differential diagnosis includes but is not limited to fracture, contusion, strain    Amount and/or Complexity of Data Reviewed  Radiology: ordered.  Discussion of management or test interpretation with external provider(s): Patient was seen and evaluated in the emergency department with history, exam, x-ray.  No acute findings on the x-ray.  Lower extremity exam is completely normal.  I recommend that she take Tylenol as needed for pain.  She is on 3 different controlled substances so do not feel adding a narcotic to her regimen is the best choice.  I did discuss with her and my concern that adding a narcotic would increase her fall risk.  She verbalized her understanding.    Risk  OTC drugs.                               Clinical Impression:   Final diagnoses:  [W19.XXXA] Fall (Primary)  [R07.89] Left-sided chest wall pain        ED Disposition Condition    Discharge Stable          ED Prescriptions    None       Follow-up Information       Follow up With Specialties Details Why Contact Info    Garrick Nascimento MD Family Medicine Schedule an appointment as soon as possible for a visit   [facility] [place] [address] [place]  [facility] [id number]  [phone]               Yi Finn MD  11/02/23 2711

## 2023-11-02 NOTE — Clinical Note
"Faith "Rhonda Jin was seen and treated in our emergency department on 11/2/2023.  She may return to work on 11/14/2023.       If you have any questions or concerns, please don't hesitate to call.      Yi Finn MD"

## 2023-11-07 PROCEDURE — G0179 MD RECERTIFICATION HHA PT: HCPCS | Mod: ,,, | Performed by: NURSE PRACTITIONER

## 2023-12-05 ENCOUNTER — HOSPITAL ENCOUNTER (EMERGENCY)
Facility: HOSPITAL | Age: 58
Discharge: HOME | End: 2023-12-05
Attending: EMERGENCY MEDICINE
Payer: MEDICARE

## 2023-12-05 VITALS
WEIGHT: 158 LBS | HEIGHT: 63 IN | HEART RATE: 89 BPM | BODY MASS INDEX: 28 KG/M2 | TEMPERATURE: 98 F | OXYGEN SATURATION: 97 % | SYSTOLIC BLOOD PRESSURE: 154 MMHG | DIASTOLIC BLOOD PRESSURE: 80 MMHG | RESPIRATION RATE: 18 BRPM

## 2023-12-05 DIAGNOSIS — S39.012A LUMBAR STRAIN, INITIAL ENCOUNTER: Primary | ICD-10-CM

## 2023-12-05 PROCEDURE — 99284 EMERGENCY DEPT VISIT MOD MDM: CPT

## 2023-12-05 PROCEDURE — 25000003 PHARM REV CODE 250: Performed by: EMERGENCY MEDICINE

## 2023-12-05 RX ORDER — HYDROCODONE BITARTRATE AND ACETAMINOPHEN 10; 325 MG/1; MG/1
1 TABLET ORAL
Status: COMPLETED | OUTPATIENT
Start: 2023-12-05 | End: 2023-12-05

## 2023-12-05 RX ORDER — HYDROCODONE BITARTRATE AND ACETAMINOPHEN 5; 325 MG/1; MG/1
1 TABLET ORAL EVERY 6 HOURS PRN
Qty: 12 TABLET | Refills: 0 | Status: SHIPPED | OUTPATIENT
Start: 2023-12-05 | End: 2024-02-15

## 2023-12-05 RX ORDER — CYCLOBENZAPRINE HCL 10 MG
10 TABLET ORAL 3 TIMES DAILY PRN
Qty: 15 TABLET | Refills: 0 | Status: SHIPPED | OUTPATIENT
Start: 2023-12-05 | End: 2023-12-10

## 2023-12-05 RX ADMIN — HYDROCODONE BITARTRATE AND ACETAMINOPHEN 1 TABLET: 10; 325 TABLET ORAL at 05:12

## 2023-12-05 NOTE — ED PROVIDER NOTES
Encounter Date: 12/5/2023       History     Chief Complaint   Patient presents with    Back Pain     Pt to er c/o back pain s/p fall today.     The history is provided by the patient.   Back Pain   This is a new problem. The current episode started today. The problem has been unchanged. The pain is associated with falling. The pain is present in the lumbar spine. The quality of the pain is described as aching. The pain is The same all the time. Pertinent negatives include no chest pain, no fever, no dysuria and no weakness.   Attempted to kick a box out of the way and slipped and fell backwards, landing flat on her back.  Reports h/o prior lumbar fusion surgery.    Review of patient's allergies indicates:   Allergen Reactions    Adhesive      Other reaction(s): skin tears easily with adhesive    Cephalexin Nausea And Vomiting    Erythromycin Nausea And Vomiting    Lithium Nausea And Vomiting    Naproxen Nausea And Vomiting     Past Medical History:   Diagnosis Date    Bipolar disorder     Carpal tunnel syndrome     COPD (chronic obstructive pulmonary disease)     Depression     Dry eyes     Lumbar degenerative disc disease     Mixed hyperlipidemia     Muscle spasms of both lower extremities     Muscle spasms of neck     Neuropathy     TIA (transient ischemic attack) 2022    Ulnar nerve external compression syndrome      Past Surgical History:   Procedure Laterality Date    ANTERIOR CRUCIATE LIGAMENT REPAIR Left     CARPAL TUNNEL RELEASE Right 5/5/2023    Procedure: RELEASE, CARPAL TUNNEL;  Surgeon: Canelo Bhatt MD;  Location: [facility] OR;  Service: Orthopedics;  Laterality: Right;    FRACTURE SURGERY  2017    Mauro Cárdenas    JOINT REPLACEMENT  2016    revision total knee ledt    SHOULDER ARTHROSCOPY W/ ROTATOR CUFF REPAIR Left     TOTAL KNEE ARTHROPLASTY Left     ULNAR NERVE TRANSPOSITION Right 5/5/2023    Procedure: TRANSPOSITION, NERVE, ULNAR  Endoscopic;  Surgeon: Canelo Bhatt MD;  Location: [facility] OR;  Service:  Orthopedics;  Laterality: Right;  Endoscopic    WRIST SURGERY Right      Family History   Problem Relation Age of Onset    Coronary artery disease Mother     Diabetes Mother     Arthritis Mother         Back , hands    Early death Mother         Diabetes    Heart disease Mother     Hypertension Mother     Kidney disease Mother     Stroke Mother     Vision loss Mother     Coronary artery disease Father     Asthma Father         Back    Heart disease Father     Hypertension Father     Vision loss Father     Arthritis Sister         Not sure    Asthma Sister         Finger, shoulder    Mental illness Sister     Vision loss Sister     Arthritis Brother         Hip    Birth defects Brother         Special Needs/ Downs Syndrome    Heart disease Brother     Learning disabilities Brother     Intellectual disability Brother      Social History     Tobacco Use    Smoking status: Every Day     Current packs/day: 0.25     Average packs/day: 0.3 packs/day for 42.0 years (10.5 ttl pk-yrs)     Types: Vaping with nicotine, Cigarettes     Passive exposure: Yes    Smokeless tobacco: Never   Substance Use Topics    Alcohol use: Not Currently     Comment: stopped in 2005    Drug use: Not Currently     Types: Marijuana, Cocaine     Comment: Nothing     Review of Systems   Constitutional:  Negative for fever.   HENT:  Negative for sore throat.    Respiratory:  Negative for shortness of breath.    Cardiovascular:  Negative for chest pain.   Gastrointestinal:  Negative for nausea.   Genitourinary:  Negative for dysuria.   Musculoskeletal:  Positive for back pain.   Skin:  Negative for rash.   Neurological:  Negative for weakness.   Hematological:  Does not bruise/bleed easily.       Physical Exam     Initial Vitals [12/05/23 1710]   BP Pulse Resp Temp SpO2   (!) 163/84 95 20 97.9 °F (36.6 °C) 97 %      MAP       --         Physical Exam    Nursing note and vitals reviewed.  Constitutional: She appears well-developed and well-nourished.    HENT:   Head: Normocephalic and atraumatic.   Right Ear: External ear normal.   Left Ear: External ear normal.   Eyes: Conjunctivae and EOM are normal. Pupils are equal, round, and reactive to light.   Neck: Neck supple.   Normal range of motion.  Cardiovascular:  Normal rate, regular rhythm, normal heart sounds and intact distal pulses.           Pulmonary/Chest: Breath sounds normal.   Abdominal: Abdomen is soft. Bowel sounds are normal.   Musculoskeletal:         General: Normal range of motion.      Cervical back: Normal range of motion and neck supple.        Back:      Neurological: She is alert and oriented to person, place, and time. GCS score is 15. GCS eye subscore is 4. GCS verbal subscore is 5. GCS motor subscore is 6.   Skin: Skin is warm and dry. Capillary refill takes less than 2 seconds.   Psychiatric: She has a normal mood and affect. Her behavior is normal. Judgment and thought content normal.         ED Course   Procedures  Labs Reviewed - No data to display       Imaging Results              X-Ray Lumbar Spine Ap And Lateral (Final result)  Result time 12/05/23 18:05:32      Final result by Bar Clements MD (12/05/23 18:05:32)                   Impression:      No acute findings.      Electronically signed by: Bar Clements  Date:    12/05/2023  Time:    18:05               Narrative:    EXAMINATION:  XR LUMBAR SPINE AP AND LATERAL    CLINICAL HISTORY:  fall;    COMPARISON:  3 December 2014    FINDINGS:  Two views of the lumbar spine.  Similar mild dextroscoliosis and grade 1 anterolisthesis L4 on L5.  No new alignment abnormality.  No significant loss or T1 body height.  Mild to moderate degenerative changes.  There is aortic atherosclerosis.                                       Medications   HYDROcodone-acetaminophen  mg per tablet 1 tablet (1 tablet Oral Given 12/5/23 0943)     Medical Decision Making  Amount and/or Complexity of Data Reviewed  Radiology: ordered and independent  interpretation performed. Decision-making details documented in ED Course.    Risk  Prescription drug management.    Differential includes:  contusion, fracture, strain.  Will obtain L-spine x-ray and give analgesic.                                  Clinical Impression:  Final diagnoses:  [S39.012A] Lumbar strain, initial encounter (Primary)          ED Disposition Condition    Discharge Stable          ED Prescriptions       Medication Sig Dispense Start Date End Date Auth. Provider    HYDROcodone-acetaminophen (NORCO) 5-325 mg per tablet Take 1 tablet by mouth every 6 (six) hours as needed for Pain. 12 tablet 12/5/2023 -- Josh Oneil MD    cyclobenzaprine (FLEXERIL) 10 MG tablet Take 1 tablet (10 mg total) by mouth 3 (three) times daily as needed for Muscle spasms. 15 tablet 12/5/2023 12/10/2023 Josh Oneil MD          Follow-up Information       Follow up With Specialties Details Why Contact Info    Follow up with your primary MD in 3-5 days if not improved.  Return to ED for worsening symptoms.                 Josh Oneil MD  12/05/23 [id number]

## 2023-12-15 ENCOUNTER — DOCUMENTATION ONLY (OUTPATIENT)
Dept: ORTHOPEDICS | Facility: CLINIC | Age: 58
End: 2023-12-15
Payer: MEDICARE

## 2023-12-15 NOTE — PROGRESS NOTES
Patient to receive orthotic  in February 2023, and is medically necessary to prevent pressure sores and shearing of skin. Previously received similar splint  in October of 2022, which is no longer medically necessary because it will not fit due to increase edema causing poor anatomical alignment.

## 2023-12-18 ENCOUNTER — EXTERNAL HOME HEALTH (OUTPATIENT)
Dept: HOME HEALTH SERVICES | Facility: HOSPITAL | Age: 58
End: 2023-12-18
Payer: MEDICARE

## 2023-12-21 ENCOUNTER — DOCUMENT SCAN (OUTPATIENT)
Dept: HOME HEALTH SERVICES | Facility: HOSPITAL | Age: 58
End: 2023-12-21
Payer: MEDICARE

## 2024-01-10 ENCOUNTER — OFFICE VISIT (OUTPATIENT)
Dept: ORTHOPEDICS | Facility: CLINIC | Age: 59
End: 2024-01-10
Payer: MEDICARE

## 2024-01-10 VITALS
HEIGHT: 63 IN | HEART RATE: 82 BPM | WEIGHT: 158.06 LBS | DIASTOLIC BLOOD PRESSURE: 69 MMHG | BODY MASS INDEX: 28 KG/M2 | SYSTOLIC BLOOD PRESSURE: 109 MMHG

## 2024-01-10 DIAGNOSIS — M18.11 PRIMARY OSTEOARTHRITIS OF FIRST CARPOMETACARPAL JOINT OF RIGHT HAND: Primary | ICD-10-CM

## 2024-01-10 PROCEDURE — 99213 OFFICE O/P EST LOW 20 MIN: CPT | Mod: ,,, | Performed by: STUDENT IN AN ORGANIZED HEALTH CARE EDUCATION/TRAINING PROGRAM

## 2024-01-10 NOTE — PROGRESS NOTES
"Clinic Note    Surgery:  Right Carpal and cubital tunnel releases, thumb A1 pulley release on 5/5/23    History of present illness:    Patient is doing great regarding the above surgery.  Her sensation has fully returned into her hand and she no longer has nocturnal symptoms that wake her up at nighttime.  Main issue today is pain at the base of the thumb.  She has been wearing a thumb CMC brace which has been helping.  She has not had an injection into the thumb CMC joint.  She requests an injection today      Past Surgical History:   Procedure Laterality Date    ANTERIOR CRUCIATE LIGAMENT REPAIR Left     CARPAL TUNNEL RELEASE Right 5/5/2023    Procedure: RELEASE, CARPAL TUNNEL;  Surgeon: Canelo Bhatt MD;  Location: [facility] OR;  Service: Orthopedics;  Laterality: Right;    FRACTURE SURGERY  2017    Mauro Cárdenas    JOINT REPLACEMENT  2016    revision total knee ledt    SHOULDER ARTHROSCOPY W/ ROTATOR CUFF REPAIR Left     TOTAL KNEE ARTHROPLASTY Left     ULNAR NERVE TRANSPOSITION Right 5/5/2023    Procedure: TRANSPOSITION, NERVE, ULNAR  Endoscopic;  Surgeon: Canelo Bhatt MD;  Location: [facility] OR;  Service: Orthopedics;  Laterality: Right;  Endoscopic    WRIST SURGERY Right            Examination:    Vital Signs:    Vitals:    01/10/24 0920   BP: 109/69   Pulse: 82   Weight: 71.7 kg (158 lb 1.1 oz)   Height: 5' 3" (1.6 m)       Body mass index is 28 kg/m².    Constitution:   Well-developed, well nourished patient in no acute distress.  Neurological:   Alert and oriented x 3 and cooperative to examination.     Psychiatric/Behavior: Normal mental status.  Respiratory:   No shortness of breath.  Eyes:    Extraoccular muscles intact  Skin:    No scars, rash or suspicious lesions.    MSK:   Right Upper Extremity:  Surgical incisions over the elbow and wrist are healed without any evidence of infection.  Two-point discrimination is 5mm in all 5 digits.  She can make a fist and extend her digits.  No catching or locking.  Apb " strength is 5/5.  First dorsal interosseous strength is 5/5.  She has full elbow range of motion including 0-155 degrees.  Tenderness to palpation over the radiocarpal joint at the radioscaphoid joint.  Tenderness to palpation over the thumb CMC joint.  Grind test is positive.  She has pain with radial deviation..  Radial pulses 2+ hand is warm well perfused    Imaging:   No new imaging     Assessment: s/p above surgeries, right SLAC wrist, right thumb CMC arthritis    Plan:  She is doing great recovering from the nerve decompressive surgeries.  Her main issue today is with the right thumb CMC joint arthritis.  I will give her injection into the right thumb CMC joint today.  She has a thumb CMC brace which she has worn out.  I explained that I could give her another 1 in clinic today where she can get 1 over-the-counter.  She would like to get 1 over-the-counter.  I can see her back in 3 months to see how she is doing    Follow Up:  Three-months   Xray at next visit:  None

## 2024-02-15 ENCOUNTER — OFFICE VISIT (OUTPATIENT)
Dept: NEUROLOGY | Facility: CLINIC | Age: 59
End: 2024-02-15
Payer: MEDICARE

## 2024-02-15 ENCOUNTER — EXTERNAL HOME HEALTH (OUTPATIENT)
Dept: HOME HEALTH SERVICES | Facility: HOSPITAL | Age: 59
End: 2024-02-15
Payer: MEDICARE

## 2024-02-15 VITALS
BODY MASS INDEX: 28 KG/M2 | HEIGHT: 63 IN | SYSTOLIC BLOOD PRESSURE: 96 MMHG | DIASTOLIC BLOOD PRESSURE: 73 MMHG | HEART RATE: 82 BPM | WEIGHT: 158 LBS

## 2024-02-15 DIAGNOSIS — G45.9 TRANSIENT ISCHEMIC ATTACK (TIA): Primary | ICD-10-CM

## 2024-02-15 PROCEDURE — 99213 OFFICE O/P EST LOW 20 MIN: CPT | Mod: S$PBB,,, | Performed by: NURSE PRACTITIONER

## 2024-02-15 PROCEDURE — 99999 PR PBB SHADOW E&M-EST. PATIENT-LVL V: CPT | Mod: PBBFAC,,, | Performed by: NURSE PRACTITIONER

## 2024-02-15 PROCEDURE — 99215 OFFICE O/P EST HI 40 MIN: CPT | Mod: PBBFAC | Performed by: NURSE PRACTITIONER

## 2024-02-15 RX ORDER — LINACLOTIDE 145 UG/1
145 CAPSULE, GELATIN COATED ORAL EVERY MORNING
COMMUNITY

## 2024-02-15 RX ORDER — VARENICLINE TARTRATE 0.5 (11)-1
KIT ORAL
COMMUNITY
Start: 2024-01-17 | End: 2024-04-23

## 2024-02-15 RX ORDER — ZALEPLON 5 MG/1
5 CAPSULE ORAL NIGHTLY
COMMUNITY

## 2024-02-15 RX ORDER — CARIPRAZINE 4.5 MG/1
4.5 CAPSULE, GELATIN COATED ORAL
COMMUNITY
Start: 2024-02-07

## 2024-02-15 NOTE — PROGRESS NOTES
Subjective:      Patient ID: Faith Jin is a 59 y.o. female.    Chief Complaint:  Transient Ischemic Attack (9 month follow up for TIA. Patient denies any stroke like deficits. Patient c/o L foot dragging. She does not have current list of medications with her today.)      History of Present Illness  Patient presents for follow up of TIA (July and September of 2022.). MRI brain and MRA head/neck in the past normal. Sees psychiatrist, Dr. Natacha Smalls. Sees Dr. Angeles's NP, Claudia tomorrow. Last A1C was 5.7. Today, patient denies any new onset of numbness or tingling. Reports her left foot is dragging when she walks. Denies any headache or visual changes. Currently working on smoking cessation.              Past Medical History:   Diagnosis Date    Bipolar disorder     Carpal tunnel syndrome     COPD (chronic obstructive pulmonary disease)     Depression     Dry eyes     Lumbar degenerative disc disease     Mixed hyperlipidemia     Muscle spasms of both lower extremities     Muscle spasms of neck     Neuropathy     TIA (transient ischemic attack) 2022    Ulnar nerve external compression syndrome        Past Surgical History:   Procedure Laterality Date    ANTERIOR CRUCIATE LIGAMENT REPAIR Left     CARPAL TUNNEL RELEASE Right 5/5/2023    Procedure: RELEASE, CARPAL TUNNEL;  Surgeon: Canelo Bhatt MD;  Location: [facility] OR;  Service: Orthopedics;  Laterality: Right;    FRACTURE SURGERY  2017    Mauro Cárdenas    JOINT REPLACEMENT  2016    revision total knee ledt    SHOULDER ARTHROSCOPY W/ ROTATOR CUFF REPAIR Left     TOTAL KNEE ARTHROPLASTY Left     ULNAR NERVE TRANSPOSITION Right 5/5/2023    Procedure: TRANSPOSITION, NERVE, ULNAR  Endoscopic;  Surgeon: Canelo Bhatt MD;  Location: [facility] OR;  Service: Orthopedics;  Laterality: Right;  Endoscopic    WRIST SURGERY Right        Family History   Problem Relation Age of Onset    Coronary artery disease Mother     Diabetes Mother     Arthritis Mother         Back  , hands    Early death Mother         Diabetes    Heart disease Mother     Hypertension Mother     Kidney disease Mother     Stroke Mother     Vision loss Mother     Coronary artery disease Father     Asthma Father         Back    Heart disease Father     Hypertension Father     Vision loss Father     Arthritis Sister         Not sure    Asthma Sister         Finger, shoulder    Mental illness Sister     Vision loss Sister     Arthritis Brother         Hip    Birth defects Brother         Special Needs/ Downs Syndrome    Heart disease Brother     Learning disabilities Brother     Intellectual disability Brother        Social History     Socioeconomic History    Marital status:    Tobacco Use    Smoking status: Every Day     Current packs/day: 0.25     Average packs/day: 0.3 packs/day for 42.0 years (10.5 ttl pk-yrs)     Types: Vaping with nicotine, Cigarettes     Passive exposure: Yes    Smokeless tobacco: Never   Substance and Sexual Activity    Alcohol use: Not Currently     Comment: stopped in 2005    Drug use: Not Currently     Types: Marijuana, Cocaine     Comment: Nothing    Sexual activity: Not Currently     Partners: Male     Birth control/protection: None     Comment: Abstinence       Current Outpatient Medications   Medication Sig Dispense Refill    albuterol (PROVENTIL/VENTOLIN HFA) 90 mcg/actuation inhaler INHALE TWO PUFFS INTO THE LUNGS EVERY FOUR TO SIX HOURS AS NEEDED FOR SHORTNESS OF BREATH      aspirin 325 MG tablet Take 325 mg by mouth once daily.      atorvastatin (LIPITOR) 40 MG tablet Take 40 mg by mouth once daily.      azelastine (ASTELIN) 137 mcg (0.1 %) nasal spray daily as needed.      benztropine (COGENTIN) 1 MG tablet Take 1 mg by mouth as needed.      buPROPion (WELLBUTRIN XL) 300 MG 24 hr tablet Take 300 mg by mouth every morning.      carBAMazepine (TEGRETOL XR) 400 MG Tb12 Take 400 mg by mouth 2 (two) times daily.      celecoxib (CELEBREX) 100 MG capsule Take 100 mg by mouth 3  (three) times daily.      clonazePAM (KLONOPIN) 0.5 MG tablet Take 0.5 mg by mouth nightly.      dexlansoprazole (DEXILANT) 60 mg capsule Take 1 capsule by mouth once daily.      diclofenac sodium (VOLTAREN) 1 % Gel Apply topically as needed.      DULoxetine (CYMBALTA) 60 MG capsule Take 60 mg by mouth 2 (two) times daily.      estradioL (ESTRACE) 0.01 % (0.1 mg/gram) vaginal cream as needed.      famotidine (PEPCID) 20 MG tablet Take 20 mg by mouth nightly.      fluticasone propionate (FLONASE) 50 mcg/actuation nasal spray 1 spray by Each Nostril route nightly.      furosemide (LASIX) 20 MG tablet Take 20 mg by mouth once daily.      hydrOXYzine pamoate (VISTARIL) 25 MG Cap Take 25 mg by mouth Daily.      icosapent ethyL (VASCEPA) 1 gram Cap Take 2 capsules by mouth 2 (two) times daily.      JARDIANCE 25 mg tablet Take 25 mg by mouth once daily.      LINZESS 145 mcg Cap capsule Take 145 mcg by mouth every morning.      loratadine (CLARITIN) 10 mg tablet Take 10 mg by mouth daily as needed.      metFORMIN (GLUCOPHAGE) 500 MG tablet Take 500 mg by mouth 3 (three) times daily.      MOVANTIK 25 mg tablet Take 25 mg by mouth once daily.      nicotine (NICODERM CQ) 21 mg/24 hr 1 patch once daily.      pregabalin (LYRICA) 100 MG capsule Take 100 mg by mouth 3 (three) times daily.      RESTASIS 0.05 % ophthalmic emulsion Place 1 drop into both eyes 2 (two) times daily.      tiZANidine (ZANAFLEX) 4 MG tablet take 1 tablet BY MOUTH EVERY 8 HOURS AS NEEDED FOR MUSCLE SPASMS (Patient taking differently: Take 4 mg by mouth daily as needed. take 1 tablet BY MOUTH EVERY 8 HOURS AS NEEDED FOR MUSCLE SPASMS) 90 tablet 3    topiramate (TOPAMAX) 200 MG Tab Take 200 mg by mouth once daily.      valACYclovir (VALTREX) 500 MG tablet Take 500 mg by mouth 2 (two) times daily.      varenicline (CHANTIX KELL) 0.5 mg (11)- 1 mg (42) tablet Take by mouth.      VRAYLAR 4.5 mg Cap Take 4.5 mg by mouth.      zaleplon (SONATA) 5 MG Cap Take 5 mg  "by mouth every evening.       No current facility-administered medications for this visit.       Review of patient's allergies indicates:   Allergen Reactions    Adhesive      Other reaction(s): skin tears easily with adhesive    Cephalexin Nausea And Vomiting    Erythromycin Nausea And Vomiting    Lithium Nausea And Vomiting    Naproxen Nausea And Vomiting      Vitals:    02/15/24 0844   BP: 96/73   BP Location: Left arm   Patient Position: Sitting   Pulse: 82   Weight: 71.7 kg (158 lb)   Height: 5' 3" (1.6 m)        Review of Systems  12 point ROS performed and negative unless otherwise documented in HPI  Objective:     Neurologic Exam      Mental Status   Oriented to person, place, and time.   Speech: speech is normal   Level of consciousness: alert     Cranial Nerves   Cranial nerves II through XII intact.      Motor Exam   Muscle bulk: normal     Strength   Strength 5/5 throughout.      Sensory Exam   Light touch normal.      Gait, Coordination, and Reflexes      Gait  Gait: normal; no current dragging of left leg noted     Physical Exam  Vitals and nursing note reviewed.   Cardiovascular:      Rate and Rhythm: Normal rate.   Pulmonary:      Effort: Pulmonary effort is normal.   Neurological:      Mental Status: She is oriented to person, place, and time.      Cranial Nerves: Cranial nerves 2-12 are intact.      Motor: Motor strength is normal.      Gait: Gait is intact.   Psychiatric:         Speech: Speech normal.     Assessment:     1. Transient ischemic attack (TIA)      Plan:     Continue ASA 81 mg daily  Continue statin; goal LDL <70.  Goal A1C <7.0; continue management per endo  Secondary stroke prevention measures discussed. Education provided on signs and symptoms of stroke; advised to call 9-1-1 with any new onset of numbness, tingling or weakness, difficulty with speech or facial droop.        "

## 2024-03-12 ENCOUNTER — DOCUMENT SCAN (OUTPATIENT)
Dept: HOME HEALTH SERVICES | Facility: HOSPITAL | Age: 59
End: 2024-03-12
Payer: MEDICARE

## 2024-03-28 ENCOUNTER — HOSPITAL ENCOUNTER (EMERGENCY)
Facility: HOSPITAL | Age: 59
Discharge: HOME | End: 2024-03-29
Attending: EMERGENCY MEDICINE
Payer: MEDICARE

## 2024-03-28 DIAGNOSIS — R20.0 LEFT SIDED NUMBNESS: Primary | ICD-10-CM
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
## 2024-03-28 DIAGNOSIS — R55 SYNCOPE: ICD-10-CM

## 2024-03-28 LAB
ABORH RETYPE: NORMAL
ALBUMIN SERPL-MCNC: 3.8 G/DL (ref 3.4–4.8)
ALBUMIN/GLOB SERPL: 1.6 RATIO (ref 1.1–2)
ALP SERPL-CCNC: 75 UNIT/L (ref 40–150)
ALT SERPL-CCNC: 18 UNIT/L (ref 0–55)
APTT PPP: 23.5 SECONDS (ref 23.2–33.7)
AST SERPL-CCNC: 26 UNIT/L (ref 5–34)
BASOPHILS # BLD AUTO: 0.06 X10(3)/MCL
BASOPHILS NFR BLD AUTO: 1 %
BILIRUB SERPL-MCNC: 0.2 MG/DL
BUN SERPL-MCNC: 13.8 MG/DL (ref 9.8–20.1)
CALCIUM SERPL-MCNC: 8.8 MG/DL (ref 8.4–10.2)
CHLORIDE SERPL-SCNC: 106 MMOL/L (ref 98–107)
CO2 SERPL-SCNC: 18 MMOL/L (ref 23–31)
CREAT SERPL-MCNC: 0.96 MG/DL (ref 0.55–1.02)
EOSINOPHIL # BLD AUTO: 0.32 X10(3)/MCL (ref 0–0.9)
EOSINOPHIL NFR BLD AUTO: 5.1 %
ERYTHROCYTE [DISTWIDTH] IN BLOOD BY AUTOMATED COUNT: 17.7 % (ref 11.5–17)
ETHANOL SERPL-MCNC: <10 MG/DL
GFR SERPLBLD CREATININE-BSD FMLA CKD-EPI: >60 MLS/MIN/1.73/M2
GLOBULIN SER-MCNC: 2.4 GM/DL (ref 2.4–3.5)
GLUCOSE SERPL-MCNC: 202 MG/DL (ref 82–115)
GROUP & RH: NORMAL
HCT VFR BLD AUTO: 34.7 % (ref 37–47)
HGB BLD-MCNC: 10.9 G/DL (ref 12–16)
IMM GRANULOCYTES # BLD AUTO: 0.01 X10(3)/MCL (ref 0–0.04)
IMM GRANULOCYTES NFR BLD AUTO: 0.2 %
INDIRECT COOMBS: NORMAL
INR PPP: 1
LACTATE SERPL-SCNC: 2.7 MMOL/L (ref 0.5–2.2)
LYMPHOCYTES # BLD AUTO: 2.43 X10(3)/MCL (ref 0.6–4.6)
LYMPHOCYTES NFR BLD AUTO: 38.6 %
MCH RBC QN AUTO: 23.4 PG (ref 27–31)
MCHC RBC AUTO-ENTMCNC: 31.4 G/DL (ref 33–36)
MCV RBC AUTO: 74.5 FL (ref 80–94)
MONOCYTES # BLD AUTO: 0.54 X10(3)/MCL (ref 0.1–1.3)
MONOCYTES NFR BLD AUTO: 8.6 %
NEUTROPHILS # BLD AUTO: 2.94 X10(3)/MCL (ref 2.1–9.2)
NEUTROPHILS NFR BLD AUTO: 46.5 %
NRBC BLD AUTO-RTO: 0 %
PLATELET # BLD AUTO: 326 X10(3)/MCL (ref 130–400)
PMV BLD AUTO: 8.5 FL (ref 7.4–10.4)
POTASSIUM SERPL-SCNC: 3.1 MMOL/L (ref 3.5–5.1)
PROT SERPL-MCNC: 6.2 GM/DL (ref 5.8–7.6)
PROTHROMBIN TIME: 12.6 SECONDS (ref 12.5–14.5)
RBC # BLD AUTO: 4.66 X10(6)/MCL (ref 4.2–5.4)
SODIUM SERPL-SCNC: 136 MMOL/L (ref 136–145)
SPECIMEN OUTDATE: NORMAL
TROPONIN I SERPL-MCNC: <0.01 NG/ML (ref 0–0.04)
WBC # SPEC AUTO: 6.3 X10(3)/MCL (ref 4.5–11.5)

## 2024-03-28 PROCEDURE — G0390 TRAUMA RESPONS W/HOSP CRITI: HCPCS | Performed by: EMERGENCY MEDICINE

## 2024-03-28 PROCEDURE — 93010 ELECTROCARDIOGRAM REPORT: CPT | Mod: ,,, | Performed by: INTERNAL MEDICINE

## 2024-03-28 PROCEDURE — 93005 ELECTROCARDIOGRAM TRACING: CPT

## 2024-03-28 PROCEDURE — 25000003 PHARM REV CODE 250: Performed by: NURSE PRACTITIONER

## 2024-03-28 PROCEDURE — 85610 PROTHROMBIN TIME: CPT | Performed by: EMERGENCY MEDICINE

## 2024-03-28 PROCEDURE — 96361 HYDRATE IV INFUSION ADD-ON: CPT

## 2024-03-28 PROCEDURE — 25500020 PHARM REV CODE 255: Performed by: EMERGENCY MEDICINE

## 2024-03-28 PROCEDURE — 83605 ASSAY OF LACTIC ACID: CPT | Performed by: EMERGENCY MEDICINE

## 2024-03-28 PROCEDURE — 85025 COMPLETE CBC W/AUTO DIFF WBC: CPT | Performed by: EMERGENCY MEDICINE

## 2024-03-28 PROCEDURE — 86850 RBC ANTIBODY SCREEN: CPT | Performed by: EMERGENCY MEDICINE

## 2024-03-28 PROCEDURE — 82077 ASSAY SPEC XCP UR&BREATH IA: CPT | Performed by: EMERGENCY MEDICINE

## 2024-03-28 PROCEDURE — 84484 ASSAY OF TROPONIN QUANT: CPT | Performed by: EMERGENCY MEDICINE

## 2024-03-28 PROCEDURE — 80053 COMPREHEN METABOLIC PANEL: CPT | Performed by: EMERGENCY MEDICINE

## 2024-03-28 PROCEDURE — 99285 EMERGENCY DEPT VISIT HI MDM: CPT | Mod: 25

## 2024-03-28 PROCEDURE — 85730 THROMBOPLASTIN TIME PARTIAL: CPT | Performed by: EMERGENCY MEDICINE

## 2024-03-28 RX ORDER — ONDANSETRON HYDROCHLORIDE 2 MG/ML
4 INJECTION, SOLUTION INTRAVENOUS
Status: COMPLETED | OUTPATIENT
Start: 2024-03-28 | End: 2024-03-29

## 2024-03-28 RX ORDER — FENTANYL CITRATE 50 UG/ML
50 INJECTION, SOLUTION INTRAMUSCULAR; INTRAVENOUS
Status: COMPLETED | OUTPATIENT
Start: 2024-03-28 | End: 2024-03-29

## 2024-03-28 RX ADMIN — IOHEXOL 100 ML: 350 INJECTION, SOLUTION INTRAVENOUS at 10:03

## 2024-03-28 RX ADMIN — SODIUM CHLORIDE 1000 ML: 9 INJECTION, SOLUTION INTRAVENOUS at 10:03

## 2024-03-28 RX ADMIN — IOPAMIDOL 100 ML: 755 INJECTION, SOLUTION INTRAVENOUS at 10:03

## 2024-03-29 ENCOUNTER — DOCUMENTATION ONLY (OUTPATIENT)
Dept: SURGERY | Facility: HOSPITAL | Age: 59
End: 2024-03-29
Payer: MEDICARE

## 2024-03-29 VITALS
DIASTOLIC BLOOD PRESSURE: 70 MMHG | SYSTOLIC BLOOD PRESSURE: 107 MMHG | HEIGHT: 62 IN | HEART RATE: 78 BPM | OXYGEN SATURATION: 98 % | TEMPERATURE: 98 F | WEIGHT: 150 LBS | RESPIRATION RATE: 10 BRPM | BODY MASS INDEX: 27.6 KG/M2

## 2024-03-29 LAB
AMPHET UR QL SCN: POSITIVE
APPEARANCE UR: CLEAR
BACTERIA #/AREA URNS AUTO: ABNORMAL /HPF
BARBITURATE SCN PRESENT UR: NEGATIVE
BENZODIAZ UR QL SCN: NEGATIVE
BILIRUB UR QL STRIP.AUTO: NEGATIVE
CANNABINOIDS UR QL SCN: POSITIVE
COCAINE UR QL SCN: NEGATIVE
COLOR UR AUTO: ABNORMAL
FENTANYL UR QL SCN: POSITIVE
GLUCOSE UR QL STRIP.AUTO: ABNORMAL
KETONES UR QL STRIP.AUTO: NEGATIVE
LACTATE SERPL-SCNC: 1.5 MMOL/L (ref 0.5–2.2)
LEUKOCYTE ESTERASE UR QL STRIP.AUTO: NEGATIVE
MDMA UR QL SCN: NEGATIVE
NITRITE UR QL STRIP.AUTO: NEGATIVE
OPIATES UR QL SCN: NEGATIVE
PCP UR QL: NEGATIVE
PH UR STRIP.AUTO: 6 [PH]
PH UR: 6 [PH] (ref 3–11)
PROT UR QL STRIP.AUTO: NEGATIVE
RBC #/AREA URNS AUTO: ABNORMAL /HPF
RBC UR QL AUTO: NEGATIVE
SP GR UR STRIP.AUTO: 1.05 (ref 1–1.03)
SPECIFIC GRAVITY, URINE AUTO (.000) (OHS): 1.05 (ref 1–1.03)
SQUAMOUS #/AREA URNS LPF: ABNORMAL /HPF
UROBILINOGEN UR STRIP-ACNC: NORMAL
WBC #/AREA URNS AUTO: ABNORMAL /HPF

## 2024-03-29 PROCEDURE — 96374 THER/PROPH/DIAG INJ IV PUSH: CPT

## 2024-03-29 PROCEDURE — 80307 DRUG TEST PRSMV CHEM ANLYZR: CPT | Performed by: EMERGENCY MEDICINE

## 2024-03-29 PROCEDURE — 83605 ASSAY OF LACTIC ACID: CPT | Performed by: EMERGENCY MEDICINE

## 2024-03-29 PROCEDURE — 63600175 PHARM REV CODE 636 W HCPCS: Performed by: EMERGENCY MEDICINE

## 2024-03-29 PROCEDURE — 63600175 PHARM REV CODE 636 W HCPCS: Performed by: NURSE PRACTITIONER

## 2024-03-29 PROCEDURE — 81001 URINALYSIS AUTO W/SCOPE: CPT | Mod: XB | Performed by: EMERGENCY MEDICINE

## 2024-03-29 PROCEDURE — 96375 TX/PRO/DX INJ NEW DRUG ADDON: CPT

## 2024-03-29 RX ORDER — MORPHINE SULFATE 4 MG/ML
4 INJECTION, SOLUTION INTRAMUSCULAR; INTRAVENOUS
Status: COMPLETED | OUTPATIENT
Start: 2024-03-29 | End: 2024-03-29

## 2024-03-29 RX ORDER — OXYCODONE HYDROCHLORIDE 5 MG/1
5 TABLET ORAL EVERY 4 HOURS PRN
Qty: 12 TABLET | Refills: 0 | Status: SHIPPED | OUTPATIENT
Start: 2024-03-29 | End: 2024-04-23

## 2024-03-29 RX ADMIN — FENTANYL CITRATE 50 MCG: 50 INJECTION, SOLUTION INTRAMUSCULAR; INTRAVENOUS at 12:03

## 2024-03-29 RX ADMIN — ONDANSETRON 4 MG: 2 INJECTION INTRAMUSCULAR; INTRAVENOUS at 12:03

## 2024-03-29 RX ADMIN — MORPHINE SULFATE 4 MG: 4 INJECTION INTRAVENOUS at 04:03

## 2024-03-29 NOTE — CONSULTS
"   Trauma Surgery   Activation Note    Patient Name: Faith Jin  MRN: [id number]   YOB: 1965  Date: 03/29/2024    LEVEL 1 TRAUMA     Subjective:   History of present illness: Patient is an approximately 59 year old female presented via ground EMS s/p syncopal fall while smoking around 2230. On evaluation by ED Physician, she complained of reduced sensation to LUE/LLE; therefore, activated as level 1 TTA. She also has abrasion to nose and right hand. This has occurred twice before. She denies CP/ SOB. On ASA, Does use THC and meth but denies use during event. No facial deficits. Speech intact. Motor intact. Dr. Bingham with NSGY was contacted at 2235 and recommendations discussed. Await MRI       Primary Survey:  A patent   B [place] breath sounds   C  2+ radial and DP    D GCS 15(E 4, V 5, M 6)    E exposed, log-rolled and examined (see below)   F See below     VITAL SIGNS: 24 HR MIN & MAX LAST   Temp  Min: 97.5 °F (36.4 °C)  Max: 98.2 °F (36.8 °C)  97.7 °F (36.5 °C)   BP  Min: 97/59  Max: 140/99  (!) 140/99    Pulse  Min: 71  Max: 101  84    Resp  Min: 12  Max: 20  14    SpO2  Min: 91 %  Max: 99 %  (!) 91 %      HT: 5' 2" (157.5 cm)  WT: 68 kg (150 lb)  BMI: 27.4     FAST: deferred    Medications/transfusions received en-route: -  Medications/transfusions received in trauma bay: -    Scheduled Meds:      Continuous Infusions:  PRN Meds:    ROS: 12 point ROS negative except as stated in HPI    Allergies:   Review of patient's allergies indicates:   Allergen Reactions    Adhesive      Other reaction(s): skin tears easily with adhesive    Cephalexin Nausea And Vomiting    Erythromycin Nausea And Vomiting    Lithium Nausea And Vomiting    Naproxen Nausea And Vomiting      Past Medical History:   Diagnosis Date    Bipolar disorder     Carpal tunnel syndrome     COPD (chronic obstructive pulmonary disease)     Depression     Dry eyes     Lumbar degenerative disc disease     Mixed hyperlipidemia     Muscle " spasms of both lower extremities     Muscle spasms of neck     Neuropathy     TIA (transient ischemic attack) 2022    Ulnar nerve external compression syndrome       Past Surgical History:   Procedure Laterality Date    ANTERIOR CRUCIATE LIGAMENT REPAIR Left     CARPAL TUNNEL RELEASE Right 5/5/2023    Procedure: RELEASE, CARPAL TUNNEL;  Surgeon: Canelo Bhatt MD;  [facility];  Service: Orthopedics;  Laterality: Right;    FRACTURE SURGERY  2017    Mauro Cárdenas    JOINT REPLACEMENT  2016    revision total knee ledt    SHOULDER ARTHROSCOPY W/ ROTATOR CUFF REPAIR Left     TOTAL KNEE ARTHROPLASTY Left     ULNAR NERVE TRANSPOSITION Right 5/5/2023    Procedure: TRANSPOSITION, NERVE, ULNAR  Endoscopic;  Surgeon: Canelo Bhatt MD;  Location: [facility];  Service: Orthopedics;  Laterality: Right;  Endoscopic    WRIST SURGERY Right       Objective:   Secondary Survey:   General: Well developed, well nourished, no acute distress, AAOx3  Neuro: CNII-XII grossly intact  HEENT:  Normocephalic, abrasion to nose  PERRL, cervical collar in place  CV:  RRR  Pulse: 2+ RP b/l, 2+ DP b/l   Resp/chest:  Non-labored breathing, satting on room air  GI:  Abdomen soft, non-tender, non-distended  :  Normal external  genitalia,  no blood at urethral meatus.   Rectal:  no gross blood.  Extremities: Moves all 4 spontaneously and purposefully, no obvious gross deformities.  Back/Spine: cervical and thoracic  TTP, no palpable step offs or deformities.  Skin/wounds:  Warm, well perfused, abrasion/ skin tear to right hand      Labs:  Troponin:  Recent Labs     03/28/24 2205   TROPONINI <0.010     CBC:  Recent Labs     03/28/24 2205   WBC 6.30   RBC 4.66   HGB 10.9*   HCT 34.7*      MCV 74.5*   MCH 23.4*   MCHC 31.4*     CMP:  Recent Labs     03/28/24 2205   CALCIUM 8.8   ALBUMIN 3.8      K 3.1*   CO2 18*   BUN 13.8   CREATININE 0.96   ALKPHOS 75   ALT 18   AST 26   BILITOT 0.2     Lactic Acid:  No results for input(s):  ""LACTATE" in the last 72 hours.  ETOH:  Recent Labs     03/28/24  2205   ETHANOL <10.0      Urine Drug Screen:  No results for input(s): "COCAINE", "OPIATE", "BARBITURATE", "AMPHETAMINE", "FENTANYL", "CANNABINOIDS", "MDMA" in the last 72 hours.    Invalid input(s): "BENZODIAZEPINE", "PHENCYCLIDINE"   ABG:  No results for input(s): "PH", "PO2", "PCO2", "HCO3", "BE" in the last 168 hours.     Imaging:  Imaging Results              CTA Head and Neck (xpd) (Preliminary result)  Result time 03/28/24 23:47:42      Preliminary result by Ty Crow Jr., MD (03/28/24 23:47:42)                   Narrative:    START OF REPORT:  Technique: CT angiogram of the intracranial vessels was performed with intravenous contrast with direct axial as well as sagittal and coronal reformations. CT angiogram of the neck vessels was performed with intravenous contrast with direct axial as well as sagittal and coronal reformations.    Comparison: Correlation is with CT head study dated same day.    Clinical history: Weakness.    Findings:  Intracranial Vascular structures:  Internal carotid arteries: Mild of the cavernous segment of the left internal carotid artery is seen with no significant stenosis. The right internal carotid artery appears unremarkable.  Middle cerebral arteries: Unremarkable.  Anterior cerebral arteries: Unremarkable.  Vertebral arteries: Unremarkable.  Basilar artery: Unremarkable.  Posterior cerebral arteries: Unremarkable.  Posterior communicating arteries: Unremarkable.  Jugular Veins and venous sinuses: Unremarkable.  Neck Vascular structures: Mild atheromatous calcification with is seen at the origin of the left common carotid artery.  Carotids:  Common carotid arteries: Unremarkable.  Internal carotid artery: Unremarkable.  Vertebral arteries: Unremarkable.  Jugular Veins and venous sinuses: Unremarkable.  Brain parenchyma: Intracranial findings discussed separately in the CT head report. No abnormal " intracranial enhancement seen.      Impression:  1. Unremarkable CT angiogram of the head and neck with no aneurysm dissection or occlusion identified. Details and findings as above.                                         CT Chest Abdomen Pelvis With IV Contrast (XPD) NO Oral Contrast (Preliminary result)  Result time 03/28/24 22:58:27      Preliminary result by Ty Crow Jr., MD (03/28/24 22:58:27)                   Narrative:    START OF REPORT:  Technique: CT Scan of the chest abdomen and pelvis was performed with intravenous contrast with axial as well as sagittal and, coronal images.    Dosage Information: Automated Exposure Control was utilized.    Comparison: None.    Clinical History: Trauma level 1 Syncopal episode, fall from sitting Pt states unable to feel legs, decreased sensation to left side [facility] [phone].    Findings:  Mediastinum: The mediastinal structures are within normal limits.  Heart: The heart size is within normal limits.  Aorta: Unremarkable appearing aorta.  Pulmonary Arteries: Unremarkable.  Lungs: There is mild non specific dependent change at the lung bases. There is a focal pleural based ground-glass opacity in the right upper lobe (series 4, image 31). This may be related to subsegmental atelectasis or scarring. There is a 3 mm pleural based soft tissue density nodule in the right middle lobe (series 4, image 59). Another 3 mm pleural based soft tissue density nodule is seen in the lingula of the left upper lobe (series 4, image 68).  Pleura: No effusions or pneumothorax are identified.  Bony Structures:  Ribs: No acute rib fractures are identified. There is an old fracture of the left 7th rib anterolaterally (series 4, image 79).  Abdomen:  Abdominal Wall: No abdominal wall pathology is seen.  Liver: The liver appears unremarkable.  Biliary System: No extrahepatic biliary duct dilatation is seen.  Gallbladder: The gallbladder appears unremarkable.  Pancreas: The  pancreas appears unremarkable.  Spleen: The spleen appears unremarkable.  Adrenals: The adrenal glands appear unremarkable.  Kidneys: The kidneys appear unremarkable with no stones cysts masses or hydronephrosis.  Aorta: The abdominal aorta appears unremarkable.  IVC: Unremarkable.  Bowel:  Esophagus: The visualized esophagus appears unremarkable.  Stomach: The stomach appears unremarkable.  Duodenum: Unremarkable appearing duodenum.  Small Bowel: The small bowel appears unremarkable.  Colon: Nondistended.  Appendix: The appendix appears unremarkable (series 2, images 164-169).  Peritoneum: No intraperitoneal free air or ascites is seen.    Pelvis:  Bladder: The bladder appears unremarkable.  Female:  Uterus: The uterus appears unremarkable.  Ovaries: No adnexal masses are seen.    Bony structures: No fracture, subluxation or dislocation is identified.  Dorsal Spine: There is mild spondylosis of the visualized dorsal spine.  Bony Pelvis: The visualized bony structures of the pelvis appear unremarkable.      Impression:  1. There is a focal pleural based ground-glass opacity in the right upper lobe (series 4, image 31). This may be related to subsegmental atelectasis or scarring. There is a 3 mm pleural based soft tissue density nodule in the right middle lobe (series 4, image 59). Another 3 mm pleural based soft tissue density nodule is seen in the lingula of the left upper lobe (series 4, image 68).  2. No acute traumatic intrathoracic pathology identified. No acute traumatic intraabdominal or pelvic solid organ or bowel pathology identified. Details and other findings as discussed above.                                         CT Maxillofacial Without Contrast (Preliminary result)  Result time 03/28/24 22:45:18      Preliminary result by Ty Crow Jr., MD (03/28/24 22:45:18)                   Narrative:    START OF REPORT:  Technique: Noncontrast maxillofacial CT was performed with axial as well as  sagittal and coronal images being submitted for interpretation.    Comparison: None.    Clinical history: Trauma level 1 Syncopal episode, fall from sitting Pt states unable to feel legs, decreased sensation to left side [place] [phone].    Findings:  Orbital soft tissues: The orbital soft tissues appear unremarkable.  Bones:  Orbital bony structures: The bilateral orbital bony structures are intact with no orbital fracture identified.  Mandible: The mandible appears unremarkable.  Maxilla: The maxilla appears unremarkable.  Pterygoid plates: No fracture identified of the right or left pterygoid plates.  Zygoma: The zygomatic arches are intact.  TMJ: The mandibular condyles appear normally placed with respect to the mandibular fossa.  Nasal Bones: No displaced nasal bone fracture is seen.  Skull: No acute linear or depressed fracture is identified in the visualized skull.  Paranasal sinuses: There is moderate opacity in the left sphenoid sinus. These findings suggest sinusitis. The rest of the paranasal sinuses appear clear.  Mastoid air cells: The visualized mastoid air cells appear clear.  Brain: Intracranial findings discussed separately.      Impression:  1. No acute maxillofacial fracture identified. Details and findings as noted above.                                         CT Cervical Spine Without Contrast (Preliminary result)  Result time 03/28/24 22:44:33      Preliminary result by Ty Crow Jr., MD (03/28/24 22:44:33)                   Narrative:    START OF REPORT:  Technique: CT of the cervical spine was performed without intravenous contrast with axial as well as sagittal and coronal images.    Comparison: None.    Dosage Information: Automated exposure control was utilized.    Clinical history: Trauma level 1 Syncopal episode, fall from sitting Pt states unable to feel legs [place] [phone].    Findings:  Position: Supine.  Artifact: None.  Lung apices: The visualized lung apices  appear unremarkable.  Spine:  Spinal canal: The spinal canal appears unremarkable.  Spinal cord: The spinal cord appears unremarkable.  Mineralization: Within normal limits.  Rotation: No significant rotation is seen.  Scoliosis: No significant scoliosis is seen.  Vertebral Fusion: No vertebral fusion is identified.  Listhesis: No significant listhesis is identified.  Lordosis: Moderate straightening of the cervical lordosis is seen.  Intervertebral disc spaces: Multilevel loss of disc height is seen.  Osteophytes: Moderate multilevel endplate osteophytes are seen.  Endplate Sclerosis: Moderate multilevel endplate sclerosis is seen.  Uncovertebral degenerative changes: Moderate multilevel uncovertebral joint arthrosis is seen.  Facet degenerative changes: Mild multilevel facet degenerative changes are seen.  Calcifications: Small globular ligamentous calcifications are seen anterior the disc spaces at multiple levels.  Fractures: No acute cervical spine fracture dislocation or subluxation is seen.  Orthopedic Hardware: None.    Miscellaneous:  Mastoid air cells: The visualized mastoid air cells appear clear.  Soft Tissues: Unremarkable.      Impression:  1. No acute cervical spine fracture dislocation or subluxation is seen.  2. Degenerative changes and other details as above.                                         CT Head Without Contrast (Preliminary result)  Result time 03/28/24 22:44:09      Preliminary result by Ty Crow Jr., MD (03/28/24 22:44:09)                   Narrative:    START OF REPORT:  Technique: CT of the head was performed without intravenous contrast with axial as well as coronal and sagittal images.    Comparison: Comparison is with study dated 2022/09/13.    Dosage Information: Automated exposure control was utilized.    Clinical history: Trauma level 1 Syncopal episode, fall from sitting Pt states unable to feel legs [facility] [phone].    Findings:  Hemorrhage: No acute  intracranial hemorrhage is seen.  CSF spaces: The ventricles sulci and basal cisterns are within normal limits for age.  Brain parenchyma: Unremarkable with preservation of the grey white junction throughout.  Cerebellum: Unremarkable.  Vascular: Unremarkable venous sinuses.  Sella and skull base: The sella appears to be within normal limits for age.  Cerebellopontine angles: Within normal limits.  Intracranial calcifications: Incidental note is made of bilateral choroid plexus calcification. Incidental note is made of some pineal region calcification.  Calvarium: No acute linear or depressed skull fracture is seen.    Maxillofacial Structures:  Paranasal sinuses: There is persistent opacification of the left sphenoid sinus. This may reflect chronic sinusitis. The rest of the paranasal sinuses appear clear.  Orbits: The orbits appear unremarkable.  Zygomatic arches: The zygomatic arches are intact and unremarkable.  Temporal bones and mastoids: The temporal bones and mastoids appear unremarkable.  TMJ: The mandibular condyles appear normally placed with respect to the mandibular fossa.      Impression:  1. No acute intracranial process identified. Details and other findings as noted above.                                         X-Ray Chest 1 View (Final result)  Result time 03/28/24 22:46:08      Final result by Bar Clements MD (03/28/24 22:46:08)                   Impression:      No acute findings.      Electronically signed by: Bar Clements  Date:    03/28/2024  Time:    22:46               Narrative:    EXAMINATION:  XR CHEST 1 VIEW    CLINICAL HISTORY:  r/o bleeding or hemorrhage;    COMPARISON:  No priors    FINDINGS:  Frontal view of the chest was obtained. The heart is not enlarged.  Lungs are clear.  There is no pneumothorax or significant effusion.                                       X-Ray Pelvis Routine AP (Final result)  Result time 03/28/24 22:46:40      Final result by Bar Clements MD  (03/28/24 22:46:40)                   Impression:      No acute findings.      Electronically signed by: Bar Clements  Date:    03/28/2024  Time:    22:46               Narrative:    EXAMINATION:  XR PELVIS ROUTINE AP    CLINICAL HISTORY:  r/o bleeding or hemorrhage;    COMPARISON:  None    FINDINGS:  Frontal image of the pelvis demonstrates no fracture or dislocation.                                        Assessment & Plan:     59 year old female presented via ground EMS s/p syncopal fall while smoking around 2230.      q1h neuro checks  Obtain MRI brain, c/t/l spine per NSGY recs  Further plan to follow results     Opal Menendez [facility]   Trauma/Acute Care Surgery  [facility]  C: [phone]

## 2024-03-29 NOTE — HOSPITAL COURSE
59-year-old female who while outside smoking had a syncopal episode and fell.  She presented to the emergency department and was activated as a trauma activation.  She reported left-sided hypoesthesias.  This included her face, left arm, left leg.  She also complains of low back pain.  She was worked up extensively with pan scans and MRI of the brain, cervical, thoracic, lumbar spines.  Other than atelectasis there were no other acute findings.  We discussed the case after the MRIs with Neurosurgery.  There was no indication for admission or intervention.  On my repeat exam at this time the patient states that her left side of her face of the left side of her arm are now back at baseline.  She was no abnormal feelings.  She was 5/5 strength in all 5 extremities.  She was states that she does still have some left leg numbness but this is normal.  It was slightly worse than normal.  She was still has low back pain which again his chronic.  She also has some paraspinous cervical pain but again her MRIs negative.  She was able to easily ambulate around the emergency department and her pain is well-controlled.  She will be switched to a soft collar as needed for comfort only.  She can follow up with the primary care provider.  We will send her on a short course of pain medications.  She reports that this syncope and paresthesias has happened several times in the past and has resolved spontaneously.  No indication to follow up with us.

## 2024-03-29 NOTE — ED NOTES
Pt. Logrolled to L side to inspect posterior surfaces - diffuse midline cervical and thoracic tenderness. No injuries or step-off noted. Rectal deferred, able to flex buttocks. No neuro changes p logroll.

## 2024-03-29 NOTE — DISCHARGE SUMMARY
[facility] - Emergency Dept  Trauma  Discharge Summary      Patient Name: Faith Jin  MRN: [id number]  Admission Date: 3/28/2024  Hospital Length of Stay: 0 days  Discharge Date and Time:  03/29/2024 7:13 AM  Attending Physician: Brynn Desir MD   Discharging Provider: KRISTI Reddy  Primary Care Provider: Garrick Nascimento MD    HPI:   No notes on file    * No surgery found *      Indwelling Lines/Drains at time of discharge:   Lines/Drains/Airways       None                 Hospital Course: 59-year-old female who while outside smoking had a syncopal episode and fell.  She presented to the emergency department and was activated as a trauma activation.  She reported left-sided hypoesthesias.  This included her face, left arm, left leg.  She also complains of low back pain.  She was worked up extensively with pan scans and MRI of the brain, cervical, thoracic, lumbar spines.  Other than atelectasis there were no other acute findings.  We discussed the case after the MRIs with Neurosurgery.  There was no indication for admission or intervention.  On my repeat exam at this time the patient states that her left side of her face of the left side of her arm are now back at baseline.  She was no abnormal feelings.  She was 5/5 strength in all 5 extremities.  She was states that she does still have some left leg numbness but this is normal.  It was slightly worse than normal.  She was still has low back pain which again his chronic.  She also has some paraspinous cervical pain but again her MRIs negative.  She was able to easily ambulate around the emergency department and her pain is well-controlled.  She will be switched to a soft collar as needed for comfort only.  She can follow up with the primary care provider.  We will send her on a short course of pain medications.  She reports that this syncope and paresthesias has happened several times in the past and has resolved  spontaneously.  No indication to follow up with us.    Goals of Care Treatment Preferences:  Code Status: Full Code      Consults:   Consults (From admission, onward)          Status Ordering Provider     Inpatient consult to Neurosurgery  Once        Provider:  Chana Bingham MD    Acknowledged BYRON WHITE            Significant Diagnostic Studies: N/A    Pending Diagnostic Studies:       None          There are no hospital problems to display for this patient.     Discharged Condition: good    Disposition: Home or Self Care    Follow Up:   Follow-up Information       Garrick Nascimento MD Follow up.    Specialty: Family Medicine  Contact information:  [address] [place] [id number] [phone]                           Patient Instructions:   No discharge procedures on file.  Medications:  Reconciled Home Medications:      Medication List        START taking these medications      oxyCODONE 5 MG immediate release tablet  Commonly known as: ROXICODONE  Take 1 tablet (5 mg total) by mouth every 4 (four) hours as needed for Pain.            CHANGE how you take these medications      tiZANidine 4 MG tablet  Commonly known as: ZANAFLEX  take 1 tablet BY MOUTH EVERY 8 HOURS AS NEEDED FOR MUSCLE SPASMS  What changed:   how much to take  how to take this  when to take this  reasons to take this            CONTINUE taking these medications      albuterol 90 mcg/actuation inhaler  Commonly known as: PROVENTIL/VENTOLIN HFA  INHALE TWO PUFFS INTO THE LUNGS EVERY FOUR TO SIX HOURS AS NEEDED FOR SHORTNESS OF BREATH     aspirin 325 MG tablet  Take 325 mg by mouth once daily.     atorvastatin 40 MG tablet  Commonly known as: LIPITOR  Take 40 mg by mouth once daily.     azelastine 137 mcg (0.1 %) nasal spray  Commonly known as: ASTELIN  daily as needed.     benztropine 1 MG tablet  Commonly known as: COGENTIN  Take 1 mg by mouth as needed.     buPROPion 300 MG 24 hr tablet  Commonly known as: WELLBUTRIN  XL  Take 300 mg by mouth every morning.     carBAMazepine 400 MG Tb12  Commonly known as: TEGRETOL XR  Take 400 mg by mouth 2 (two) times daily.     celecoxib 100 MG capsule  Commonly known as: CeleBREX  Take 100 mg by mouth 3 (three) times daily.     clonazePAM 0.5 MG tablet  Commonly known as: KlonoPIN  Take 0.5 mg by mouth 2 (two) times daily.     dexlansoprazole 60 mg capsule  Commonly known as: DEXILANT  Take 1 capsule by mouth once daily.     DULoxetine 60 MG capsule  Commonly known as: CYMBALTA  Take 60 mg by mouth 2 (two) times daily.     estradioL 0.01 % (0.1 mg/gram) vaginal cream  Commonly known as: ESTRACE  as needed.     famotidine 20 MG tablet  Commonly known as: PEPCID  Take 20 mg by mouth nightly.     fluticasone propionate 50 mcg/actuation nasal spray  Commonly known as: FLONASE  1 spray by Each Nostril route nightly.     furosemide 20 MG tablet  Commonly known as: LASIX  Take 20 mg by mouth once daily.     hydrOXYzine pamoate 25 MG Cap  Commonly known as: VISTARIL  Take 25 mg by mouth Daily. Per pt reports 1 the am and 2 at bedside     icosapent ethyL 1 gram Cap  Commonly known as: VASCEPA  Take 2 capsules by mouth 2 (two) times daily.     JARDIANCE 25 mg tablet  Generic drug: empagliflozin  Take 25 mg by mouth once daily.     LINZESS 145 mcg Cap capsule  Generic drug: linaCLOtide  Take 145 mcg by mouth every morning.     loratadine 10 mg tablet  Commonly known as: CLARITIN  Take 10 mg by mouth daily as needed.     metFORMIN 500 MG tablet  Commonly known as: GLUCOPHAGE  Take 500 mg by mouth 3 (three) times daily.     pregabalin 100 MG capsule  Commonly known as: LYRICA  Take 100 mg by mouth 3 (three) times daily.     RESTASIS 0.05 % ophthalmic emulsion  Generic drug: cycloSPORINE  Place 1 drop into both eyes 2 (two) times daily.     topiramate 200 MG Tab  Commonly known as: TOPAMAX  Take 200 mg by mouth once daily.     valACYclovir 500 MG tablet  Commonly known as: VALTREX  Take 500 mg by mouth 2  (two) times daily.     varenicline 0.5 mg (11)- 1 mg (42) tablet  Commonly known as: CHANTIX KELL  Take by mouth.     VRAYLAR 4.5 mg Cap  Generic drug: cariprazine  Take 4.5 mg by mouth.     zaleplon 5 MG Cap  Commonly known as: SONATA  Take 5 mg by mouth every evening.            STOP taking these medications      diclofenac sodium 1 % Gel  Commonly known as: VOLTAREN     MOVANTIK 25 mg tablet  Generic drug: naloxegoL     nicotine 21 mg/24 hr  Commonly known as: NICODERM CQ            Time spent on the discharge of patient: 35 minutes    KRISTI Reddy  Trauma  [facility] - Emergency Dept

## 2024-03-29 NOTE — ED PROVIDER NOTES
Encounter Date: 3/28/2024    SCRIBE #1 NOTE: I, Wisam Gracia, am scribing for, and in the presence of,  Brynn Desir MD. I have scribed the following portions of the note - Other sections scribed: HPI, ROS, PE.       History     Chief Complaint   Patient presents with    Loss of Consciousness     Syncopal episode at home in bathroom. States standing up after rolling her cigarette, said she wasn't feeling well, friend sat her down and fell forward onto the floor, striking nose. AB to nasal bridge. C/o pain to neck, right jaw, and mid back. NV intact. States has felt weak for a while, worse now. Denies chest pain, sob     Pt is a 59 y.o. female with a pMHx of BPD, COPD, depression, lumbar degenerative disc disease, hyperlipidemia, neuropathy, and TIA presenting after syncopal event. Pt states that she stood up from a stool and experienced a syncopal episode at 2030 , hitting her head on the sink as she fell on her left side, to the ground. Pt states hat she cannot see clearly out of her left eye and cannot feel much on the left side of her body. Pt also complains of neck pain and back pain. Pt denies blood thinners or ETOH use.     The history is provided by the patient and medical records. No  was used.     Review of patient's allergies indicates:   Allergen Reactions    Adhesive      Other reaction(s): skin tears easily with adhesive    Cephalexin Nausea And Vomiting    Erythromycin Nausea And Vomiting    Lithium Nausea And Vomiting    Naproxen Nausea And Vomiting     Past Medical History:   Diagnosis Date    Bipolar disorder     Carpal tunnel syndrome     COPD (chronic obstructive pulmonary disease)     Depression     Dry eyes     Lumbar degenerative disc disease     Mixed hyperlipidemia     Muscle spasms of both lower extremities     Muscle spasms of neck     Neuropathy     TIA (transient ischemic attack) 2022    Ulnar nerve external compression syndrome      Past Surgical History:    Procedure Laterality Date    ANTERIOR CRUCIATE LIGAMENT REPAIR Left     CARPAL TUNNEL RELEASE Right 5/5/2023    Procedure: RELEASE, CARPAL TUNNEL;  Surgeon: Canelo Bhatt MD;  Location: [facility] OR;  Service: Orthopedics;  Laterality: Right;    FRACTURE SURGERY  2017    Mauro Melia    JOINT REPLACEMENT  2016    revision total knee ledt    SHOULDER ARTHROSCOPY W/ ROTATOR CUFF REPAIR Left     TOTAL KNEE ARTHROPLASTY Left     ULNAR NERVE TRANSPOSITION Right 5/5/2023    Procedure: TRANSPOSITION, NERVE, ULNAR  Endoscopic;  Surgeon: Canelo Bhatt MD;  Location: [facility] OR;  Service: Orthopedics;  Laterality: Right;  Endoscopic    WRIST SURGERY Right      Family History   Problem Relation Age of Onset    Coronary artery disease Mother     Diabetes Mother     Arthritis Mother         Back , hands    Early death Mother         Diabetes    Heart disease Mother     Hypertension Mother     Kidney disease Mother     Stroke Mother     Vision loss Mother     Coronary artery disease Father     Asthma Father         Back    Heart disease Father     Hypertension Father     Vision loss Father     Arthritis Sister         Not sure    Asthma Sister         Finger, shoulder    Mental illness Sister     Vision loss Sister     Arthritis Brother         Hip    Birth defects Brother         Special Needs/ Downs Syndrome    Heart disease Brother     Learning disabilities Brother     Intellectual disability Brother      Social History     Tobacco Use    Smoking status: Every Day     Current packs/day: 0.25     Average packs/day: 0.3 packs/day for 42.0 years (10.5 ttl pk-yrs)     Types: Vaping with nicotine, Cigarettes     Passive exposure: Yes    Smokeless tobacco: Never   Substance Use Topics    Alcohol use: Not Currently     Comment: stopped in 2005    Drug use: Not Currently     Types: Marijuana, Cocaine     Comment: Nothing     Review of Systems   Eyes:  Positive for visual disturbance (blurriness to left eye).   Musculoskeletal:  Positive  "for back pain and neck pain.   Neurological:  Positive for numbness (Left sided "from my head to my toes").       Physical Exam     Initial Vitals [03/28/24 2141]   BP Pulse Resp Temp SpO2   114/75 101 15 98.2 °F (36.8 °C) 98 %      MAP       --         Physical Exam    Nursing note and vitals reviewed.  Constitutional: She appears well-developed and well-nourished. She is not diaphoretic. She does not appear ill. No distress. Cervical collar in place.   HENT:   Head: Normocephalic.   Right Ear: External ear normal.   Left Ear: External ear normal.   Nose: No nasal deformity, septal deviation or nasal septal hematoma.   Mouth/Throat: Oropharynx is clear and moist and mucous membranes are normal.   Abrasion to nose, mild swelling     Eyes: Conjunctivae and EOM are normal. Pupils are equal, round, and reactive to light.   Pupils 2mm to 4mm equal and reactive     Neck: Neck supple. No tracheal deviation present.   Normal range of motion.  Cardiovascular:  Normal rate, regular rhythm, normal heart sounds and intact distal pulses.           Pulses:       Radial pulses are 2+ on the right side and 2+ on the left side.        Dorsalis pedis pulses are 2+ on the right side and 2+ on the left side.   2+ radial and DP pulses    Pulmonary/Chest: Breath sounds normal. No respiratory distress. She exhibits no tenderness.   No signs of trauma    Abdominal: Abdomen is soft. She exhibits no distension. There is no abdominal tenderness.   No signs of trauma    No right CVA tenderness.  No left CVA tenderness. There is no rebound.   Musculoskeletal:         General: Tenderness (thoracic spine tenderness) present. Normal range of motion.      Cervical back: Normal range of motion and neck supple.      Thoracic back: Normal. No bony tenderness.      Lumbar back: Normal. No bony tenderness.      Comments: Skin tear to right hand     Neurological: She is alert and oriented to person, place, and time. She has normal strength. A sensory " deficit (Decreased sensation to whole left face and left side of body.) is present. No cranial nerve deficit. GCS score is 15. GCS eye subscore is 4. GCS verbal subscore is 5. GCS motor subscore is 6.   No pronator drift to BLE and BUE.   Normal strength.       Skin: Skin is warm and dry. Capillary refill takes less than 2 seconds. No abrasion, no ecchymosis and no laceration noted. No pallor.   Psychiatric: She has a normal mood and affect. Her behavior is normal.         ED Course   Procedures  Labs Reviewed   COMPREHENSIVE METABOLIC PANEL - Abnormal; Notable for the following components:       Result Value    Potassium Level 3.1 (*)     Carbon Dioxide 18 (*)     Glucose Level 202 (*)     All other components within normal limits   LACTIC ACID, PLASMA - Abnormal; Notable for the following components:    Lactic Acid Level 2.7 (*)     All other components within normal limits   URINALYSIS, REFLEX TO URINE CULTURE - Abnormal; Notable for the following components:    Specific Gravity, UA 1.048 (*)     Glucose, UA 4+ (*)     All other components within normal limits   DRUG SCREEN, URINE (BEAKER) - Abnormal; Notable for the following components:    Amphetamines, Urine Positive (*)     Cannabinoids, Urine Positive (*)     Fentanyl, Urine Positive (*)     Specific Gravity, Urine Auto 1.048 (*)     All other components within normal limits    Narrative:     Cut off concentrations:    Amphetamines - 1000 ng/ml  Barbiturates - 200 ng/ml  Benzodiazepine - 200 ng/ml  Cannabinoids (THC) - 50 ng/ml  Cocaine - 300 ng/ml  Fentanyl - 1.0 ng/ml  MDMA - 500 ng/ml  Opiates - 300 ng/ml   Phencyclidine (PCP) - 25 ng/ml    Specimen submitted for drug analysis and tested for pH and specific gravity in order to evaluate sample integrity. Suspect tampering if specific gravity is <1.003 and/or pH is not within the range of 4.5 - 8.0  False negatives may result form substances such as bleach added to urine.  False positives may result for the  presence of a substance with similar chemical structure to the drug or its metabolite.    This test provides only a PRELIMINARY analytical test result. A more specific alternate chemical method must be used in order to obtain a confirmed analytical result. Gas chromatography/mass spectrometry (GC/MS) is the preferred confirmatory method. Other chemical confirmation methods are available. Clinical consideration and professional judgement should be applied to any drug of abuse test result, particularly when preliminary positive results are used.    Positive results will be confirmed only at the physicians request. Unconfirmed screening results are to be used only for medical purposes (treatment).        CBC WITH DIFFERENTIAL - Abnormal; Notable for the following components:    Hgb 10.9 (*)     Hct 34.7 (*)     MCV 74.5 (*)     MCH 23.4 (*)     MCHC 31.4 (*)     RDW 17.7 (*)     All other components within normal limits   PROTIME-INR - Normal   APTT - Normal   ALCOHOL,MEDICAL (ETHANOL) - Normal   TROPONIN I - Normal   LACTIC ACID, PLASMA - Normal   CBC W/ AUTO DIFFERENTIAL    Narrative:     The following orders were created for panel order CBC auto differential.  Procedure                               Abnormality         Status                     ---------                               -----------         ------                     CBC with Differential[[id number]]       Abnormal            Final result                 Please view results for these tests on the individual orders.   TYPE & SCREEN   ABORH RETYPE     EKG Readings: (Independently Interpreted)   Initial Reading: No STEMI. Rhythm: Normal Sinus Rhythm. Heart Rate: 99. Ectopy: No Ectopy. Conduction: Normal. ST Segments: Normal ST Segments. T Waves: Normal. Axis: Normal.   Taken at 2139     ECG Results              EKG 12-lead (Final result)        Collection Time Result Time QRS Duration OHS QTC Calculation    03/28/24 21:39:10 03/30/24 22:53:03 92 462                      Final result by Interface, Lab In [facility] (03/30/24 22:53:08)                   Narrative:    Test Reason : R55,    Vent. Rate : 099 BPM     Atrial Rate : 099 BPM     P-R Int : 146 ms          QRS Dur : 092 ms      QT Int : 360 ms       P-R-T Axes : 042 018 042 degrees     QTc Int : 462 ms    Normal sinus rhythm  Confirmed by Nino Somers MD ([id number]) on 3/30/2024 10:52:58 PM    Referred By:             Confirmed By:Nino Somers MD                                     EKG 12-LEAD (Final result)  Result time 04/03/24 14:59:51      Final result by Unknown User (04/03/24 14:59:51)                                      Imaging Results              MRI Lumbar Spine Without Contrast (Final result)  Result time 03/29/24 10:07:28      Final result by Seferino Cornejo MD (03/29/24 10:07:28)                   Impression:      No acute findings in the lumbar spine    Findings are compatible with the preliminary report.      Electronically signed by: Seferino Cornejo MD  Date:    03/29/2024  Time:    10:07               Narrative:    EXAMINATION:  MRI LUMBAR SPINE WITHOUT CONTRAST    CLINICAL HISTORY:  Fall, weakness    TECHNIQUE:  Multiplanar MRI performed through the lumbar spine without contrast.    COMPARISON:  CT chest, abdomen and pelvis 03/28/2024    FINDINGS:  Vertebral bodies are normal in height and alignment.  No osseous edema or acute fracture.  Mild disc space narrowing and desiccation is at L1-2 and L4-5.    Conus medullaris terminates at the L2 vertebral body level.    L1-2: Small disc bulge without significant stenosis.    L2-3: Small disc bulge and mild facet hypertrophy without significant stenosis.    L3-4: Small disc bulge and facet hypertrophy results in mild bilateral neural foraminal stenosis.  Spinal canal is patent.    L4-5: Disc bulge and facet hypertrophy results in moderate left and mild-to-moderate right neural foraminal stenosis with mild to moderate spinal canal stenosis.    L5-S1: Facet  hypertrophy without stenosis.    Paravertebral soft tissues are normal.                        Preliminary result by Ty Crow Jr., MD (03/29/24 06:01:21)                   Impression:    1. No acute lumbar spine fracture, dislocation or subluxation is seen. Degenerative changes and other details as described above.               Narrative:    START OF REPORT:  Technique: Standard axial sagittal and coronal lumbar spine MRI sequences were performed.    Comparison: None.    Clinical history: Fall with sensory deficits.    Surgical history: None.    Findings:  Post- surgical changes: None.  Distal cord and conus medullaris: Spinal cord and conus medullaris are unremarkable.  Cauda equina and intrathecal contents: Cauda equina appears normal.  Anatomy: Unremarkable.  Alignment:  Spondylolisthesis: No listhesis is identified.  Curvature: Lumbar lordosis is maintained.  Degenerative changes:  Osteophytes: None.  Modic changes: None.  Schmorls node: Schmorls node at the superior endplate of T12.  Integrity of the bone, bone marrow and discs:  Discs: Mild disc desiccation at T12- L1 down through L5-S1.  Findings at specific level:  T12- L1: Nerve roots are normal.  L1- L2: A disc bulge is identified. Disc material abuts the thecal sac. Disc material causes left lateral recess and left neural foraminal narrowing.  L2- L3: Nerve roots are normal.  L3- L4: A disc bulge is identified. Disc material indents the thecal sac. This finding together with bilateral facet and ligamentum flavum hypertrophy causes narrowing of the bilateral lateral recess and bilateral neural foramen. Spinal canal is patent.  L4- L5: A disc bulge is identified. Disc material indents the thecal sac. Spinal canal is mildly stenotic.  L5- S1: Nerve roots are normal.                                         MRI Thoracic Spine Without Contrast (Final result)  Result time 03/29/24 10:05:27      Final result by Seferino Cornejo MD (03/29/24  10:05:27)                   Impression:      No acute findings in the thoracic spine.    Findings are compatible with the preliminary report.      Electronically signed by: Seferino Cornejo MD  Date:    03/29/2024  Time:    10:05               Narrative:    EXAMINATION:  MRI THORACIC SPINE WITHOUT CONTRAST    CLINICAL HISTORY:  fall with sensation changes;    TECHNIQUE:  Multiplanar MRI was performed through the thoracic spine without contrast.    COMPARISON:  CT chest, abdomen and pelvis 03/28/2024    FINDINGS:  Vertebral bodies are normal in height and alignment.  No osseous edema or acute fracture.  Disc heights are relatively maintained.    Spinal cord is normal in caliber and signal.  No intradural, extramedullary lesions.    Paravertebral soft tissues are normal.                        Preliminary result by Ty Crow Jr., MD (03/29/24 05:53:04)                   Impression:    1. No fracture, dislocation or subluxation is seen in the thoracic spine.  2. Unremarkable T-spine MRI.               Narrative:    START OF REPORT:  Technique: Multiplanar multisequence MRI of the thoracic spine without contrast was performed in neutral position.    Comparison: None.    Clinical History: Fall with sensory deficits.    Findings: No fracture, dislocation or subluxation is seen in the thoracic spine.  Spine: No vertebral body fracture is identified. The vertebral body heights are maintained with normal vertebral alignment. Disc heights are maintained. Bone marrow signal is normal. The distal spinal cord and cauda equina are normal. The paraspinal soft tissues are unremarkable.  Spinal cord: Normal.  Spinal canal: Normal.  Anatomy: Normal.  Bone alignment: Normal.  Bone and marrow degenerative changes: Normal.  bone marrow, and disc integrity: Normal.                                         MRI Cervical Spine Without Contrast (Final result)  Result time 03/29/24 10:09:49      Final result by Bar Clements MD  (03/29/24 10:09:49)                   Impression:      1. Edema along the right C4-C5 facet joint with widening of the joint slightly increased compared to 2022.  I cannot exclude acute injury.  2. Otherwise no acute findings.  Degenerative changes as discussed.  3. Findings discussed with Ralph Young NP at 1008 on 3/29/2024.      Electronically signed by: Bar Clements  Date:    03/29/2024  Time:    10:09               Narrative:    EXAMINATION:  MRI CERVICAL SPINE WITHOUT CONTRAST    CLINICAL HISTORY:  fall with sensory changes;.    TECHNIQUE:  Multiplanar, multisequence MR images of the cervical spine were acquired without the administration of contrast.    COMPARISON:  CT yesterday and 6 July 2022    FINDINGS:  No significant alignment abnormality.  No significant loss of vertebral body height.  No suspicious marrow signal abnormality.  No convincing abnormal signal within the cervical cord.    C2-3    No significant abnormality    C3-4    Mild degenerative changes without significant spinal canal or neuroforaminal narrowing.    C4-5    Some fluid signal is seen along the right facet joint for example on sagittal STIR image 12 series 12.  There is widening of the facet joint here best demonstrated on concurrent CT image 22 series 11.  This was present in 2022 but is slightly increased.  Otherwise mild degenerative changes primarily involving the disc without significant spinal canal narrowing.    C5-6    Moderate disc and posterior element degenerative changes with mild concentric spinal canal narrowing.    C6-7    Moderate disc and posterior element degenerative changes with moderate concentric spinal canal narrowing.    C7-T1    No significant abnormality.                        Preliminary result by Ty Crow Jr., MD (03/29/24 05:50:45)                   Impression:    1. No acute cervical spine fracture, dislocation or subluxation is identified. Degenerative changes and other details as  above.               Narrative:    START OF REPORT:  Technique: Multiplanar, multisequence MRI of the cervical spine without contrast was performed in neutral position.    Comparison: None.    Clinical History: Fall with sensory deficits.    Findings:  Spinal cord: The spinal cord signal is within normal limits.  Post surgical changes: None.  Alignment: Normal vertebral alignment is seen. No listhesis identified.  Curvature: Subtle straightening of the cervical lordosis.  Vertebral body fracture/deformity: Vertebral body is maintained.  posterior longtudinal ligament: Normal in thickness.  Disc morphology: Moderate disc desiccation at C2- C3 down through C7-T1. Associated moderate loss of disc height seen at C5-C6 and C6-C7.  Modic endplate changes: None.  Schmorls node: None.    C2- C3: There is no significant disc herniation. The spinal canal and neural foraminae are patent and the exiting nerve roots are normal.    C3- C4: There is no significant disc herniation. The spinal canal and neural foraminae are patent and the exiting nerve roots are normal.    C4- C5: A disc bulge is identified. Disc material indents the thecal sac.    C5- C6: A disc bulge is identified. Disc material abuts the anterior aspect of the spinal cord. There is narrowing of the bilateral neural foramen.    C6- C7: A disc bulge is identified. Disc material abuts the anterior aspect of the spinal cord. There is narrowing of the bilateral neural foramen.    C7- T1: There is no significant disc herniation. The spinal canal and neural foraminae are patent and the exiting nerve roots are normal.                                         MRI Brain Without Contrast (Final result)  Result time 03/29/24 09:52:32      Final result by Bar Clements MD (03/29/24 09:52:32)                   Impression:      No acute intracranial findings.    No significant discrepancy with the preliminary report.      Electronically signed by: Bar  Clements  Date:    03/29/2024  Time:    09:52               Narrative:    EXAMINATION:  MRI BRAIN WITHOUT CONTRAST    CLINICAL HISTORY:  fall with sensory deficits;    TECHNIQUE:  Routine unenhanced brain MRI.    COMPARISON:  CT yesterday.    FINDINGS:  Diffusion imaging is negative for acute infarct.  There is minimal patchy increased T2 and FLAIR signal in the cerebral white matter which is nonspecific but most commonly associated with chronic small vessel ischemic changes. The ventricles are not enlarged.    Signal voids are visible in the intracranial internal carotid arteries bilaterally and in the basilar artery implying gross patency.                        Preliminary result by Ty Crow Jr., MD (03/29/24 05:43:03)                   Impression:    1. No acute intracranial process is identified. Details and other findings as discussed above.               Narrative:    START OF REPORT:  Technique: Multiplanar, multisequence magnetic resonance imaging of the brain was performed without intravenous contrast.    Comparison: Correlation is with CT study dated 2024-03-28 22:12:[id number] 22:49:44.    Clinical history: Fall with sensory deficits.    Findings:  Hemorrhage: No acute intracranial hemorrhage is identified.  Stroke: No abnormal signal is identified on the diffusion images to suggest acute infarct.  Extra axial spaces: The ventricles sulci and basal cisterns are within normal limits.  Cerebral, cerebellar, and brainstem parenchyma: Within normal limits. Mild scattered periventricular and subcortical white matter signal abnormalities are seen. The main consideration is small vessel ischemic changes in a patient of this age.  Herniation: None.  Calvarium: Within normal limits.  Vascular system: Normal flow voids.  Visualized paranasal sinuses: Left sphenoid sinusitis.  Visualized orbits: The visualized orbits appear unremarkable.  Visualized upper cervical spine: Normal.  Sella and skull  base: Normal.  Temporal bones and mastoids: Within normal limits.                                         CTA Head and Neck (xpd) (Final result)  Result time 03/29/24 10:03:58      Final result by Seferino Cornejo MD (03/29/24 10:03:58)                   Impression:      No acute abnormality. No high-grade stenosis or major vessel occlusion.    Findings are compatible with the preliminary report.      Electronically signed by: Seferino Cornejo MD  Date:    03/29/2024  Time:    10:03               Narrative:    EXAMINATION:  CTA HEAD AND NECK (XPD)    CLINICAL HISTORY:  fall. r/o stroke;    TECHNIQUE:  Non contrast low dose axial images were obtained through the head. CT angiogram was performed from the level of the luis to the top of the head following the IV administration of 100mL of Omnipaque 350.   Sagittal and coronal reconstructions and maximum intensity projection reconstructions were performed. Arterial stenosis percentages are based on NASCET measurement criteria.  Total DLP 1684.  Automated exposure control utilized.    COMPARISON:  MR brain 03/29/2024    FINDINGS:  Intracranial Compartment:    Ventricles and sulci are normal in size for age without evidence of hydrocephalus. No extra-axial blood or fluid collections.    The brain parenchyma appears normal. No parenchymal mass, hemorrhage, edema, or major vascular distribution infarct.    Skull/Extracranial Contents (limited evaluation): No fracture. Mastoid air cells and paranasal sinuses are essentially clear.    Non-Vascular Structures of the Neck/Thoracic Inlet (limited evaluation): Normal.    Aorta: Normal 3 vessel arch.    Extracranial carotid circulation: No hemodynamically significant stenosis, aneurysmal dilatation, or dissection.    Extracranial vertebral circulation: No hemodynamically significant stenosis, aneurysmal dilatation, or dissection.    Intracranial Arteries: No focal high-grade stenosis, occlusion, or aneurysm.    Venous structures  (limited evaluation): Normal.                        Preliminary result by Ty Crow Jr., MD (03/28/24 23:47:42)                   Impression:    1. Unremarkable CT angiogram of the head and neck with no aneurysm dissection or occlusion identified. Details and findings as above.               Narrative:    START OF REPORT:  Technique: CT angiogram of the intracranial vessels was performed with intravenous contrast with direct axial as well as sagittal and coronal reformations. CT angiogram of the neck vessels was performed with intravenous contrast with direct axial as well as sagittal and coronal reformations.    Comparison: Correlation is with CT head study dated same day.    Clinical history: Weakness.    Findings:  Intracranial Vascular structures:  Internal carotid arteries: Mild of the cavernous segment of the left internal carotid artery is seen with no significant stenosis. The right internal carotid artery appears unremarkable.  Middle cerebral arteries: Unremarkable.  Anterior cerebral arteries: Unremarkable.  Vertebral arteries: Unremarkable.  Basilar artery: Unremarkable.  Posterior cerebral arteries: Unremarkable.  Posterior communicating arteries: Unremarkable.  Jugular Veins and venous sinuses: Unremarkable.  Neck Vascular structures: Mild atheromatous calcification with is seen at the origin of the left common carotid artery.  Carotids:  Common carotid arteries: Unremarkable.  Internal carotid artery: Unremarkable.  Vertebral arteries: Unremarkable.  Jugular Veins and venous sinuses: Unremarkable.  Brain parenchyma: Intracranial findings discussed separately in the CT head report. No abnormal intracranial enhancement seen.                                         CT Chest Abdomen Pelvis With IV Contrast (XPD) NO Oral Contrast (Final result)  Result time 03/29/24 08:41:41      Final result by Felix Nath MD (03/29/24 08:41:41)                   Impression:      1. There is a focal  pleural based ground-glass opacity in the right upper lobe (series 4, image 31). This may be related to subsegmental atelectasis or scarring. There is a 3 mm pleural based soft tissue density nodule in the right middle lobe (series 4, image 59). Another 3 mm pleural based soft tissue density nodule is seen in the lingula of the left upper lobe (series 4, image 68).2. No acute traumatic intrathoracic pathology identified. No acute traumatic intraabdominal or pelvic solid organ or bowel pathology identified. Details and other findings as discussed above.      Electronically signed by: Felix Nath  Date:    03/29/2024  Time:    08:41               Narrative:    EXAMINATION:  CT CHEST ABDOMEN PELVIS WITH IV CONTRAST (XPD)    CLINICAL HISTORY:  Trauma;    TECHNIQUE:  Axial images of the chest, abdomen, and pelvis were obtained With Contrast. Sagittal and coronal reconstructed images were available for review.    Automatic exposure control was utilized to reduce the patient's radiation dose.    DLP = 847    COMPARISON:  No prior images available for comparison.    FINDINGS:  Mediastinum: The mediastinal structures are within normal limits.Heart: The heart size is within normal limits.Aorta: Unremarkable appearing aorta.Pulmonary Arteries: Unremarkable.Lungs: There is mild non specific dependent change at the lung bases. There is a focal pleural based ground-glass opacity in the right upper lobe (series 4, image 31). This may be related to subsegmental atelectasis or scarring. There is a 3 mm pleural based soft tissue density nodule in the right middle lobe (series 4, image 59). Another 3 mm pleural based soft tissue density nodule is seen in the lingula of the left upper lobe (series 4, image 68).Pleura: No effusions or pneumothorax are identified.Bony Structures:Ribs: No acute rib fractures are identified. There is an old fracture of the left 7th rib anterolaterally (series 4, image 79).Abdomen:Abdominal Wall: No  abdominal wall pathology is seen.Liver: The liver appears unremarkable.Biliary System: No extrahepatic biliary duct dilatation is seen.Gallbladder: The gallbladder appears unremarkable.Pancreas: The pancreas appears unremarkable.Spleen: The spleen appears unremarkable.Adrenals: The adrenal glands appear unremarkable.Kidneys: The kidneys appear unremarkable with no stones cysts masses or hydronephrosis.Aorta: The abdominal aorta appears unremarkable.IVC: Unremarkable.Bowel:Esophagus: The visualized esophagus appears unremarkable.Stomach: The stomach appears unremarkable.Duodenum: Unremarkable appearing duodenum.Small Bowel: The small bowel appears unremarkable.Colon: Nondistended.Appendix: The appendix appears unremarkable (series 2, images 164-169).Peritoneum: No intraperitoneal free air or ascites is seen.Pelvis:Bladder: The bladder appears unremarkable.Female:Uterus: The uterus appears unremarkable.Ovaries: No adnexal masses are seen.Bony structures: No fracture, subluxation or dislocation is identified.Dorsal Spine: There is mild spondylosis of the visualized dorsal spine.Bony Pelvis: The visualized bony structures of the pelvis appear unremarkable.                        Preliminary result by Ty Crow Jr., MD (03/28/24 22:58:27)                   Impression:    1. There is a focal pleural based ground-glass opacity in the right upper lobe (series 4, image 31). This may be related to subsegmental atelectasis or scarring. There is a 3 mm pleural based soft tissue density nodule in the right middle lobe (series 4, image 59). Another 3 mm pleural based soft tissue density nodule is seen in the lingula of the left upper lobe (series 4, image 68).  2. No acute traumatic intrathoracic pathology identified. No acute traumatic intraabdominal or pelvic solid organ or bowel pathology identified. Details and other findings as discussed above.               Narrative:    START OF REPORT:  Technique: CT Scan of  the chest abdomen and pelvis was performed with intravenous contrast with axial as well as sagittal and, coronal images.    Dosage Information: Automated Exposure Control was utilized.    Comparison: None.    Clinical History: Trauma level 1 Syncopal episode, fall from sitting Pt states unable to feel legs, decreased sensation to left side Juan Carlos [id number].    Findings:  Mediastinum: The mediastinal structures are within normal limits.  Heart: The heart size is within normal limits.  Aorta: Unremarkable appearing aorta.  Pulmonary Arteries: Unremarkable.  Lungs: There is mild non specific dependent change at the lung bases. There is a focal pleural based ground-glass opacity in the right upper lobe (series 4, image 31). This may be related to subsegmental atelectasis or scarring. There is a 3 mm pleural based soft tissue density nodule in the right middle lobe (series 4, image 59). Another 3 mm pleural based soft tissue density nodule is seen in the lingula of the left upper lobe (series 4, image 68).  Pleura: No effusions or pneumothorax are identified.  Bony Structures:  Ribs: No acute rib fractures are identified. There is an old fracture of the left 7th rib anterolaterally (series 4, image 79).  Abdomen:  Abdominal Wall: No abdominal wall pathology is seen.  Liver: The liver appears unremarkable.  Biliary System: No extrahepatic biliary duct dilatation is seen.  Gallbladder: The gallbladder appears unremarkable.  Pancreas: The pancreas appears unremarkable.  Spleen: The spleen appears unremarkable.  Adrenals: The adrenal glands appear unremarkable.  Kidneys: The kidneys appear unremarkable with no stones cysts masses or hydronephrosis.  Aorta: The abdominal aorta appears unremarkable.  IVC: Unremarkable.  Bowel:  Esophagus: The visualized esophagus appears unremarkable.  Stomach: The stomach appears unremarkable.  Duodenum: Unremarkable appearing duodenum.  Small Bowel: The small bowel appears  unremarkable.  Colon: Nondistended.  Appendix: The appendix appears unremarkable (series 2, images 164-169).  Peritoneum: No intraperitoneal free air or ascites is seen.    Pelvis:  Bladder: The bladder appears unremarkable.  Female:  Uterus: The uterus appears unremarkable.  Ovaries: No adnexal masses are seen.    Bony structures: No fracture, subluxation or dislocation is identified.  Dorsal Spine: There is mild spondylosis of the visualized dorsal spine.  Bony Pelvis: The visualized bony structures of the pelvis appear unremarkable.                                         CT Maxillofacial Without Contrast (Final result)  Result time 03/29/24 08:37:23      Final result by Felix Nath MD (03/29/24 08:37:23)                   Impression:      No displaced facial fracture appreciated      Electronically signed by: Felix Nath  Date:    03/29/2024  Time:    08:37               Narrative:    CLINICAL HISTORY:  Trauma.    TECHNIQUE:  Maxillofacial CT was performed without  contrast. There are sagittal and coronal reconstructed images available for review.    Automatic exposure control was utilized to reduce the patient's radiation dose.    DLP = 1862    COMPARISON:  No prior imaging available for comparison.    FINDINGS:  Orbital soft tissues: The orbital soft tissues appear unremarkable.Bones:Orbital bony structures: The bilateral orbital bony structures are intact with no orbital fracture identified.Mandible: The mandible appears unremarkable.Maxilla: The maxilla appears unremarkable.Pterygoid plates: No fracture identified of the right or left pterygoid plates.Zygoma: The zygomatic arches are intact.TMJ: The mandibular condyles appear normally placed with respect to the mandibular fossa.Nasal Bones: No displaced nasal bone fracture is seen.Skull: No acute linear or depressed fracture is identified in the visualized skull.Paranasal sinuses: There is moderate opacity in the left sphenoid sinus. These findings  suggest sinusitis. The rest of the paranasal sinuses appear clear.Mastoid air cells: The visualized mastoid air cells appear clear.Brain: Intracranial findings discussed separately.                        Preliminary result by Ty Crow Jr., MD (03/28/24 22:45:18)                   Impression:    1. No acute maxillofacial fracture identified. Details and findings as noted above.               Narrative:    START OF REPORT:  Technique: Noncontrast maxillofacial CT was performed with axial as well as sagittal and coronal images being submitted for interpretation.    Comparison: None.    Clinical history: Trauma level 1 Syncopal episode, fall from sitting Pt states unable to feel legs, decreased sensation to left side Juan Carlos [id number].    Findings:  Orbital soft tissues: The orbital soft tissues appear unremarkable.  Bones:  Orbital bony structures: The bilateral orbital bony structures are intact with no orbital fracture identified.  Mandible: The mandible appears unremarkable.  Maxilla: The maxilla appears unremarkable.  Pterygoid plates: No fracture identified of the right or left pterygoid plates.  Zygoma: The zygomatic arches are intact.  TMJ: The mandibular condyles appear normally placed with respect to the mandibular fossa.  Nasal Bones: No displaced nasal bone fracture is seen.  Skull: No acute linear or depressed fracture is identified in the visualized skull.  Paranasal sinuses: There is moderate opacity in the left sphenoid sinus. These findings suggest sinusitis. The rest of the paranasal sinuses appear clear.  Mastoid air cells: The visualized mastoid air cells appear clear.  Brain: Intracranial findings discussed separately.                                         CT Cervical Spine Without Contrast (Final result)  Result time 03/29/24 08:35:29      Final result by Felix Nath MD (03/29/24 08:35:29)                   Impression:      1. No acute cervical spine abnormality  identified.    2. Ligament, spinal cord and/or vascular abnormalities cannot be excluded on the basis of this examination.    Degenerative changes as above.      Electronically signed by: Felix Nath  Date:    03/29/2024  Time:    08:35               Narrative:    EXAMINATION:  CT CERVICAL SPINE WITHOUT CONTRAST    CLINICAL HISTORY:  Trauma;    TECHNIQUE:  CT of the cervical spine Without contrast. Sagittal and coronal reconstructions were performed on the source images.    Automatic exposure control was utilized to reduce the patient's radiation dose.    DLP = 1862    COMPARISON:  No prior imaging available for comparison.    FINDINGS:  Position: Supine.Artifact: None.Lung apices: The visualized lung apices appear unremarkable.Spine:Spinal canal: The spinal canal appears unremarkable.Spinal cord: The spinal cord appears unremarkable.Mineralization: Within normal limits.Rotation: No significant rotation is seen.Scoliosis: No significant scoliosis is seen.Vertebral Fusion: No vertebral fusion is identified.Listhesis: No significant listhesis is identified.Lordosis: Moderate straightening of the cervical lordosis is seen.Intervertebral disc spaces: Multilevel loss of disc height is seen.Osteophytes: Moderate multilevel endplate osteophytes are seen.Endplate Sclerosis: Moderate multilevel endplate sclerosis is seen.Uncovertebral degenerative changes: Moderate multilevel uncovertebral joint arthrosis is seen.Facet degenerative changes: Mild multilevel facet degenerative changes are seen.Calcifications: Small globular ligamentous calcifications are seen anterior the disc spaces at multiple levels.Fractures: No acute cervical spine fracture dislocation or subluxation is seen.Orthopedic Hardware: None.Miscellaneous:Mastoid air cells: The visualized mastoid air cells appear clear.Soft Tissues: Unremarkable.                        Preliminary result by Ty Crow Jr., MD (03/28/24 22:44:33)                    Impression:    1. No acute cervical spine fracture dislocation or subluxation is seen.  2. Degenerative changes and other details as above.               Narrative:    START OF REPORT:  Technique: CT of the cervical spine was performed without intravenous contrast with axial as well as sagittal and coronal images.    Comparison: None.    Dosage Information: Automated exposure control was utilized.    Clinical history: Trauma level 1 Syncopal episode, fall from sitting Pt states unable to feel legs Juan Carlos [id number].    Findings:  Position: Supine.  Artifact: None.  Lung apices: The visualized lung apices appear unremarkable.  Spine:  Spinal canal: The spinal canal appears unremarkable.  Spinal cord: The spinal cord appears unremarkable.  Mineralization: Within normal limits.  Rotation: No significant rotation is seen.  Scoliosis: No significant scoliosis is seen.  Vertebral Fusion: No vertebral fusion is identified.  Listhesis: No significant listhesis is identified.  Lordosis: Moderate straightening of the cervical lordosis is seen.  Intervertebral disc spaces: Multilevel loss of disc height is seen.  Osteophytes: Moderate multilevel endplate osteophytes are seen.  Endplate Sclerosis: Moderate multilevel endplate sclerosis is seen.  Uncovertebral degenerative changes: Moderate multilevel uncovertebral joint arthrosis is seen.  Facet degenerative changes: Mild multilevel facet degenerative changes are seen.  Calcifications: Small globular ligamentous calcifications are seen anterior the disc spaces at multiple levels.  Fractures: No acute cervical spine fracture dislocation or subluxation is seen.  Orthopedic Hardware: None.    Miscellaneous:  Mastoid air cells: The visualized mastoid air cells appear clear.  Soft Tissues: Unremarkable.                                         CT Head Without Contrast (Final result)  Result time 03/29/24 08:32:43      Final result by Felix aNth MD (03/29/24 08:32:43)                    Impression:      No acute intracranial abnormality identified.  Findings of chronic microvascular ischemic disease.      Electronically signed by: Felix Nath  Date:    03/29/2024  Time:    08:32               Narrative:    EXAMINATION:  CT HEAD WITHOUT CONTRAST    CLINICAL HISTORY:  Trauma;    TECHNIQUE:  Low dose axial images were obtained through the head.  Coronal and sagittal reformations were also performed. Contrast was not administered.    Automatic exposure control was utilized to reduce the patient's radiation dose.    DLP= 1862    COMPARISON:  09/14/2022    FINDINGS:  No acute intracranial hemorrhage, edema or mass. No acute parenchymal abnormality.    Mild cerebral atrophy with concordant ventricular enlargement.    There is normal gray white differentiation.    The osseous structures are normal.    The mastoid air cells are clear.    The auditory canals are patent bilaterally.    The globes and orbital contents are normal bilaterally.    Fluid within the left sphenoid sinus.                        Preliminary result by Ty Crow Jr., MD (03/28/24 22:44:09)                   Impression:    1. No acute intracranial process identified. Details and other findings as noted above.               Narrative:    START OF REPORT:  Technique: CT of the head was performed without intravenous contrast with axial as well as coronal and sagittal images.    Comparison: Comparison is with study dated 2022/09/13.    Dosage Information: Automated exposure control was utilized.    Clinical history: Trauma level 1 Syncopal episode, fall from sitting Pt states unable to feel legs Juan Carlos [phone].    Findings:  Hemorrhage: No acute intracranial hemorrhage is seen.  CSF spaces: The ventricles sulci and basal cisterns are within normal limits for age.  Brain parenchyma: Unremarkable with preservation of the grey white junction throughout.  Cerebellum: Unremarkable.  Vascular: Unremarkable venous  sinuses.  Sella and skull base: The sella appears to be within normal limits for age.  Cerebellopontine angles: Within normal limits.  Intracranial calcifications: Incidental note is made of bilateral choroid plexus calcification. Incidental note is made of some pineal region calcification.  Calvarium: No acute linear or depressed skull fracture is seen.    Maxillofacial Structures:  Paranasal sinuses: There is persistent opacification of the left sphenoid sinus. This may reflect chronic sinusitis. The rest of the paranasal sinuses appear clear.  Orbits: The orbits appear unremarkable.  Zygomatic arches: The zygomatic arches are intact and unremarkable.  Temporal bones and mastoids: The temporal bones and mastoids appear unremarkable.  TMJ: The mandibular condyles appear normally placed with respect to the mandibular fossa.                                         X-Ray Chest 1 View (Final result)  Result time 03/28/24 22:46:08      Final result by Bar Clements MD (03/28/24 22:46:08)                   Impression:      No acute findings.      Electronically signed by: Bar Clements  Date:    03/28/2024  Time:    22:46               Narrative:    EXAMINATION:  XR CHEST 1 VIEW    CLINICAL HISTORY:  r/o bleeding or hemorrhage;    COMPARISON:  No priors    FINDINGS:  Frontal view of the chest was obtained. The heart is not enlarged.  Lungs are clear.  There is no pneumothorax or significant effusion.                                       X-Ray Pelvis Routine AP (Final result)  Result time 03/28/24 22:46:40      Final result by Bar Clements MD (03/28/24 22:46:40)                   Impression:      No acute findings.      Electronically signed by: Bar Clements  Date:    03/28/2024  Time:    22:46               Narrative:    EXAMINATION:  XR PELVIS ROUTINE AP    CLINICAL HISTORY:  r/o bleeding or hemorrhage;    COMPARISON:  None    FINDINGS:  Frontal image of the pelvis demonstrates no fracture or dislocation.                                     X-Rays:   Independently Interpreted Readings:   Other Readings:  CXR: no acute cardiopulmonary process  Pelvis XR: no acute fracture or dislocation       Medications   iopamidoL (ISOVUE-370) injection 100 mL (100 mLs Intravenous Given 3/28/24 2237)   sodium chloride 0.9% bolus 1,000 mL 1,000 mL (0 mLs Intravenous Stopped 3/29/24 0003)   iohexoL (OMNIPAQUE 350) injection 100 mL (100 mLs Intravenous Given 3/28/24 2259)   fentaNYL injection 50 mcg (50 mcg Intravenous Given 3/29/24 0012)   ondansetron injection 4 mg (4 mg Intravenous Given 3/29/24 0013)   morphine injection 4 mg (4 mg Intravenous Given 3/29/24 0425)     Medical Decision Making      The differential diagnosis includes, but is not limited to dehydration, TIA, CVA, ICH, spinal injury, dysrhythmia, anemia     Level 1 trauma called due to fall with left side numbness, no weakness  Chest and pelvis Xr nml   CT with no acute traumatic injuries  NSGY consulted and rec MRI brain and spine  ICU vs floor admission pending MRI results   Admitted to trauma     Problems Addressed:  Fall from standing, initial encounter: acute illness or injury that poses a threat to life or bodily functions  Left sided numbness: acute illness or injury that poses a threat to life or bodily functions  Syncope: acute illness or injury that poses a threat to life or bodily functions    Amount and/or Complexity of Data Reviewed  Labs: ordered. Decision-making details documented in ED Course.  Radiology: ordered and independent interpretation performed. Decision-making details documented in ED Course.  ECG/medicine tests: ordered and independent interpretation performed. Decision-making details documented in ED Course.    Risk  Prescription drug management.  Parenteral controlled substances.  Decision regarding hospitalization.            Scribe Attestation:   Scribe #1: I performed the above scribed service and the documentation accurately describes the  services I performed. I attest to the accuracy of the note.    Attending Attestation:           Physician Attestation for Scribe:  Physician Attestation Statement for Scribe #1: I, Brynn Desir MD, reviewed documentation, as scribed by Wisam Gracia in my presence, and it is both accurate and complete.             ED Course as of 04/09/24 0302   Thu Mar 28, 2024   2238 NSGY consulted by trauma service- MRI brain as well as whole spine prior to admission  [KM]   Fri Mar 29, 2024   0639 Ralph with the trauma svc says he will speak with Dr. Bingham and dispo pt - MRI's are all negative [NL]      ED Course User Index  [KM] Brynn Desir MD  [NL] Rohan Hummel MD                           Clinical Impression:  Final diagnoses:  [R55] Syncope  [R20.0] Left sided numbness (Primary)  [W19.XXXA] Fall from standing, initial encounter          ED Disposition Condition    Admit Stable                Brynn Desir MD  03/29/24 0021       Brynn Desir MD  04/09/24 0302

## 2024-03-29 NOTE — ED NOTES
Assumed care of pt here as a level 1 Dr Mariano consulted for neuro see additional scans, pt here after a syncopal episode she has skin tear the back of the right hand and abrasion to the nose she reports numbness and weakness to the left side of her body she in a c collar family friend at bedside pending results. VS stable see trauma flow sheet for initial assessment

## 2024-03-29 NOTE — PROGRESS NOTES
Received call from radiologist on over read.  Concern for facet instability at C4-C5.  This was present in 2022 although it was slightly worsened this admission.  There was some increased STIR signal.  I have discussed the case with the neurosurgeon on call who recommends the following: Return to emergency department, order Harvey J collar and [place] collar.  She needs flat and upright AP and lateral films with collar on.  Do not flex or extend the neck.  Once this has been obtained please contact Dr. Bingham who will evaluate the patient at bedside and determine the next steps of care.  We are happy to admit the patient should she require admission.  The patient did not answer her phone but I was able to make contact with the sister and explained the situation and she will ensure the patient returns to the emergency department this morning.

## 2024-03-29 NOTE — PROGRESS NOTES
Neurosurgery Progress Note      Neurosurgical Emergent Response     This patient met criteria for Neurosurgical Emergent Response due to Neurologic deficit as a result of potential spinal cord injury.    The Neurosurgery team was notified at 10:35 PM by trauma NP Opal Menendez.   Patient evaluation began at 10:45 PM.      Miss Jin is a 59-year-old female who has a past medical history of TIA with residual weakness in her right foot. She is s/p a syncopal episode today on 3/28/2024. The patient is reported to have normal strength with decreased sensation in her left arm and leg.      I have personally reviewed and evaluated the following reports as well as radiographic studies:    CT head without contrast- no acute abnormalities.    CT cervical spine without contrast- straightening the cervical spine with multilevel degenerative disease.     CT chest, abdomen and pelvis with contrast- normal alignment of the lumbar spine with no acute abnormalities.      Additional neurosurgical recommendations:     1.  The patient will be completing additional imaging studies, including a CTA of the head and neck in addition to a MRI brain, cervical, thoracic, and lumbar spine without gadolinium.     2.  Additional neurosurgical recommendations to follow.

## 2024-03-29 NOTE — ED NOTES
Pt sitting up in bed c collar changed no change in neuro noted pt reports feeling dizzy asking to sit before trying to walk

## 2024-03-30 LAB
OHS QRS DURATION: 92 MS
OHS QTC CALCULATION: 462 MS

## 2024-03-31 ENCOUNTER — NURSE TRIAGE (OUTPATIENT)
Dept: ADMINISTRATIVE | Facility: CLINIC | Age: 59
End: 2024-03-31
Payer: MEDICARE

## 2024-03-31 ENCOUNTER — HOSPITAL ENCOUNTER (EMERGENCY)
Facility: HOSPITAL | Age: 59
Discharge: HOME | End: 2024-03-31
Attending: EMERGENCY MEDICINE
Payer: MEDICARE

## 2024-03-31 VITALS
BODY MASS INDEX: 28.52 KG/M2 | TEMPERATURE: 98 F | SYSTOLIC BLOOD PRESSURE: 127 MMHG | WEIGHT: 155 LBS | HEIGHT: 62 IN | RESPIRATION RATE: 18 BRPM | DIASTOLIC BLOOD PRESSURE: 76 MMHG | OXYGEN SATURATION: 98 % | HEART RATE: 92 BPM

## 2024-03-31 DIAGNOSIS — S00.81XA ABRASION OF CHIN, INITIAL ENCOUNTER: ICD-10-CM

## 2024-03-31 DIAGNOSIS — Z76.0 MEDICATION REFILL: Primary | ICD-10-CM

## 2024-03-31 PROCEDURE — 99283 EMERGENCY DEPT VISIT LOW MDM: CPT

## 2024-03-31 PROCEDURE — 99282 EMERGENCY DEPT VISIT SF MDM: CPT

## 2024-03-31 RX ORDER — HYDROCODONE BITARTRATE AND ACETAMINOPHEN 5; 325 MG/1; MG/1
1 TABLET ORAL EVERY 6 HOURS PRN
Qty: 12 TABLET | Refills: 0 | Status: SHIPPED | OUTPATIENT
Start: 2024-03-31 | End: 2024-04-03

## 2024-03-31 NOTE — ED PROVIDER NOTES
Encounter Date: 3/31/2024       History     Chief Complaint   Patient presents with    Medication Refill     Pt states she is out of pain meds and C/O bruising under chin from C-collar. Denies an increase in pain, states pain level is about the same. Abrasion with redness noted to chin, but free of bruising.      See MDM    The history is provided by the patient. No  was used.     Review of patient's allergies indicates:   Allergen Reactions    Adhesive      Other reaction(s): skin tears easily with adhesive    Cephalexin Nausea And Vomiting    Erythromycin Nausea And Vomiting    Lithium Nausea And Vomiting    Naproxen Nausea And Vomiting     Past Medical History:   Diagnosis Date    Bipolar disorder     Carpal tunnel syndrome     COPD (chronic obstructive pulmonary disease)     Depression     Dry eyes     Lumbar degenerative disc disease     Mixed hyperlipidemia     Muscle spasms of both lower extremities     Muscle spasms of neck     Neuropathy     TIA (transient ischemic attack) 2022    Ulnar nerve external compression syndrome      Past Surgical History:   Procedure Laterality Date    ANTERIOR CRUCIATE LIGAMENT REPAIR Left     CARPAL TUNNEL RELEASE Right 5/5/2023    Procedure: RELEASE, CARPAL TUNNEL;  Surgeon: Canelo Bhatt MD;  Location: [facility] OR;  Service: Orthopedics;  Laterality: Right;    FRACTURE SURGERY  2017    Mauro Cárdenas    JOINT REPLACEMENT  2016    revision total knee ledt    SHOULDER ARTHROSCOPY W/ ROTATOR CUFF REPAIR Left     TOTAL KNEE ARTHROPLASTY Left     ULNAR NERVE TRANSPOSITION Right 5/5/2023    Procedure: TRANSPOSITION, NERVE, ULNAR  Endoscopic;  Surgeon: Canelo Bhatt MD;  Location: [facility] OR;  Service: Orthopedics;  Laterality: Right;  Endoscopic    WRIST SURGERY Right      Family History   Problem Relation Age of Onset    Coronary artery disease Mother     Diabetes Mother     Arthritis Mother         Back , hands    Early death Mother         Diabetes    Heart disease  Mother     Hypertension Mother     Kidney disease Mother     Stroke Mother     Vision loss Mother     Coronary artery disease Father     Asthma Father         Back    Heart disease Father     Hypertension Father     Vision loss Father     Arthritis Sister         Not sure    Asthma Sister         Finger, shoulder    Mental illness Sister     Vision loss Sister     Arthritis Brother         Hip    Birth defects Brother         Special Needs/ Downs Syndrome    Heart disease Brother     Learning disabilities Brother     Intellectual disability Brother      Social History     Tobacco Use    Smoking status: Every Day     Current packs/day: 0.25     Average packs/day: 0.3 packs/day for 42.0 years (10.5 ttl pk-yrs)     Types: Vaping with nicotine, Cigarettes     Passive exposure: Yes    Smokeless tobacco: Never   Substance Use Topics    Alcohol use: Not Currently     Comment: stopped in 2005    Drug use: Not Currently     Types: Marijuana, Cocaine     Comment: Nothing     Review of Systems   Constitutional:         Out of pain medication     Skin:  Positive for wound (abrasion/irritation to underside of chin from C-collar).   All other systems reviewed and are negative.      Physical Exam     Initial Vitals [03/31/24 1440]   BP Pulse Resp Temp SpO2   127/76 92 18 98 °F (36.7 °C) 98 %      MAP       --         Physical Exam    Nursing note and vitals reviewed.  Constitutional: She appears well-developed and well-nourished.   Neck:   Cervical collar on from previous ER visit. Upon looking at the underside of her chin there is a small abrasion (not quite skin taking off but slight abrasion with redness around it.    Cardiovascular:  Normal rate.           Pulmonary/Chest: No respiratory distress.   Musculoskeletal:      Comments: Ambulatory steadily     Neurological: She is alert and oriented to person, place, and time.   Skin: Skin is warm and dry.   Psychiatric: She has a normal mood and affect.         ED Course    Procedures  Labs Reviewed - No data to display       Imaging Results    None          Medications - No data to display  Medical Decision Making  58 y/o female presents for medication refill as she is out of her pain medication. She was seen as trauma on 3/28 and then returned 3/29 for abnormal MRI report. Placed in cervical collar and Dr. Bingham evaluated her. Okay for discharge home with outpatient f/u. The patient states she took her last pain pill. She also notes discoloration of her chin or underside of her chin. No new injury.     Abrasion noted to underside of chin with some redness. Not quite breaking the layer of skin off just yet but certainly rubbing it and causing irritation. Placed a nonadhesive between chin and c-collar to help protect the skin. Will give short course pain medication as she needs to follow up.       Additional MDM:   Differential Diagnosis:   Other: The following diagnoses were also considered and will be evaluated: abrasion, medication refill and wound.                                   Clinical Impression:  Final diagnoses:  [Z76.0] Medication refill (Primary)  [S00.81XA] Abrasion of chin, initial encounter          ED Disposition Condition    Discharge Stable          ED Prescriptions       Medication Sig Dispense Start Date End Date Auth. Provider    HYDROcodone-acetaminophen (NORCO) 5-325 mg per tablet Take 1 tablet by mouth every 6 (six) hours as needed for Pain. 12 tablet 3/31/2024 4/3/2024 Alisha Morgan FNP          Follow-up Information       Follow up With Specialties Details Why Contact Info    Garrick Nascimento MD Family Medicine Call in 1 week As needed, If symptoms worsen [address]  [place] [address] [phone]               Alisha Morgan FNP  03/31/24 4450

## 2024-03-31 NOTE — FIRST PROVIDER EVALUATION
Medical screening examination initiated.  I have conducted a focused provider triage encounter, findings are as follows:    Brief history of present illness:  60 y/o female presents with irritation to the underside of her chin from the neck brace she has on and she is running out of pain medications. She was seen on 3/28 as GLF as a trauma. She was asked to return 3/29 for MRI read. Dr. Bingham saw her and okay with discharge with outpatient f/u. No new injury or pain     There were no vitals filed for this visit.    Pertinent physical exam:  alert, nonlabored, cervical collar is on and in triage I evaluated and there is a small abrasion to the underside of her chin with some redness where it appears the brace is rubbing her causing abrasion. She is ambulatory steadily.     Brief workup plan:  exam. Medication refill if indicated.     Preliminary workup initiated; this workup will be continued and followed by the physician or advanced practice provider that is assigned to the patient when roomed.

## 2024-03-31 NOTE — TELEPHONE ENCOUNTER
Pt reports she is running out of pain medication, only has 4 left and worry she won't make it through the night. She is in pain due to an expanded muscle in her neck. Seen in ED yesterday when she was prescribed pain meds.    Dispo- call pcp when office is open. Explained to pt if she runs out of pain meds and her pain is uncontrolled, she will need to be seen in ED or reach out to her pcp tomorrow. She VU.   Reason for Disposition   Caller requesting a CONTROLLED substance prescription refill (e.g., narcotics, ADHD medicines)    Protocols used: Medication Refill and Renewal Call-A-AH

## 2024-03-31 NOTE — TELEPHONE ENCOUNTER
Pt reporting her neck brace is causing her to have a large bruise underneath her chin due to friction. She has tried to adjust the brace but states it is not helping. No protocol available for pts specific issue, advised pt per nurse judgement to be seen within the next 4 hours in either UC or ED. Pt states she planned on going into the ED for pain med refill soon so she will also speak to them about her brace.  Reason for Disposition   Nursing judgment or information in reference    Protocols used: No Guideline [id number]

## 2024-03-31 NOTE — DISCHARGE INSTRUCTIONS
Place nonadhesive between the neck brace and chin to help prevent further irritation and break down. May take norco as needed for pain, do not take and drive.

## 2024-04-02 ENCOUNTER — TELEPHONE (OUTPATIENT)
Dept: NEUROSURGERY | Facility: CLINIC | Age: 59
End: 2024-04-02
Payer: MEDICARE

## 2024-04-02 DIAGNOSIS — S16.1XXD ACUTE STRAIN OF NECK MUSCLE, SUBSEQUENT ENCOUNTER: Primary | ICD-10-CM

## 2024-04-02 NOTE — TELEPHONE ENCOUNTER
Tried to call the patient back to find out if she needs orders for a new brace or for replacement pads, but no answer.  I left a voicemail requesting a call back.

## 2024-04-03 DIAGNOSIS — S16.1XXD ACUTE STRAIN OF NECK MUSCLE, SUBSEQUENT ENCOUNTER: Primary | ICD-10-CM

## 2024-04-03 NOTE — TELEPHONE ENCOUNTER
The patient called back this morning and spoke with Pamela.  She needs an order for the replacement pads.  Order generated and faxed to [facility].

## 2024-04-04 ENCOUNTER — OFFICE VISIT (OUTPATIENT)
Dept: ORTHOPEDICS | Facility: CLINIC | Age: 59
End: 2024-04-04
Payer: MEDICARE

## 2024-04-04 VITALS
WEIGHT: 155 LBS | BODY MASS INDEX: 28.52 KG/M2 | DIASTOLIC BLOOD PRESSURE: 81 MMHG | HEART RATE: 88 BPM | SYSTOLIC BLOOD PRESSURE: 119 MMHG | HEIGHT: 62 IN

## 2024-04-04 DIAGNOSIS — M75.82 ROTATOR CUFF TENDONITIS, LEFT: Primary | ICD-10-CM

## 2024-04-04 PROCEDURE — 20610 DRAIN/INJ JOINT/BURSA W/O US: CPT | Mod: LT,,, | Performed by: ORTHOPAEDIC SURGERY

## 2024-04-04 PROCEDURE — 99214 OFFICE O/P EST MOD 30 MIN: CPT | Mod: 25,,, | Performed by: ORTHOPAEDIC SURGERY

## 2024-04-04 RX ORDER — BETAMETHASONE SODIUM PHOSPHATE AND BETAMETHASONE ACETATE 3; 3 MG/ML; MG/ML
12 INJECTION, SUSPENSION INTRA-ARTICULAR; INTRALESIONAL; INTRAMUSCULAR; SOFT TISSUE
Status: DISCONTINUED | OUTPATIENT
Start: 2024-04-04 | End: 2024-04-04 | Stop reason: HOSPADM

## 2024-04-04 RX ORDER — LIDOCAINE HYDROCHLORIDE 20 MG/ML
5 INJECTION, SOLUTION EPIDURAL; INFILTRATION; INTRACAUDAL; PERINEURAL
Status: DISCONTINUED | OUTPATIENT
Start: 2024-04-04 | End: 2024-04-04 | Stop reason: HOSPADM

## 2024-04-04 RX ADMIN — BETAMETHASONE SODIUM PHOSPHATE AND BETAMETHASONE ACETATE 12 MG: 3; 3 INJECTION, SUSPENSION INTRA-ARTICULAR; INTRALESIONAL; INTRAMUSCULAR; SOFT TISSUE at 03:04

## 2024-04-04 RX ADMIN — LIDOCAINE HYDROCHLORIDE 5 ML: 20 INJECTION, SOLUTION EPIDURAL; INFILTRATION; INTRACAUDAL; PERINEURAL at 03:04

## 2024-04-04 NOTE — PROCEDURES
Large Joint Aspiration/Injection: L subacromial bursa    Date/Time: 4/4/2024 3:15 PM    Performed by: Mio Pittman MD  Authorized by: Swetha Vallejo FNP    Consent Done?:  Yes (Verbal)  Indications:  Arthritis  Timeout: prior to procedure the correct patient, procedure, and site was verified    Prep: patient was prepped and draped in usual sterile fashion      Details:  Needle Size:  22 G  Approach:  Posterior  Location:  Shoulder  Site:  L subacromial bursa  Medications:  5 mL LIDOcaine (PF) 20 mg/mL (2%) 20 mg/mL (2 %); 12 mg betamethasone acetate-betamethasone sodium phosphate 6 mg/mL  Patient tolerance:  Patient tolerated the procedure well with no immediate complications

## 2024-04-04 NOTE — PROGRESS NOTES
Chief Complaint:   Chief Complaint   Patient presents with    Left Shoulder - Follow-up     Lt shoulder pain last injection on 10/24/23. injection did help.  Patient had a fall on DOI: 3/29/24. She blacked out. She has a neck brace on and bruises on both arms.        History of present illness:  59-year-old female presents today for evaluation of left shoulder pain.  Patient was recently seen in the hospital admitted to the hospital after a fall.  Patient says she blacked out.  Sustained a neck injury.  She has been treated in a collar for this.  Also complaining of some left shoulder pain.    Past Medical History:   Diagnosis Date    Bipolar disorder     Carpal tunnel syndrome     COPD (chronic obstructive pulmonary disease)     Depression     Dry eyes     Lumbar degenerative disc disease     Mixed hyperlipidemia     Muscle spasms of both lower extremities     Muscle spasms of neck     Neuropathy     TIA (transient ischemic attack) 2022    Ulnar nerve external compression syndrome        Past Surgical History:   Procedure Laterality Date    ANTERIOR CRUCIATE LIGAMENT REPAIR Left     CARPAL TUNNEL RELEASE Right 5/5/2023    Procedure: RELEASE, CARPAL TUNNEL;  Surgeon: Canelo Bhatt MD;  Location: [facility];  Service: Orthopedics;  Laterality: Right;    FRACTURE SURGERY  2017    Mauro Cárdenas    JOINT REPLACEMENT  2016    revision total knee ledt    SHOULDER ARTHROSCOPY W/ ROTATOR CUFF REPAIR Left     TOTAL KNEE ARTHROPLASTY Left     ULNAR NERVE TRANSPOSITION Right 5/5/2023    Procedure: TRANSPOSITION, NERVE, ULNAR  Endoscopic;  Surgeon: Canelo Bhatt MD;  Location: [facility] OR;  Service: Orthopedics;  Laterality: Right;  Endoscopic    WRIST SURGERY Right        Current Outpatient Medications   Medication Sig    albuterol (PROVENTIL/VENTOLIN HFA) 90 mcg/actuation inhaler INHALE TWO PUFFS INTO THE LUNGS EVERY FOUR TO SIX HOURS AS NEEDED FOR SHORTNESS OF BREATH    aspirin 325 MG tablet Take 325 mg by mouth once daily.     atorvastatin (LIPITOR) 40 MG tablet Take 40 mg by mouth once daily.    azelastine (ASTELIN) 137 mcg (0.1 %) nasal spray daily as needed.    benztropine (COGENTIN) 1 MG tablet Take 1 mg by mouth as needed.    buPROPion (WELLBUTRIN XL) 300 MG 24 hr tablet Take 300 mg by mouth every morning.    carBAMazepine (TEGRETOL XR) 400 MG Tb12 Take 400 mg by mouth 2 (two) times daily.    celecoxib (CELEBREX) 100 MG capsule Take 100 mg by mouth 3 (three) times daily.    clonazePAM (KLONOPIN) 0.5 MG tablet Take 0.5 mg by mouth 2 (two) times daily.    dexlansoprazole (DEXILANT) 60 mg capsule Take 1 capsule by mouth once daily.    DULoxetine (CYMBALTA) 60 MG capsule Take 60 mg by mouth 2 (two) times daily.    estradioL (ESTRACE) 0.01 % (0.1 mg/gram) vaginal cream as needed.    famotidine (PEPCID) 20 MG tablet Take 20 mg by mouth nightly.    fluticasone propionate (FLONASE) 50 mcg/actuation nasal spray 1 spray by Each Nostril route nightly.    furosemide (LASIX) 20 MG tablet Take 20 mg by mouth once daily.    hydrOXYzine pamoate (VISTARIL) 25 MG Cap Take 25 mg by mouth Daily. Per pt reports 1 the am and 2 at bedside    icosapent ethyL (VASCEPA) 1 gram Cap Take 2 capsules by mouth 2 (two) times daily.    JARDIANCE 25 mg tablet Take 25 mg by mouth once daily.    LINZESS 145 mcg Cap capsule Take 145 mcg by mouth every morning.    loratadine (CLARITIN) 10 mg tablet Take 10 mg by mouth daily as needed.    metFORMIN (GLUCOPHAGE) 500 MG tablet Take 500 mg by mouth 3 (three) times daily.    oxyCODONE (ROXICODONE) 5 MG immediate release tablet Take 1 tablet (5 mg total) by mouth every 4 (four) hours as needed for Pain.    pregabalin (LYRICA) 100 MG capsule Take 100 mg by mouth 3 (three) times daily.    RESTASIS 0.05 % ophthalmic emulsion Place 1 drop into both eyes 2 (two) times daily.    tiZANidine (ZANAFLEX) 4 MG tablet take 1 tablet BY MOUTH EVERY 8 HOURS AS NEEDED FOR MUSCLE SPASMS (Patient taking differently: Take 4 mg by mouth daily as  needed. take 1 tablet BY MOUTH EVERY 8 HOURS AS NEEDED FOR MUSCLE SPASMS)    topiramate (TOPAMAX) 200 MG Tab Take 200 mg by mouth once daily.    valACYclovir (VALTREX) 500 MG tablet Take 500 mg by mouth 2 (two) times daily.    varenicline (CHANTIX KELL) 0.5 mg (11)- 1 mg (42) tablet Take by mouth.    VRAYLAR 4.5 mg Cap Take 4.5 mg by mouth.    zaleplon (SONATA) 5 MG Cap Take 5 mg by mouth every evening.     No current facility-administered medications for this visit.       Review of patient's allergies indicates:   Allergen Reactions    Adhesive      Other reaction(s): skin tears easily with adhesive    Cephalexin Nausea And Vomiting    Erythromycin Nausea And Vomiting    Lithium Nausea And Vomiting    Naproxen Nausea And Vomiting       Family History   Problem Relation Age of Onset    Coronary artery disease Mother     Diabetes Mother     Arthritis Mother         Back , hands    Early death Mother         Diabetes    Heart disease Mother     Hypertension Mother     Kidney disease Mother     Stroke Mother     Vision loss Mother     Coronary artery disease Father     Asthma Father         Back    Heart disease Father     Hypertension Father     Vision loss Father     Arthritis Sister         Not sure    Asthma Sister         Finger, shoulder    Mental illness Sister     Vision loss Sister     Arthritis Brother         Hip    Birth defects Brother         Special Needs/ Downs Syndrome    Heart disease Brother     Learning disabilities Brother     Intellectual disability Brother        Social History     Socioeconomic History    Marital status:    Tobacco Use    Smoking status: Every Day     Current packs/day: 0.25     Average packs/day: 0.3 packs/day for 42.0 years (10.5 ttl pk-yrs)     Types: Vaping with nicotine, Cigarettes     Passive exposure: Yes    Smokeless tobacco: Never   Substance and Sexual Activity    Alcohol use: Not Currently     Comment: stopped in 2005    Drug use: Not Currently     Types:  Marijuana, Cocaine     Comment: Nothing    Sexual activity: Not Currently     Partners: Male     Birth control/protection: None     Comment: Abstinence           Review of Systems:    Constitution: Negative for chills, fever, and sweats.  Negative for unexplained weight loss.    HENT:  Negative for headaches and blurry vision.    Cardiovascular:Negative for chest pain or irregular heart beat. Negative for hypertension.    Respiratory:  Negative for cough and shortness of breath.    Gastrointestinal: Negative for abdominal pain, heartburn, melena, nausea, and vomitting.    Genitourinary:  Negative bladder incontinence and dysuria.    Musculoskeletal:  See HPI    Neurological: Negative for numbness.    Psychiatric/Behavioral: Negative for depression.  The patient is not nervous/anxious.      Endocrine: Negative for polyuria    Hematologic/Lymphatic: Negative for bleeding problem.  Does not bruise/bleed easily.    Skin: Negative for poor would healing and rash      Physical Examination:    Vital Signs:    Vitals:    04/04/24 1453   BP: 119/81   Pulse: 88       Body mass index is 28.35 kg/m².    General: No acute distress, alert and oriented, healthy appearing    HEENT: Head is atraumatic, mucous membranes are moist    Neck: Supples, no JVD    Cardiovascular: Palpable dorsalis pedis and posterior tibial pulses, regular rate and rhythm to those pulses    Lungs: Breathing non-labored    Skin: no rashes appreciated    Neurologic: Can flex and extend knees, ankles, and toes. Sensation is grossly intact      Left shoulder range of motion left shoulder is well-maintained.  She has no significant tenderness.  She does have good strength.  No signs of further tearing.    X-rays:      Assessment::  Left shoulder rotator cuff tear    Plan:  Patient was likely mild tear for left rotator cuff.  It was not significantly worsened.  She is responded well to injections in the past.  Repeat injections today.    This note was created  using M Modal voice recognition software that occasionally misinterpreted phrases or words.    Consult note is delivered via Epic messaging service.

## 2024-04-15 ENCOUNTER — TELEPHONE (OUTPATIENT)
Dept: NEUROSURGERY | Facility: CLINIC | Age: 59
End: 2024-04-15
Payer: MEDICARE

## 2024-04-15 DIAGNOSIS — S16.1XXD ACUTE STRAIN OF NECK MUSCLE, SUBSEQUENT ENCOUNTER: Primary | ICD-10-CM

## 2024-04-15 DIAGNOSIS — G89.29 CHRONIC BILATERAL LOW BACK PAIN WITH LEFT-SIDED SCIATICA: ICD-10-CM

## 2024-04-15 DIAGNOSIS — M54.42 CHRONIC BILATERAL LOW BACK PAIN WITH LEFT-SIDED SCIATICA: ICD-10-CM

## 2024-04-15 NOTE — TELEPHONE ENCOUNTER
"From Kenzie: "I got a call from Dana at Dr Nascimento's office re Faith Martin 1965 - she fell again this morning and Dr Nascimento would really like for her to see Dr Bingham for  F/U appt. Also, please let Dana know when its scheduled for [phone] direct line ". I tried to call Dana back but she did not answer so I LMOM.  "

## 2024-04-15 NOTE — TELEPHONE ENCOUNTER
I am requesting Ana to contact Ms. Jin.  I had most recently evaluated the patient in the ER 2 1/2 weeks ago on 03/29/2024 for an inpatient neurosurgical consultation.  She had a syncopal episode with acute onset of pain along her spinal axis with numbness in her left face, arm, and leg that resolved.  A MRI cervical spine without gadolinium demonstrated normal alignment.  On the right at C4-5, there was facet joint widening and edema.  There was no significant neural compression.  Ms. Jin was maintained in a hard C-collar.    Because of her falling episodes and low back pain, I am ordering cervical spine x-rays with AP lateral flat and upright views  in her [facility] C-collar.  In addition, I am ordering lumbar spine x-rays with AP lateral and flexion-extension views.     Arrangements need to be made for Ms. Jin to follow up in my neurosurgery clinic approximately 2 days after these x-ray studies have been completed.      Orders Placed This Encounter   Procedures    X-Ray Cervical Spine Complete 5 view    X-Ray Lumbar Complete Including Flex And Ext

## 2024-04-15 NOTE — ADDENDUM NOTE
"You have chosen to receive care through a telehealth visit.  Do you consent to use a video/audio connection for your medical care today? Yes     CHIEF COMPLAINT  Chief Complaint   Patient presents with   • Dental Problem         HPI  Leona Murillo is a 26 y.o. female  presents with complaint of dental abscess. Reports appx a week ago she was prescribed clindamycin for a dental abscess. Reports she stopped it 5 days ago. Reports she took 3 days. Reports she is allergic to clindamycin and now she has a rash. Reports she is having rash on thighs. Reports its \"itchy.\" Reports she has a dental abscess. Reports this is the 4th time its come to a head in a week. Reports she was told not to take mupirocin due to should not use in the mouth. Reports no fever or chills. No nausea or vomiting. Reports she has not taken anything for her symptoms.     Review of Systems   Constitutional: Negative for chills, fatigue and fever.   HENT: Positive for dental problem. Negative for congestion, ear discharge, ear pain, facial swelling, sinus pressure, sinus pain, sore throat and trouble swallowing.    Respiratory: Negative for chest tightness, shortness of breath and wheezing.    Cardiovascular: Negative for chest pain.   Gastrointestinal: Negative for abdominal pain, nausea and vomiting.   Musculoskeletal: Negative for back pain and myalgias.   Skin: Positive for rash.        Upper thighs with rash after taking clindamycin   Neurological: Negative for dizziness and headaches.   Psychiatric/Behavioral: Negative.        Past Medical History:   Diagnosis Date   • Endometriosis    • Gestational diabetes     w/ first 3 pregnancy   • Obesity    • PCOS (polycystic ovarian syndrome)        Family History   Problem Relation Age of Onset   • Diabetes Father    • Heart disease Father         chf   • Heart attack Father    • Diabetes Mother    • Hypertension Mother    • Heart disease Paternal Grandfather        Social History     Socioeconomic " Addended by: YAHAIRA GROSSMAN on: 4/15/2024 04:35 PM     Modules accepted: Orders     History   • Marital status:    Tobacco Use   • Smoking status: Some Days     Packs/day: 0.25     Years: 2.00     Pack years: 0.50     Types: Cigarettes     Last attempt to quit: 4/21/2019     Years since quitting: 3.5   • Smokeless tobacco: Never   Vaping Use   • Vaping Use: Never used   Substance and Sexual Activity   • Alcohol use: Never     Comment: Socially        Leona Murillo  reports that she has been smoking cigarettes. She has a 0.50 pack-year smoking history. She has never used smokeless tobacco.. I have educated her on the risk of diseases from using tobacco products such as cancer, COPD and heart disease.       I spent 1  minutes counseling the patient.              LMP 09/25/2022 (Exact Date)   Breastfeeding No     PHYSICAL EXAM  Physical Exam   Constitutional: She is oriented to person, place, and time. She appears well-developed and well-nourished. No distress.   HENT:   Head: Normocephalic and atraumatic.   Mouth/Throat: Oropharynx is clear and moist. Dental abscesses present.       Eyes: Conjunctivae and lids are normal.   Pulmonary/Chest: Effort normal.  No respiratory distress.  Abdominal: There is no abdominal tenderness.   Lymphadenopathy:        Right cervical: No anterior cervical adenopathy present.       Left cervical: No anterior cervical adenopathy present.   Neurological: She is alert and oriented to person, place, and time.   Skin: Rash noted. Rash is maculopapular.        Psychiatric: She has a normal mood and affect. Her speech is normal and behavior is normal.       Results for orders placed or performed in visit on 10/20/22   POCT pregnancy, urine    Specimen: Urine   Result Value Ref Range    HCG, Urine, QL Negative Negative    Lot Number [id number]     Internal Positive Control Passed Positive, Passed    Internal Negative Control Passed Negative, Passed    Expiration Date 113,030        Diagnoses and all orders for this visit:    1. Dental abscess (Primary)    2. Allergic  drug rash due to anti-infective agent    Other orders  -     azithromycin (Zithromax Z-Macario) 250 MG tablet; Take 2 tablets by mouth on day 1, then 1 tablet daily on days 2-5  Dispense: 6 tablet; Refill: 0  -     methylPREDNISolone (MEDROL) 4 MG dose pack; Take as directed on package instructions.  Dispense: 21 tablet; Refill: 0    take medications as prescribed   Warm salt water mouth rinses  Tylenol for pain  Patient has stopped clindamycin and didn't start mupirocin   Call dentist in the am patient is scheduled for root canal on Nov 4th  ER for worsening symptoms such as high fever, increased swelling or trouble breathing       FOLLOW-UP  As discussed during visit with PCP/[facility] if no improvement or Urgent Care/Emergency Department if worsening of symptoms    Patient verbalizes understanding of medication dosage, comfort measures, instructions for treatment and follow-up.    Chelsea Kunz, APRN  10/23/2022  22:36 EDT    The use of a video visit has been reviewed with the patient and verbal informed consent has been obtained. Myself and Leona Murillo participated in this visit. The patient is located in [address] [place] [address].    I am located in [place], [place]. Mychart and Zoom were utilized. I spent 5 minutes in the patient's chart for this visit.

## 2024-04-15 NOTE — TELEPHONE ENCOUNTER
I spoke with the patient to inquire about the symptoms she is having. She is C/O new onset  lower back pain that radiates to her left leg. She stated her left leg has always been weak but it is getting progressively worse. She did inform me that she has had 3 surgeries on her left knee. The last time she fell was on 3/31/24 and only takes Motrin to relieve the pain. She is asking for some other medications to be called in because she is very uncomfortable. She has not had any recent testing since MRI's in 3/2024. Denies seeing any other doctors for this besides Dr. Nascimento. I did advise her that I will send this to Dr. Bingham to advise if any additional testing is needed prior to the appointment.

## 2024-04-16 NOTE — TELEPHONE ENCOUNTER
I spoke with the patient to advise her of Dr. Bingham's recommendations. She is still actively wearing her C- Collar. I did advise her to have that on for her cervical XR. I scheduled her with Dr. Bingham for 4/23/24 at 4:00. XR's are scheduled at [facility] for 4/19/24 at 10:00, 10:15. She was compliant w date and time.

## 2024-04-16 NOTE — TELEPHONE ENCOUNTER
Dr. Bingham does have an opening at 9:30 on 4/23/24. I tried to call the patient to see if she would like to come in at that time instead of 4:00. She did not answer so I LMOM.

## 2024-04-19 ENCOUNTER — HOSPITAL ENCOUNTER (OUTPATIENT)
Dept: RADIOLOGY | Facility: HOSPITAL | Age: 59
Discharge: HOME | End: 2024-04-19
Attending: NEUROLOGICAL SURGERY
Payer: MEDICARE

## 2024-04-19 DIAGNOSIS — G89.29 CHRONIC BILATERAL LOW BACK PAIN WITH LEFT-SIDED SCIATICA: ICD-10-CM

## 2024-04-19 DIAGNOSIS — M54.42 CHRONIC BILATERAL LOW BACK PAIN WITH LEFT-SIDED SCIATICA: ICD-10-CM

## 2024-04-19 DIAGNOSIS — S16.1XXD ACUTE STRAIN OF NECK MUSCLE, SUBSEQUENT ENCOUNTER: ICD-10-CM

## 2024-04-19 PROCEDURE — 72050 X-RAY EXAM NECK SPINE 4/5VWS: CPT | Mod: TC

## 2024-04-19 PROCEDURE — 72114 X-RAY EXAM L-S SPINE BENDING: CPT | Mod: TC

## 2024-04-22 NOTE — PROGRESS NOTES
[facility]   Neurosurgery      Faith Jin  MRN: [id number], CSN: [id number]      : 1965   Age: 59 y.o. female  Payor: [employer] MEDICARE / Plan: [employer] MEDICARE PPO / Product Type: Medicare Advantage /       Ref:  No referring provider defined for this encounter.    PCP: Garrick Nascimento MD    Visit Date: 2024     Patient Active Problem List   Diagnosis    Generalized weakness    Type II diabetes mellitus    TIA (transient ischemic attack)    Left-sided weakness       SUBJECTIVE:      CC:   Chief Complaint   Patient presents with    f/u ER visit with neck and low back pain       HPI:   Ms. Jin is a 59 y.o. right-handed female who has a past medical history significant for hypertension, TIA, COPD, neuropathy, bipolar disorder, depression, as well as methamphetamine and marijuana use.  She takes aspirin 325 mg and Celebrex daily.  Ms. Jin is being evaluated as a follow up patient in the neurosurgery clinic after an inpatient consultation approximately 4 weeks ago on 3/29/2024.  The patient had a syncopal episode on 2024 with positive loss of consciousness.  She woke up with pain along her entire spinal axis as well as numbness in her left face, arm, and leg.  A MRI cervical spine without gadolinium radiology report was finalized with an abnormality involving the right C4-5 facet joint being widened with edema.     The plan of care was for Ms. Jin to continue wearing a [place] J C-collar at all times for a duration of 3 months.  When the patient was in the ER, her left-sided numbness has nearly resolved and was only in her left fingertips.  It is noted that the patient had left-sided numbness and weakness after a TIA in .  She has not had any weakness.  The patient has not had any bowel or bladder incontinence.  Ms. Jin has been ambulatory.      Ms. Jin has completed updated cervical and lumbar spine x-rays for neck and low back pain since  her inpatient admission.  The patient visits the neurosurgery clinic with her friend Dilia.  She has been wearing her [place] J C-collar at all times.  Ms. Jin reports that she tripped and fell at a friend's house approximately 1 week ago with no loss of consciousness.    The patient has minimal neck pain with no radiating arm pain.  Her chronic low back pain has worsened recently with radiation down her bilateral posterior legs.  She rates her low back to bilateral leg pain ratio as 70:30.   The numbness in all her extremities has resolved.  Ms. Jin has not had any saddle anesthesia or urinary incontinence.  The patient has chronic diarrhea with loose stool for the last year.  Her established gastroenterologist is Dr. Flores.    She has been ambulatory, but unsteady on her feet secondary to left knee pain.  Ms. Jin is established with orthopedic surgeon Dr. Pittman, as she has had multiple surgeries on her left knee.  In addition, she follows with orthopedic hand specialist Dr. Bhatt for arthritis in her right hand.    There is increased pain along her entire spine as the day progresses and a reduction of pain when taking Toradol.  She is awaiting home health services with physical therapy to be established.  The patient is not currently established with a pain management specialist.    Ms. Jin visits with a psychiatrist on a monthly basis.  She smokes 2 hand-rolled cigarettes daily.       Patient Active Problem List    Diagnosis Date Noted    Left-sided weakness 09/13/2022    TIA (transient ischemic attack) 07/10/2022    Generalized weakness 07/06/2022    Type II diabetes mellitus 07/06/2022     Past Medical History:   Diagnosis Date    Bipolar disorder     Carpal tunnel syndrome     COPD (chronic obstructive pulmonary disease)     Depression     Dry eyes     Lumbar degenerative disc disease     Mixed hyperlipidemia     Muscle spasms of both lower extremities     Muscle spasms of neck      Neuropathy     TIA (transient ischemic attack) 2022    Ulnar nerve external compression syndrome      Past Surgical History:   Procedure Laterality Date    ANTERIOR CRUCIATE LIGAMENT REPAIR Left     CARPAL TUNNEL RELEASE Right 5/5/2023    Procedure: RELEASE, CARPAL TUNNEL;  Surgeon: Canelo Bhatt MD;  Location: [facility];  Service: Orthopedics;  Laterality: Right;    FRACTURE SURGERY  2017    Mauro Cárdenas    JOINT REPLACEMENT  2016    revision total knee ledt    SHOULDER ARTHROSCOPY W/ ROTATOR CUFF REPAIR Left     TOTAL KNEE ARTHROPLASTY Left     ULNAR NERVE TRANSPOSITION Right 5/5/2023    Procedure: TRANSPOSITION, NERVE, ULNAR  Endoscopic;  Surgeon: Canelo Bhatt MD;  Location: [facility];  Service: Orthopedics;  Laterality: Right;  Endoscopic    WRIST SURGERY Right        Current Outpatient Medications:     albuterol (PROVENTIL/VENTOLIN HFA) 90 mcg/actuation inhaler, INHALE TWO PUFFS INTO THE LUNGS EVERY FOUR TO SIX HOURS AS NEEDED FOR SHORTNESS OF BREATH, Disp: , Rfl:     aspirin 325 MG tablet, Take 325 mg by mouth once daily., Disp: , Rfl:     atorvastatin (LIPITOR) 40 MG tablet, Take 40 mg by mouth once daily., Disp: , Rfl:     azelastine (ASTELIN) 137 mcg (0.1 %) nasal spray, daily as needed., Disp: , Rfl:     benztropine (COGENTIN) 1 MG tablet, Take 1 mg by mouth as needed., Disp: , Rfl:     buPROPion (WELLBUTRIN XL) 300 MG 24 hr tablet, Take 300 mg by mouth every morning., Disp: , Rfl:     carBAMazepine (TEGRETOL XR) 400 MG Tb12, Take 400 mg by mouth 2 (two) times daily., Disp: , Rfl:     celecoxib (CELEBREX) 100 MG capsule, Take 100 mg by mouth 3 (three) times daily., Disp: , Rfl:     celecoxib (CELEBREX) 200 MG capsule, Take 200 mg by mouth., Disp: , Rfl:     clonazePAM (KLONOPIN) 0.5 MG tablet, Take 0.5 mg by mouth 2 (two) times daily., Disp: , Rfl:     CONTOUR NEXT TEST STRIPS Strp, 3 (three) times daily. Use to test blood glucose, Disp: , Rfl:     dexlansoprazole (DEXILANT) 60 mg capsule, Take 1 capsule by  mouth once daily., Disp: , Rfl:     DULoxetine (CYMBALTA) 60 MG capsule, Take 60 mg by mouth 2 (two) times daily., Disp: , Rfl:     estradioL (ESTRACE) 0.01 % (0.1 mg/gram) vaginal cream, as needed., Disp: , Rfl:     famotidine (PEPCID) 20 MG tablet, Take 20 mg by mouth nightly., Disp: , Rfl:     fluticasone propionate (FLONASE) 50 mcg/actuation nasal spray, 1 spray by Each Nostril route nightly., Disp: , Rfl:     furosemide (LASIX) 20 MG tablet, Take 20 mg by mouth once daily., Disp: , Rfl:     hydrOXYzine pamoate (VISTARIL) 25 MG Cap, Take 25 mg by mouth Daily. Per pt reports 1 the am and 2 at bedside, Disp: , Rfl:     ibuprofen (ADVIL,MOTRIN) 600 MG tablet, Take 600 mg by mouth every 8 (eight) hours as needed., Disp: , Rfl:     icosapent ethyL (VASCEPA) 1 gram Cap, Take 2 capsules by mouth 2 (two) times daily., Disp: , Rfl:     INVEGA SUSTENNA 234 mg/1.5 mL Syrg injection, , Disp: , Rfl:     JARDIANCE 25 mg tablet, Take 25 mg by mouth once daily., Disp: , Rfl:     ketorolac (TORADOL) 10 mg tablet, Take 10 mg by mouth 2 (two) times daily., Disp: , Rfl:     LINZESS 145 mcg Cap capsule, Take 145 mcg by mouth every morning., Disp: , Rfl:     loratadine (CLARITIN) 10 mg tablet, Take 10 mg by mouth daily as needed., Disp: , Rfl:     metFORMIN (GLUCOPHAGE) 500 MG tablet, Take 500 mg by mouth 3 (three) times daily., Disp: , Rfl:     mupirocin (BACTROBAN) 2 % ointment, Apply topically 2 (two) times daily., Disp: , Rfl:     polyethylene glycol (GLYCOLAX) 17 gram/dose powder, Take 17 g by mouth., Disp: , Rfl:     pregabalin (LYRICA) 100 MG capsule, Take 100 mg by mouth 3 (three) times daily., Disp: , Rfl:     RESTASIS 0.05 % ophthalmic emulsion, Place 1 drop into both eyes 2 (two) times daily., Disp: , Rfl:     tiZANidine (ZANAFLEX) 4 MG tablet, take 1 tablet BY MOUTH EVERY 8 HOURS AS NEEDED FOR MUSCLE SPASMS (Patient taking differently: Take 4 mg by mouth daily as needed. take 1 tablet BY MOUTH EVERY 8 HOURS AS NEEDED FOR  MUSCLE SPASMS), Disp: 90 tablet, Rfl: 3    topiramate (TOPAMAX) 200 MG Tab, Take 200 mg by mouth once daily., Disp: , Rfl:     valACYclovir (VALTREX) 500 MG tablet, Take 500 mg by mouth 2 (two) times daily., Disp: , Rfl:     varenicline (CHANTIX) 1 mg Tab, Take 1 mg by mouth 2 (two) times daily., Disp: , Rfl:     VRAYLAR 4.5 mg Cap, Take 4.5 mg by mouth., Disp: , Rfl:     zaleplon (SONATA) 5 MG Cap, Take 5 mg by mouth every evening., Disp: , Rfl:     triamcinolone acetonide 0.1% (KENALOG) 0.1 % paste, SMARTSIG:Sparingly Topical 3 Times Daily (Patient not taking: Reported on 4/23/2024), Disp: , Rfl:     Review of patient's allergies indicates:   Allergen Reactions    Adhesive      Other reaction(s): skin tears easily with adhesive    Cephalexin Nausea And Vomiting    Erythromycin Nausea And Vomiting    Lithium Nausea And Vomiting    Naproxen Nausea And Vomiting       Social History     Tobacco Use    Smoking status: Every Day     Current packs/day: 0.25     Average packs/day: 0.3 packs/day for 42.0 years (10.5 ttl pk-yrs)     Types: Vaping with nicotine, Cigarettes     Passive exposure: Yes    Smokeless tobacco: Never   Substance Use Topics    Alcohol use: Not Currently     Comment: stopped in 2005     Occupation: Medically disabled since 1995 due to her mental health condition, 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    Family History   Problem Relation Name Age of Onset    Coronary artery disease Mother Shobha Jin     Diabetes Mother Shobha Jin     Arthritis Mother Shobha Jin         Back , hands    Early death Mother Shobha Jin         Diabetes    Heart disease Mother Shobah Jin     Hypertension Mother Shobha Jin     Kidney disease Mother Shobha Jin     Stroke Mother Shobha Jin     Vision loss Mother Shobha Jin     Coronary artery disease Father Cesar     Asthma Father Cesar         Back    Heart disease Father Cesar     Hypertension Father Cesar     Vision loss  "Father Cesar     Arthritis Sister Hailey         Not sure    Asthma Sister Hailey         Finger, shoulder    Mental illness Sister Hailey     Vision loss Sister Hailey     Arthritis Brother Zacarias         Hip    Birth defects Brother Zacarias         Special Needs/ Downs Syndrome    Heart disease Brother Zacarias     Learning disabilities Brother Zacarias     Intellectual disability Brother Zacarias        ROS:  Constitutional:  Negative for chills and fever.   HENT:  Negative for congestion and sore throat.    Eyes:  Positive for blurred vision, Negative for double vision.   Respiratory:  Negative for cough and shortness of breath.    Cardiovascular:  Negative for chest pain and palpitations.   Gastrointestinal:  Positive for diarrhea, Negative for constipation, nausea and vomiting.   Musculoskeletal:  Positive for back pain and neck pain.   Neurological:  Positive for sensory change, Negative for focal weakness and headaches.   Endo/Heme/Allergies:  Does not bruise/bleed easily.   Psychiatric/Behavioral:  Positive for depression and anxiety.      OBJECTIVE:     EXAMINATION:  /74   Pulse 93   Resp 16   Ht 5' 2" (1.575 m)   Wt 66.9 kg (147 lb 6.4 oz)   LMP  (LMP Unknown)   BMI 26.96 kg/m²   Body Habitus: Normal    Physical Exam:  Constitutional:   Well developed, cooperative with smoke odor.  She is wearing a [place] J C-collar.      Body habitus:  Normal     Mental Status:   GCS- 15  E-4, V-5, M-6     Opens eyes spontaneously  Oriented x 3  Normal speech  Follows commands in all extremities     Cranial nerves:  CN II: PERRL, 4 to 3 mm, brisk bilaterally  CN III, IV, and VI: extraocular movements normal, no ptosis  CN V: normal facial sensation and masseter function  CN VII: facial strength normal and symmetrical  CN VIII: hearing normal bilaterally  CN IX and X: swallowing and phonation normal  CN XI: shoulder shrug intact bilaterally  CN XII: tongue protrusion midline     Motor:  Muscle bulk: normal in all " extremities  Tone: normal in all extremities  No pronator drift     Upper extremities:  Deltoid: right 5/5; left 5/5  Biceps: right 5/5; left 5/5  Triceps: right 5/5; left 5/5  Wrist extensors: right 5/5; left 5/5  Wrist flexors: right 5/5; left 5/5  : right 5/5; left 5/5  Interosseous muscles: right 5/5; left 5/5     Lower extremities:  Hip flexors: right 5/5; left 5/5  Knee extensors: right 5/5; left 5/5  Knee flexors: right 5/5; left 5/5  Foot dorsiflexors: right 5/5; left 5/5  Foot plantar flexors: right 5/5; left 5/5     Sensation:  Normal to light touch in all extremities     Reflexes:  Ordoñezs sign: right negative; left negative  Babinski: right downgoing; left downgoing  Clonus: right negative; left negative      Musculoskeletal:    Gait: normal     Straight leg test: right negative; left negative    Cervical:  Mild pain with palpation   Upper back: No pain with palpation  Lower back: Mild pain with palpation       DIAGNOSTICS REVIEW OF IMAGING, LAB & OTHER STUDIES:  I have personally reviewed and evaluated the following reports as well as radiographic studies that were completed on 04/19/2024:    Cervical spine x-rays- when compared to cervical spine x-rays on 03/29/2024, there have been no significant changes.  There is straightening of the cervical spine multilevel degenerative disc disease.  There is minimal anterior spondylolisthesis of C3 on C4 and trace anterior spondylolisthesis of C4 on C5.      Lumbar spine x-rays- when compared to lumbar spine x-rays on 12/05/2023, there is stable Grade I anterolisthesis of L4 on L5 with no motion on flexion-extension views.         I have personally reviewed and evaluated the following reports as well as radiographic studies that were completed on 03/28/2024 in the ER:       CT head without contrast- no acute abnormalities.  There are chronic microvascular ischemic findings.     CT cervical spine without contrast- straightening the cervical spine with  multilevel degenerative disease.  There is mild widening of the right facet at C4-5.     CT chest, abdomen and pelvis with contrast- normal alignment of the lumbar spine with no acute abnormalities.  Ground-glass opacity in the right lung.          I have personally reviewed and evaluated the following reports as well as radiographic studies that were completed on 03/29/2024:     MRI brain without gadolinium- no acute abnormalities.     MRI cervical spine without gadolinium- normal alignment.  On the right at C4-5, there is facet joint widening and edema.  There is mild stenosis and degenerative findings along the cervical spine with CSF visualized circumferentially around the spinal cord.     MRI thoracic spine without gadolinium- normal alignment no significant neural compression.       MRI lumbar spine without gadolinium- there is Grade I anterior spondylolisthesis of L4 on L5.  A L4-5 disc bulge contributes to moderate stenosis with left-greater-than-right neuroforaminal narrowing.  There is fluid in the bilateral L4-5 facets      ASSESSMENT:  Ms. Jin is a 59 y.o. right-handed female who has a past medical history significant for hypertension, TIA, COPD, neuropathy, bipolar disorder, depression, as well as methamphetamine and marijuana use.  She takes aspirin 325 mg and Celebrex daily.  Ms. Jin is being evaluated as a follow up patient in the neurosurgery clinic after an inpatient consultation approximately 4 weeks ago on 3/29/2024.  The patient had a syncopal episode on 03/28/2024 with positive loss of consciousness.  She woke up with pain along her entire spinal axis as well as numbness in her left face, arm, and leg.  Ms. Jin currently has a normal and sensory neurological exam. There are no signs of myelopathy.     I reviewed imaging studies with Ms. Jin and her friend Dilia.  Cervical spine x-rays on 04/19/2024 were reviewed.  When compared to cervical spine x-rays on 03/29/2024, there have  been no significant changes.  There is straightening of the cervical spine multilevel degenerative disc disease.  There is minimal anterior spondylolisthesis of C3 on C4 and trace anterior spondylolisthesis of C4 on C5.  A MRI cervical spine without gadolinium demonstrates normal alignment.  On the right at C4-5, there is facet joint widening and edema.  There is mild stenosis and degenerative findings along the cervical spine with CSF visualized circumferentially around the spinal cord. A MRI lumbar spine without gadolinium demonstrates Grade I anterior spondylolisthesis of L4 on L5.  A L4-5 disc bulge contributes to moderate stenosis with left-greater-than-right neuroforaminal narrowing.  There is fluid in the bilateral L4-5 facets        PLAN:    Encounter Diagnoses   Name Primary?    Acquired spondylolisthesis of cervical vertebra Yes    Neck pain, chronic     Spondylolisthesis of lumbar region     Lumbar spondylosis     Chronic bilateral low back pain with bilateral sciatica      Orders Placed This Encounter   Procedures    X-Ray Cervical Spine Complete 5 view    Ambulatory referral/consult to Home Health    Ambulatory referral/consult to Pain Clinic        1.  I discussed with Ms. Jin and her friend Dilia that the right C4-5 facet widening will continue to be monitored with imaging studies. The patient's neck and low back pain will be treated with optimization of medical management.  The patient is very agreeable to this nonsurgical plan of care.     2.  I instructed the patient to continue wearing her [place] J C-collar at all times. Ms. Jin is anticipated to wear her [place] J C-collar for a duration of 3 months, which corresponds to an end target date of 06/28/2024.       3.  Should she develop progressive anterior spondylolisthesis at C4-5 and/or develop corresponding neurological deficits, Ms. Jin would be considered a future candidate for surgery, specifically a C3-4, C4-5 ACDF.    4.  I am  referring Ms. Jin to home health for physical therapy, as she does not drive and has frequent falling episodes.    5.  I am also referring her to pain management specialist Dr. Fraser for evaluation and consideration of cervical/ lumbar epidural steroid injections.    6.  The patient is scheduled to follow up with her established orthopedic hand surgeon Dr. Bhatt on 04/29/2024 for arthritis in her right hand.     7.  She is recommended to continue with her scheduled monthly mental health follow up visits with her psychiatrist.    8.  Ms. Jin is advised to contact her gastroenterologist Dr. Flores follow up on her chronic bowel incontinence issues.     9.  We discussed smoking cessation goals and how nicotine inhibits tissue healing.    10.  Arrangements are being made for Ms. Jin to follow up in the neurosurgery clinic in 4 weeks with either NA Munson or NAM [place].  2 days prior to this visit, she needs to complete upright cervical spine x-rays in her [place] J C-collar with AP and lateral views.      This note will be sent to the patient's referring provider No ref. provider found and primary care provider Garrick Nascimento MD.           Chana Bingham MD  Neurosurgeon

## 2024-04-23 ENCOUNTER — OFFICE VISIT (OUTPATIENT)
Dept: NEUROSURGERY | Facility: CLINIC | Age: 59
End: 2024-04-23
Payer: MEDICARE

## 2024-04-23 VITALS
WEIGHT: 147.38 LBS | BODY MASS INDEX: 27.12 KG/M2 | RESPIRATION RATE: 16 BRPM | HEIGHT: 62 IN | DIASTOLIC BLOOD PRESSURE: 74 MMHG | HEART RATE: 93 BPM | SYSTOLIC BLOOD PRESSURE: 121 MMHG

## 2024-04-23 DIAGNOSIS — M54.41 CHRONIC BILATERAL LOW BACK PAIN WITH BILATERAL SCIATICA: ICD-10-CM

## 2024-04-23 DIAGNOSIS — M54.2 NECK PAIN, CHRONIC: ICD-10-CM

## 2024-04-23 DIAGNOSIS — M47.816 LUMBAR SPONDYLOSIS: ICD-10-CM

## 2024-04-23 DIAGNOSIS — G89.29 NECK PAIN, CHRONIC: ICD-10-CM

## 2024-04-23 DIAGNOSIS — M43.16 SPONDYLOLISTHESIS OF LUMBAR REGION: ICD-10-CM

## 2024-04-23 DIAGNOSIS — M43.12 ACQUIRED SPONDYLOLISTHESIS OF CERVICAL VERTEBRA: Primary | ICD-10-CM

## 2024-04-23 DIAGNOSIS — G89.29 CHRONIC BILATERAL LOW BACK PAIN WITH BILATERAL SCIATICA: ICD-10-CM

## 2024-04-23 DIAGNOSIS — M54.42 CHRONIC BILATERAL LOW BACK PAIN WITH BILATERAL SCIATICA: ICD-10-CM

## 2024-04-23 PROCEDURE — 3074F SYST BP LT 130 MM HG: CPT | Mod: CPTII,,, | Performed by: NEUROLOGICAL SURGERY

## 2024-04-23 PROCEDURE — 99214 OFFICE O/P EST MOD 30 MIN: CPT | Mod: ,,, | Performed by: NEUROLOGICAL SURGERY

## 2024-04-23 PROCEDURE — 3078F DIAST BP <80 MM HG: CPT | Mod: CPTII,,, | Performed by: NEUROLOGICAL SURGERY

## 2024-04-23 PROCEDURE — 3008F BODY MASS INDEX DOCD: CPT | Mod: CPTII,,, | Performed by: NEUROLOGICAL SURGERY

## 2024-04-23 RX ORDER — CELECOXIB 200 MG/1
200 CAPSULE ORAL
COMMUNITY
Start: 2024-02-21

## 2024-04-23 RX ORDER — IBUPROFEN 600 MG/1
600 TABLET ORAL EVERY 8 HOURS PRN
COMMUNITY
Start: 2024-04-11

## 2024-04-23 RX ORDER — VARENICLINE TARTRATE 1 MG/1
1 TABLET, FILM COATED ORAL 2 TIMES DAILY
COMMUNITY
Start: 2024-03-01

## 2024-04-23 RX ORDER — POLYETHYLENE GLYCOL 3350 17 G/17G
17 POWDER, FOR SOLUTION ORAL
COMMUNITY
Start: 2024-03-13

## 2024-04-23 RX ORDER — HYDROCODONE BITARTRATE AND ACETAMINOPHEN 5; 325 MG/1; MG/1
1 TABLET ORAL EVERY 8 HOURS PRN
COMMUNITY
Start: 2024-04-05 | End: 2024-04-23

## 2024-04-23 RX ORDER — MUPIROCIN 20 MG/G
OINTMENT TOPICAL 2 TIMES DAILY
COMMUNITY
Start: 2024-04-05

## 2024-04-23 RX ORDER — AMOXICILLIN AND CLAVULANATE POTASSIUM 875; 125 MG/1; MG/1
1 TABLET, FILM COATED ORAL 2 TIMES DAILY
COMMUNITY
Start: 2024-03-07 | End: 2024-04-23

## 2024-04-23 RX ORDER — TRIAMCINOLONE ACETONIDE 1 MG/G
PASTE DENTAL
COMMUNITY
Start: 2024-04-04

## 2024-04-23 RX ORDER — KETOROLAC TROMETHAMINE 10 MG/1
10 TABLET, FILM COATED ORAL 2 TIMES DAILY
COMMUNITY
Start: 2024-04-17 | End: 2024-05-21

## 2024-04-23 RX ORDER — PALIPERIDONE PALMITATE 234 MG/1.5ML
INJECTION INTRAMUSCULAR
COMMUNITY
Start: 2024-04-17

## 2024-04-23 RX ORDER — BLOOD SUGAR DIAGNOSTIC
STRIP MISCELLANEOUS 3 TIMES DAILY
COMMUNITY
Start: 2024-03-14

## 2024-04-23 NOTE — PATIENT INSTRUCTIONS
Ms. Jin is a 59 y.o. right-handed female who has a past medical history significant for hypertension, TIA, COPD, neuropathy, bipolar disorder, depression, as well as methamphetamine and marijuana use.  She takes aspirin 325 mg and Celebrex daily.  Ms. Jin is being evaluated as a follow up patient in the neurosurgery clinic after an inpatient consultation approximately 4 weeks ago on 3/29/2024.  The patient had a syncopal episode on 03/28/2024 with positive loss of consciousness.  She woke up with pain along her entire spinal axis as well as numbness in her left face, arm, and leg.  Ms. Jin currently has a normal and sensory neurological exam. There are no signs of myelopathy.     I reviewed imaging studies with Ms. Jin and her friend Dilia.  Cervical spine x-rays on 04/19/2024 were reviewed.  When compared to cervical spine x-rays on 03/29/2024, there have been no significant changes.  There is straightening of the cervical spine multilevel degenerative disc disease.  There is minimal anterior spondylolisthesis of C3 on C4 and trace anterior spondylolisthesis of C4 on C5.  A MRI cervical spine without gadolinium demonstrates normal alignment.  On the right at C4-5, there is facet joint widening and edema.  There is mild stenosis and degenerative findings along the cervical spine with CSF visualized circumferentially around the spinal cord. A MRI lumbar spine without gadolinium demonstrates Grade I anterior spondylolisthesis of L4 on L5.  A L4-5 disc bulge contributes to moderate stenosis with left-greater-than-right neuroforaminal narrowing.  There is fluid in the bilateral L4-5 facets        PLAN:    Encounter Diagnoses   Name Primary?    Acquired spondylolisthesis of cervical vertebra Yes    Neck pain, chronic     Spondylolisthesis of lumbar region     Lumbar spondylosis     Chronic bilateral low back pain with bilateral sciatica      Orders Placed This Encounter   Procedures    X-Ray Cervical  Spine Complete 5 view    Ambulatory referral/consult to Home Health    Ambulatory referral/consult to Pain Clinic        1.  I discussed with Ms. Jin and her friend Dilia that the right C4-5 facet widening will continue to be monitored with imaging studies. The patient's neck and low back pain will be treated with optimization of medical management.  The patient is very agreeable to this nonsurgical plan of care.     2.  I instructed the patient to continue wearing her [place] J C-collar at all times. Ms. Jin is anticipated to wear her [place] J C-collar for a duration of 3 months, which corresponds to an end target date of 06/28/2024.       3.  Should she develop progressive anterior spondylolisthesis at C4-5 and/or develop corresponding neurological deficits, Ms. Jin would be considered a future candidate for surgery, specifically a C3-4, C4-5 ACDF.    4.  I am referring Ms. Jin to home [facility] for physical therapy, as she does not drive and has frequent falling episodes.    5.  I am also referring her to pain management specialist Dr. Fraser for evaluation and consideration of cervical/ lumbar epidural steroid injections.    6.  The patient is scheduled to follow up with her established orthopedic hand surgeon Dr. Bhatt on 04/29/2024 for arthritis in her right hand.     7.  She is recommended to continue with her scheduled monthly mental health follow up visits with her psychiatrist.    8.  Ms. Jin is advised to contact her gastroenterologist Dr. Flores follow up on her chronic bowel incontinence issues.     9.  We discussed smoking cessation goals and how nicotine inhibits tissue healing.    10.  Arrangements are being made for Ms. Jin to follow up in the neurosurgery clinic in 4 weeks with either NA Munson or [facility] [place].  2 days prior to this visit, she needs to complete upright cervical spine x-rays in her [place] J C-collar with AP and lateral views.      This note will be sent to the  patient's referring provider No ref. provider found and primary care provider Garrick Nascimento MD.

## 2024-04-24 PROCEDURE — G0180 MD CERTIFICATION HHA PATIENT: HCPCS | Mod: ,,, | Performed by: NEUROLOGICAL SURGERY

## 2024-04-29 ENCOUNTER — OFFICE VISIT (OUTPATIENT)
Dept: ORTHOPEDICS | Facility: CLINIC | Age: 59
End: 2024-04-29
Payer: MEDICARE

## 2024-04-29 ENCOUNTER — TELEPHONE (OUTPATIENT)
Dept: NEUROSURGERY | Facility: CLINIC | Age: 59
End: 2024-04-29
Payer: MEDICARE

## 2024-04-29 VITALS
SYSTOLIC BLOOD PRESSURE: 121 MMHG | DIASTOLIC BLOOD PRESSURE: 84 MMHG | WEIGHT: 148.81 LBS | HEIGHT: 62 IN | HEART RATE: 84 BPM | BODY MASS INDEX: 27.38 KG/M2

## 2024-04-29 DIAGNOSIS — M19.131 SLAC (SCAPHOLUNATE ADVANCED COLLAPSE) OF WRIST, RIGHT: Primary | ICD-10-CM

## 2024-04-29 PROCEDURE — 1159F MED LIST DOCD IN RCRD: CPT | Mod: CPTII,,, | Performed by: STUDENT IN AN ORGANIZED HEALTH CARE EDUCATION/TRAINING PROGRAM

## 2024-04-29 PROCEDURE — 99213 OFFICE O/P EST LOW 20 MIN: CPT | Mod: 25,,, | Performed by: STUDENT IN AN ORGANIZED HEALTH CARE EDUCATION/TRAINING PROGRAM

## 2024-04-29 PROCEDURE — 20605 DRAIN/INJ JOINT/BURSA W/O US: CPT | Mod: RT,,, | Performed by: STUDENT IN AN ORGANIZED HEALTH CARE EDUCATION/TRAINING PROGRAM

## 2024-04-29 PROCEDURE — 3074F SYST BP LT 130 MM HG: CPT | Mod: CPTII,,, | Performed by: STUDENT IN AN ORGANIZED HEALTH CARE EDUCATION/TRAINING PROGRAM

## 2024-04-29 PROCEDURE — 3079F DIAST BP 80-89 MM HG: CPT | Mod: CPTII,,, | Performed by: STUDENT IN AN ORGANIZED HEALTH CARE EDUCATION/TRAINING PROGRAM

## 2024-04-29 PROCEDURE — 3008F BODY MASS INDEX DOCD: CPT | Mod: CPTII,,, | Performed by: STUDENT IN AN ORGANIZED HEALTH CARE EDUCATION/TRAINING PROGRAM

## 2024-04-29 RX ORDER — BETAMETHASONE SODIUM PHOSPHATE AND BETAMETHASONE ACETATE 3; 3 MG/ML; MG/ML
6 INJECTION, SUSPENSION INTRA-ARTICULAR; INTRALESIONAL; INTRAMUSCULAR; SOFT TISSUE
Status: DISCONTINUED | OUTPATIENT
Start: 2024-04-29 | End: 2024-04-29 | Stop reason: HOSPADM

## 2024-04-29 RX ORDER — LIDOCAINE HYDROCHLORIDE 10 MG/ML
1 INJECTION INFILTRATION; PERINEURAL
Status: DISCONTINUED | OUTPATIENT
Start: 2024-04-29 | End: 2024-04-29 | Stop reason: HOSPADM

## 2024-04-29 RX ADMIN — BETAMETHASONE SODIUM PHOSPHATE AND BETAMETHASONE ACETATE 6 MG: 3; 3 INJECTION, SUSPENSION INTRA-ARTICULAR; INTRALESIONAL; INTRAMUSCULAR; SOFT TISSUE at 03:04

## 2024-04-29 RX ADMIN — LIDOCAINE HYDROCHLORIDE 1 ML: 10 INJECTION INFILTRATION; PERINEURAL at 03:04

## 2024-04-29 NOTE — PROCEDURES
Intermediate Joint Aspiration/Injection: R radiocarpal    Date/Time: 4/29/2024 3:30 PM    Performed by: Canelo Bhatt MD  Authorized by: Canelo Bhatt MD    Consent Done?:  Yes (Verbal)  Indications:  Arthritis  Timeout: Prior to procedure the correct patient, procedure, and site was verified      Location:  Wrist  Site:  R radiocarpal  Ultrasonic Guidance for needle placement: No  Needle size:  25 G  Approach:  Dorsal  Medications:  1 mL LIDOcaine HCL 10 mg/ml (1%) 10 mg/mL (1 %); 6 mg betamethasone acetate-betamethasone sodium phosphate 6 mg/mL  Patient tolerance:  Patient tolerated the procedure well with no immediate complications

## 2024-04-29 NOTE — PROGRESS NOTES
"Clinic Note    Surgery:  Right Carpal and cubital tunnel releases, thumb A1 pulley release on 5/5/23    History of present illness:    Patient is doing great regarding the above surgery.  She is here today for wrist pain.  She had a previous scapholunate ligament surgery many years ago.  She is developing pain in her wrist localized to the radial aspect of the wrist worse with motion.  She has trouble doing activities like washing her dishes.  She does not have a brace that she wears.      Past Surgical History:   Procedure Laterality Date    ANTERIOR CRUCIATE LIGAMENT REPAIR Left     CARPAL TUNNEL RELEASE Right 5/5/2023    Procedure: RELEASE, CARPAL TUNNEL;  Surgeon: Canelo Bhatt MD;  Location: [facility];  Service: Orthopedics;  Laterality: Right;    FRACTURE SURGERY  2017    Mauro Cárdenas    JOINT REPLACEMENT  2016    revision total knee ledt    SHOULDER ARTHROSCOPY W/ ROTATOR CUFF REPAIR Left     TOTAL KNEE ARTHROPLASTY Left     ULNAR NERVE TRANSPOSITION Right 5/5/2023    Procedure: TRANSPOSITION, NERVE, ULNAR  Endoscopic;  Surgeon: Canelo Bhatt MD;  Location: [facility];  Service: Orthopedics;  Laterality: Right;  Endoscopic    WRIST SURGERY Right            Examination:    Vital Signs:    Vitals:    04/29/24 1515   BP: 121/84   Pulse: 84   Weight: 67.5 kg (148 lb 12.8 oz)   Height: 5' 2" (1.575 m)   PainSc:   7       Body mass index is 27.22 kg/m².    Constitution:   Well-developed, well nourished patient in no acute distress.  Neurological:   Alert and oriented x 3 and cooperative to examination.     Psychiatric/Behavior: Normal mental status.  Respiratory:   No shortness of breath.  Eyes:    Extraoccular muscles intact  Skin:    No scars, rash or suspicious lesions.    MSK:   Right Upper Extremity:  Surgical incisions over the elbow and wrist are healed without any evidence of infection.  Two-point discrimination is 5mm in all 5 digits.  She can make a fist and extend her digits.  No catching or locking.  Apb " strength is 5/5.  First dorsal interosseous strength is 5/5.  She has full elbow range of motion including 0-155 degrees.  Tenderness to palpation over the radiocarpal joint at the radioscaphoid joint.  Tenderness to palpation over the thumb CMC joint.  Grind test is positive.  She has pain with radial deviation..  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  I will give her an injection to the radiocarpal joint today.  I will also give her a Velcro wrist brace.  She can follow up with me as needed    Follow Up:  As needed  Xray at next visit:  None

## 2024-04-29 NOTE — TELEPHONE ENCOUNTER
I spoke with the patient to see if she has heard from [facility] regarding an appointment. She stated she is actually going through [facility] now. She did do some PT this morning. She also had questions about her neck brace. She wasn't sure if she was supposed to take it off for therapy. I advised her that Dr. Bingham told her to remain in her C-Collar at all times. This will be re-evaluated at the time of her upcoming appointment with Raina. She verbalized understanding.

## 2024-05-16 ENCOUNTER — TELEPHONE (OUTPATIENT)
Dept: ORTHOPEDICS | Facility: CLINIC | Age: 59
End: 2024-05-16
Payer: MEDICARE

## 2024-05-17 NOTE — PROGRESS NOTES
[facility]  History & Physical  Neurosurgery      Faith Jin   [id number]   1965       SUBJECTIVE:     CHIEF COMPLAINT:  Follow up visit    HPI:  Faith Jin is a 59 y.o. female who presents for a follow up visit.  The patient presented to the emergency department following a syncopal episode with loss of consciousness on 3/28/2024.  She reported new onset left facial numbness radiating into the left arm as well as into the leg at that time.  MRI of the cervical spine revealed facet joint widening and edema at C4-5 therefore Dr. Bingham was consulted and recommended [place] J cervical collar at all times.  The patient was seen in our clinic per Dr. Bingham on 4/23/2024 reporting resolution of her previous numbness with continued neck and lower back pain.  She has a history of chronic lower back pain which she felt had been aggravated following her fall.  She reported a new fall at a friend's house approximately 1 week prior to this follow-up visit as well.  Updated x-rays of the cervical and lumbar spine revealed stable findings therefore the patient was advised to continue on with her [place] J cervical collar with an in target goal of 6/28/2024.  Dr. Bingham felt at that time that should her anterolisthesis at C4-5 continued to progress she would be a future candidate for C3-4, C4-5 ACDF.  She was advised to begin home health physical therapy at that time.  She was also referred on for consultation with Dr. Fraser regarding possible cervical or lumbar epidural steroid injections.    The patient presents today describing stiffness into the posterior neck and trapezius region.  She also continues to struggle with chronic lower back pain occasionally radiating into the right lower extremity.  She is scheduled for consultation with Dr. Fraser on 6/4/2024.  She reports she has remained compliant with her cervical collar.  She is denying any radicular symptoms into the upper extremities.  The  patient rates the pain 7 on the pain scale.  The patient denies disturbances in bowel or bladder function.  There is no difficulty with balance.     Past Medical History:   Diagnosis Date    Bipolar disorder     Carpal tunnel syndrome     COPD (chronic obstructive pulmonary disease)     Depression     Dry eyes     Lumbar degenerative disc disease     Mixed hyperlipidemia     Muscle spasms of both lower extremities     Muscle spasms of neck     Neuropathy     TIA (transient ischemic attack) 2022    Ulnar nerve external compression syndrome        Past Surgical History:   Procedure Laterality Date    ANTERIOR CRUCIATE LIGAMENT REPAIR Left     CARPAL TUNNEL RELEASE Right 5/5/2023    Procedure: RELEASE, CARPAL TUNNEL;  Surgeon: Canelo Bhatt MD;  Location: [facility];  Service: Orthopedics;  Laterality: Right;    FRACTURE SURGERY  2017    Mauro Cárdenas    JOINT REPLACEMENT  2016    revision total knee ledt    SHOULDER ARTHROSCOPY W/ ROTATOR CUFF REPAIR Left     TOTAL KNEE ARTHROPLASTY Left     ULNAR NERVE TRANSPOSITION Right 5/5/2023    Procedure: TRANSPOSITION, NERVE, ULNAR  Endoscopic;  Surgeon: Canelo Bhatt MD;  Location: [facility];  Service: Orthopedics;  Laterality: Right;  Endoscopic    WRIST SURGERY Right        Family History   Problem Relation Name Age of Onset    Coronary artery disease Mother Shobha Arnould     Diabetes Mother Shobha Arnould     Arthritis Mother Shobha Arnould         Back , hands    Early death Mother Shobha Arnould         Diabetes    Heart disease Mother Shobha Arnould     Hypertension Mother Shobha Arnould     Kidney disease Mother Shobha Arnould     Stroke Mother Shobha Arnould     Vision loss Mother Shobha Arnould     Coronary artery disease Father Cesar     Asthma Father Cesar         Back    Heart disease Father Cesar     Hypertension Father Cesar     Vision loss Father Cesar     Arthritis Sister Hailey         Not sure    Asthma Sister Hailey         Finger, shoulder    Mental illness Sister  Hailey     Vision loss Sister Hailey     Arthritis Brother Zacarias         Hip    Birth defects Brother Zacarias         Special Needs/ Downs Syndrome    Heart disease Brother Zacarias     Learning disabilities Brother Zacarias     Intellectual disability Brother Zacarias        Social History     Socioeconomic History    Marital status:    Tobacco Use    Smoking status: Every Day     Current packs/day: 0.25     Average packs/day: 0.3 packs/day for 42.0 years (10.5 ttl pk-yrs)     Types: Vaping with nicotine, Cigarettes     Passive exposure: Yes    Smokeless tobacco: Never   Substance and Sexual Activity    Alcohol use: Not Currently     Comment: stopped in 2005    Drug use: Not Currently     Types: Marijuana, Cocaine     Comment: Nothing    Sexual activity: Not Currently     Partners: Male     Birth control/protection: None     Comment: Abstinence        Review of patient's allergies indicates:   Allergen Reactions    Adhesive      Other reaction(s): skin tears easily with adhesive    Cephalexin Nausea And Vomiting    Erythromycin Nausea And Vomiting    Lithium Nausea And Vomiting    Naproxen Nausea And Vomiting        Current Outpatient Medications   Medication Instructions    albuterol (PROVENTIL/VENTOLIN HFA) 90 mcg/actuation inhaler     aspirin 325 mg, Oral, Daily    atorvastatin (LIPITOR) 40 mg, Oral, Daily    azelastine (ASTELIN) 137 mcg (0.1 %) nasal spray Daily PRN    benztropine (COGENTIN) 1 mg, Oral, As needed (PRN)    buPROPion (WELLBUTRIN XL) 300 mg, Oral, Every morning    carBAMazepine (TEGRETOL XR) 400 mg, Oral, 2 times daily    celecoxib (CELEBREX) 200 mg, Oral    clonazePAM (KLONOPIN) 0.5 mg, Oral, 2 times daily    CONTOUR NEXT TEST STRIPS Strp 3 times daily, Use to test blood glucose    dexlansoprazole (DEXILANT) 60 mg capsule 1 capsule, Oral, Daily    DULoxetine (CYMBALTA) 60 mg, Oral, 2 times daily    estradioL (ESTRACE) 0.01 % (0.1 mg/gram) vaginal cream As needed (PRN)    famotidine (PEPCID) 20 mg,  Oral, Nightly    fluticasone propionate (FLONASE) 50 mcg/actuation nasal spray 1 spray, Each Nostril, Nightly    furosemide (LASIX) 20 mg, Oral, Daily    hydrOXYzine pamoate (VISTARIL) 25 mg, Oral, Daily, Per pt reports 1 the am and 2 at bedside     ibuprofen (ADVIL,MOTRIN) 600 mg, Oral, Every 8 hours PRN    icosapent ethyL (VASCEPA) 1 gram Cap 2 capsules, Oral, 2 times daily    INVEGA SUSTENNA 234 mg/1.5 mL Syrg injection     JARDIANCE 25 mg, Oral, Daily    LINZESS 145 mcg, Oral, Every morning    loratadine (CLARITIN) 10 mg, Oral, Daily PRN    metFORMIN (GLUCOPHAGE-XR) 500 mg, Oral, 3 times daily    methocarbamoL (ROBAXIN) 750 mg, Oral, 4 times daily PRN    mupirocin (BACTROBAN) 2 % ointment Topical (Top), 2 times daily    polyethylene glycol (GLYCOLAX) 17 g    pregabalin (LYRICA) 100 mg, Oral, 3 times daily    RESTASIS 0.05 % ophthalmic emulsion 1 drop, Both Eyes, 2 times daily    topiramate (TOPAMAX) 200 mg, Oral, Daily    triamcinolone acetonide 0.1% (KENALOG) 0.1 % paste     valACYclovir (VALTREX) 500 mg, Oral, 2 times daily    varenicline (CHANTIX) 1 mg, Oral, 2 times daily    VRAYLAR 4.5 mg, Oral    zaleplon (SONATA) 5 mg, Oral, Nightly          Review of Systems   Constitutional:  Negative for chills, fever and weight loss.   HENT:  Negative for congestion, hearing loss, nosebleeds and tinnitus.    Eyes:  Negative for blurred vision, double vision and photophobia.   Respiratory:  Negative for cough, shortness of breath and wheezing.    Cardiovascular:  Negative for chest pain, palpitations and leg swelling.   Gastrointestinal:  Negative for constipation, diarrhea, nausea and vomiting.   Genitourinary:  Negative for dysuria, frequency and urgency.   Musculoskeletal:  Positive for back pain and neck pain (stiffness). Negative for falls.   Skin:  Negative for itching and rash.   Neurological:  Negative for dizziness, tingling, tremors, sensory change, speech change, seizures, loss of consciousness and  "headaches.   Psychiatric/Behavioral:  Negative for depression, hallucinations and memory loss. The patient is not nervous/anxious.        OBJECTIVE:     Visit Vitals  /74   Pulse 98   Resp 16   Ht 5' 2" (1.575 m)   Wt 67.1 kg (148 lb)   LMP  (LMP Unknown)   BMI 27.07 kg/m²        Physical Exam    General:  Pleasant, Well-nourished, Well-groomed.    Cardiovascular:  Neck is supple.  There are no carotid bruits.  Heart has regular rate and rhythm.    Lungs:  Breathing is quiet, non-lablored    Abdomen:  Soft, non-tender, non-distended.    Neurological:  Muscle strength against resistance:   Right Left   Deltoid (C5) 5/5 5/5   Biceps (C5/6) 5/5 5/5   Wrist Flexors (C5/6) 5/5 5/5   Triceps (C7) 5/5 5/5   Wrist extension (C7) 5/5 5/5   Finger abduction (C8) 5/5 5/5    5/5 5/5        Hip abduction 5/5 5/5   Hip adduction 5/5 5/5   Hip flexion (L2) 5/5 5/5   Knee extension (L3) 5/5 5/5   Knee flexion (L4) 5/5 5/5   Dorsiflexion (L5) 5/5 5/5   EHL (L5) 5/5 5/5   Plantar flexion (S1) 5/5 5/5   Sensation is intact to primary modalities in bilateral upper and lower extremities.    Reflexes:  Negative Babinski, Clonus, Ordoñez, Tinel's, and Phalen's bilaterally.  Gait is normal  Coordination is normal.  No tremor noted.    Imaging:  All pertinent neuroimaging independently reviewed. Discussed these findings in detail with the patient.    Updated x-rays of the cervical spine reveal stable anterolisthesis at C3-4 and C4-5 which appear to be unchanged compared to previous imaging dated 4/19/2024 and 4/29/2024.    ASSESSMENT:       ICD-10-CM ICD-9-CM   1. Acquired spondylolisthesis of cervical vertebra  M43.12 738.4     PLAN:     1. Acquired spondylolisthesis of cervical vertebra  - methocarbamoL (ROBAXIN) 750 MG Tab; Take 1 tablet (750 mg total) by mouth 4 (four) times daily as needed (MUSCLE SPASMS).  Dispense: 40 tablet; Refill: 0  - X-Ray Cervical Spine 5 View W Flex Extxt; Future    Faith Leena Jin presents " today reporting occasional stiffness into the posterior neck and trapezius region.  She also continues to struggle with some chronic lower back pain occasionally radiating into the right leg.  I did take the time to review the patient's previous x-rays as well as updated x-rays with her in clinic today.  We will discontinue her tizanidine and switch to Robaxin to hopefully provide her a better relief of her muscular tension.  She was also advised to continue utilization of over-the-counter anti-inflammatories as well as Tylenol Arthritis.  She was advised to continue on with her cervical collar at all times.  We will plan to see the patient  to her target end date goal for bracing with flexion and extension views of the cervical spine for surveillance purposes.  We did discuss should her imaging remained stable we will begin weaning her brace at that time.  She verbalizes understanding.    Follow up in about 6 weeks (around 7/1/2024) for With Imaging Prior to Appt.      E/M Level Based On Time:   15 minutes spent on reviewing chart, which includes interpreting lab results and diagnostic tests.   20 minutes spent in the room with the patient performing a history and physical exam, counseling or educating the patient/caregiver, prescribing medications, ordering labwork/diagnostic tests, or placing referrals.   0 minutes spent collaborating plan of care with physician.   5 minutes spent documenting all relevant clinical informationin the electronic health record.     Total Time Spent: 40 minutes       KRISTI Husain    Disclaimer:  This note is prepared using voice recognition software and as such is likely to have errors despite attempts at proofreading. Please contact me for questions.

## 2024-05-21 ENCOUNTER — HOSPITAL ENCOUNTER (OUTPATIENT)
Dept: RADIOLOGY | Facility: HOSPITAL | Age: 59
Discharge: HOME | End: 2024-05-21
Attending: NEUROLOGICAL SURGERY
Payer: MEDICARE

## 2024-05-21 ENCOUNTER — OFFICE VISIT (OUTPATIENT)
Dept: NEUROSURGERY | Facility: CLINIC | Age: 59
End: 2024-05-21
Payer: MEDICARE

## 2024-05-21 VITALS
RESPIRATION RATE: 16 BRPM | BODY MASS INDEX: 27.23 KG/M2 | DIASTOLIC BLOOD PRESSURE: 74 MMHG | HEART RATE: 98 BPM | HEIGHT: 62 IN | WEIGHT: 148 LBS | SYSTOLIC BLOOD PRESSURE: 118 MMHG

## 2024-05-21 DIAGNOSIS — G89.29 NECK PAIN, CHRONIC: ICD-10-CM

## 2024-05-21 DIAGNOSIS — M54.2 NECK PAIN, CHRONIC: ICD-10-CM

## 2024-05-21 DIAGNOSIS — M43.12 ACQUIRED SPONDYLOLISTHESIS OF CERVICAL VERTEBRA: Primary | ICD-10-CM

## 2024-05-21 DIAGNOSIS — M43.12 ACQUIRED SPONDYLOLISTHESIS OF CERVICAL VERTEBRA: ICD-10-CM

## 2024-05-21 PROCEDURE — 72050 X-RAY EXAM NECK SPINE 4/5VWS: CPT | Mod: TC

## 2024-05-21 PROCEDURE — 1159F MED LIST DOCD IN RCRD: CPT | Mod: CPTII,,, | Performed by: NURSE PRACTITIONER

## 2024-05-21 PROCEDURE — 3008F BODY MASS INDEX DOCD: CPT | Mod: CPTII,,, | Performed by: NURSE PRACTITIONER

## 2024-05-21 PROCEDURE — 3074F SYST BP LT 130 MM HG: CPT | Mod: CPTII,,, | Performed by: NURSE PRACTITIONER

## 2024-05-21 PROCEDURE — 3078F DIAST BP <80 MM HG: CPT | Mod: CPTII,,, | Performed by: NURSE PRACTITIONER

## 2024-05-21 PROCEDURE — 1160F RVW MEDS BY RX/DR IN RCRD: CPT | Mod: CPTII,,, | Performed by: NURSE PRACTITIONER

## 2024-05-21 PROCEDURE — 99215 OFFICE O/P EST HI 40 MIN: CPT | Mod: ,,, | Performed by: NURSE PRACTITIONER

## 2024-05-21 RX ORDER — METHOCARBAMOL 750 MG/1
750 TABLET, FILM COATED ORAL 4 TIMES DAILY PRN
Qty: 40 TABLET | Refills: 0 | Status: SHIPPED | OUTPATIENT
Start: 2024-05-21 | End: 2024-05-31

## 2024-05-21 RX ORDER — METFORMIN HYDROCHLORIDE 500 MG/1
500 TABLET, EXTENDED RELEASE ORAL 3 TIMES DAILY
COMMUNITY
Start: 2024-05-06

## 2024-05-23 NOTE — TELEPHONE ENCOUNTER
I called the patient again. I explained that the brace has to be hand washed and air dried. Patient voiced a clear understanding.

## 2024-06-04 ENCOUNTER — OFFICE VISIT (OUTPATIENT)
Dept: PAIN MEDICINE | Facility: CLINIC | Age: 59
End: 2024-06-04
Payer: MEDICARE

## 2024-06-04 VITALS
HEIGHT: 62 IN | SYSTOLIC BLOOD PRESSURE: 118 MMHG | DIASTOLIC BLOOD PRESSURE: 74 MMHG | WEIGHT: 148 LBS | HEART RATE: 95 BPM | BODY MASS INDEX: 27.23 KG/M2

## 2024-06-04 DIAGNOSIS — M54.42 CHRONIC BILATERAL LOW BACK PAIN WITH BILATERAL SCIATICA: ICD-10-CM

## 2024-06-04 DIAGNOSIS — M43.12 ACQUIRED SPONDYLOLISTHESIS OF CERVICAL VERTEBRA: ICD-10-CM

## 2024-06-04 DIAGNOSIS — G89.29 CHRONIC BILATERAL LOW BACK PAIN WITH BILATERAL SCIATICA: ICD-10-CM

## 2024-06-04 DIAGNOSIS — M43.16 SPONDYLOLISTHESIS OF LUMBAR REGION: ICD-10-CM

## 2024-06-04 DIAGNOSIS — M54.41 CHRONIC BILATERAL LOW BACK PAIN WITH BILATERAL SCIATICA: ICD-10-CM

## 2024-06-04 PROCEDURE — 99203 OFFICE O/P NEW LOW 30 MIN: CPT | Mod: ,,, | Performed by: ANESTHESIOLOGY

## 2024-06-04 PROCEDURE — 3078F DIAST BP <80 MM HG: CPT | Mod: CPTII,,, | Performed by: ANESTHESIOLOGY

## 2024-06-04 PROCEDURE — 3008F BODY MASS INDEX DOCD: CPT | Mod: CPTII,,, | Performed by: ANESTHESIOLOGY

## 2024-06-04 PROCEDURE — 1160F RVW MEDS BY RX/DR IN RCRD: CPT | Mod: CPTII,,, | Performed by: ANESTHESIOLOGY

## 2024-06-04 PROCEDURE — 3074F SYST BP LT 130 MM HG: CPT | Mod: CPTII,,, | Performed by: ANESTHESIOLOGY

## 2024-06-04 PROCEDURE — 1159F MED LIST DOCD IN RCRD: CPT | Mod: CPTII,,, | Performed by: ANESTHESIOLOGY
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
## 2024-06-05 ENCOUNTER — EXTERNAL HOME HEALTH (OUTPATIENT)
Dept: HOME HEALTH SERVICES | Facility: HOSPITAL | Age: 59
End: 2024-06-05
Payer: MEDICARE

## 2024-06-07 ENCOUNTER — TELEPHONE (OUTPATIENT)
Dept: NEUROSURGERY | Facility: CLINIC | Age: 59
End: 2024-06-07
Payer: MEDICARE

## 2024-06-07 DIAGNOSIS — M43.12 ACQUIRED SPONDYLOLISTHESIS OF CERVICAL VERTEBRA: Primary | ICD-10-CM

## 2024-06-07 RX ORDER — METHOCARBAMOL 750 MG/1
750 TABLET, FILM COATED ORAL 3 TIMES DAILY
Qty: 40 TABLET | Refills: 0 | Status: SHIPPED | OUTPATIENT
Start: 2024-06-07 | End: 2024-06-17

## 2024-06-07 NOTE — TELEPHONE ENCOUNTER
Please advise the patient her refill has been sent in to [facility].  Additional refills will need to be obtained per her primary care provider.  Thank you

## 2024-06-07 NOTE — TELEPHONE ENCOUNTER
The patient called the office this morning requesting a refill of her Robaxin 750mg sent to [facility].

## 2024-06-07 NOTE — TELEPHONE ENCOUNTER
I tried to call the patient to advise her of Raina's recommendations. She did not answer so I left a detailed message for her.

## 2024-06-24 ENCOUNTER — EXTERNAL HOME HEALTH (OUTPATIENT)
Dept: HOME HEALTH SERVICES | Facility: HOSPITAL | Age: 59
End: 2024-06-24
Payer: MEDICARE

## 2024-06-28 NOTE — PROGRESS NOTES
[facility]  History & Physical  Neurosurgery      Faith Jin   [id number]   1965       SUBJECTIVE:     CHIEF COMPLAINT:  Follow up visit    HPI:  Faith Jin is a 59 y.o. female who presents for a follow up visit.  The patient presented to the emergency department following a syncopal episode with loss of consciousness on 3/28/2024.  She reported new onset left facial numbness radiating into the left arm as well as into the leg at that time.  MRI of the cervical spine revealed facet joint widening and edema at C4-5 therefore Dr. Bingham was consulted and recommended [place] J cervical collar at all times.  The patient was seen in our clinic per Dr. Bingham on 4/23/2024 reporting resolution of her previous numbness with continued neck and lower back pain.  She has a history of chronic lower back pain which she felt had been aggravated following her fall.  She reported a new fall at a friend's house approximately 1 week prior to this follow-up visit as well.  Updated x-rays of the cervical and lumbar spine revealed stable findings therefore the patient was advised to continue on with her [place] J cervical collar with an in target goal of 6/28/2024.  Dr. Bingham felt at that time that should her anterolisthesis at C4-5 continue to progress she would be a future candidate for C3-4, C4-5 ACDF.  She was advised to begin home health physical therapy at that time.  She was also referred on for consultation with Dr. Fraser regarding possible cervical or lumbar epidural steroid injections.    The patient presents today describing minimal stiffness into the posterior neck and trapezius region.  She also continues to struggle with chronic lower back pain occasionally radiating into the right lower extremity.  She was seen by Dr. Fraser on 6/4/2024 who recommended physical therapy for the lumbar spine.  The patient states this has been discontinued secondary to insurance issues.  She does continue on  with some home exercises which she does feel are helping.  Unfortunately she continues to struggle with significant lower back pain.  She reports she has remained compliant with her cervical collar.  She is denying any radicular symptoms into the upper extremities.  The patient rates her neck pain a 0/10 on the pain scale.  The patient denies disturbances in bowel or bladder function.  There is no difficulty with balance.     Past Medical History:   Diagnosis Date    Bipolar disorder     Carpal tunnel syndrome     COPD (chronic obstructive pulmonary disease)     Depression     Dry eyes     Lumbar degenerative disc disease     Mixed hyperlipidemia     Muscle spasms of both lower extremities     Muscle spasms of neck     Neuropathy     TIA (transient ischemic attack) 2022    Ulnar nerve external compression syndrome        Past Surgical History:   Procedure Laterality Date    ANTERIOR CRUCIATE LIGAMENT REPAIR Left     CARPAL TUNNEL RELEASE Right 5/5/2023    Procedure: RELEASE, CARPAL TUNNEL;  Surgeon: Canelo Bhatt MD;  Location: [facility];  Service: Orthopedics;  Laterality: Right;    FRACTURE SURGERY  2017    Mauro Cárdenas    JOINT REPLACEMENT  2016    revision total knee ledt    SHOULDER ARTHROSCOPY W/ ROTATOR CUFF REPAIR Left     TOTAL KNEE ARTHROPLASTY Left     ULNAR NERVE TRANSPOSITION Right 5/5/2023    Procedure: TRANSPOSITION, NERVE, ULNAR  Endoscopic;  Surgeon: Canelo Bhatt MD;  Location: [facility] OR;  Service: Orthopedics;  Laterality: Right;  Endoscopic    WRIST SURGERY Right        Family History   Problem Relation Name Age of Onset    Coronary artery disease Mother Shobha Jin     Diabetes Mother Shobha Jin     Arthritis Mother Shobha Jin         Back , hands    Early death Mother Shobha Jin         Diabetes    Heart disease Mother Shobha Jin     Hypertension Mother Shobha Jin     Kidney disease Mother Shobha Jin     Stroke Mother Shobha Jin     Vision loss Mother Shobha Jin      Coronary artery disease Father Cesar     Asthma Father Cesar         Back    Heart disease Father Cesar     Hypertension Father Cesar     Vision loss Father Cesar     Arthritis Sister Hailey         Not sure    Asthma Sister Hailey         Finger, shoulder    Mental illness Sister Hailey     Vision loss Sister Hailey     Arthritis Brother Zacarias         Hip    Birth defects Brother Zacarias         Special Needs/ Downs Syndrome    Heart disease Brother Zacarias     Learning disabilities Brother Zacarias     Intellectual disability Brother Zacarias        Social History     Socioeconomic History    Marital status:    Tobacco Use    Smoking status: Every Day     Current packs/day: 0.25     Average packs/day: 0.3 packs/day for 42.0 years (10.5 ttl pk-yrs)     Types: Vaping with nicotine, Cigarettes     Passive exposure: Yes    Smokeless tobacco: Never   Substance and Sexual Activity    Alcohol use: Not Currently     Comment: stopped in 2005    Drug use: Not Currently     Types: Marijuana, Cocaine     Comment: Nothing    Sexual activity: Not Currently     Partners: Male     Birth control/protection: None     Comment: Abstinence        Review of patient's allergies indicates:   Allergen Reactions    Adhesive      Other reaction(s): skin tears easily with adhesive    Cephalexin Nausea And Vomiting    Erythromycin Nausea And Vomiting    Lithium Nausea And Vomiting    Naproxen Nausea And Vomiting        Current Outpatient Medications   Medication Instructions    albuterol (PROVENTIL/VENTOLIN HFA) 90 mcg/actuation inhaler     aspirin 325 mg, Oral, Daily    atorvastatin (LIPITOR) 40 mg, Oral, Daily    azelastine (ASTELIN) 137 mcg (0.1 %) nasal spray Daily PRN    benztropine (COGENTIN) 1 mg, Oral, As needed (PRN)    buPROPion (WELLBUTRIN XL) 300 mg, Oral, Every morning    carBAMazepine (TEGRETOL XR) 400 mg, Oral, 2 times daily    celecoxib (CELEBREX) 200 mg, Oral    clonazePAM (KLONOPIN) 0.5 mg, Oral, 2 times daily    CONTOUR NEXT  TEST STRIPS Strp 3 times daily, Use to test blood glucose    dexlansoprazole (DEXILANT) 60 mg capsule 1 capsule, Oral, Daily    DULoxetine (CYMBALTA) 60 mg, Oral, 2 times daily    estradioL (ESTRACE) 0.01 % (0.1 mg/gram) vaginal cream As needed (PRN)    famotidine (PEPCID) 20 mg, Oral, Nightly    fluticasone propionate (FLONASE) 50 mcg/actuation nasal spray 1 spray, Each Nostril, Nightly    furosemide (LASIX) 20 mg, Oral, Daily    hydrOXYzine pamoate (VISTARIL) 25 mg, Oral, Daily, Per pt reports 1 the am and 2 at bedside     ibuprofen (ADVIL,MOTRIN) 600 mg, Oral, Every 8 hours PRN    icosapent ethyL (VASCEPA) 1 gram Cap 2 capsules, Oral, 2 times daily    INVEGA SUSTENNA 234 mg/1.5 mL Syrg injection     JARDIANCE 25 mg, Oral, Daily    LINZESS 145 mcg, Oral, Every morning    loratadine (CLARITIN) 10 mg, Oral, Daily PRN    metFORMIN (GLUCOPHAGE-XR) 500 mg, Oral, 3 times daily    mupirocin (BACTROBAN) 2 % ointment Topical (Top), 2 times daily    polyethylene glycol (GLYCOLAX) 17 g, Oral    pregabalin (LYRICA) 100 mg, Oral, 3 times daily    RESTASIS 0.05 % ophthalmic emulsion 1 drop, Both Eyes, 2 times daily    tiZANidine (ZANAFLEX) 4 mg, Oral, Every 8 hours PRN    topiramate (TOPAMAX) 200 mg, Oral, Daily    triamcinolone acetonide 0.1% (KENALOG) 0.1 % paste     valACYclovir (VALTREX) 500 mg, Oral, 2 times daily    varenicline (CHANTIX) 1 mg, Oral, 2 times daily    VRAYLAR 4.5 mg, Oral    zaleplon (SONATA) 5 mg, Oral, Nightly          Review of Systems   Constitutional:  Negative for chills, fever and weight loss.   HENT:  Negative for congestion, hearing loss, nosebleeds and tinnitus.    Eyes:  Negative for blurred vision, double vision and photophobia.   Respiratory:  Negative for cough, shortness of breath and wheezing.    Cardiovascular:  Negative for chest pain, palpitations and leg swelling.   Gastrointestinal:  Negative for constipation, diarrhea, nausea and vomiting.   Genitourinary:  Negative for dysuria,  "frequency and urgency.   Musculoskeletal:  Positive for back pain and neck pain (stiffness). Negative for falls.   Skin:  Negative for itching and rash.   Neurological:  Negative for dizziness, tingling, tremors, sensory change, speech change, seizures, loss of consciousness and headaches.   Psychiatric/Behavioral:  Negative for depression, hallucinations and memory loss. The patient is not nervous/anxious.        OBJECTIVE:     Visit Vitals  /69   Pulse 84   Resp 16   Ht 5' 2" (1.575 m)   Wt 67.6 kg (149 lb)   LMP  (LMP Unknown)   BMI 27.25 kg/m²        Physical Exam    General:  Pleasant, Well-nourished, Well-groomed.    Cardiovascular:  Neck is supple.  There are no carotid bruits.  Heart has regular rate and rhythm.    Lungs:  Breathing is quiet, non-lablored    Abdomen:  Soft, non-tender, non-distended.    Neurological:  Muscle strength against resistance:   Right Left   Deltoid (C5) 5/5 5/5   Biceps (C5/6) 5/5 5/5   Wrist Flexors (C5/6) 5/5 5/5   Triceps (C7) 5/5 5/5   Wrist extension (C7) 5/5 5/5   Finger abduction (C8) 5/5 5/5    5/5 5/5        Hip abduction 5/5 5/5   Hip adduction 5/5 5/5   Hip flexion (L2) 5/5 5/5   Knee extension (L3) 5/5 5/5   Knee flexion (L4) 5/5 5/5   Dorsiflexion (L5) 5/5 5/5   EHL (L5) 5/5 5/5   Plantar flexion (S1) 5/5 5/5   Sensation is intact to primary modalities in bilateral upper and lower extremities.    Reflexes:  Negative Babinski, Clonus, Ordoñez, Tinel's, and Phalen's bilaterally.  Gait is normal  Coordination is normal.  No tremor noted.    Imaging:  All pertinent neuroimaging independently reviewed. Discussed these findings in detail with the patient.    Updated x-rays of the cervical spine dated 7/1/2024 reveal stable anterolisthesis at C3-4 and C4-5 which appear to be unchanged compared to previous imaging dated 4/19/2024, 4/29/2024 and 5/20/24 although there is some instability noted on flexion and extension views at C4-5.    ASSESSMENT:       ICD-10-CM " ICD-9-CM   1. Acquired spondylolisthesis of cervical vertebra  M43.12 738.4     PLAN:     1. Acquired spondylolisthesis of cervical vertebra  - CT Cervical Spine Without Contrast; Future    Faithsa Leena Jin presents today reporting occasional stiffness into the posterior neck and trapezius region.  She also continues to struggle with some chronic lower back pain occasionally radiating into the right leg.  I did take the time to review the patient's previous x-rays as well as updated x-rays with her in clinic today.  I would like to proceed with a CT scan of the cervical spine to evaluate the healing of her fracture.  We will have Dr. Bingham take a look at these images once they are available for review regarding additional recommendations.  She was advised to remain in her cervical collar until she hears back from me following these images.  She was advised to reach out to Dr. Fraser or her primary care provider regarding additional refills of her muscle relaxer as her cervical symptoms have resolved.  She verbalizes understanding.    Follow up for Will call with results and recommendations.      E/M Level Based On Time:   15 minutes spent on reviewing chart, which includes interpreting lab results and diagnostic tests.   15 minutes spent with the patient performing a history and physical exam, counseling or educating the patient/caregiver, prescribing medications, ordering labwork/diagnostic tests, or placing referrals.   0 minutes spent collaborating plan of care with physician.   5 minutes spent documenting all relevant clinical informationin the electronic health record.     Total Time Spent: 35 minutes             KRISTI Husain    Disclaimer:  This note is prepared using voice recognition software and as such is likely to have errors despite attempts at proofreading. Please contact me for questions.

## 2024-07-01 ENCOUNTER — OFFICE VISIT (OUTPATIENT)
Dept: NEUROSURGERY | Facility: CLINIC | Age: 59
End: 2024-07-01
Payer: MEDICARE

## 2024-07-01 ENCOUNTER — HOSPITAL ENCOUNTER (OUTPATIENT)
Dept: RADIOLOGY | Facility: HOSPITAL | Age: 59
Discharge: HOME | End: 2024-07-01
Attending: NURSE PRACTITIONER
Payer: MEDICARE

## 2024-07-01 VITALS
HEART RATE: 84 BPM | BODY MASS INDEX: 27.42 KG/M2 | RESPIRATION RATE: 16 BRPM | HEIGHT: 62 IN | DIASTOLIC BLOOD PRESSURE: 69 MMHG | SYSTOLIC BLOOD PRESSURE: 109 MMHG | WEIGHT: 149 LBS

## 2024-07-01 DIAGNOSIS — M43.12 ACQUIRED SPONDYLOLISTHESIS OF CERVICAL VERTEBRA: ICD-10-CM

## 2024-07-01 DIAGNOSIS — M43.12 ACQUIRED SPONDYLOLISTHESIS OF CERVICAL VERTEBRA: Primary | ICD-10-CM

## 2024-07-01 PROCEDURE — 3078F DIAST BP <80 MM HG: CPT | Mod: CPTII,,, | Performed by: NURSE PRACTITIONER

## 2024-07-01 PROCEDURE — 72052 X-RAY EXAM NECK SPINE 6/>VWS: CPT | Mod: TC

## 2024-07-01 PROCEDURE — 3008F BODY MASS INDEX DOCD: CPT | Mod: CPTII,,, | Performed by: NURSE PRACTITIONER

## 2024-07-01 PROCEDURE — 3074F SYST BP LT 130 MM HG: CPT | Mod: CPTII,,, | Performed by: NURSE PRACTITIONER

## 2024-07-01 PROCEDURE — 99214 OFFICE O/P EST MOD 30 MIN: CPT | Mod: ,,, | Performed by: NURSE PRACTITIONER

## 2024-07-01 PROCEDURE — 1159F MED LIST DOCD IN RCRD: CPT | Mod: CPTII,,, | Performed by: NURSE PRACTITIONER

## 2024-07-01 PROCEDURE — 1160F RVW MEDS BY RX/DR IN RCRD: CPT | Mod: CPTII,,, | Performed by: NURSE PRACTITIONER

## 2024-07-01 RX ORDER — TIZANIDINE 4 MG/1
4 TABLET ORAL EVERY 8 HOURS PRN
COMMUNITY
Start: 2024-06-06 | End: 2024-07-05 | Stop reason: SDUPTHER

## 2024-07-03 ENCOUNTER — DOCUMENT SCAN (OUTPATIENT)
Dept: HOME HEALTH SERVICES | Facility: HOSPITAL | Age: 59
End: 2024-07-03
Payer: MEDICARE

## 2024-07-05 RX ORDER — TIZANIDINE 4 MG/1
4 TABLET ORAL 3 TIMES DAILY PRN
Qty: 50 TABLET | Refills: 0 | Status: SHIPPED | OUTPATIENT
Start: 2024-07-05

## 2024-07-05 NOTE — TELEPHONE ENCOUNTER
Pt contacted office requesting for you to refill tizanidine, pt states her PCP informed her they can no longer fill medication correct pharmacy attached to order, please advise thanks

## 2024-07-09 ENCOUNTER — HOSPITAL ENCOUNTER (OUTPATIENT)
Dept: RADIOLOGY | Facility: HOSPITAL | Age: 59
Discharge: HOME | End: 2024-07-09
Attending: NURSE PRACTITIONER
Payer: MEDICARE

## 2024-07-09 DIAGNOSIS — M43.12 ACQUIRED SPONDYLOLISTHESIS OF CERVICAL VERTEBRA: ICD-10-CM

## 2024-07-09 PROCEDURE — 72125 CT NECK SPINE W/O DYE: CPT | Mod: TC

## 2024-07-11 ENCOUNTER — TELEPHONE (OUTPATIENT)
Dept: NEUROSURGERY | Facility: CLINIC | Age: 59
End: 2024-07-11
Payer: MEDICARE

## 2024-07-12 ENCOUNTER — HOSPITAL ENCOUNTER (EMERGENCY)
Facility: HOSPITAL | Age: 59
Discharge: HOME | End: 2024-07-12
Attending: INTERNAL MEDICINE
Payer: MEDICARE

## 2024-07-12 VITALS
OXYGEN SATURATION: 96 % | TEMPERATURE: 99 F | BODY MASS INDEX: 24.29 KG/M2 | HEIGHT: 62 IN | DIASTOLIC BLOOD PRESSURE: 87 MMHG | SYSTOLIC BLOOD PRESSURE: 135 MMHG | RESPIRATION RATE: 18 BRPM | HEART RATE: 80 BPM | WEIGHT: 132 LBS

## 2024-07-12 DIAGNOSIS — M25.551 RIGHT HIP PAIN: Primary | ICD-10-CM

## 2024-07-12 PROCEDURE — 96372 THER/PROPH/DIAG INJ SC/IM: CPT | Performed by: INTERNAL MEDICINE

## 2024-07-12 PROCEDURE — 99284 EMERGENCY DEPT VISIT MOD MDM: CPT | Mod: 25

## 2024-07-12 PROCEDURE — 25000003 PHARM REV CODE 250: Performed by: INTERNAL MEDICINE

## 2024-07-12 PROCEDURE — 63600175 PHARM REV CODE 636 W HCPCS: Performed by: INTERNAL MEDICINE

## 2024-07-12 RX ORDER — IBUPROFEN 800 MG/1
800 TABLET ORAL EVERY 6 HOURS PRN
Qty: 20 TABLET | Refills: 0 | Status: SHIPPED | OUTPATIENT
Start: 2024-07-12

## 2024-07-12 RX ORDER — OXYCODONE AND ACETAMINOPHEN 5; 325 MG/1; MG/1
1 TABLET ORAL ONCE
Status: COMPLETED | OUTPATIENT
Start: 2024-07-12 | End: 2024-07-12

## 2024-07-12 RX ORDER — KETOROLAC TROMETHAMINE 30 MG/ML
30 INJECTION, SOLUTION INTRAMUSCULAR; INTRAVENOUS
Status: COMPLETED | OUTPATIENT
Start: 2024-07-12 | End: 2024-07-12

## 2024-07-12 RX ORDER — ONDANSETRON 4 MG/1
4 TABLET, ORALLY DISINTEGRATING ORAL ONCE
Status: COMPLETED | OUTPATIENT
Start: 2024-07-12 | End: 2024-07-12

## 2024-07-12 RX ADMIN — ONDANSETRON 4 MG: 4 TABLET, ORALLY DISINTEGRATING ORAL at 04:07

## 2024-07-12 RX ADMIN — KETOROLAC TROMETHAMINE 30 MG: 30 INJECTION, SOLUTION INTRAMUSCULAR at 04:07

## 2024-07-12 RX ADMIN — OXYCODONE HYDROCHLORIDE AND ACETAMINOPHEN 1 TABLET: 5; 325 TABLET ORAL at 04:07

## 2024-07-12 NOTE — ED PROVIDER NOTES
Source of History:  Patient, no limitations    Chief complaint:  Hip Pain (Pt to er with non traumatic right hip pain)      HPI:  Faith Jin is a 59 y.o. female presenting with Hip Pain (Pt to er with non traumatic right hip pain)       Right hip pain, onset few days, no injury. Currently under care of Dr Bingham and Dr Fraser. Refill for muscle relaxers sent to pharmacy. Medrol Nura also waiting at pharmacy. No red flags. Ambulates unassisted without difficulty  The patient complains of pain in the right hip pain after a nontraumatic injury. Onset of symptoms was a few days ago. Patient describes pain as aching. Pain severity now is moderate. Pain is aggravated by movement and laying on right side. Pain is alleviated by NSAIDS. Symptoms associated with pain include none. The patient denies other injuries.          Review of Systems   Constitutional symptoms:  Negative except as documented in HPI.   Skin symptoms:  Negative except as documented in HPI.   HEENT symptoms:  Negative except as documented in HPI.   Respiratory symptoms:  Negative except as documented in HPI.   Cardiovascular symptoms:  Negative except as documented in HPI.   Gastrointestinal symptoms:  Negative except as documented in HPI.    Genitourinary symptoms:  Negative except as documented in HPI.   Musculoskeletal symptoms:  Negative except as documented in HPI.   Neurologic symptoms:  Negative except as documented in HPI.   Psychiatric symptoms:  Negative except as documented in HPI.   Allergy/immunologic symptoms:  Negative except as documented in HPI.             Additional review of systems information: All other systems reviewed and otherwise negative.      Review of patient's allergies indicates:   Allergen Reactions    Adhesive      Other reaction(s): skin tears easily with adhesive    Cephalexin Nausea And Vomiting    Erythromycin Nausea And Vomiting    Lithium Nausea And Vomiting    Naproxen Nausea And Vomiting       PMH:   As per HPI and below:    Past Medical History:   Diagnosis Date    Bipolar disorder     Carpal tunnel syndrome     COPD (chronic obstructive pulmonary disease)     Depression     Dry eyes     Lumbar degenerative disc disease     Mixed hyperlipidemia     Muscle spasms of both lower extremities     Muscle spasms of neck     Neuropathy     TIA (transient ischemic attack) 2022    Ulnar nerve external compression syndrome        Family History   Problem Relation Name Age of Onset    Coronary artery disease Mother Shobha Arnould     Diabetes Mother Shobha Hardwickould     Arthritis Mother Shobha Hardwickould         Back , hands    Early death Mother Shobha Hardwickould         Diabetes    Heart disease Mother Shobha Hardwickould     Hypertension Mother Shobha Arnould     Kidney disease Mother Shobha Arnould     Stroke Mother Shobha Hardwickould     Vision loss Mother Shobha Jin     Coronary artery disease Father Cesar     Asthma Father Cesar         Back    Heart disease Father Cesar     Hypertension Father Cesar     Vision loss Father Cesar     Arthritis Sister Hailey         Not sure    Asthma Sister Hailey         Finger, shoulder    Mental illness Sister Hailey     Vision loss Sister Hailey     Arthritis Brother Zacarias         Hip    Birth defects Brother Zacarias         Special Needs/ Downs Syndrome    Heart disease Brother Zacarias     Learning disabilities Brother Zacarias     Intellectual disability Brother Zacarias        Past Surgical History:   Procedure Laterality Date    ANTERIOR CRUCIATE LIGAMENT REPAIR Left     CARPAL TUNNEL RELEASE Right 5/5/2023    Procedure: RELEASE, CARPAL TUNNEL;  Surgeon: Canelo Bhatt MD;  Location: [facility];  Service: Orthopedics;  Laterality: Right;    FRACTURE SURGERY  2017    Mauro Cárdenas    JOINT REPLACEMENT  2016    revision total knee ledt    SHOULDER ARTHROSCOPY W/ ROTATOR CUFF REPAIR Left     TOTAL KNEE ARTHROPLASTY Left     ULNAR NERVE TRANSPOSITION Right 5/5/2023    Procedure: TRANSPOSITION, NERVE, ULNAR   "Endoscopic;  Surgeon: Canelo Bhatt MD;  Location: [facility];  Service: Orthopedics;  Laterality: Right;  Endoscopic    WRIST SURGERY Right        Social History     Tobacco Use    Smoking status: Every Day     Current packs/day: 0.25     Average packs/day: 0.3 packs/day for 42.0 years (10.5 ttl pk-yrs)     Types: Vaping with nicotine, Cigarettes     Passive exposure: Yes    Smokeless tobacco: Never   Substance Use Topics    Alcohol use: Not Currently     Comment: stopped in 2005    Drug use: Not Currently     Types: Marijuana, Cocaine     Comment: Nothing       Patient Active Problem List   Diagnosis    Generalized weakness    Type II diabetes mellitus    TIA (transient ischemic attack)    Left-sided weakness        Physical Exam:    /87   Pulse 80   Temp 98.9 °F (37.2 °C) (Oral)   Resp 18   Ht 5' 2" (1.575 m)   Wt 59.9 kg (132 lb)   LMP  (LMP Unknown)   SpO2 96%   Breastfeeding No   BMI 24.14 kg/m²     Nursing note and vital signs reviewed.    General:  Alert, no acute distress. Sleeping comfortably in stretcher  Skin: Normal for Ethnic Origin, No cyanosis  HEENT: Normocephalic and atraumatic, Vision unchanged, Pupils symmetric, No icterus , Nasal mucosa is pink and moist  Cardiovascular:  Regular rate and rhythm, No edema  Chest Wall: No deformity, equal chest rise  Respiratory:  Lungs are clear to auscultation, respirations are non-labored.    Musculoskeletal:  Normal perfusion to all extremities, painful ROM right hip without deformity, ambulates unassisted without difficulty  Gastrointestinal:  Soft, Non distended  Neurological:  Alert and oriented, normal motor observed, normal speech observed.    Psychiatric:  Cooperative, appropriate mood & affect.        Labs that have been ordered have been independently reviewed and interpreted by myself.     Old Chart Reviewed.      Initial Impression/ Differential Dx:  Differential diagnosis includes, but is not limited to:    Hip arthritis, bursitis, " fracture, dislocation, septic joint, referred pain from back or knee, neuropathic pain, DVT, referred pain from abdomen       MDM:      Reviewed Nurses Note.    Reviewed Pertinent old records.    Orders Placed This Encounter    X-Ray Hip 2 or 3 views Right with Pelvis when performed    oxyCODONE-acetaminophen 5-325 mg per tablet 1 tablet    ondansetron disintegrating tablet 4 mg    ketorolac injection 30 mg    ibuprofen (ADVIL,MOTRIN) 800 MG tablet                    Labs Reviewed - No data to display       X-Ray Hip 2 or 3 views Right with Pelvis when performed    (Results Pending)        No visits with results within 1 Day(s) from this visit.   Latest known visit with results is:   Admission on 03/28/2024, Discharged on 03/29/2024   Component Date Value Ref Range Status    Sodium 03/28/2024 136  136 - 145 mmol/L Final    Potassium 03/28/2024 3.1 (L)  3.5 - 5.1 mmol/L Final    Chloride 03/28/2024 106  98 - 107 mmol/L Final    CO2 03/28/2024 18 (L)  23 - 31 mmol/L Final    Glucose 03/28/2024 202 (H)  82 - 115 mg/dL Final    Blood Urea Nitrogen 03/28/2024 13.8  9.8 - 20.1 mg/dL Final    Creatinine 03/28/2024 0.96  0.55 - 1.02 mg/dL Final    Calcium 03/28/2024 8.8  8.4 - 10.2 mg/dL Final    Protein Total 03/28/2024 6.2  5.8 - 7.6 gm/dL Final    Albumin 03/28/2024 3.8  3.4 - 4.8 g/dL Final    Globulin 03/28/2024 2.4  2.4 - 3.5 gm/dL Final    Albumin/Globulin Ratio 03/28/2024 1.6  1.1 - 2.0 ratio Final    Bilirubin Total 03/28/2024 0.2  <=1.5 mg/dL Final    ALP 03/28/2024 75  40 - 150 unit/L Final    ALT 03/28/2024 18  0 - 55 unit/L Final    AST 03/28/2024 26  5 - 34 unit/L Final    eGFR 03/28/2024 >60  mls/min/1.73/m2 Final    PT 03/28/2024 12.6  12.5 - 14.5 seconds Final    INR 03/28/2024 1.0  <=1.3 Final    PTT 03/28/2024 23.5  23.2 - 33.7 seconds Final    Lactic Acid Level 03/28/2024 2.7 (H)  0.5 - 2.2 mmol/L Final    Group & Rh 03/28/2024 A POS   Final    Indirect Kristian GEL 03/28/2024 NEG   Final    Specimen  Outdate 03/28/2024 03/31/2024 23:59   Final    Color, UA 03/29/2024 Light-Yellow  Yellow, Light-Yellow, Colorless, Straw, Dark-Yellow Final    Appearance, UA 03/29/2024 Clear  Clear Final    Specific Gravity, UA 03/29/2024 1.048 (H)  1.005 - 1.030 Final    pH, UA 03/29/2024 6.0  5.0 - 8.5 Final    Protein, UA 03/29/2024 Negative  Negative Final    Glucose, UA 03/29/2024 4+ (A)  Negative, Normal Final    Ketones, UA 03/29/2024 Negative  Negative Final    Blood, UA 03/29/2024 Negative  Negative Final    Bilirubin, UA 03/29/2024 Negative  Negative Final    Urobilinogen, UA 03/29/2024 Normal  0.2, 1.0, Normal Final    Nitrites, UA 03/29/2024 Negative  Negative Final    Leukocyte Esterase, UA 03/29/2024 Negative  Negative Final    WBC, UA 03/29/2024 0-5  None Seen, 0-2, 3-5, 0-5 /HPF Final    Bacteria, UA 03/29/2024 None Seen  None Seen, Trace /HPF Final    Squamous Epithelial Cells, UA 03/29/2024 Trace  None Seen /HPF Final    RBC, UA 03/29/2024 0-5  None Seen, 0-2, 3-5, 0-5 /HPF Final    Amphetamines, Urine 03/29/2024 Positive (A)  Negative Final    Barbiturates, Urine 03/29/2024 Negative  Negative Final    Benzodiazepine, Urine 03/29/2024 Negative  Negative Final    Cannabinoids, Urine 03/29/2024 Positive (A)  Negative Final    Cocaine, Urine 03/29/2024 Negative  Negative Final    Fentanyl, Urine 03/29/2024 Positive (A)  Negative Final    MDMA, Urine 03/29/2024 Negative  Negative Final    Opiates, Urine 03/29/2024 Negative  Negative Final    Phencyclidine, Urine 03/29/2024 Negative  Negative Final    pH, Urine 03/29/2024 6.0  3.0 - 11.0 Final    Specific Gravity, Urine Auto 03/29/2024 1.048 (H)  1.001 - 1.035 Final    Ethanol Level 03/28/2024 <10.0  <=10.0 mg/dL Final    Troponin-I 03/28/2024 <0.010  0.000 - 0.045 ng/mL Final    QRS Duration 03/28/2024 92  ms Final    [facility] QTC Calculation 03/28/2024 462  ms Final    WBC 03/28/2024 6.30  4.50 - 11.50 x10(3)/mcL Final    RBC 03/28/2024 4.66  4.20 - 5.40 x10(6)/mcL Final     Hgb 03/28/2024 10.9 (L)  12.0 - 16.0 g/dL Final    Hct 03/28/2024 34.7 (L)  37.0 - 47.0 % Final    MCV 03/28/2024 74.5 (L)  80.0 - 94.0 fL Final    MCH 03/28/2024 23.4 (L)  27.0 - 31.0 pg Final    MCHC 03/28/2024 31.4 (L)  33.0 - 36.0 g/dL Final    RDW 03/28/2024 17.7 (H)  11.5 - 17.0 % Final    Platelet 03/28/2024 326  130 - 400 x10(3)/mcL Final    MPV 03/28/2024 8.5  7.4 - 10.4 fL Final    Neut % 03/28/2024 46.5  % Final    Lymph % 03/28/2024 38.6  % Final    Mono % 03/28/2024 8.6  % Final    Eos % 03/28/2024 5.1  % Final    Basophil % 03/28/2024 1.0  % Final    Lymph # 03/28/2024 2.43  0.6 - 4.6 x10(3)/mcL Final    Neut # 03/28/2024 2.94  2.1 - 9.2 x10(3)/mcL Final    Mono # 03/28/2024 0.54  0.1 - 1.3 x10(3)/mcL Final    Eos # 03/28/2024 0.32  0 - 0.9 x10(3)/mcL Final    Baso # 03/28/2024 0.06  <=0.2 x10(3)/mcL Final    IG# 03/28/2024 0.01  0 - 0.04 x10(3)/mcL Final    IG% 03/28/2024 0.2  % Final    NRBC% 03/28/2024 0.0  % Final    ABORH Retype 03/28/2024 A POS   Final    Lactic Acid Level 03/29/2024 1.5  0.5 - 2.2 mmol/L Final       Imaging Results              X-Ray Hip 2 or 3 views Right with Pelvis when performed (In process)                                                          Diagnostic Impression:    1. Right hip pain         ED Disposition Condition    Discharge Stable             Follow-up Information       [facility] - Emergency Dept.    Specialty: Emergency Medicine  Why: If symptoms worsen  Contact information:  [address]  [facility] [address] [phone]             Jess Fraser MD.    Specialty: Pain Medicine  Contact information:  [address]  [facility] [address]  [phone]                              ED Prescriptions       Medication Sig Dispense Start Date End Date Auth. Provider    ibuprofen (ADVIL,MOTRIN) 800 MG tablet Take 1 tablet (800 mg total) by mouth every 6 (six) hours as needed for Pain. 20 tablet  7/12/2024 -- Maicol Calix,           Follow-up Information       Follow up With Specialties Details Why Contact Info    [facility] - Emergency Dept Emergency Medicine  If symptoms worsen [address]  [facility] [address] [phone]    Jess Fraser MD Pain Medicine   [address] [place] [id number]  [phone]               Maicol Calix DO  07/12/24 0529

## 2024-07-15 ENCOUNTER — TELEPHONE (OUTPATIENT)
Dept: PAIN MEDICINE | Facility: CLINIC | Age: 59
End: 2024-07-15
Payer: MEDICARE
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
## 2024-07-15 NOTE — TELEPHONE ENCOUNTER
----- Message from Ryanne Beaver sent at 7/12/2024  9:19 AM CDT -----  Regarding: Rt. hip pain  Ms. Jin phoned the office today because she is having severe rt. Hip pain. She had x-rays of her hip taken today at the ER[facility]  She is calling to see if she can get something to help with the pain.

## 2024-07-16 ENCOUNTER — TELEPHONE (OUTPATIENT)
Dept: NEUROSURGERY | Facility: CLINIC | Age: 59
End: 2024-07-16
Payer: MEDICARE

## 2024-07-16 DIAGNOSIS — G45.9 TIA (TRANSIENT ISCHEMIC ATTACK): Primary | ICD-10-CM

## 2024-07-16 NOTE — TELEPHONE ENCOUNTER
I spoke with the patient regarding Dr. Bingham's recommendation for a C3-4, C4-5 ACDF at this time given the persistent facet widening at C4-5 despite 3 months of bracing.  I advised the patient she will need to have a cardiac evaluation given her history of strokes and aspirin requirements.  I have entered a referral her to be seen.  She was also advised that she will need to be 4 weeks tobacco and nicotine free prior to surgical intervention.  She verbalizes understanding.  She was advised to remain in her cervical collar in the interim.  Dr. Bingham also mentioned she will need to stop her Celebrex at least 10 days prior to surgical intervention.  She was recommending the patient return to clinic to be seen on her schedule once the patient has obtained cardiac clearance to move forward with the recommended procedure.  Would someone mind please calling the patient to make these arrangements?

## 2024-07-16 NOTE — TELEPHONE ENCOUNTER
Spoke with pt    I related to her message that she can alternate tylenol & Ibuprofen for pain  Pt verbalized understanding

## 2024-07-23 ENCOUNTER — TELEPHONE (OUTPATIENT)
Dept: PAIN MEDICINE | Facility: HOSPITAL | Age: 59
End: 2024-07-23
Payer: MEDICARE

## 2024-07-23 RX ORDER — IBUPROFEN 600 MG/1
600 TABLET ORAL EVERY 8 HOURS PRN
Qty: 50 TABLET | Refills: 0 | Status: SHIPPED | OUTPATIENT
Start: 2024-07-23

## 2024-07-23 NOTE — TELEPHONE ENCOUNTER
----- Message from Susi Brice MA sent at 7/23/2024 12:00 PM CDT -----  Regarding: RE: Celestina Fraser please see note from Ms Pearl  ----- Message -----  From: Ryanne Beaver  Sent: 7/23/2024  11:38 AM CDT  To: Bria MIJARES Staff  Subject: Motrin                                           Ms. Cuevas phoned the office today because she is having a lot of pain in her rt. Hip.  The pain got so bad she saw her PCP and he ordered x-rays of the the right hip, per Ms. Jin her PCP told her that she had a lot of arthritis in her hip.  He is referring her to Dr. Pittman but in the meantime she wants to know if she get some Motrin to help with the pain.

## 2024-07-29 ENCOUNTER — TELEPHONE (OUTPATIENT)
Dept: ORTHOPEDICS | Facility: CLINIC | Age: 59
End: 2024-07-29
Payer: MEDICARE

## 2024-07-29 NOTE — TELEPHONE ENCOUNTER
Patient to ask for an appt she is having right hip pain and last visit was 10/24/23 and she cancelled a few appts between then and now. I scheduled patient for 8/6/2024 and she will call back if that does not work.

## 2024-07-30 DIAGNOSIS — M25.551 RIGHT HIP PAIN: Primary | ICD-10-CM

## 2024-08-06 ENCOUNTER — OFFICE VISIT (OUTPATIENT)
Dept: ORTHOPEDICS | Facility: CLINIC | Age: 59
End: 2024-08-06
Payer: COMMERCIAL

## 2024-08-06 VITALS
BODY MASS INDEX: 26.5 KG/M2 | HEIGHT: 62 IN | SYSTOLIC BLOOD PRESSURE: 96 MMHG | DIASTOLIC BLOOD PRESSURE: 60 MMHG | WEIGHT: 144 LBS | HEART RATE: 85 BPM

## 2024-08-06 DIAGNOSIS — M25.551 PAIN OF RIGHT HIP: ICD-10-CM

## 2024-08-06 DIAGNOSIS — M25.551 RIGHT HIP PAIN: ICD-10-CM

## 2024-08-06 DIAGNOSIS — M87.051 AVASCULAR NECROSIS OF BONE OF RIGHT HIP: Primary | ICD-10-CM

## 2024-08-06 PROCEDURE — 3008F BODY MASS INDEX DOCD: CPT | Mod: CPTII,,, | Performed by: ORTHOPAEDIC SURGERY

## 2024-08-06 PROCEDURE — 3074F SYST BP LT 130 MM HG: CPT | Mod: CPTII,,, | Performed by: ORTHOPAEDIC SURGERY

## 2024-08-06 PROCEDURE — 99213 OFFICE O/P EST LOW 20 MIN: CPT | Mod: ,,, | Performed by: ORTHOPAEDIC SURGERY

## 2024-08-06 PROCEDURE — 1159F MED LIST DOCD IN RCRD: CPT | Mod: CPTII,,, | Performed by: ORTHOPAEDIC SURGERY

## 2024-08-06 PROCEDURE — 3078F DIAST BP <80 MM HG: CPT | Mod: CPTII,,, | Performed by: ORTHOPAEDIC SURGERY

## 2024-08-06 RX ORDER — METHYLPREDNISOLONE 4 MG/1
TABLET ORAL
COMMUNITY
Start: 2024-07-12

## 2024-08-08 ENCOUNTER — TELEPHONE (OUTPATIENT)
Facility: CLINIC | Age: 59
End: 2024-08-08
Payer: COMMERCIAL

## 2024-08-16 ENCOUNTER — TELEPHONE (OUTPATIENT)
Dept: NEUROSURGERY | Facility: CLINIC | Age: 59
End: 2024-08-16
Payer: COMMERCIAL
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
## 2024-08-19 NOTE — TELEPHONE ENCOUNTER
Neither Bentley nor myself have been able to reach the patient.  She was a no show for her MRI appt at [facility] this morning.  Will continue trying to get in touch with patient.

## 2024-08-19 NOTE — TELEPHONE ENCOUNTER
I spoke with Bentley with [facility] about the patient and her situation.  The patient is scheduled for an MRI of her hip today at [facility] at 10am.  Bentley was going to reach out to the patient to coordinate a time to meet her to get her set up with a new collar.  I tried to call the patient to let her know the plan and to see if she could get updated cervical x-rays while at [facility] this morning, so that she does not have to go to the ER.  The patient did not answer and there was no option to leave a voicemail.  I tried to call her sister that is listed in her chart, but did not get an answer on that number either.

## 2024-08-20 ENCOUNTER — TELEPHONE (OUTPATIENT)
Dept: ORTHOPEDICS | Facility: CLINIC | Age: 59
End: 2024-08-20
Payer: COMMERCIAL

## 2024-08-20 NOTE — TELEPHONE ENCOUNTER
Patient called to get the number to reschedule her MRI and I will reschedule her MRI appt for Thursday as well. I rescheduled MRI to 9/17/24 at 1:45.
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
## 2024-08-21 NOTE — TELEPHONE ENCOUNTER
I received an email from Bentley yesterday afternoon.  She was able to speak with the patient.  She is going to meet her here at the office this morning to fit her for a new collar.

## 2024-09-17 ENCOUNTER — TELEPHONE (OUTPATIENT)
Dept: NEUROSURGERY | Facility: CLINIC | Age: 59
End: 2024-09-17
Payer: MEDICARE

## 2024-09-17 NOTE — TELEPHONE ENCOUNTER
This patient was initially evaluated for an inpatient neurosurgery consultation after a syncopal episode on 03/28/2024.  The patient was most recently seen in the neurosurgery clinic on 07/01/2024 by NAM Paris and imaging was reviewed with you.  Recommendation was for a C3-4, C4-5 ACDF on an outpatient basis given persistent facet widening at C4-5 despite 3 months of bracing.  A phone note was documented on 07/16/2024, where cardiac clearance was requested as a prerequisite for proposing a surgery date.  Clearance has been received and is in the chart under media.  Need to determine a surgery date and I will schedule her to return to see you accordingly.

## 2024-09-21 NOTE — TELEPHONE ENCOUNTER
I am requesting Anna to contact Ms. Jin.  She has completed preoperative cardiac clearance from cardiologist Dr. Eldridge.     The patient ideally needs to have become nicotine free at this point.     I would like to offer Ms. Jin a surgery date of Monday, 10/28/2024 at 7:30 a.m. for a C3-4, C4-5 ACDF.      The patient needs to discontinue taking ibuprofen, all other NSAIDs, aspirin 325 mg, and nutritional supplements 10 days preoperatively.      If this surgery date is acceptable, I would like arrangements made for the patient to follow up in my neurosurgery clinic for a preoperative evaluation.

## 2024-09-23 NOTE — TELEPHONE ENCOUNTER
I spoke with the patient.  She was agreeable to surgery date.  I scheduled her to return for follow up to discuss surgery/preop on 10/8 at 9:30am.  She is not completely nicotine free, but is down to 1-2 cigarettes/day.  I asked that she continue to work on stopping completely.  She voiced understanding.

## 2024-10-09 ENCOUNTER — ANESTHESIA EVENT (OUTPATIENT)
Dept: SURGERY | Facility: HOSPITAL | Age: 59
DRG: 473 | End: 2024-10-09
Payer: MEDICARE

## 2024-10-09 ENCOUNTER — TELEPHONE (OUTPATIENT)
Dept: NEUROSURGERY | Facility: CLINIC | Age: 59
End: 2024-10-09
Payer: MEDICARE
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
## 2024-10-14 ENCOUNTER — TELEPHONE (OUTPATIENT)
Dept: NEUROSURGERY | Facility: CLINIC | Age: 59
End: 2024-10-14
Payer: MEDICARE

## 2024-10-14 NOTE — TELEPHONE ENCOUNTER
----- Message from Susi sent at 10/14/2024  8:15 AM CDT -----  Sat 12-Oct-24 03:59p TAKEN  To......:  Office Numbers [id number]  Name....: Faith Jin  Phone #.: [phone]  Patient.: Same  Pt .: 1965  Primary.: Dr Chana Bingham [id number]  <3 Weeks.: No  Details.: She missed her appt on Mon, 10/7 due to being sick and needs to reschedule. She is scheduled for surgery on 10/28 but needs to see dr guadarrama.      Call Type Details: Office Msg -      --------------------------------------  [DW5 10/12/2024 3:59 PM] Holding for next sched.fax/email

## 2024-10-14 NOTE — TELEPHONE ENCOUNTER
I spoke with the patient to advise her the nurse has already spoken to her about the missed appointment and is scheduled with Dr. Bingham for 10/22/24 at 4:30. She was compliant w date and time.

## 2024-10-17 ENCOUNTER — TELEPHONE (OUTPATIENT)
Dept: NEUROSURGERY | Facility: CLINIC | Age: 59
End: 2024-10-17
Payer: MEDICARE

## 2024-10-17 DIAGNOSIS — E11.9 TYPE 2 DIABETES MELLITUS WITHOUT COMPLICATION, UNSPECIFIED WHETHER LONG TERM INSULIN USE: Primary | ICD-10-CM

## 2024-10-17 DIAGNOSIS — D68.9 COAGULATION DEFECT: ICD-10-CM

## 2024-10-17 DIAGNOSIS — Z01.818 PREOP TESTING: ICD-10-CM

## 2024-10-17 NOTE — TELEPHONE ENCOUNTER
The patient is scheduled for surgery on 10/28/24.  She is not scheduled to come in for preop until Tuesday, 10/22.  She will need to stop her ASA, NSAIDs, and supplements tomorrow, 10/18, in preparation for surgery.  She needs to get her preop labs and imaging prior to her appt on Tuesday as well due to her appt here being late in the day.  I tried to call the patient to go over these instructions, but she did not answer.  I left a voicemail requesting a call back to discuss in further detail.

## 2024-10-18 NOTE — TELEPHONE ENCOUNTER
I spoke with the patient and went over medications to stop today.  I gave her the number to the PAT clinic to call and set up a time to go on Tuesday prior to her appt here.  I put orders in the chart for preop work-up.  She is aware of her appt here on Tuesday.  She will call with any questions/concerns in the meantime.

## 2024-10-21 RX ORDER — FLUTICASONE FUROATE, UMECLIDINIUM BROMIDE AND VILANTEROL TRIFENATATE 100; 62.5; 25 UG/1; UG/1; UG/1
1 POWDER RESPIRATORY (INHALATION) DAILY
COMMUNITY

## 2024-10-21 NOTE — PROGRESS NOTES
[facility]   Neurosurgery      Faith Jin  MRN: [id number], CSN: [id number]      : 1965   Age: 59 y.o. female  Payor: [employer] MEDICARE / Plan: [employer] [facility] SPECIAL NEEDS / Product Type: Medicare Advantage /       Ref:  No referring provider defined for this encounter.    PCP: Garrick Nascimento MD    Visit Date: 10/22/2024     Patient Active Problem List   Diagnosis    Generalized weakness    Type II diabetes mellitus    TIA (transient ischemic attack)    Left-sided weakness       SUBJECTIVE:      CC:   Chief Complaint   Patient presents with    Intermittent neck pain, hx of cervical spine trauma       HPI:   Ms. Jin is a 59 y.o. right-handed female who has a past medical history significant for hypertension, TIA, COPD, neuropathy, bipolar disorder, schizoaffective disorder, depression, as well as methamphetamine and marijuana use.  She was taking aspirin 325 mg and ibuprofen until 2 weeks ago.  Ms. Jin is being evaluated as a follow up patient in the neurosurgery clinic after an inpatient consultation on 3/29/2024.  The patient had a syncopal episode 6 months ago 2024 with positive loss of consciousness.  She woke up with pain along her entire spinal axis as well as numbness in her left face, arm, and leg.  A MRI cervical spine without gadolinium radiology report was finalized with an abnormality involving the right C4-5 facet joint being widened with edema.  Ms. Jin has been deemed a surgical candidate due to persistent widening of the right C4-5 facet joint while being maintained in a [place] J C-collar since her syncopal episode.     The patient's last date of visit in my neurosurgery clinic was on 2024.  Ms. Jin's last date of visit in the neurosurgery clinic was on 2024 with [facility] [place] with a recommendation to continue wearing a [place] J C-collar at all times.  The patient has been deemed a candidate for a C3-4, C4-5 ACDF due to  persistent facet widening at C4-5 well as movement at C3-4 as well as C4-5 on flexion-extension views despite continued bracing.  Ms. Jin has completed preoperative clearance from cardiologist Dr. Eldridge.  She was also recommended to discontinue smoking and become nicotine free.  In the meantime, the patient continues to vape and smokes 2 rolled cigarettes daily.    Ms. Jin visits the neurosurgery clinic with her friend Dilia.  She is wearing a [place] J C-collar.  The patient has mild neck pain without radiating arm pain.  She reports a pain level of 2/10 today.  With time, her neck pain has improved since her initial syncopal episode 6 months ago.  Ms. Jin also has chronic low back pain radiating down her right leg.  She feels generally weak with no numbness.  The patient has been fully ambulatory with no bowel or bladder incontinence.    She has been taking Tylenol as needed for pain.  In regards her psychiatric conditions, she follows with a mental health specialist on a monthly basis.        Reviewed from the patient's date of visit on 04/23/2024:  When the patient was in the ER, her left-sided numbness has nearly resolved and was only in her left fingertips.  It is noted that the patient had left-sided numbness and weakness after a TIA in 2022.  She has not had any weakness.  The patient has not had any bowel or bladder incontinence.  Ms. Jin has been ambulatory.       Ms. Jin has completed updated cervical and lumbar spine x-rays for neck and low back pain since her inpatient admission.  The patient visits the neurosurgery clinic with her friend Dilia.  She has been wearing her [place] J C-collar at all times.  Ms. Jin reports that she tripped and fell at a friend's house approximately 1 week ago with no loss of consciousness.     The patient has minimal neck pain with no radiating arm pain.  Her chronic low back pain has worsened recently with radiation down her bilateral posterior legs.   She rates her low back to bilateral leg pain ratio as 70:30.   The numbness in all her extremities has resolved.  Ms. Jin has not had any saddle anesthesia or urinary incontinence.  The patient has chronic diarrhea with loose stool for the last year.  Her established gastroenterologist is Dr. Flores.     She has been ambulatory, but unsteady on her feet secondary to left knee pain.  Ms. Jin is established with orthopedic surgeon Dr. Pittman, as she has had multiple surgeries on her left knee.  In addition, she follows with orthopedic hand specialist Dr. Bhatt for arthritis in her right hand.     There is increased pain along her entire spine as the day progresses and a reduction of pain when taking Toradol.  She is awaiting home health services with physical therapy to be established.  The patient is not currently established with a pain management specialist.     Ms. Jin visits with a psychiatrist on a monthly basis.  She smokes 2 hand-rolled cigarettes daily.      Patient Active Problem List    Diagnosis Date Noted    Left-sided weakness 09/13/2022    TIA (transient ischemic attack) 07/10/2022    Generalized weakness 07/06/2022    Type II diabetes mellitus 07/06/2022     Past Medical History:   Diagnosis Date    Acquired spondylolisthesis of cervical vertebra     Ambulates with cane     Anxiety     Bipolar disorder     w/manic depression as per pt    Carpal tunnel syndrome     COPD (chronic obstructive pulmonary disease)     Depression     Diabetes mellitus     Digestive disorder     GERD    Dry eyes     Headache     Hyperlipidemia     Hypertension     Insomnia     Lumbar degenerative disc disease     Mixed hyperlipidemia     Muscle spasms of both lower extremities     Muscle spasms of neck     Neuropathy     Personal history of fall     PONV (postoperative nausea and vomiting)     Right hip pain     radiates down to ankle    Seizure-like activity 03/31/2024    no further activity since 03    TIA  (transient ischemic attack) 2022    Ulnar nerve external compression syndrome      Past Surgical History:   Procedure Laterality Date    ANTERIOR CRUCIATE LIGAMENT REPAIR Left 1993    CARPAL TUNNEL RELEASE Right 05/05/2023    Procedure: RELEASE, CARPAL TUNNEL;  Surgeon: Canelo Bhatt MD;  Location: [facility];  Service: Orthopedics;  Laterality: Right;    FRACTURE SURGERY  2017    Mauro Melia    JOINT REPLACEMENT  2016    revision total knee ledt    SHOULDER ARTHROSCOPY W/ ROTATOR CUFF REPAIR Left     TOTAL KNEE ARTHROPLASTY Left     X2    ULNAR NERVE TRANSPOSITION Right 05/05/2023    Procedure: TRANSPOSITION, NERVE, ULNAR  Endoscopic;  Surgeon: Canelo Bhatt MD;  Location: [facility] OR;  Service: Orthopedics;  Laterality: Right;  Endoscopic    WRIST SURGERY Right        Current Outpatient Medications:     acyclovir 5% (ZOVIRAX) 5 % Crea, 1 Application., Disp: , Rfl:     albuterol (PROVENTIL/VENTOLIN HFA) 90 mcg/actuation inhaler, 1 puff as needed Inhalation every 4 hrs, Disp: , Rfl:     atorvastatin (LIPITOR) 40 MG tablet, 1 tablet Orally Once a day, Disp: , Rfl:     azelastine (ASTELIN) 137 mcg (0.1 %) nasal spray, 1 puff in each nostril Nasally Twice a day for 30 day(s), Disp: , Rfl:     benztropine (COGENTIN) 1 MG tablet, Take 1 mg by mouth 2 (two) times daily., Disp: , Rfl:     blood-glucose meter (CONTOUR NEXT METER) Misc, as directed in vitro three times daily for 365 days, Disp: , Rfl:     buPROPion (WELLBUTRIN XL) 300 MG 24 hr tablet, 1 tablet in the morning Orally Once a day, Disp: , Rfl:     carBAMazepine (TEGRETOL XR) 400 MG Tb12, Take 400 mg by mouth 2 (two) times daily., Disp: , Rfl:     cariprazine (VRAYLAR) 4.5 mg Cap, 1 capsule., Disp: , Rfl:     clonazePAM (KLONOPIN) 0.5 MG tablet, 1 tablet Orally twice a day, Disp: , Rfl:     CONTOUR NEXT TEST STRIPS Strp, 3 (three) times daily. Use to test blood glucose, Disp: , Rfl:     dexlansoprazole (DEXILANT) 60 mg capsule, 1 capsule., Disp: , Rfl:     DULoxetine  (CYMBALTA) 60 MG capsule, 1 capsule., Disp: , Rfl:     empagliflozin (JARDIANCE) 25 mg tablet, Jardiance 25 mg tablet, [RxNorm: 7152690], Disp: , Rfl:     estradioL (ESTRACE) 0.5 MG tablet, 1 tablet Orally Once a day, Disp: , Rfl:     famotidine (PEPCID) 20 MG tablet, 1 tablet at bedtime Orally Once a day, Disp: , Rfl:     fenofibrate 160 MG Tab, Take 1 tablet by mouth once daily., Disp: , Rfl:     fluticasone propionate (FLONASE) 50 mcg/actuation nasal spray, 1 spray by Each Nostril route nightly., Disp: , Rfl:     fluticasone-umeclidin-vilanter (TRELEGY ELLIPTA) 100-62.5-25 mcg DsDv, Inhale 1 puff into the lungs once daily., Disp: , Rfl:     furosemide (LASIX) 20 MG tablet, 1 tablet Orally Once a day, Disp: , Rfl:     hydrOXYzine pamoate (VISTARIL) 25 MG Cap, Take 25 mg by mouth Daily. Per pt reports 1 the am and 2 at bedside, Disp: , Rfl:     icosapent ethyL (VASCEPA) 1 gram Cap, 2 capsules with meals Orally Twice a day, Disp: , Rfl:     linaCLOtide (LINZESS) 145 mcg Cap capsule, Take 145 mcg by mouth before breakfast., Disp: , Rfl:     loratadine (CLARITIN) 10 mg tablet, 1 tablet Orally Once a day for 30 day(s), Disp: , Rfl:     metFORMIN (GLUCOPHAGE) 500 MG tablet, metFORMIN 500 mg tablet, [RxNorm: 020119], Disp: , Rfl:     mupirocin (BACTROBAN) 2 % ointment, Apply topically 2 (two) times daily., Disp: , Rfl:     paliperidone palm, 3 month, (INVEGA TRINZA) 546 mg/1.75 mL Syrg injection, Invega Trinza 546 mg/1.75 mL intramuscular syringe, [RxNorm: 3657704], Disp: , Rfl:     polyethylene glycol (GLYCOLAX) 17 gram/dose powder, Take 17 g by mouth as needed., Disp: , Rfl:     pregabalin (LYRICA) 100 MG capsule, Take 100 mg by mouth 4 (four) times daily., Disp: , Rfl:     pregabalin (LYRICA) 100 MG capsule, Lyrica 100 mg capsule, [RxNorm: 791172], Disp: , Rfl:     pregabalin (LYRICA) 75 MG capsule, 1 capsule., Disp: , Rfl:     promethazine (PHENERGAN) 25 MG tablet, 1 tablet as needed Orally every 12 hrs, Disp: , Rfl:      RESTASIS 0.05 % ophthalmic emulsion, Place 1 drop into both eyes 2 (two) times daily., Disp: , Rfl:     tiZANidine (ZANAFLEX) 4 MG tablet, 1 tablet as needed Orally Three times a day, Disp: , Rfl:     topiramate (TOPAMAX) 200 MG Tab, 1 tablet Orally Twice a day, Disp: , Rfl:     valACYclovir (VALTREX) 500 MG tablet, Take 500 mg by mouth 2 (two) times daily., Disp: , Rfl:     varenicline (CHANTIX) 1 mg Tab, Chantix 1 mg tablet, [RxNorm: 389809], Disp: , Rfl:     naloxegoL (MOVANTIK) 25 mg tablet, 1 tablet in the morning Orally Once a day for 30 day(s), Disp: , Rfl:     triamcinolone acetonide 0.1% (KENALOG) 0.1 % paste, , Disp: , Rfl:     zaleplon (SONATA) 5 MG Cap, 1 capsule at bedtime as needed Orally Once a day, Disp: , Rfl:     Review of patient's allergies indicates:   Allergen Reactions    Adhesive      Other reaction(s): skin tears easily with adhesive    Cephalexin Nausea And Vomiting    Erythromycin Nausea And Vomiting    Lithium Nausea And Vomiting    Naproxen Nausea And Vomiting       Social History     Tobacco Use    Smoking status: Every Day     Current packs/day: 0.25     Average packs/day: 0.3 packs/day for 42.0 years (10.5 ttl pk-yrs)     Types: Vaping with nicotine, Cigarettes     Passive exposure: Past    Smokeless tobacco: Never    Tobacco comments:     2-3 cigarettes/day    Substance Use Topics    Alcohol use: Not Currently     Comment: stopped in 2005     Occupation: Medically disabled since 1995 due to her mental health conditions including schizoaffective disorder and bipolar disorder.  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     Family History   Problem Relation Name Age of Onset    Coronary artery disease Mother Shobha Jin     Diabetes Mother Shobha Jin     Arthritis Mother Shobha Jin         Back , hands    Early death Mother Shobha Jin         Diabetes    Heart disease Mother Shobha Jin     Hypertension Mother Shobha Jin     Kidney disease Mother  "Shobha Jin     Stroke Mother Shobha Jin     Vision loss Mother Shobha Jin     Coronary artery disease Father Cesar     Asthma Father Cesar         Back    Heart disease Father Cesar     Hypertension Father Cesar     Vision loss Father Cesar     Arthritis Sister Hailey         Not sure    Asthma Sister Hailey         Finger, shoulder    Mental illness Sister Hailey     Vision loss Sister Hailey     Arthritis Brother Zacarias         Hip    Birth defects Brother Zacarias         Special Needs/ Downs Syndrome    Heart disease Brother Zacarias     Learning disabilities Brother Zacarias     Intellectual disability Brother Zacarias        ROS:  Constitutional:  Negative for chills and fever.   HENT:  Negative for congestion and sore throat.    Eyes:  Positive for blurred vision, Negative for double vision.   Respiratory:  Negative for cough and shortness of breath.    Cardiovascular:  Negative for chest pain and palpitations.   Gastrointestinal:  Positive for diarrhea, Negative for constipation, nausea and vomiting.   Musculoskeletal:  Positive for back pain and neck pain.   Neurological:  Positive for headaches, Negative for sensory change and focal weakness.  Endo/Heme/Allergies:  Does not bruise/bleed easily.   Psychiatric/Behavioral:  Positive for depression and anxiety.      OBJECTIVE:     EXAMINATION:  /78 (BP Location: Left arm, Patient Position: Sitting)   Pulse 91   Resp 16   Ht 5' 2" (1.575 m)   Wt 65.7 kg (144 lb 12.8 oz)   LMP  (LMP Unknown)   BMI 26.48 kg/m²   Body Habitus: Normal    Physical Exam:  Constitutional: Well developed and cooperative.  She is wearing a [place] J C-collar.   Body habitus:  Normal     Cardiovascular:  regular rate and rhythm with no murmurs  Lungs: clear to auscultation bilateral  Abdomen:  soft, nontender, nondistended        Mental Status:   GCS- 15  E-4, V-5, M-6     Opens eyes spontaneously  Oriented x 3  Normal speech  Follows commands in all extremities     Cranial " nerves:  CN II: PERRL, 4 to 3 mm, brisk bilaterally  CN III, IV, and VI: extraocular movements normal, no ptosis  CN V: normal facial sensation and masseter function  CN VII: facial strength normal and symmetrical  CN VIII: hearing normal bilaterally  CN IX and X: swallowing and phonation normal  CN XI: shoulder shrug intact bilaterally  CN XII: tongue protrusion midline     Motor:  Muscle bulk: normal in all extremities  Tone: normal in all extremities  No pronator drift     Upper extremities:  Deltoid: right 5/5; left 5/5  Biceps: right 5/5; left 5/5  Triceps: right 5/5; left 5/5  Wrist extensors: right 5/5; left 5/5  Wrist flexors: right 5/5; left 5/5  : right 5/5; left 5/5  Interosseous muscles: right 5/5; left 5/5     Lower extremities:  Hip flexors: right 5/5; left 5/5  Knee extensors: right 5/5; left 5/5  Knee flexors: right 5/5; left 5/5  Foot dorsiflexors: right 5/5; left 5/5  Foot plantar flexors: right 5/5; left 5/5     Sensation:  Normal to light touch in all extremities     Reflexes:  Ordoñezs sign: right negative; left negative  Babinski: right downgoing; left downgoing  Clonus: right negative; left negative      Musculoskeletal:     Gait: normal      Straight leg test: right negative; left negative     Cervical:  No pain with palpation   Upper back: No pain with palpation  Lower back: No pain with palpation        DIAGNOSTICS REVIEW OF IMAGING, LAB & OTHER STUDIES:  I have personally reviewed and evaluated the following reports as well as radiographic studies:     Cervical spine x-rays, 07/01/2024-  There is straightening of the cervical spine with multilevel degenerative disc disease.  There is minimal anterior spondylolisthesis of C3 on C4 and C4 on C5.  With flexion, there is minimally increased subluxation of C2 on C3, C3 on C4, and C4 on C5.      Lumbar spine x-rays, 04/19/2024- when compared to lumbar spine x-rays on 12/05/2023, there is stable Grade I anterolisthesis of L4 on L5 with no  motion on flexion-extension views.      CT cervical spine without contrast, 07/09/2024- there is straightening of the cervical spine with multilevel degenerative disc disease.  When compared to a CT cervical spine without contrast on 03/28/2024, there continues to be mild widening of the right facet at C4-5.            I have personally reviewed and evaluated the following reports as well as radiographic studies that were completed on 03/28/2024 in the ER:       CT head without contrast- no acute abnormalities.  There are chronic microvascular ischemic findings.     CT cervical spine without contrast- straightening the cervical spine with multilevel degenerative disease.  There is mild widening of the right facet at C4-5.     CT chest, abdomen and pelvis with contrast- normal alignment of the lumbar spine with no acute abnormalities.  Ground-glass opacity in the right lung.           I have personally reviewed and evaluated the following reports as well as radiographic studies that were completed on 03/29/2024:     MRI brain without gadolinium- no acute abnormalities.     MRI cervical spine without gadolinium- normal alignment.  On the right at C4-5, there is facet joint widening and edema.  There is mild stenosis and degenerative findings along the cervical spine with CSF visualized circumferentially around the spinal cord.     MRI thoracic spine without gadolinium- normal alignment no significant neural compression.       MRI lumbar spine without gadolinium- there is Grade I anterior spondylolisthesis of L4 on L5.  A L4-5 disc bulge contributes to moderate stenosis with left-greater-than-right neuroforaminal narrowing.  There is fluid in the bilateral L4-5 facets.       ASSESSMENT:  Ms. Jin is a 59 y.o. right-handed female who has a past medical history significant for hypertension, TIA, COPD, neuropathy, bipolar disorder, schizoaffective disorder, depression, as well as methamphetamine and marijuana use.  She  was taking aspirin 325 mg and ibuprofen until 2 weeks ago.  Ms. Jin is being evaluated as a follow up patient in the neurosurgery clinic after an inpatient consultation on 3/29/2024.  The patient had a syncopal episode 6 months ago 03/28/2024 with positive loss of consciousness.  She woke up with pain along her entire spinal axis as well as numbness in her left face, arm, and leg.  A MRI cervical spine without gadolinium radiology report was finalized with an abnormality involving the right C4-5 facet joint being widened with edema.  Ms. Jin has been deemed a surgical candidate due to persistent widening of the right C4-5 facet joint while being maintained in a [place] J C-collar since her syncopal episode.  She currently has a normal and sensory neurological exam. There are no signs of myelopathy.     I reviewed imaging studies with Ms. Jin and her friend Dilia.  A MRI cervical spine without gadolinium demonstrates normal alignment.  On the right at C4-5, there is facet joint widening and edema.  There is mild stenosis and degenerative findings along the cervical spine with CSF visualized circumferentially around the spinal cord.  Cervical spine x-rays demonstrate straightening of the cervical spine with multilevel degenerative disc disease.  There is minimal anterior spondylolisthesis of C3 on C4 and C4 on C5.  With flexion, there is minimally increased subluxation of C2 on C3, C3 on C4, and C4 on C5.        PLAN:    Encounter Diagnoses   Name Primary?    Acquired spondylolisthesis of cervical vertebra Yes    Cervical spine instability      No orders of the defined types were placed in this encounter.       1.  I discussed with Ms. Jin and her friend Dilia that due to the anterior spondylolisthesis at C3-4 and C4-5 along with persistent widening of her right C4-5 facet joint even with prolonged bracing of her cervical spine, she is considered a candidate for surgery.  The patient needs to continue  wearing her [place] J C-collar at all times.  I reviewed the risks, benefits, and alternatives of a C3-4, C4-5 anterior cervical diskectomy and fusion.  She agrees to proceed.  All questions were answered to her satisfaction.  Ms. Jin signed a surgical consent form in the neurosurgery clinic today     2.  The patient's C3-4, C4-5 ACDF is being scheduled for Monday, 10/28/2024 at 7:30 a.m.  She was informed that this surgical procedure has not yet been fully authorized by her medical insurance plan.  There is a possibility that this surgery will need to be rescheduled to another date if there are any delays in authorization.      3.  Ms. Jin has completed preoperative clearance from cardiologist Dr. Eldridge as well as permission to discontinue taking aspirin 325 mg 10 days preoperatively.  The patient stopped taking aspirin 2 weeks ago.     4.  She has discontinued taking ibuprofen, all other NSAIDs, and nutritional supplements 2 weeks ago.      5.  The patient was encouraged to become nicotine free, as nicotine inhibits bony healing.    6.  She continues to attend scheduled monthly appointments with her mental health provider.      7.  Ms. Jin completed preoperative lab and tests earlier today.  These results will be reviewed.           The risks, benefits, and alternatives to surgery were discussed with the patient.      Complications of an Anterior Cervical Discectomy and Fusion (ACDF) may include:  Failure to improve symptoms and/or increased or persistent pain; Recurrence, continuation, or worsening of the condition that required the operation; Need for further surgical intervention or treatment; Neurological injury, which may include spinal cord or nerve root injury, paralysis (which involves the inability to move arms and/or legs), clumsiness, loss of sympathetic response, loss of sensation, and loss of bowel, bladder, and sexual function; Delayed or immediate spinal instability; Failure of hardware;  Failure to fuse (increased risk with nicotine use) after anterior cervical discectomy and fusion (ACDF); Theoretical risk of disease transmission from allograft material; Cerebral spinal fluid leak; Meningitis; Damage to trachea, esophagus, major blood vessels, nerves, and surrounding anatomical structures; Scarring; Tongue weakness; Swallowing difficulties; Hoarseness; and Positioning problems such as neuropathy or compartment syndrome.     Complications of any surgery may include:  Adverse reaction to anesthesia; Bleeding; Transfusion of blood products, which carries a risk of infection or reaction; Infection, which requires treatment with antibiotics by mouth or intravenously, or even further surgeries; Urinary tract infection; Heart attack, stroke, pneumonia, and DVT/PE (blood clot in the legs or pelvis that can dislodge and go to the lungs); Other unforeseen complications; Coma; and Death.     Benefits of surgery include:  Possible improvement in arm pain, numbness, and/or weakness; and possible improvement in neck pain.     Alternatives to the procedure include:  Physical therapy, chiropractic care, acupuncture, medical therapy, and pain management.        ANTERIOR CERVICAL DISCECTOMY AND FUSION (ACDF)  DISCHARGE INSTRUCTIONS        1.  Wear your cervical collar at all times.  Use the [place] J collar (blue color).  The lining of the collar can be removed and washed with soap and water.   When showering, use the [place] collar (peach color).  Your collar will likely be discontinued after your first postoperative follow-up appointment in 4-6 weeks.        2.  Keep your incision clean and dry.    Your sutures are under the skin and will dissolve with time.  Wait at least 72 hours from the time of your surgery to take a shower.  After showering, pat your incision dry.  Do not immerse your incision in water for 4-6 weeks (e.g. bath tub, hot tub, swimming pool).        3.  Activity restrictions:  No lifting  greater than 15 pounds until your return appointment in 4-6 weeks.  No bending, stooping, or twisting.  No impact exercise or contact sports for at least 3 months.  No driving until your cervical collar is removed in 4-6 weeks.  To resume driving short distances (<30 minutes), you must be off of your narcotic medications and be able to comfortably brake suddenly, should the need arise.    Get up and walk.        4.  Contact your Neurosurgeon if the following occurs:  Signs and symptoms of infection, including fever above 101.5 degrees Fahrenheit and/or chills.  Redness, swelling, warmth, or drainage from the incision.  Any lasting changes in sensation, numbness, and/or tingling.  Increased weakness or increased pain.  Swelling of the foot and/or lower leg with calf pain.  If swallowing difficulty increases or persists beyond four weeks of your surgery.     Office hours Monday through Friday, 8:00 AM to 4:00 PM except for holidays. There is an answering service available during non-office hours, with 24 hours neurosurgery coverage.  Report to the Emergency Department if you need immediate medical assistance.       Difficulty and/or soreness with swallowing are standard after this operation and should improve over the next week.  Warm, soft foods are preferable until your swallowing returns to normal.     Because you have had a fusion, you are not to take steroidal medications or non-steroidal anti-inflammatory (NSAID) medications such as Motrin/ Ibuprofen or Naprosyn/ Naproxen unless agreed upon with your neurosurgeon.       Please contact Dr. Bingham's office for any questions or concerns.  Typically, your first follow-up appointment after an ACDF surgery is 4-6 weeks from the date of your operation.  For your return visit, an x-ray of your cervical spine will be arranged in Radiology before your neurosurgery appointment.  You will be requested to remove your cervical collar for these flexion/ extension C-spine x-rays,  and then replace the brace after your x-rays are completed.       This note will be sent to the patient's referring provider No ref. provider found and primary care provider Garrick Nascimento MD.           Chana Bingham MD  Neurosurgeon

## 2024-10-22 ENCOUNTER — HOSPITAL ENCOUNTER (OUTPATIENT)
Dept: RADIOLOGY | Facility: HOSPITAL | Age: 59
Discharge: HOME | End: 2024-10-22
Attending: NEUROLOGICAL SURGERY
Payer: MEDICARE

## 2024-10-22 ENCOUNTER — OFFICE VISIT (OUTPATIENT)
Dept: NEUROSURGERY | Facility: CLINIC | Age: 59
End: 2024-10-22
Payer: MEDICARE

## 2024-10-22 VITALS
RESPIRATION RATE: 16 BRPM | WEIGHT: 144.81 LBS | HEIGHT: 62 IN | HEART RATE: 91 BPM | DIASTOLIC BLOOD PRESSURE: 78 MMHG | BODY MASS INDEX: 26.65 KG/M2 | SYSTOLIC BLOOD PRESSURE: 114 MMHG

## 2024-10-22 DIAGNOSIS — M43.12 ACQUIRED SPONDYLOLISTHESIS OF CERVICAL VERTEBRA: Primary | ICD-10-CM

## 2024-10-22 DIAGNOSIS — Z01.818 PREOP TESTING: ICD-10-CM

## 2024-10-22 DIAGNOSIS — M53.2X2 CERVICAL SPINE INSTABILITY: ICD-10-CM

## 2024-10-22 DIAGNOSIS — M53.2X2 CERVICAL SPINE INSTABILITY: Primary | ICD-10-CM

## 2024-10-22 PROCEDURE — 3078F DIAST BP <80 MM HG: CPT | Mod: CPTII,,, | Performed by: NEUROLOGICAL SURGERY

## 2024-10-22 PROCEDURE — 3044F HG A1C LEVEL LT 7.0%: CPT | Mod: CPTII,,, | Performed by: NEUROLOGICAL SURGERY

## 2024-10-22 PROCEDURE — 3074F SYST BP LT 130 MM HG: CPT | Mod: CPTII,,, | Performed by: NEUROLOGICAL SURGERY

## 2024-10-22 PROCEDURE — 1159F MED LIST DOCD IN RCRD: CPT | Mod: CPTII,,, | Performed by: NEUROLOGICAL SURGERY

## 2024-10-22 PROCEDURE — 1160F RVW MEDS BY RX/DR IN RCRD: CPT | Mod: CPTII,,, | Performed by: NEUROLOGICAL SURGERY

## 2024-10-22 PROCEDURE — 99214 OFFICE O/P EST MOD 30 MIN: CPT | Mod: ,,, | Performed by: NEUROLOGICAL SURGERY

## 2024-10-22 PROCEDURE — 71046 X-RAY EXAM CHEST 2 VIEWS: CPT | Mod: TC

## 2024-10-22 PROCEDURE — 3008F BODY MASS INDEX DOCD: CPT | Mod: CPTII,,, | Performed by: NEUROLOGICAL SURGERY

## 2024-10-22 RX ORDER — ICOSAPENT ETHYL 1 G/1
CAPSULE ORAL
COMMUNITY

## 2024-10-22 RX ORDER — LORATADINE 10 MG/1
TABLET ORAL
COMMUNITY

## 2024-10-22 RX ORDER — FUROSEMIDE 20 MG/1
TABLET ORAL
COMMUNITY

## 2024-10-22 RX ORDER — PREGABALIN 75 MG/1
1 CAPSULE ORAL
COMMUNITY

## 2024-10-22 RX ORDER — PREGABALIN 100 MG/1
CAPSULE ORAL
COMMUNITY
Start: 2024-07-23

## 2024-10-22 RX ORDER — DEXLANSOPRAZOLE 60 MG/1
1 CAPSULE, DELAYED RELEASE ORAL
COMMUNITY

## 2024-10-22 RX ORDER — ATORVASTATIN CALCIUM 40 MG/1
TABLET, FILM COATED ORAL
COMMUNITY

## 2024-10-22 RX ORDER — CARIPRAZINE 4.5 MG/1
1 CAPSULE, GELATIN COATED ORAL
COMMUNITY
Start: 2024-07-23

## 2024-10-22 RX ORDER — METFORMIN HYDROCHLORIDE 500 MG/1
TABLET ORAL
COMMUNITY
Start: 2024-07-23

## 2024-10-22 RX ORDER — TIZANIDINE 4 MG/1
TABLET ORAL
COMMUNITY

## 2024-10-22 RX ORDER — BUPROPION HYDROCHLORIDE 300 MG/1
TABLET ORAL
COMMUNITY

## 2024-10-22 RX ORDER — ALBUTEROL SULFATE 90 UG/1
INHALANT RESPIRATORY (INHALATION)
COMMUNITY

## 2024-10-22 RX ORDER — VARENICLINE TARTRATE 1 MG/1
TABLET, FILM COATED ORAL
COMMUNITY
Start: 2024-07-23

## 2024-10-22 RX ORDER — ACYCLOVIR 50 MG/G
1 CREAM TOPICAL
Status: ON HOLD | COMMUNITY
End: 2024-10-29 | Stop reason: CLARIF

## 2024-10-22 RX ORDER — PROMETHAZINE HYDROCHLORIDE 25 MG/1
TABLET ORAL
COMMUNITY

## 2024-10-22 RX ORDER — PALIPERIDONE PALMITATE 546 MG/1.75ML
INJECTION, SUSPENSION, EXTENDED RELEASE INTRAMUSCULAR
COMMUNITY
Start: 2024-07-23

## 2024-10-22 RX ORDER — TOPIRAMATE 200 MG/1
TABLET ORAL
COMMUNITY

## 2024-10-22 RX ORDER — DEXTROSE 4 G
TABLET,CHEWABLE ORAL
COMMUNITY
Start: 2023-10-16

## 2024-10-22 RX ORDER — CLONAZEPAM 0.5 MG/1
TABLET ORAL
COMMUNITY
Start: 2024-07-23

## 2024-10-22 RX ORDER — NALOXEGOL OXALATE 25 MG/1
TABLET, FILM COATED ORAL
COMMUNITY

## 2024-10-22 RX ORDER — AZELASTINE 1 MG/ML
SPRAY, METERED NASAL
COMMUNITY

## 2024-10-22 RX ORDER — FAMOTIDINE 20 MG/1
TABLET, FILM COATED ORAL
COMMUNITY

## 2024-10-22 RX ORDER — ZALEPLON 5 MG/1
CAPSULE ORAL
COMMUNITY

## 2024-10-22 RX ORDER — FENOFIBRATE 160 MG/1
1 TABLET ORAL DAILY
COMMUNITY

## 2024-10-22 RX ORDER — DULOXETIN HYDROCHLORIDE 60 MG/1
1 CAPSULE, DELAYED RELEASE ORAL
COMMUNITY
Start: 2024-07-23

## 2024-10-22 RX ORDER — ESTRADIOL 0.5 MG/1
TABLET ORAL
COMMUNITY

## 2024-10-22 NOTE — PATIENT INSTRUCTIONS
Ms. Jin is a 59 y.o. right-handed female who has a past medical history significant for hypertension, TIA, COPD, neuropathy, bipolar disorder, schizoaffective disorder, depression, as well as methamphetamine and marijuana use.  She was taking aspirin 325 mg and ibuprofen until 2 weeks ago.  Ms. Jin is being evaluated as a follow up patient in the neurosurgery clinic after an inpatient consultation on 3/29/2024.  The patient had a syncopal episode 6 months ago 03/28/2024 with positive loss of consciousness.  She woke up with pain along her entire spinal axis as well as numbness in her left face, arm, and leg.  A MRI cervical spine without gadolinium radiology report was finalized with an abnormality involving the right C4-5 facet joint being widened with edema.  Ms. Jin has been deemed a surgical candidate due to persistent widening of the right C4-5 facet joint while being maintained in a [place] J C-collar since her syncopal episode.  She currently has a normal and sensory neurological exam. There are no signs of myelopathy.     I reviewed imaging studies with Ms. Jin and her friend Dilia.  A MRI cervical spine without gadolinium demonstrates normal alignment.  On the right at C4-5, there is facet joint widening and edema.  There is mild stenosis and degenerative findings along the cervical spine with CSF visualized circumferentially around the spinal cord.  Cervical spine x-rays demonstrate straightening of the cervical spine with multilevel degenerative disc disease.  There is minimal anterior spondylolisthesis of C3 on C4 and C4 on C5.  With flexion, there is minimally increased subluxation of C2 on C3, C3 on C4, and C4 on C5.        PLAN:    Encounter Diagnoses   Name Primary?    Acquired spondylolisthesis of cervical vertebra Yes    Cervical spine instability      No orders of the defined types were placed in this encounter.       1.  I discussed with Ms. Jin and her friend Dilia that due to  the anterior spondylolisthesis at C3-4 and C4-5 along with persistent widening of her right C4-5 facet joint even with prolonged bracing of her cervical spine, she is considered a candidate for surgery.  The patient needs to continue wearing her [place] J C-collar at all times.  I reviewed the risks, benefits, and alternatives of a C3-4, C4-5 anterior cervical diskectomy and fusion.  She agrees to proceed.  All questions were answered to her satisfaction.  Ms. Jin signed a surgical consent form in the neurosurgery clinic today     2.  The patient's C3-4, C4-5 ACDF is being scheduled for Monday, 10/28/2024 at 7:30 a.m.  She was informed that this surgical procedure has not yet been fully authorized by her medical insurance plan.  There is a possibility that this surgery will need to be rescheduled to another date if there are any delays in authorization.      3.  Ms. Jin has completed preoperative clearance from cardiologist Dr. Eldridge as well as permission to discontinue taking aspirin 325 mg 10 days preoperatively.  The patient stopped taking aspirin 2 weeks ago.     4.  She has discontinued taking ibuprofen, all other NSAIDs, and nutritional supplements 2 weeks ago.      5.  The patient was encouraged to become nicotine free, as nicotine inhibits bony healing.    6.  She continues to attend scheduled monthly appointments with her mental health provider.      7.  Ms. Jin completed preoperative lab and tests earlier today.  These results will be reviewed.           The risks, benefits, and alternatives to surgery were discussed with the patient.      Complications of an Anterior Cervical Discectomy and Fusion (ACDF) may include:  Failure to improve symptoms and/or increased or persistent pain; Recurrence, continuation, or worsening of the condition that required the operation; Need for further surgical intervention or treatment; Neurological injury, which may include spinal cord or nerve root injury,  paralysis (which involves the inability to move arms and/or legs), clumsiness, loss of sympathetic response, loss of sensation, and loss of bowel, bladder, and sexual function; Delayed or immediate spinal instability; Failure of hardware; Failure to fuse (increased risk with nicotine use) after anterior cervical discectomy and fusion (ACDF); Theoretical risk of disease transmission from allograft material; Cerebral spinal fluid leak; Meningitis; Damage to trachea, esophagus, major blood vessels, nerves, and surrounding anatomical structures; Scarring; Tongue weakness; Swallowing difficulties; Hoarseness; and Positioning problems such as neuropathy or compartment syndrome.     Complications of any surgery may include:  Adverse reaction to anesthesia; Bleeding; Transfusion of blood products, which carries a risk of infection or reaction; Infection, which requires treatment with antibiotics by mouth or intravenously, or even further surgeries; Urinary tract infection; Heart attack, stroke, pneumonia, and DVT/PE (blood clot in the legs or pelvis that can dislodge and go to the lungs); Other unforeseen complications; Coma; and Death.     Benefits of surgery include:  Possible improvement in arm pain, numbness, and/or weakness; and possible improvement in neck pain.     Alternatives to the procedure include:  Physical therapy, chiropractic care, acupuncture, medical therapy, and pain management.        ANTERIOR CERVICAL DISCECTOMY AND FUSION (ACDF)  DISCHARGE INSTRUCTIONS        1.  Wear your cervical collar at all times.  Use the [place] J collar (blue color).  The lining of the collar can be removed and washed with soap and water.   When showering, use the [place] collar (peach color).  Your collar will likely be discontinued after your first postoperative follow-up appointment in 4-6 weeks.        2.  Keep your incision clean and dry.    Your sutures are under the skin and will dissolve with time.  Wait at least 72  hours from the time of your surgery to take a shower.  After showering, pat your incision dry.  Do not immerse your incision in water for 4-6 weeks (e.g. bath tub, hot tub, swimming pool).        3.  Activity restrictions:  No lifting greater than 15 pounds until your return appointment in 4-6 weeks.  No bending, stooping, or twisting.  No impact exercise or contact sports for at least 3 months.  No driving until your cervical collar is removed in 4-6 weeks.  To resume driving short distances (<30 minutes), you must be off of your narcotic medications and be able to comfortably brake suddenly, should the need arise.    Get up and walk.        4.  Contact your Neurosurgeon if the following occurs:  Signs and symptoms of infection, including fever above 101.5 degrees Fahrenheit and/or chills.  Redness, swelling, warmth, or drainage from the incision.  Any lasting changes in sensation, numbness, and/or tingling.  Increased weakness or increased pain.  Swelling of the foot and/or lower leg with calf pain.  If swallowing difficulty increases or persists beyond four weeks of your surgery.     Office hours Monday through Friday, 8:00 AM to 4:00 PM except for holidays. There is an answering service available during non-office hours, with 24 hours neurosurgery coverage.  Report to the Emergency Department if you need immediate medical assistance.       Difficulty and/or soreness with swallowing are standard after this operation and should improve over the next week.  Warm, soft foods are preferable until your swallowing returns to normal.     Because you have had a fusion, you are not to take steroidal medications or non-steroidal anti-inflammatory (NSAID) medications such as Motrin/ Ibuprofen or Naprosyn/ Naproxen unless agreed upon with your neurosurgeon.       Please contact Dr. Bignham's office for any questions or concerns.  Typically, your first follow-up appointment after an ACDF surgery is 4-6 weeks from the date of your  operation.  For your return visit, an x-ray of your cervical spine will be arranged in Radiology before your neurosurgery appointment.  You will be requested to remove your cervical collar for these flexion/ extension C-spine x-rays, and then replace the brace after your x-rays are completed.       This note will be sent to the patient's referring provider No ref. provider found and primary care provider Garrick Nascimento MD.  and primary care provider Garrick Nascimento MD.

## 2024-10-24 ENCOUNTER — TELEPHONE (OUTPATIENT)
Dept: NEUROSURGERY | Facility: HOSPITAL | Age: 59
End: 2024-10-24
Payer: MEDICARE

## 2024-10-24 NOTE — TELEPHONE ENCOUNTER
I spoke with the patient and let her know the surgery was approved.  She is aware that preop labs/imaging were reviewed and acceptable to proceed.  Went over arrival time of 5am and medications to continue to hold.  She is aware that the hospital will be calling tomorrow afternoon to confirm arrival time and go over preop instructions.  She will call if she has any questions/concerns.

## 2024-10-24 NOTE — TELEPHONE ENCOUNTER
I am requesting Anna to contact Ms. Jin. I reviewed the patient's preoperative lab and imaging results that were completed on 10/22/2024. All of these values are acceptable for proceeding with surgery.     Ms. Jin is scheduled to proceed with a  C3-4 to C4-5 ACDF surgery on Monday 10/28/2024 at 0730 am.      Approval of surgery pending peer to peer scheduled for 10/24/2024.

## 2024-10-25 DIAGNOSIS — M43.12 ACQUIRED SPONDYLOLISTHESIS OF CERVICAL VERTEBRA: Primary | ICD-10-CM

## 2024-10-27 ENCOUNTER — NURSE TRIAGE (OUTPATIENT)
Dept: ADMINISTRATIVE | Facility: CLINIC | Age: 59
End: 2024-10-27
Payer: MEDICARE

## 2024-10-28 ENCOUNTER — ANESTHESIA (OUTPATIENT)
Dept: SURGERY | Facility: HOSPITAL | Age: 59
DRG: 473 | End: 2024-10-28
Payer: MEDICARE

## 2024-10-28 ENCOUNTER — HOSPITAL ENCOUNTER (INPATIENT)
Facility: HOSPITAL | Age: 59
LOS: 1 days | Discharge: HOME HEALTH | DRG: 473 | End: 2024-10-29
Attending: NEUROLOGICAL SURGERY | Admitting: NEUROLOGICAL SURGERY
Payer: MEDICARE

## 2024-10-28 DIAGNOSIS — R53.1 LEFT-SIDED WEAKNESS: ICD-10-CM

## 2024-10-28 DIAGNOSIS — G45.9 TIA (TRANSIENT ISCHEMIC ATTACK): ICD-10-CM

## 2024-10-28 DIAGNOSIS — M43.12 ACQUIRED SPONDYLOLISTHESIS OF CERVICAL VERTEBRA: ICD-10-CM

## 2024-10-28 DIAGNOSIS — M43.12 ACQUIRED SPONDYLOLISTHESIS OF CERVICAL VERTEBRA: Primary | ICD-10-CM

## 2024-10-28 DIAGNOSIS — R53.1 GENERALIZED WEAKNESS: Primary | ICD-10-CM

## 2024-10-28 LAB
GLUCOSE SERPL-MCNC: NORMAL MG/DL (ref 70–110)
GROUP & RH: NORMAL
INDIRECT COOMBS: NORMAL
POCT GLUCOSE: 102 MG/DL (ref 70–110)
POCT GLUCOSE: 116 MG/DL (ref 70–110)
SPECIMEN OUTDATE: NORMAL

## 2024-10-28 PROCEDURE — 63600175 PHARM REV CODE 636 W HCPCS: Performed by: NEUROLOGICAL SURGERY

## 2024-10-28 PROCEDURE — 37000009 HC ANESTHESIA EA ADD 15 MINS: Performed by: NEUROLOGICAL SURGERY

## 2024-10-28 PROCEDURE — 0RT30ZZ RESECTION OF CERVICAL VERTEBRAL DISC, OPEN APPROACH: ICD-10-PCS | Performed by: NEUROLOGICAL SURGERY

## 2024-10-28 PROCEDURE — 11000001 HC ACUTE MED/SURG PRIVATE ROOM

## 2024-10-28 PROCEDURE — 99900035 HC TECH TIME PER 15 MIN (STAT)

## 2024-10-28 PROCEDURE — 36620 INSERTION CATHETER ARTERY: CPT | Mod: 59,,, | Performed by: ANESTHESIOLOGY

## 2024-10-28 PROCEDURE — 27201423 OPTIME MED/SURG SUP & DEVICES STERILE SUPPLY: Performed by: NEUROLOGICAL SURGERY

## 2024-10-28 PROCEDURE — 27800903 OPTIME MED/SURG SUP & DEVICES OTHER IMPLANTS: Performed by: NEUROLOGICAL SURGERY

## 2024-10-28 PROCEDURE — 36000710: Performed by: NEUROLOGICAL SURGERY

## 2024-10-28 PROCEDURE — D9220A PRA ANESTHESIA: Mod: CRNA,,,

## 2024-10-28 PROCEDURE — 63600175 PHARM REV CODE 636 W HCPCS: Performed by: ANESTHESIOLOGY

## 2024-10-28 PROCEDURE — 25000003 PHARM REV CODE 250: Performed by: NEUROLOGICAL SURGERY

## 2024-10-28 PROCEDURE — 86900 BLOOD TYPING SEROLOGIC ABO: CPT | Performed by: NEUROLOGICAL SURGERY

## 2024-10-28 PROCEDURE — 71000039 HC RECOVERY, EACH ADD'L HOUR: Performed by: NEUROLOGICAL SURGERY

## 2024-10-28 PROCEDURE — 63600175 PHARM REV CODE 636 W HCPCS

## 2024-10-28 PROCEDURE — 37000008 HC ANESTHESIA 1ST 15 MINUTES: Performed by: NEUROLOGICAL SURGERY

## 2024-10-28 PROCEDURE — 25000003 PHARM REV CODE 250

## 2024-10-28 PROCEDURE — 01N10ZZ RELEASE CERVICAL NERVE, OPEN APPROACH: ICD-10-PCS | Performed by: NEUROLOGICAL SURGERY

## 2024-10-28 PROCEDURE — 0RG20K1 FUSION OF 2 OR MORE CERVICAL VERTEBRAL JOINTS WITH NONAUTOLOGOUS TISSUE SUBSTITUTE, POSTERIOR APPROACH, POSTERIOR COLUMN, OPEN APPROACH: ICD-10-PCS | Performed by: NEUROLOGICAL SURGERY

## 2024-10-28 PROCEDURE — 36000711: Performed by: NEUROLOGICAL SURGERY

## 2024-10-28 PROCEDURE — 4A1004G MONITORING OF CENTRAL NERVOUS ELECTRICAL ACTIVITY, INTRAOPERATIVE, OPEN APPROACH: ICD-10-PCS | Performed by: NEUROLOGICAL SURGERY

## 2024-10-28 PROCEDURE — D9220A PRA ANESTHESIA: Mod: ANES,,, | Performed by: ANESTHESIOLOGY

## 2024-10-28 PROCEDURE — 00NW0ZZ RELEASE CERVICAL SPINAL CORD, OPEN APPROACH: ICD-10-PCS | Performed by: NEUROLOGICAL SURGERY

## 2024-10-28 PROCEDURE — 86850 RBC ANTIBODY SCREEN: CPT | Performed by: NEUROLOGICAL SURGERY

## 2024-10-28 PROCEDURE — 36415 COLL VENOUS BLD VENIPUNCTURE: CPT | Performed by: NEUROLOGICAL SURGERY

## 2024-10-28 PROCEDURE — C1713 ANCHOR/SCREW BN/BN,TIS/BN: HCPCS | Performed by: NEUROLOGICAL SURGERY

## 2024-10-28 PROCEDURE — 99900031 HC PATIENT EDUCATION (STAT)

## 2024-10-28 PROCEDURE — 86901 BLOOD TYPING SEROLOGIC RH(D): CPT | Performed by: NEUROLOGICAL SURGERY

## 2024-10-28 PROCEDURE — 82962 GLUCOSE BLOOD TEST: CPT | Performed by: NEUROLOGICAL SURGERY

## 2024-10-28 PROCEDURE — 71000015 HC POSTOP RECOV 1ST HR: Performed by: NEUROLOGICAL SURGERY

## 2024-10-28 PROCEDURE — 0PB30ZZ EXCISION OF CERVICAL VERTEBRA, OPEN APPROACH: ICD-10-PCS | Performed by: NEUROLOGICAL SURGERY

## 2024-10-28 PROCEDURE — 71000033 HC RECOVERY, INTIAL HOUR: Performed by: NEUROLOGICAL SURGERY

## 2024-10-28 DEVICE — DBM T43102 1CC GRAFTON PUTTY
Type: IMPLANTABLE DEVICE | Site: SPINE CERVICAL | Status: FUNCTIONAL
Brand: GRAFTON®AND GRAFTON PLUS®DEMINERALIZED BONE MATRIX (DBM)

## 2024-10-28 DEVICE — IMPLANTABLE DEVICE: Type: IMPLANTABLE DEVICE | Site: SPINE CERVICAL | Status: FUNCTIONAL

## 2024-10-28 RX ORDER — METOPROLOL TARTRATE 25 MG/1
12.5 TABLET, FILM COATED ORAL DAILY
COMMUNITY

## 2024-10-28 RX ORDER — ALUMINUM HYDROXIDE, MAGNESIUM HYDROXIDE, AND SIMETHICONE 1200; 120; 1200 MG/30ML; MG/30ML; MG/30ML
30 SUSPENSION ORAL EVERY 4 HOURS PRN
Status: DISCONTINUED | OUTPATIENT
Start: 2024-10-28 | End: 2024-10-29 | Stop reason: HOSPADM

## 2024-10-28 RX ORDER — MORPHINE SULFATE 4 MG/ML
1 INJECTION, SOLUTION INTRAMUSCULAR; INTRAVENOUS EVERY 4 HOURS PRN
Status: DISCONTINUED | OUTPATIENT
Start: 2024-10-28 | End: 2024-10-29 | Stop reason: HOSPADM

## 2024-10-28 RX ORDER — GLYCOPYRROLATE 0.2 MG/ML
INJECTION INTRAMUSCULAR; INTRAVENOUS
Status: DISCONTINUED | OUTPATIENT
Start: 2024-10-28 | End: 2024-10-28

## 2024-10-28 RX ORDER — GLUCAGON 1 MG
1 KIT INJECTION
Status: DISCONTINUED | OUTPATIENT
Start: 2024-10-28 | End: 2024-10-28 | Stop reason: HOSPADM

## 2024-10-28 RX ORDER — BENZTROPINE MESYLATE 1 MG/1
1 TABLET ORAL 2 TIMES DAILY
Status: DISCONTINUED | OUTPATIENT
Start: 2024-10-28 | End: 2024-10-29 | Stop reason: HOSPADM

## 2024-10-28 RX ORDER — ZOLPIDEM TARTRATE 5 MG/1
5 TABLET ORAL NIGHTLY PRN
Status: DISCONTINUED | OUTPATIENT
Start: 2024-10-28 | End: 2024-10-29 | Stop reason: HOSPADM

## 2024-10-28 RX ORDER — ACETAMINOPHEN 10 MG/ML
1000 INJECTION, SOLUTION INTRAVENOUS ONCE
Status: DISCONTINUED | OUTPATIENT
Start: 2024-10-28 | End: 2024-10-28 | Stop reason: HOSPADM

## 2024-10-28 RX ORDER — CARBAMAZEPINE 100 MG/1
400 TABLET, EXTENDED RELEASE ORAL 2 TIMES DAILY
Status: DISCONTINUED | OUTPATIENT
Start: 2024-10-28 | End: 2024-10-29 | Stop reason: HOSPADM

## 2024-10-28 RX ORDER — HYDROCODONE BITARTRATE AND ACETAMINOPHEN 10; 325 MG/1; MG/1
1 TABLET ORAL EVERY 6 HOURS PRN
Status: DISCONTINUED | OUTPATIENT
Start: 2024-10-28 | End: 2024-10-29 | Stop reason: HOSPADM

## 2024-10-28 RX ORDER — PANTOPRAZOLE SODIUM 40 MG/1
40 TABLET, DELAYED RELEASE ORAL DAILY
Status: DISCONTINUED | OUTPATIENT
Start: 2024-10-28 | End: 2024-10-29 | Stop reason: HOSPADM

## 2024-10-28 RX ORDER — AMOXICILLIN 250 MG
2 CAPSULE ORAL NIGHTLY PRN
Status: DISCONTINUED | OUTPATIENT
Start: 2024-10-28 | End: 2024-10-29 | Stop reason: HOSPADM

## 2024-10-28 RX ORDER — HYDROXYZINE HYDROCHLORIDE 25 MG/1
50 TABLET, FILM COATED ORAL NIGHTLY
Status: DISCONTINUED | OUTPATIENT
Start: 2024-10-28 | End: 2024-10-29 | Stop reason: HOSPADM

## 2024-10-28 RX ORDER — FLUTICASONE PROPIONATE 50 MCG
1 SPRAY, SUSPENSION (ML) NASAL NIGHTLY PRN
Status: DISCONTINUED | OUTPATIENT
Start: 2024-10-28 | End: 2024-10-29 | Stop reason: HOSPADM

## 2024-10-28 RX ORDER — ROCURONIUM BROMIDE 10 MG/ML
INJECTION, SOLUTION INTRAVENOUS
Status: DISCONTINUED | OUTPATIENT
Start: 2024-10-28 | End: 2024-10-28

## 2024-10-28 RX ORDER — BUPROPION HYDROCHLORIDE 150 MG/1
300 TABLET ORAL DAILY
Status: DISCONTINUED | OUTPATIENT
Start: 2024-10-28 | End: 2024-10-29 | Stop reason: HOSPADM

## 2024-10-28 RX ORDER — METFORMIN HYDROCHLORIDE 500 MG/1
500 TABLET ORAL
Status: DISCONTINUED | OUTPATIENT
Start: 2024-10-29 | End: 2024-10-29 | Stop reason: HOSPADM

## 2024-10-28 RX ORDER — HYDROXYZINE PAMOATE 25 MG/1
25 CAPSULE ORAL DAILY
Status: DISCONTINUED | OUTPATIENT
Start: 2024-10-28 | End: 2024-10-29 | Stop reason: HOSPADM

## 2024-10-28 RX ORDER — ALBUTEROL SULFATE 90 UG/1
1 INHALANT RESPIRATORY (INHALATION) EVERY 6 HOURS PRN
Status: DISCONTINUED | OUTPATIENT
Start: 2024-10-28 | End: 2024-10-29 | Stop reason: HOSPADM

## 2024-10-28 RX ORDER — HYDROMORPHONE HYDROCHLORIDE 2 MG/ML
0.2 INJECTION, SOLUTION INTRAMUSCULAR; INTRAVENOUS; SUBCUTANEOUS EVERY 5 MIN PRN
Status: DISCONTINUED | OUTPATIENT
Start: 2024-10-28 | End: 2024-10-28 | Stop reason: HOSPADM

## 2024-10-28 RX ORDER — ONDANSETRON HYDROCHLORIDE 2 MG/ML
4 INJECTION, SOLUTION INTRAVENOUS DAILY PRN
Status: DISCONTINUED | OUTPATIENT
Start: 2024-10-28 | End: 2024-10-28 | Stop reason: HOSPADM

## 2024-10-28 RX ORDER — PHENYLEPHRINE HYDROCHLORIDE 10 MG/ML
INJECTION INTRAVENOUS
Status: DISCONTINUED | OUTPATIENT
Start: 2024-10-28 | End: 2024-10-28

## 2024-10-28 RX ORDER — ATORVASTATIN CALCIUM 40 MG/1
40 TABLET, FILM COATED ORAL DAILY
Status: DISCONTINUED | OUTPATIENT
Start: 2024-10-28 | End: 2024-10-29 | Stop reason: HOSPADM

## 2024-10-28 RX ORDER — CEFAZOLIN 2 G/1
2 INJECTION, POWDER, FOR SOLUTION INTRAMUSCULAR; INTRAVENOUS
Status: COMPLETED | OUTPATIENT
Start: 2024-10-28 | End: 2024-10-29

## 2024-10-28 RX ORDER — SODIUM CHLORIDE 9 MG/ML
INJECTION, SOLUTION INTRAVENOUS CONTINUOUS
Status: DISCONTINUED | OUTPATIENT
Start: 2024-10-28 | End: 2024-10-29

## 2024-10-28 RX ORDER — PROCHLORPERAZINE EDISYLATE 5 MG/ML
5 INJECTION INTRAMUSCULAR; INTRAVENOUS EVERY 6 HOURS PRN
Status: DISCONTINUED | OUTPATIENT
Start: 2024-10-28 | End: 2024-10-29 | Stop reason: HOSPADM

## 2024-10-28 RX ORDER — KETOROLAC TROMETHAMINE 30 MG/ML
INJECTION, SOLUTION INTRAMUSCULAR; INTRAVENOUS
Status: DISCONTINUED | OUTPATIENT
Start: 2024-10-28 | End: 2024-10-28

## 2024-10-28 RX ORDER — METOPROLOL TARTRATE 25 MG/1
12.5 TABLET ORAL DAILY
Status: DISCONTINUED | OUTPATIENT
Start: 2024-10-29 | End: 2024-10-29 | Stop reason: HOSPADM

## 2024-10-28 RX ORDER — MIDAZOLAM HYDROCHLORIDE 1 MG/ML
INJECTION INTRAMUSCULAR; INTRAVENOUS
Status: DISCONTINUED | OUTPATIENT
Start: 2024-10-28 | End: 2024-10-28

## 2024-10-28 RX ORDER — SODIUM CHLORIDE, SODIUM GLUCONATE, SODIUM ACETATE, POTASSIUM CHLORIDE AND MAGNESIUM CHLORIDE 30; 37; 368; 526; 502 MG/100ML; MG/100ML; MG/100ML; MG/100ML; MG/100ML
INJECTION, SOLUTION INTRAVENOUS CONTINUOUS
Status: DISCONTINUED | OUTPATIENT
Start: 2024-10-28 | End: 2024-10-28

## 2024-10-28 RX ORDER — MUPIROCIN 20 MG/G
OINTMENT TOPICAL 2 TIMES DAILY
Status: DISCONTINUED | OUTPATIENT
Start: 2024-10-28 | End: 2024-10-29 | Stop reason: HOSPADM

## 2024-10-28 RX ORDER — ACETAMINOPHEN 10 MG/ML
INJECTION, SOLUTION INTRAVENOUS
Status: DISCONTINUED | OUTPATIENT
Start: 2024-10-28 | End: 2024-10-28

## 2024-10-28 RX ORDER — TOPIRAMATE 100 MG/1
200 TABLET, FILM COATED ORAL 2 TIMES DAILY
Status: DISCONTINUED | OUTPATIENT
Start: 2024-10-28 | End: 2024-10-29 | Stop reason: HOSPADM

## 2024-10-28 RX ORDER — SUCCINYLCHOLINE CHLORIDE 20 MG/ML
INJECTION INTRAMUSCULAR; INTRAVENOUS
Status: DISCONTINUED | OUTPATIENT
Start: 2024-10-28 | End: 2024-10-28

## 2024-10-28 RX ORDER — FENTANYL CITRATE 50 UG/ML
INJECTION, SOLUTION INTRAMUSCULAR; INTRAVENOUS
Status: DISCONTINUED | OUTPATIENT
Start: 2024-10-28 | End: 2024-10-28

## 2024-10-28 RX ORDER — DIPHENHYDRAMINE HYDROCHLORIDE 50 MG/ML
25 INJECTION INTRAMUSCULAR; INTRAVENOUS EVERY 6 HOURS PRN
Status: DISCONTINUED | OUTPATIENT
Start: 2024-10-28 | End: 2024-10-28 | Stop reason: HOSPADM

## 2024-10-28 RX ORDER — PREGABALIN 100 MG/1
100 CAPSULE ORAL DAILY
Status: DISCONTINUED | OUTPATIENT
Start: 2024-10-29 | End: 2024-10-29 | Stop reason: HOSPADM

## 2024-10-28 RX ORDER — DOCUSATE SODIUM 100 MG/1
100 CAPSULE, LIQUID FILLED ORAL 2 TIMES DAILY
Status: DISCONTINUED | OUTPATIENT
Start: 2024-10-28 | End: 2024-10-29 | Stop reason: HOSPADM

## 2024-10-28 RX ORDER — ONDANSETRON 4 MG/1
8 TABLET, ORALLY DISINTEGRATING ORAL EVERY 6 HOURS PRN
Status: DISCONTINUED | OUTPATIENT
Start: 2024-10-28 | End: 2024-10-29 | Stop reason: HOSPADM

## 2024-10-28 RX ORDER — METHOCARBAMOL 750 MG/1
750 TABLET, FILM COATED ORAL 3 TIMES DAILY PRN
Status: DISCONTINUED | OUTPATIENT
Start: 2024-10-28 | End: 2024-10-29 | Stop reason: HOSPADM

## 2024-10-28 RX ORDER — MIDAZOLAM HYDROCHLORIDE 2 MG/2ML
2 INJECTION, SOLUTION INTRAMUSCULAR; INTRAVENOUS ONCE AS NEEDED
Status: DISCONTINUED | OUTPATIENT
Start: 2024-10-28 | End: 2024-10-28

## 2024-10-28 RX ORDER — PROPOFOL 10 MG/ML
VIAL (ML) INTRAVENOUS CONTINUOUS PRN
Status: DISCONTINUED | OUTPATIENT
Start: 2024-10-28 | End: 2024-10-28

## 2024-10-28 RX ORDER — LIDOCAINE HYDROCHLORIDE 20 MG/ML
INJECTION, SOLUTION EPIDURAL; INFILTRATION; INTRACAUDAL; PERINEURAL
Status: DISCONTINUED | OUTPATIENT
Start: 2024-10-28 | End: 2024-10-28

## 2024-10-28 RX ORDER — ONDANSETRON HYDROCHLORIDE 2 MG/ML
INJECTION, SOLUTION INTRAVENOUS
Status: DISCONTINUED | OUTPATIENT
Start: 2024-10-28 | End: 2024-10-28

## 2024-10-28 RX ORDER — DEXAMETHASONE SODIUM PHOSPHATE 4 MG/ML
INJECTION, SOLUTION INTRA-ARTICULAR; INTRALESIONAL; INTRAMUSCULAR; INTRAVENOUS; SOFT TISSUE
Status: DISCONTINUED | OUTPATIENT
Start: 2024-10-28 | End: 2024-10-28

## 2024-10-28 RX ORDER — CEFAZOLIN SODIUM 1 G/3ML
2 INJECTION, POWDER, FOR SOLUTION INTRAMUSCULAR; INTRAVENOUS
Status: COMPLETED | OUTPATIENT
Start: 2024-10-28 | End: 2024-10-28

## 2024-10-28 RX ORDER — POLYETHYLENE GLYCOL 3350 17 G/17G
17 POWDER, FOR SOLUTION ORAL
Status: DISCONTINUED | OUTPATIENT
Start: 2024-10-28 | End: 2024-10-29 | Stop reason: HOSPADM

## 2024-10-28 RX ORDER — ESTRADIOL 0.5 MG/1
0.5 TABLET ORAL DAILY
Status: DISCONTINUED | OUTPATIENT
Start: 2024-10-28 | End: 2024-10-29 | Stop reason: HOSPADM

## 2024-10-28 RX ORDER — FAMOTIDINE 20 MG/1
20 TABLET, FILM COATED ORAL NIGHTLY
Status: DISCONTINUED | OUTPATIENT
Start: 2024-10-28 | End: 2024-10-29 | Stop reason: HOSPADM

## 2024-10-28 RX ORDER — LIDOCAINE HYDROCHLORIDE 10 MG/ML
1 INJECTION, SOLUTION EPIDURAL; INFILTRATION; INTRACAUDAL; PERINEURAL ONCE
Status: DISCONTINUED | OUTPATIENT
Start: 2024-10-28 | End: 2024-10-28

## 2024-10-28 RX ORDER — CLONAZEPAM 0.5 MG/1
0.5 TABLET ORAL 2 TIMES DAILY PRN
Status: DISCONTINUED | OUTPATIENT
Start: 2024-10-28 | End: 2024-10-29 | Stop reason: HOSPADM

## 2024-10-28 RX ADMIN — FENTANYL CITRATE 50 MCG: 50 INJECTION, SOLUTION INTRAMUSCULAR; INTRAVENOUS at 11:10

## 2024-10-28 RX ADMIN — PHENYLEPHRINE HYDROCHLORIDE 150 MCG: 10 INJECTION INTRAVENOUS at 07:10

## 2024-10-28 RX ADMIN — ACETAMINOPHEN 1000 MG: 10 INJECTION, SOLUTION INTRAVENOUS at 11:10

## 2024-10-28 RX ADMIN — HYDROXYZINE PAMOATE 25 MG: 25 CAPSULE ORAL at 04:10

## 2024-10-28 RX ADMIN — HYDROMORPHONE HYDROCHLORIDE 0.2 MG: 2 INJECTION, SOLUTION INTRAMUSCULAR; INTRAVENOUS; SUBCUTANEOUS at 12:10

## 2024-10-28 RX ADMIN — PHENYLEPHRINE HYDROCHLORIDE 100 MCG: 10 INJECTION INTRAVENOUS at 11:10

## 2024-10-28 RX ADMIN — PHENYLEPHRINE HYDROCHLORIDE 100 MCG: 10 INJECTION INTRAVENOUS at 08:10

## 2024-10-28 RX ADMIN — PHENYLEPHRINE HYDROCHLORIDE 40 MCG/MIN: 10 INJECTION INTRAVENOUS at 08:10

## 2024-10-28 RX ADMIN — ONDANSETRON 4 MG: 2 INJECTION INTRAMUSCULAR; INTRAVENOUS at 11:10

## 2024-10-28 RX ADMIN — METHOCARBAMOL 750 MG: 750 TABLET ORAL at 04:10

## 2024-10-28 RX ADMIN — PROPOFOL 50 MG: 10 INJECTION, EMULSION INTRAVENOUS at 07:10

## 2024-10-28 RX ADMIN — ATORVASTATIN CALCIUM 40 MG: 40 TABLET, FILM COATED ORAL at 03:10

## 2024-10-28 RX ADMIN — TOPIRAMATE 200 MG: 100 TABLET, FILM COATED ORAL at 08:10

## 2024-10-28 RX ADMIN — SODIUM CHLORIDE: 9 INJECTION, SOLUTION INTRAVENOUS at 07:10

## 2024-10-28 RX ADMIN — GLYCOPYRROLATE 0.2 MG: 0.2 INJECTION INTRAMUSCULAR; INTRAVENOUS at 07:10

## 2024-10-28 RX ADMIN — PANTOPRAZOLE SODIUM 40 MG: 40 TABLET, DELAYED RELEASE ORAL at 03:10

## 2024-10-28 RX ADMIN — FENTANYL CITRATE 100 MCG: 50 INJECTION, SOLUTION INTRAMUSCULAR; INTRAVENOUS at 07:10

## 2024-10-28 RX ADMIN — CEFAZOLIN 2 G: 330 INJECTION, POWDER, FOR SOLUTION INTRAMUSCULAR; INTRAVENOUS at 08:10

## 2024-10-28 RX ADMIN — CEFAZOLIN 2 G: 2 INJECTION, POWDER, FOR SOLUTION INTRAMUSCULAR; INTRAVENOUS at 04:10

## 2024-10-28 RX ADMIN — PHENYLEPHRINE HYDROCHLORIDE 200 MCG: 10 INJECTION INTRAVENOUS at 08:10

## 2024-10-28 RX ADMIN — DEXAMETHASONE SODIUM PHOSPHATE 6 MG: 4 INJECTION, SOLUTION INTRA-ARTICULAR; INTRALESIONAL; INTRAMUSCULAR; INTRAVENOUS; SOFT TISSUE at 08:10

## 2024-10-28 RX ADMIN — SODIUM CHLORIDE 0.08 MCG/KG/MIN: 9 INJECTION, SOLUTION INTRAVENOUS at 07:10

## 2024-10-28 RX ADMIN — FAMOTIDINE 20 MG: 20 TABLET, FILM COATED ORAL at 08:10

## 2024-10-28 RX ADMIN — CARBAMAZEPINE 400 MG: 100 TABLET, EXTENDED RELEASE ORAL at 03:10

## 2024-10-28 RX ADMIN — ROCURONIUM BROMIDE 10 MG: 10 SOLUTION INTRAVENOUS at 07:10

## 2024-10-28 RX ADMIN — MIDAZOLAM HYDROCHLORIDE 2 MG: 1 INJECTION, SOLUTION INTRAMUSCULAR; INTRAVENOUS at 07:10

## 2024-10-28 RX ADMIN — DEXAMETHASONE SODIUM PHOSPHATE 4 MG: 4 INJECTION, SOLUTION INTRA-ARTICULAR; INTRALESIONAL; INTRAMUSCULAR; INTRAVENOUS; SOFT TISSUE at 07:10

## 2024-10-28 RX ADMIN — SUCCINYLCHOLINE CHLORIDE 120 MG: 20 INJECTION, SOLUTION INTRAMUSCULAR; INTRAVENOUS at 07:10

## 2024-10-28 RX ADMIN — LIDOCAINE HYDROCHLORIDE 80 MG: 20 INJECTION, SOLUTION INTRAVENOUS at 07:10

## 2024-10-28 RX ADMIN — PROPOFOL 125 MCG/KG/MIN: 10 INJECTION, EMULSION INTRAVENOUS at 07:10

## 2024-10-28 RX ADMIN — KETOROLAC TROMETHAMINE 30 MG: 30 INJECTION, SOLUTION INTRAMUSCULAR; INTRAVENOUS at 11:10

## 2024-10-28 RX ADMIN — PROPOFOL 150 MG: 10 INJECTION, EMULSION INTRAVENOUS at 07:10

## 2024-10-28 RX ADMIN — HYDROCODONE BITARTRATE AND ACETAMINOPHEN 1 TABLET: 10; 325 TABLET ORAL at 04:10

## 2024-10-28 RX ADMIN — HYDROXYZINE HYDROCHLORIDE 50 MG: 25 TABLET, FILM COATED ORAL at 08:10

## 2024-10-28 RX ADMIN — BUPROPION HYDROCHLORIDE 300 MG: 150 TABLET, EXTENDED RELEASE ORAL at 03:10

## 2024-10-28 RX ADMIN — MUPIROCIN: 20 OINTMENT TOPICAL at 08:10

## 2024-10-28 RX ADMIN — BENZTROPINE MESYLATE 1 MG: 1 TABLET ORAL at 08:10

## 2024-10-28 NOTE — ANESTHESIA PROCEDURE NOTES
Arterial    Diagnosis: cervical spondylolisthesis    Patient location during procedure: done in OR  Timeout: 10/28/2024 7:35 AM  Procedure end time: 10/28/2024 7:40 AM    Staffing  Authorizing Provider: Hemal Crump MD  Performing Provider: Hemal Crump MD    Staffing  Performed by: Hemal Crump MD  Authorized by: Hemal Crump MD    Anesthesiologist was present at the time of the procedure.    Preanesthetic Checklist  Completed: patient identified, IV checked, site marked, risks and benefits discussed, surgical consent, monitors and equipment checked, pre-op evaluation, timeout performed and anesthesia consent givenArterial  Skin Prep: chlorhexidine gluconate  Local Infiltration: lidocaine  Orientation: left  Location: radial    Catheter Size: 20 G  Catheter placement by Anatomical landmarks. Heme positive aspiration all ports. Insertion Attempts: 1  Assessment  Dressing: secured with tape and tegaderm  Patient: Tolerated well

## 2024-10-28 NOTE — ANESTHESIA PROCEDURE NOTES
Intubation    Date/Time: 10/28/2024 7:30 AM    Performed by: Peter Foster CRNA  Authorized by: Hemal Crump MD    Intubation:     Induction:  Intravenous    Intubated:  Postinduction    Mask Ventilation:  Easy mask    Attempts:  1    Attempted By:  CRNA    Method of Intubation:  Video laryngoscopy    Blade:  Hui 3    Laryngeal View Grade: Grade I - full view of cords      Difficult Airway Encountered?: No      Complications:  None    Airway Device:  Oral endotracheal tube and EMG ETT (NIMS)    Airway Device Size:  7.0    Style/Cuff Inflation:  Cuffed (inflated to minimal occlusive pressure)    Tube secured:  21    Secured at:  The lips    Placement Verified By:  Capnometry    Complicating Factors:  None    Findings Post-Intubation:  BS equal bilateral and atraumatic/condition of teeth unchanged  Notes:      Video laryngoscopy utilized, maintained head and neck in neutral position during induction and ETT placement.

## 2024-10-28 NOTE — TRANSFER OF CARE
"Anesthesia Transfer of Care Note    Patient: Faith Jin    Procedure(s) Performed: Procedure(s) (LRB):  DISCECTOMY, SPINE, CERVICAL, ANTERIOR APPROACH, WITH FUSION (N/A)    Patient location: PACU    Anesthesia Type: general    Transport from OR: Transported from OR on room air with adequate spontaneous ventilation    Post pain: adequate analgesia    Post assessment: no apparent anesthetic complications and tolerated procedure well    Post vital signs: stable    Level of consciousness: sedated    Nausea/Vomiting: no nausea/vomiting    Complications: none    Transfer of care protocol was followed      Last vitals: Visit Vitals  /77   Pulse 71   Temp 36.3 °C (97.4 °F) (Oral)   Resp 18   Ht 5' 3" (1.6 m)   Wt 64.8 kg (142 lb 13.7 oz)   LMP  (LMP Unknown)   SpO2 98%   Breastfeeding No   BMI 25.31 kg/m²     "

## 2024-10-28 NOTE — ANESTHESIA PREPROCEDURE EVALUATION
"                                                                                                             10/28/2024  Faith Jin is a 59 y.o., female with multiple medical problems including COPD, DMT2, TIA, and others as listed here in chart. She is here with chronic pan and spondylolisthesis for ACDF.  She is feeling well today, breathing normally and in her usual state of health.  She has been seen and optimized for this procedure by cardiology, see notes in chart.  She reports hx of PONV.  Aside from that, she has done well under anesthesia in the past.      KIRA 9/24  "Summary    There is no evidence of intracardiac shunting.  The left ventricle is normal in size with normal systolic function.  The estimated ejection fraction is 60%.  Normal left ventricular diastolic function.  Normal right ventricular size with normal right ventricular systolic function.  Normal central venous pressure (3 mmHg)."         Pre-op Assessment    I have reviewed the Patient Summary Reports.     I have reviewed the Nursing Notes. I have reviewed the NPO Status.   I have reviewed the Medications.     Review of Systems  Anesthesia Hx:             Denies Family Hx of Anesthesia complications.    Denies Personal Hx of Anesthesia complications.                    Social:  Smoker       Cardiovascular:  Exercise tolerance: poor   Hypertension       Denies Angina.                                    Pulmonary:   COPD, mild   Shortness of breath                  Musculoskeletal:  Arthritis          Spine Disorders:             Neurological:  TIA,  Neuromuscular Disease,  Headaches           Chronic Pain Syndrome                         Endocrine:  Diabetes           Psych:  Psychiatric History                  Physical Exam  General: Well nourished, Cooperative, Alert and Oriented    Airway:  Mallampati: II   Mouth Opening: Normal  TM Distance: Normal  Tongue: Normal  Neck ROM: Extension " Decreased    Dental:  Intact    Chest/Lungs:  Normal Respiratory Rate    Heart:  Rate: Normal  Rhythm: Regular Rhythm        Anesthesia Plan  Type of Anesthesia, risks & benefits discussed:    Anesthesia Type: Gen ETT  Intra-op Monitoring Plan: Standard ASA Monitors and Art Line  Post Op Pain Control Plan: multimodal analgesia  Induction:  IV  Airway Plan: Video, Post-Induction  Informed Consent: Informed consent signed with the Patient and all parties understand the risks and agree with anesthesia plan.  All questions answered. Patient consented to blood products? Yes  ASA Score: 3  Day of Surgery Review of History & Physical: H&P Update referred to the surgeon/provider.  Anesthesia Plan Notes: Video laryngoscope    Ready For Surgery From Anesthesia Perspective.     .

## 2024-10-29 VITALS
WEIGHT: 142.88 LBS | RESPIRATION RATE: 18 BRPM | OXYGEN SATURATION: 98 % | TEMPERATURE: 98 F | DIASTOLIC BLOOD PRESSURE: 80 MMHG | SYSTOLIC BLOOD PRESSURE: 128 MMHG | BODY MASS INDEX: 25.32 KG/M2 | HEART RATE: 82 BPM | HEIGHT: 63 IN

## 2024-10-29 LAB
ANION GAP SERPL CALC-SCNC: 6 MEQ/L
BASOPHILS # BLD AUTO: 0.03 X10(3)/MCL
BASOPHILS NFR BLD AUTO: 0.5 %
BUN SERPL-MCNC: 10.4 MG/DL (ref 9.8–20.1)
CALCIUM SERPL-MCNC: 8.1 MG/DL (ref 8.4–10.2)
CHLORIDE SERPL-SCNC: 112 MMOL/L (ref 98–107)
CO2 SERPL-SCNC: 22 MMOL/L (ref 23–31)
CREAT SERPL-MCNC: 0.75 MG/DL (ref 0.55–1.02)
CREAT/UREA NIT SERPL: 14
EOSINOPHIL # BLD AUTO: 0.16 X10(3)/MCL (ref 0–0.9)
EOSINOPHIL NFR BLD AUTO: 2.5 %
ERYTHROCYTE [DISTWIDTH] IN BLOOD BY AUTOMATED COUNT: 18.2 % (ref 11.5–17)
GFR SERPLBLD CREATININE-BSD FMLA CKD-EPI: >60 ML/MIN/1.73/M2
GLUCOSE SERPL-MCNC: 117 MG/DL (ref 82–115)
HCT VFR BLD AUTO: 31 % (ref 37–47)
HGB BLD-MCNC: 9.6 G/DL (ref 12–16)
IMM GRANULOCYTES # BLD AUTO: 0.02 X10(3)/MCL (ref 0–0.04)
IMM GRANULOCYTES NFR BLD AUTO: 0.3 %
LYMPHOCYTES # BLD AUTO: 1.99 X10(3)/MCL (ref 0.6–4.6)
LYMPHOCYTES NFR BLD AUTO: 30.6 %
MCH RBC QN AUTO: 23.5 PG (ref 27–31)
MCHC RBC AUTO-ENTMCNC: 31 G/DL (ref 33–36)
MCV RBC AUTO: 76 FL (ref 80–94)
MONOCYTES # BLD AUTO: 0.58 X10(3)/MCL (ref 0.1–1.3)
MONOCYTES NFR BLD AUTO: 8.9 %
NEUTROPHILS # BLD AUTO: 3.72 X10(3)/MCL (ref 2.1–9.2)
NEUTROPHILS NFR BLD AUTO: 57.2 %
NRBC BLD AUTO-RTO: 0 %
PLATELET # BLD AUTO: 329 X10(3)/MCL (ref 130–400)
PMV BLD AUTO: 8.4 FL (ref 7.4–10.4)
POCT GLUCOSE: 105 MG/DL (ref 70–110)
POCT GLUCOSE: 117 MG/DL (ref 70–110)
POCT GLUCOSE: 133 MG/DL (ref 70–110)
POCT GLUCOSE: 137 MG/DL (ref 70–110)
POCT GLUCOSE: 80 MG/DL (ref 70–110)
POTASSIUM SERPL-SCNC: 3.8 MMOL/L (ref 3.5–5.1)
RBC # BLD AUTO: 4.08 X10(6)/MCL (ref 4.2–5.4)
SODIUM SERPL-SCNC: 140 MMOL/L (ref 136–145)
WBC # BLD AUTO: 6.5 X10(3)/MCL (ref 4.5–11.5)

## 2024-10-29 PROCEDURE — 97162 PT EVAL MOD COMPLEX 30 MIN: CPT

## 2024-10-29 PROCEDURE — 99900035 HC TECH TIME PER 15 MIN (STAT)

## 2024-10-29 PROCEDURE — 63600175 PHARM REV CODE 636 W HCPCS

## 2024-10-29 PROCEDURE — 97530 THERAPEUTIC ACTIVITIES: CPT

## 2024-10-29 PROCEDURE — 99024 POSTOP FOLLOW-UP VISIT: CPT | Mod: ,,, | Performed by: NEUROLOGICAL SURGERY

## 2024-10-29 PROCEDURE — 99900031 HC PATIENT EDUCATION (STAT)

## 2024-10-29 PROCEDURE — 85025 COMPLETE CBC W/AUTO DIFF WBC: CPT

## 2024-10-29 PROCEDURE — 94799 UNLISTED PULMONARY SVC/PX: CPT

## 2024-10-29 PROCEDURE — 36415 COLL VENOUS BLD VENIPUNCTURE: CPT

## 2024-10-29 PROCEDURE — 25000242 PHARM REV CODE 250 ALT 637 W/ HCPCS: Performed by: NEUROLOGICAL SURGERY

## 2024-10-29 PROCEDURE — 80048 BASIC METABOLIC PNL TOTAL CA: CPT

## 2024-10-29 PROCEDURE — 25000003 PHARM REV CODE 250

## 2024-10-29 PROCEDURE — 97165 OT EVAL LOW COMPLEX 30 MIN: CPT

## 2024-10-29 RX ORDER — HYDROCODONE BITARTRATE AND ACETAMINOPHEN 10; 325 MG/1; MG/1
1 TABLET ORAL EVERY 6 HOURS PRN
Qty: 28 TABLET | Refills: 0 | Status: SHIPPED | OUTPATIENT
Start: 2024-10-29

## 2024-10-29 RX ORDER — METHOCARBAMOL 750 MG/1
750 TABLET, FILM COATED ORAL 3 TIMES DAILY PRN
Qty: 30 TABLET | Refills: 1 | Status: SHIPPED | OUTPATIENT
Start: 2024-10-29 | End: 2024-11-08

## 2024-10-29 RX ADMIN — HYDROCODONE BITARTRATE AND ACETAMINOPHEN 1 TABLET: 10; 325 TABLET ORAL at 07:10

## 2024-10-29 RX ADMIN — METOPROLOL TARTRATE 12.5 MG: 25 TABLET, FILM COATED ORAL at 08:10

## 2024-10-29 RX ADMIN — CARBAMAZEPINE 400 MG: 100 TABLET, EXTENDED RELEASE ORAL at 04:10

## 2024-10-29 RX ADMIN — CEFAZOLIN 2 G: 2 INJECTION, POWDER, FOR SOLUTION INTRAMUSCULAR; INTRAVENOUS at 01:10

## 2024-10-29 RX ADMIN — TOPIRAMATE 200 MG: 100 TABLET, FILM COATED ORAL at 08:10

## 2024-10-29 RX ADMIN — FLUTICASONE FUROATE 1 PUFF: 100 POWDER RESPIRATORY (INHALATION) at 11:10

## 2024-10-29 RX ADMIN — DOCUSATE SODIUM 100 MG: 100 CAPSULE, LIQUID FILLED ORAL at 08:10

## 2024-10-29 RX ADMIN — HYDROCODONE BITARTRATE AND ACETAMINOPHEN 1 TABLET: 10; 325 TABLET ORAL at 03:10

## 2024-10-29 RX ADMIN — ATORVASTATIN CALCIUM 40 MG: 40 TABLET, FILM COATED ORAL at 08:10

## 2024-10-29 RX ADMIN — CARBAMAZEPINE 400 MG: 100 TABLET, EXTENDED RELEASE ORAL at 03:10

## 2024-10-29 RX ADMIN — BENZTROPINE MESYLATE 1 MG: 1 TABLET ORAL at 08:10

## 2024-10-29 RX ADMIN — MUPIROCIN: 20 OINTMENT TOPICAL at 09:10

## 2024-10-29 RX ADMIN — METFORMIN HYDROCHLORIDE 500 MG: 500 TABLET, FILM COATED ORAL at 08:10

## 2024-10-29 RX ADMIN — BUPROPION HYDROCHLORIDE 300 MG: 150 TABLET, EXTENDED RELEASE ORAL at 08:10

## 2024-10-29 RX ADMIN — UMECLIDINIUM BROMIDE AND VILANTEROL TRIFENATATE 1 PUFF: 62.5; 25 POWDER RESPIRATORY (INHALATION) at 11:10

## 2024-10-29 RX ADMIN — PANTOPRAZOLE SODIUM 40 MG: 40 TABLET, DELAYED RELEASE ORAL at 08:10

## 2024-10-29 RX ADMIN — PREGABALIN 100 MG: 100 CAPSULE ORAL at 09:10

## 2024-10-30 ENCOUNTER — PATIENT OUTREACH (OUTPATIENT)
Dept: ADMINISTRATIVE | Facility: CLINIC | Age: 59
End: 2024-10-30
Payer: MEDICARE

## 2024-10-30 PROCEDURE — G0180 MD CERTIFICATION HHA PATIENT: HCPCS | Mod: ,,, | Performed by: NEUROLOGICAL SURGERY

## 2024-10-31 ENCOUNTER — PATIENT MESSAGE (OUTPATIENT)
Dept: ADMINISTRATIVE | Facility: CLINIC | Age: 59
End: 2024-10-31
Payer: MEDICARE

## 2024-11-05 DIAGNOSIS — M43.12 ACQUIRED SPONDYLOLISTHESIS OF CERVICAL VERTEBRA: ICD-10-CM

## 2024-11-06 RX ORDER — HYDROCODONE BITARTRATE AND ACETAMINOPHEN 10; 325 MG/1; MG/1
1 TABLET ORAL EVERY 6 HOURS PRN
Qty: 28 TABLET | Refills: 0 | Status: SHIPPED | OUTPATIENT
Start: 2024-11-06

## 2024-11-06 RX ORDER — METHOCARBAMOL 750 MG/1
750 TABLET, FILM COATED ORAL 3 TIMES DAILY PRN
Qty: 30 TABLET | Refills: 1 | Status: SHIPPED | OUTPATIENT
Start: 2024-11-06 | End: 2024-11-26

## 2024-11-06 NOTE — TELEPHONE ENCOUNTER
Medications were refilled as you entered. If she requests another refill I will begin to wean her off. We don't see her for another 2+ weeks since she is a follow up s/p ACDF. Please ensure the patient is aware of her medications.    Also reviewed the images that patient sent in, appears to be routine healing. Please make sure she monitors for body aches, chills, or fevers. And monitor the incision for excess redness, warmth or drainage with any evidence of opening up.

## 2024-11-06 NOTE — TELEPHONE ENCOUNTER
I spoke with the patient.  She is s/p C3-4, C4-5 ACDF by Dr. Bingham on 10/28/24.  She complains of pain and stiffness in the neck.  She says it is gradually improving.  She is trying to space the Norco out as she is able.  She continues to take Robaxin for spasms, which helps.  She has about 7 left.  She has one refill on the prescription, but it is at [facility] pharmacy.  I told her I would send a new prescription to the [facility] on [address] for her.  She continues to wear her collar, but says that she is wearing the old [place]J collar because she is unable to find the pads for the new collar.  I suggested she bring them both to her appt so we can get replacement pads for her.  She is doing physical therapy through home health.  The therapist was on the way to her house when we spoke.  She mentioned that she has a small area where pus is coming out of her incision.  She says it is not a lot of drainage, not enough to wet a piece of tissue.  She denies any opening in the incision.  She said the glue was removed from her incision prior to discharge, although I am not sure that is likely since she was discharged the day after surgery.  I do not see any documentation of this either.  She is going to have the therapist take a picture of the incision when he gets there and send it to me for review.  I will forward as soon as it is available.  She says she is taking 4/day of the Norco.  She was last given Norco 10-325mg 1q6h prn #28, 7 day supply on 10/29/24.  She would be due.  I checked , no pain meds from anyone else.  Pharmacy is [facility] on [address].  I put the prescription in the same, so may need to adjust before sending.  I also added the refill of Robaxin.  She is scheduled to f/u at 4wks po w/ x-rays, 11/26/24.

## 2024-11-06 NOTE — ANESTHESIA POSTPROCEDURE EVALUATION
Anesthesia Post Evaluation    Patient: Faith Jin    Procedure(s) Performed: Procedure(s) (LRB):  DISCECTOMY, SPINE, CERVICAL, ANTERIOR APPROACH, WITH FUSION (N/A)    Final Anesthesia Type: general      Patient location during evaluation: PACU  Patient participation: Yes- Able to Participate  Level of consciousness: awake and alert  Post-procedure vital signs: reviewed and stable  Pain management: adequate  Airway patency: patent      Anesthetic complications: no      Cardiovascular status: blood pressure returned to baseline  Respiratory status: unassisted  Hydration status: euvolemic  Follow-up not needed.              Vitals Value Taken Time   /94 10/28/24 1410   Temp 36.8 °C (98.2 °F) 10/28/24 1220   Pulse 97 10/28/24 1410   Resp 14 10/28/24 1410   SpO2 98 % 10/28/24 1410         Event Time   Out of Recovery 14:02:00         Pain/Guevara Score: No data recorded

## 2024-11-06 NOTE — TELEPHONE ENCOUNTER
I tried to call the patient back to let her know medications were sent in, but no answer.  I left a detailed voicemail with this information, as well as info regarding incision and s/s to monitor.  I asked that she call with any questions/problems.  I attached incision pictures below.

## 2024-11-12 ENCOUNTER — TELEPHONE (OUTPATIENT)
Dept: NEUROSURGERY | Facility: CLINIC | Age: 59
End: 2024-11-12
Payer: MEDICARE

## 2024-11-14 DIAGNOSIS — M43.12 ACQUIRED SPONDYLOLISTHESIS OF CERVICAL VERTEBRA: ICD-10-CM

## 2024-11-14 NOTE — TELEPHONE ENCOUNTER
I tried to call the patient back to get more information but she did not answer.  I left a voicemail requesting a call back.    She is s/p C3-4, C4-5 ACDF by Dr. Bingham on 10/28/24.  She was last given Norco 10-325mg 1q6h prn #28, 7 day supply on 11/6, filled 11/8.  She would be due today.  When she last called, plan was to begin weaning her.  I will get more information on symptoms once I am able to speak with her.    11/15/24 - The patient called back this morning and I spoke with her.  She complains of pain in the neck.  She denies any pain, numbness, or tingling into the upper extremities.  She rates the pain at a 5-6/10.  She also complains of pain in the right anterior thigh to the knee that she says radiates from her back.  She has numbness in the back, but not really any pain.  She has numbness along the outside of the thigh as well.  I asked about the episode of coughing up blood that she mentioned when she first called.  She reports only the one episode.  She has not had any issues since.  I also asked about the fall that was reported by the home health nurse.  She said she fell in [employer] parking lot last week.  She lost her balance and landed on her right side.  She denies any new symptoms since the fall.  She is scheduled to f/u at 4wks po w/ x-rays, 11/26/24.  She says she is still taking 4/day of the Norco.  I explained again the plan to start weaning.  She voiced understanding.  She says she has 2-3 left of the medication.  I checked , no pain meds from anyone else.  Pharmacy is [facility] on [address].  I put in the 10mg again, but went down to TID.  May need to adjust before sending.

## 2024-11-15 RX ORDER — HYDROCODONE BITARTRATE AND ACETAMINOPHEN 10; 325 MG/1; MG/1
1 TABLET ORAL EVERY 8 HOURS PRN
Qty: 21 TABLET | Refills: 0 | Status: CANCELLED | OUTPATIENT
Start: 2024-11-15

## 2024-11-15 RX ORDER — HYDROCODONE BITARTRATE AND ACETAMINOPHEN 5; 325 MG/1; MG/1
1 TABLET ORAL EVERY 6 HOURS PRN
Qty: 28 TABLET | Refills: 0 | Status: SHIPPED | OUTPATIENT
Start: 2024-11-15

## 2024-11-15 NOTE — TELEPHONE ENCOUNTER
I tried to call the patient to let her know medication was sent in, but she did not answer.  I left a detailed message explaining the decrease in strength of the medication and plan to continue weaning.  I asked that she call back with any questions/concerns.

## 2024-11-15 NOTE — TELEPHONE ENCOUNTER
Refilled patients pain medication. Weaning her down to 5 mg of hydrocodone, leaving at 4 times daily. Will continue to wean.     Orders Placed This Encounter    HYDROcodone-acetaminophen (NORCO) 5-325 mg per tablet

## 2024-11-19 ENCOUNTER — EXTERNAL HOME HEALTH (OUTPATIENT)
Dept: HOME HEALTH SERVICES | Facility: HOSPITAL | Age: 59
End: 2024-11-19
Payer: MEDICARE

## 2024-11-19 NOTE — PATIENT INSTRUCTIONS
- Activity restrictions remain. Please refer to instructions that were previously sent home with you from the hospital.   - Continue to wear brace for a total of 3 months postoperatively.   - Follow up in clinic in 6 weeks with repeat XR of cervical spine.  - Continue to reduce tobacco use  - Okay to begin using Vitamin E on incision  - Robaxin refilled,  at [facility] drug    Contact the clinic with any other questions or concerns you may have.

## 2024-11-19 NOTE — PROGRESS NOTES
[facility]  History & Physical  Neurosurgery      Faith Jin   [id number]   1965       SUBJECTIVE:     CHIEF COMPLAINT:  Postoperative follow up    HPI:    Ms. Jin is a 59 y.o. right-handed female who has a past medical history significant for hypertension, TIA, COPD, neuropathy, bipolar disorder, schizoaffective disorder, depression, as well as methamphetamine and marijuana use. She is s/p C3-4, C4-5 ACDF with Dr. Bingham on 10/28/2024.    She is also s/p fall after syncopal episode on 3/28/2024. Prior to surgical intervention, she complained of mild neck pain without radiating arm pain. Her neck pain gradually improved over time. She has chronic left-sided numbness and weakness after a TIA in 2022. She has tried tylenol, Toradol, and bracing. Due to anterior spondylolisthesis at C3-4 and C4-5 along with persistent widening of her right C4-5 facet joint even with prolonged bracing of her cervical spine she was deemed a candidate for C3-4, C4-5 ACDF.    She presents today as a follow-up patient, accompanied by family, in the neurosurgery clinic for 4 week postoperative follow up. Well fitting [place] J cervical collar in place. States she has been compliant with brace since surgery. She reports minimal neck pain with increased muscle tightness. Neck pain does not radiate. She believe this to be because of the cervical collar. Rates her pain a 7/10 today. Current medications provide moderate relief. Requesting a refill of her robaxin. Occasional numbness present to L upper extremity and hand. Denies any new onset weakness, tingling or numbness. The patient denies bowel or bladder dysfunction.  Denies difficulty with balance.      Patient Active Problem List    Diagnosis Date Noted    Acquired spondylolisthesis of cervical vertebra 10/28/2024    Left-sided weakness 09/13/2022    TIA (transient ischemic attack) 07/10/2022    Generalized weakness 07/06/2022    Type II diabetes mellitus  07/06/2022     Past Medical History:   Diagnosis Date    Acquired spondylolisthesis of cervical vertebra     Ambulates with cane     Anxiety     Bipolar disorder     w/manic depression as per pt    Carpal tunnel syndrome     COPD (chronic obstructive pulmonary disease)     Depression     Diabetes mellitus     Digestive disorder     GERD    Dry eyes     Headache     Hyperlipidemia     Hypertension     Insomnia     Lumbar degenerative disc disease     Mixed hyperlipidemia     Muscle spasms of both lower extremities     Muscle spasms of neck     Neuropathy     Personal history of fall     PONV (postoperative nausea and vomiting)     Right hip pain     radiates down to ankle    Seizure-like activity 03/31/2024    no further activity since 03    TIA (transient ischemic attack) 2022    Ulnar nerve external compression syndrome      Past Surgical History:   Procedure Laterality Date    ANTERIOR CERVICAL DISCECTOMY W/ FUSION N/A 10/28/2024    Procedure: DISCECTOMY, SPINE, CERVICAL, ANTERIOR APPROACH, WITH FUSION;  Surgeon: Chana Bingham MD;  Location: [facility] OR;  Service: Neurosurgery;  Laterality: N/A;  C3-4, C4-5 ACDF // NTI // TIVA // [employer] //  XX    ANTERIOR CRUCIATE LIGAMENT REPAIR Left 1993    CARPAL TUNNEL RELEASE Right 05/05/2023    Procedure: RELEASE, CARPAL TUNNEL;  Surgeon: Canelo Bhatt MD;  Location: [facility] OR;  Service: Orthopedics;  Laterality: Right;    FRACTURE SURGERY  2017    Mauro Cárdenas    JOINT REPLACEMENT  2016    revision total knee ledt    SHOULDER ARTHROSCOPY W/ ROTATOR CUFF REPAIR Left     TOTAL KNEE ARTHROPLASTY Left     X2    ULNAR NERVE TRANSPOSITION Right 05/05/2023    Procedure: TRANSPOSITION, NERVE, ULNAR  Endoscopic;  Surgeon: Canelo Bhatt MD;  Location: [facility] OR;  Service: Orthopedics;  Laterality: Right;  Endoscopic    WRIST SURGERY Right        Current Outpatient Medications:     atorvastatin (LIPITOR) 40 MG tablet, 1 tablet Orally Once a day, Disp: , Rfl:     azelastine (ASTELIN)  137 mcg (0.1 %) nasal spray, 1 puff in each nostril Nasally Twice a day for 30 day(s), Disp: , Rfl:     benztropine (COGENTIN) 1 MG tablet, Take 1 mg by mouth 2 (two) times daily., Disp: , Rfl:     blood-glucose meter (CONTOUR NEXT METER) Misc, as directed in vitro three times daily for 365 days, Disp: , Rfl:     buPROPion (WELLBUTRIN XL) 300 MG 24 hr tablet, 1 tablet in the morning Orally Once a day, Disp: , Rfl:     carBAMazepine (TEGRETOL XR) 400 MG Tb12, Take 400 mg by mouth 2 (two) times daily., Disp: , Rfl:     cariprazine (VRAYLAR) 4.5 mg Cap, 1 capsule., Disp: , Rfl:     celecoxib (CELEBREX) 200 MG capsule, Take by mouth., Disp: , Rfl:     clonazePAM (KLONOPIN) 0.5 MG tablet, 1 tablet Orally twice a day, Disp: , Rfl:     CONTOUR NEXT TEST STRIPS Strp, 3 (three) times daily. Use to test blood glucose, Disp: , Rfl:     DULoxetine (CYMBALTA) 60 MG capsule, 1 capsule., Disp: , Rfl:     empagliflozin (JARDIANCE) 25 mg tablet, Jardiance 25 mg tablet, [RxNorm: 8035696], Disp: , Rfl:     estradioL (ESTRACE) 0.5 MG tablet, 1 tablet Orally Once a day, Disp: , Rfl:     famotidine (PEPCID) 20 MG tablet, 1 tablet at bedtime Orally Once a day, Disp: , Rfl:     fenofibrate 160 MG Tab, Take 1 tablet by mouth once daily., Disp: , Rfl:     fluticasone propionate (FLONASE) 50 mcg/actuation nasal spray, 1 spray by Each Nostril route nightly., Disp: , Rfl:     fluticasone-umeclidin-vilanter (TRELEGY ELLIPTA) 100-62.5-25 mcg DsDv, Inhale 1 puff into the lungs once daily., Disp: , Rfl:     furosemide (LASIX) 20 MG tablet, 1 tablet Orally Once a day, Disp: , Rfl:     HYDROcodone-acetaminophen (NORCO) 5-325 mg per tablet, Take 1 tablet by mouth every 6 (six) hours as needed for Pain., Disp: 28 tablet, Rfl: 0    hydrOXYzine pamoate (VISTARIL) 25 MG Cap, Take 25 mg by mouth Daily. Per pt reports 1 the am and 2 at bedside, Disp: , Rfl:     icosapent ethyL (VASCEPA) 1 gram Cap, 2 capsules with meals Orally Twice a day, Disp: , Rfl:      INVEGA SUSTENNA 234 mg/1.5 mL Syrg injection, Inject into the muscle., Disp: , Rfl:     linaCLOtide (LINZESS) 145 mcg Cap capsule, Take 145 mcg by mouth before breakfast., Disp: , Rfl:     loratadine (CLARITIN) 10 mg tablet, 1 tablet Orally Once a day for 30 day(s), Disp: , Rfl:     metFORMIN (GLUCOPHAGE) 500 MG tablet, metFORMIN 500 mg tablet, [RxNorm: 761627], Disp: , Rfl:     metoprolol tartrate (LOPRESSOR) 25 MG tablet, Take 12.5 mg by mouth once daily., Disp: , Rfl:     mupirocin (BACTROBAN) 2 % ointment, Apply topically 2 (two) times daily., Disp: , Rfl:     polyethylene glycol (GLYCOLAX) 17 gram/dose powder, Take 17 g by mouth as needed., Disp: , Rfl:     pregabalin (LYRICA) 100 MG capsule, Lyrica 100 mg capsule, [RxNorm: 172068], Disp: , Rfl:     promethazine (PHENERGAN) 25 MG tablet, 1 tablet as needed Orally every 12 hrs, Disp: , Rfl:     RESTASIS 0.05 % ophthalmic emulsion, Place 1 drop into both eyes 2 (two) times daily., Disp: , Rfl:     topiramate (TOPAMAX) 200 MG Tab, 1 tablet Orally Twice a day, Disp: , Rfl:     valACYclovir (VALTREX) 500 MG tablet, Take 500 mg by mouth 2 (two) times daily., Disp: , Rfl:     zaleplon (SONATA) 5 MG Cap, 1 capsule at bedtime as needed Orally Once a day, Disp: , Rfl:     dexlansoprazole (DEXILANT) 60 mg capsule, 1 capsule. (Patient not taking: Reported on 11/26/2024), Disp: , Rfl:     methocarbamoL (ROBAXIN) 750 MG Tab, Take 1 tablet (750 mg total) by mouth 3 (three) times daily as needed (muscle spasm)., Disp: 30 tablet, Rfl: 1    paliperidone palm, 3 month, (INVEGA TRINZA) 546 mg/1.75 mL Syrg injection, Invega Trinza 546 mg/1.75 mL intramuscular syringe, [RxNorm: 6679317] (Patient not taking: Reported on 11/26/2024), Disp: , Rfl:     tiZANidine (ZANAFLEX) 4 MG tablet, 1 tablet as needed Orally Three times a day (Patient not taking: Reported on 11/26/2024), Disp: , Rfl:     Review of patient's allergies indicates:   Allergen Reactions    Adhesive      Other  reaction(s): skin tears easily with adhesive    Cephalexin Nausea And Vomiting    Erythromycin Nausea And Vomiting    Lithium Nausea And Vomiting    Naproxen Nausea And Vomiting       Social History     Tobacco Use    Smoking status: Every Day     Current packs/day: 0.25     Average packs/day: 0.3 packs/day for 42.0 years (10.5 ttl pk-yrs)     Types: Vaping with nicotine, Cigarettes     Passive exposure: Past    Smokeless tobacco: Never    Tobacco comments:     2-3 cigarettes/day. Done with cigarettes. Vaping a little   Substance Use Topics    Alcohol use: Not Currently     Comment: stopped in 2005     Occupation: Medically disabled since 1995 due to her mental health conditions including schizoaffective disorder and bipolar disorder.  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       Family History   Problem Relation Name Age of Onset    Coronary artery disease Mother Shobha Hardwickould     Diabetes Mother Shobha Jin     Arthritis Mother Shobha Jin         Back , hands    Early death Mother Shobha Jin         Diabetes    Heart disease Mother Shobha Hardwickould     Hypertension Mother Shobha Jin     Kidney disease Mother Shobha Jin     Stroke Mother Shobha Jin     Vision loss Mother Shobha Jin     Coronary artery disease Father Cesar     Asthma Father Cesar         Back    Heart disease Father Cesar     Hypertension Father Cesar     Vision loss Father Cesar     Arthritis Sister Hailey         Not sure    Asthma Sister Hailey         Finger, shoulder    Mental illness Sister Hailey     Vision loss Sister Hailey     Arthritis Brother Zacarias         Hip    Birth defects Brother Zacarias         Special Needs/ Downs Syndrome    Heart disease Brother Zacarias     Learning disabilities Brother Zacarias     Intellectual disability Brother Zacarias        ROS:  Constitutional:  Negative for chills and fever.   HENT:  Negative for congestion and sore throat.    Eyes:  Negative for blurred vision and double  "vision.   Respiratory:  Negative for cough and shortness of breath.    Cardiovascular:  Negative for chest pain and palpitations.   Gastrointestinal:  Negative for constipation, diarrhea, nausea and vomiting.   Musculoskeletal:  Positive for back pain and neck pain.   Neurological: Negative for sensory change and focal weakness. Positive for headaches.   Endo/Heme/Allergies:  Does not bruise/bleed easily.   Psychiatric/Behavioral:  Positive for depression and anxiety.    ROS    OBJECTIVE:     Visit Vitals  /71 (BP Location: Left arm, Patient Position: Sitting)   Pulse 77   Resp 16   Ht 5' 3" (1.6 m)   Wt 66.9 kg (147 lb 6.4 oz)   LMP  (LMP Unknown)   BMI 26.11 kg/m²        Physical Exam    Body Habitus: Normal    Physical Exam:  Constitutional: The patient is well-developed and cooperative, sitting comfortably in a chair    Mental Status:   Oriented to person, place, and time  Normal speech    Cranial nerves:  CN II: PERRL, 4 to 3 mm, brisk bilaterally  CN III, IV, and VI: extraocular movements normal, no ptosis  CN V: normal facial sensation and masseter function  CN VII: facial strength normal and symmetrical  CN VIII: hearing normal bilaterally  CN IX and X: swallowing and phonation normal  CN XI: shoulder shrug intact bilaterally  CN XII: tongue protrusion midline    Motor:  Muscle bulk: normal in all extremities  Tone: normal in all extremities.  No pronator drift    Upper extremities:  Deltoid: right 5/5; left 5/5  Biceps: right 5/5; left 5/5  Triceps: right 5/5; left 5/5  Wrist extensors: right 5/5; left 5/5  Wrist flexors: right 5/5; left 5/5  : right 5/5; left 5/5  Interosseous muscles: right 5/5; left 5/5    Lower extremities:  Hip flexors: right 5/5; left 5/5  Knee extensors: right 5/5; left 5/5  Knee flexors: right 5/5; left 5/5  Foot dorsiflexors: right 5/5; left 5/5  Foot plantar flexors: right 5/5; left 5/5  Extensor hallucis longus: right 5/5; left 5/5    Sensation:  Normal to light touch x " 4 extremities    Reflexes:  Ordoñezs sign: right negative; left negative  Babinski: right downgoing; left downgoing  Clonus: right negative; left negative    Coordination:  Finger to nose: right normal; left normal    Musculoskeletal:    Anterior cervical incision is well healed    Gait: normal    Straight leg test: right negative; left negative    Cervical: No pain with palpation  Upper back: No pain with palpation  Lower back: No pain with palpation    Imaging:  All pertinent neuroimaging independently reviewed. Discussed these findings in detail with the patient.    XR of cervical spine completed 11/26/2024 reveals stable postoperative changes with anterior fusion hardware present from C3 to C5. Chronic grade 1 anterolisthesis of C2 on C3, C3 on C4, and C4 on C5 is grossly unchanged. No evidence of hardware failure or loosening.         ASSESSMENT/PLAN:     There are no diagnoses linked to this encounter.    Encounter Diagnoses   Name Primary?    Status post cervical spinal fusion Yes    Acquired spondylolisthesis of cervical vertebra        Orders Placed This Encounter   Procedures    X-Ray Cervical Spine Complete 5 view        Faith Jin presents today postoperative visit.  Complains of primarily muscle spasms in her neck.  Robaxin has been refilled.  Encouraged heat or ice as tolerated for muscle health.  Remain hydrated and consider a magnesium supplement for muscle relaxation.  She is encouraged to continue to take Tylenol as needed for discomfort.  Anterior cervical incision is well healed.  She is encouraged to continue progressive mobility.  She is able to begin rubbing vitamin E oil on the incision to help break up scar tissue and improve healing.  Reminded that she must wear the cervical collar for a total of 3 months postoperatively.  Discussed activity restrictions, she is not to lift anything over 10-15 lb or anything that is going to cause her to strain.  Patient verbalized  understanding.    Patient will follow up in 4 weeks with cervical XR prior to visit for surveillance purposes. Patient verbalized understanding.    Follow up in about 4 weeks (around 12/24/2024).      This note will be sent to the patient's referring provider No ref. provider found and primary care provider Garrick Nascimento MD.        Antoinette Huffman, PEDROP-C  Neurosurgery      Disclaimer:  This note is prepared using voice recognition software and as such is likely to have errors despite attempts at proofreading. Please contact me for questions.

## 2024-11-26 ENCOUNTER — HOSPITAL ENCOUNTER (OUTPATIENT)
Dept: RADIOLOGY | Facility: HOSPITAL | Age: 59
Discharge: HOME | End: 2024-11-26
Payer: MEDICARE

## 2024-11-26 ENCOUNTER — OFFICE VISIT (OUTPATIENT)
Dept: NEUROSURGERY | Facility: CLINIC | Age: 59
End: 2024-11-26
Payer: MEDICARE

## 2024-11-26 VITALS
SYSTOLIC BLOOD PRESSURE: 105 MMHG | WEIGHT: 147.38 LBS | HEART RATE: 77 BPM | HEIGHT: 63 IN | DIASTOLIC BLOOD PRESSURE: 71 MMHG | RESPIRATION RATE: 16 BRPM | BODY MASS INDEX: 26.11 KG/M2

## 2024-11-26 DIAGNOSIS — M43.12 ACQUIRED SPONDYLOLISTHESIS OF CERVICAL VERTEBRA: ICD-10-CM

## 2024-11-26 DIAGNOSIS — Z98.1 STATUS POST CERVICAL SPINAL FUSION: Primary | ICD-10-CM

## 2024-11-26 PROCEDURE — 72040 X-RAY EXAM NECK SPINE 2-3 VW: CPT | Mod: TC

## 2024-11-26 RX ORDER — CELECOXIB 200 MG/1
CAPSULE ORAL
COMMUNITY
Start: 2024-09-28

## 2024-11-26 RX ORDER — METOPROLOL SUCCINATE 25 MG/1
12.5 TABLET, EXTENDED RELEASE ORAL
COMMUNITY
Start: 2024-11-01 | End: 2024-11-26

## 2024-11-26 RX ORDER — METHOCARBAMOL 750 MG/1
750 TABLET, FILM COATED ORAL 3 TIMES DAILY PRN
Qty: 30 TABLET | Refills: 1 | Status: SHIPPED | OUTPATIENT
Start: 2024-11-26 | End: 2024-12-16

## 2024-11-26 RX ORDER — PALIPERIDONE PALMITATE 234 MG/1.5ML
INJECTION INTRAMUSCULAR
COMMUNITY
Start: 2024-10-30

## 2024-11-27 ENCOUNTER — DOCUMENT SCAN (OUTPATIENT)
Dept: HOME HEALTH SERVICES | Facility: HOSPITAL | Age: 59
End: 2024-11-27
Payer: MEDICARE

## 2025-01-06 ENCOUNTER — TELEPHONE (OUTPATIENT)
Dept: NEUROSURGERY | Facility: CLINIC | Age: 60
End: 2025-01-06
Payer: MEDICARE

## 2025-01-06 NOTE — TELEPHONE ENCOUNTER
I tried to call the patient back to get a little bit more information regarding the message that we have received. She did not answer so I LMOM.

## 2025-01-06 NOTE — TELEPHONE ENCOUNTER
The patient returned my call. She stated she has gotten everything figured out and is still wearing her brace. I advised her that the brace needs to be worn until Antoinette says its okay to D/C wearing it. She verbalized understanding. She also told me she needs to reschedule her appointment tomorrow with Antoinette because she is sick. She is now scheduled to see Antoinette for 1/9/25 at 10:00, XR at [facility] for 9:00. She was compliant w date and time.

## 2025-01-09 ENCOUNTER — TELEPHONE (OUTPATIENT)
Dept: NEUROSURGERY | Facility: CLINIC | Age: 60
End: 2025-01-09

## 2025-01-09 DIAGNOSIS — M43.12 ACQUIRED SPONDYLOLISTHESIS OF CERVICAL VERTEBRA: ICD-10-CM

## 2025-01-09 DIAGNOSIS — Z98.1 STATUS POST CERVICAL SPINAL FUSION: Primary | ICD-10-CM

## 2025-01-09 NOTE — TELEPHONE ENCOUNTER
I tried to call the patient to get her scheduled for her CT at the same time as her XR scheduled for 1/14/25. She did not answer so I left a detailed message requesting a call back.

## 2025-01-09 NOTE — TELEPHONE ENCOUNTER
Because we are nearing the 3 month post surgery date, I would like to add a CT to Ms. Jin's imaging. I was going to order it at her original 8 week follow up, but she continues to reschedule them. Please contact the patient to be sure this is scheduled with her already ordered XR. If her appointment needs to be moved to accommodate both, this is okay.    Orders Placed This Encounter    CT Cervical Spine Without Contrast

## 2025-01-09 NOTE — TELEPHONE ENCOUNTER
The patient returned my call to get her CT scheduled on the same day as her XR. I was unable to accommodate the patient for 1/14/25 so I rescheduled her appointment with Antoinette for 1/16/25 at 10:30. Her CT and XR is scheduled at [facility] for the same day at 7:30 and 7:45. She was compliant w dates and times.

## 2025-01-09 NOTE — TELEPHONE ENCOUNTER
Patient called to reschedule her appointment with NAM Curry on 1/9/25 at 10:00 due to her not feeling well. I rescheduled the patient's appointment with NAM Curry for 1/14/25 at 10:00 with XR at [facility] for 9:15. Patient verbalized understanding of appointment date and time.

## 2025-01-14 ENCOUNTER — HOSPITAL ENCOUNTER (OUTPATIENT)
Dept: RADIOLOGY | Facility: HOSPITAL | Age: 60
Discharge: HOME | End: 2025-01-14
Payer: MEDICARE

## 2025-01-14 DIAGNOSIS — M43.12 ACQUIRED SPONDYLOLISTHESIS OF CERVICAL VERTEBRA: ICD-10-CM

## 2025-01-14 DIAGNOSIS — Z98.1 STATUS POST CERVICAL SPINAL FUSION: ICD-10-CM

## 2025-01-14 PROCEDURE — 72052 X-RAY EXAM NECK SPINE 6/>VWS: CPT | Mod: TC

## 2025-01-14 PROCEDURE — 72125 CT NECK SPINE W/O DYE: CPT | Mod: TC

## 2025-01-15 ENCOUNTER — DOCUMENT SCAN (OUTPATIENT)
Dept: HOME HEALTH SERVICES | Facility: HOSPITAL | Age: 60
End: 2025-01-15
Payer: MEDICARE

## 2025-01-16 ENCOUNTER — TELEPHONE (OUTPATIENT)
Dept: NEUROSURGERY | Facility: CLINIC | Age: 60
End: 2025-01-16

## 2025-01-16 NOTE — TELEPHONE ENCOUNTER
The patient called the office this afternoon to see when her appointment with Antoinette was scheduled for. She could not remember if it was yesterday or today. I spoke with her and advised it was scheduled for yesterday at 1:00. She apologized for missing it as she fell asleep during lunch. She is now rescheduled for 1/23/25 at 10:00. She was compliant w this date and time.

## 2025-01-22 NOTE — PROGRESS NOTES
[facility]  History & Physical  Neurosurgery      Faith Jin   [id number]   1965       SUBJECTIVE:     CHIEF COMPLAINT:  Postoperative follow up    HPI:    Ms. Jin is a 59 y.o. right-handed female who has a past medical history significant for hypertension, TIA, COPD, neuropathy, bipolar disorder, schizoaffective disorder, depression, as well as methamphetamine and marijuana use. She is s/p C3-4, C4-5 ACDF with Dr. Bingham on 10/28/2024.    She is also s/p fall after syncopal episode on 3/28/2024. Prior to surgical intervention, she complained of mild neck pain without radiating arm pain. Her neck pain gradually improved over time. She has chronic left-sided numbness and weakness after a TIA in 2022. She has tried tylenol, Toradol, and bracing. Due to anterior spondylolisthesis at C3-4 and C4-5 along with persistent widening of her right C4-5 facet joint even with prolonged bracing of her cervical spine she was deemed a candidate for C3-4, C4-5 ACDF.    She presents today as a follow-up patient, for 3 month postoperative follow up. Reports she has remained compliant with [place] cervical collar in place. Takes it off to occasionally stretch her neck. Reports using the bone growth stimulator regularly. Admits to forgetting to use is daily but remains adequately consistent in her use of BGS.  She reports minimal neck pain with increased muscle tightness. Neck pain does not radiate. She believe this to be because of the cervical collar. Rates her pain a 2/10 today. Current medications provide moderate relief. Requesting a refill of her robaxin. Occasional numbness that was present to L upper extremity and hand has gradually resolved. Denies any new onset weakness, tingling or numbness. The patient denies bowel or bladder dysfunction.  Denies difficulty with balance.      Patient Active Problem List    Diagnosis Date Noted    Acquired spondylolisthesis of cervical vertebra 10/28/2024     Left-sided weakness 09/13/2022    TIA (transient ischemic attack) 07/10/2022    Generalized weakness 07/06/2022    Type II diabetes mellitus 07/06/2022     Past Medical History:   Diagnosis Date    Acquired spondylolisthesis of cervical vertebra     Ambulates with cane     Anxiety     Bipolar disorder     w/manic depression as per pt    Carpal tunnel syndrome     COPD (chronic obstructive pulmonary disease)     Depression     Diabetes mellitus     Digestive disorder     GERD    Dry eyes     Headache     Hyperlipidemia     Hypertension     Insomnia     Lumbar degenerative disc disease     Mixed hyperlipidemia     Muscle spasms of both lower extremities     Muscle spasms of neck     Neuropathy     Personal history of fall     PONV (postoperative nausea and vomiting)     Right hip pain     radiates down to ankle    Seizure-like activity 03/31/2024    no further activity since 03    TIA (transient ischemic attack) 2022    Ulnar nerve external compression syndrome      Past Surgical History:   Procedure Laterality Date    ANTERIOR CERVICAL DISCECTOMY W/ FUSION N/A 10/28/2024    Procedure: DISCECTOMY, SPINE, CERVICAL, ANTERIOR APPROACH, WITH FUSION;  Surgeon: Chana Bingham MD;  Location: [facility];  Service: Neurosurgery;  Laterality: N/A;  C3-4, C4-5 ACDF // NTI // TIVA // [employer] //  XX    ANTERIOR CRUCIATE LIGAMENT REPAIR Left 1993    CARPAL TUNNEL RELEASE Right 05/05/2023    Procedure: RELEASE, CARPAL TUNNEL;  Surgeon: Canelo Bhatt MD;  Location: [facility] OR;  Service: Orthopedics;  Laterality: Right;    FRACTURE SURGERY  2017    Mauro Cárdenas    JOINT REPLACEMENT  2016    revision total knee ledt    SHOULDER ARTHROSCOPY W/ ROTATOR CUFF REPAIR Left     TOTAL KNEE ARTHROPLASTY Left     X2    ULNAR NERVE TRANSPOSITION Right 05/05/2023    Procedure: TRANSPOSITION, NERVE, ULNAR  Endoscopic;  Surgeon: Canelo Bhatt MD;  Location: [facility] OR;  Service: Orthopedics;  Laterality: Right;  Endoscopic    WRIST SURGERY Right         Current Outpatient Medications:     atorvastatin (LIPITOR) 40 MG tablet, 1 tablet Orally Once a day, Disp: , Rfl:     azelastine (ASTELIN) 137 mcg (0.1 %) nasal spray, 1 puff in each nostril Nasally Twice a day for 30 day(s), Disp: , Rfl:     benztropine (COGENTIN) 1 MG tablet, Take 1 mg by mouth 2 (two) times daily., Disp: , Rfl:     blood-glucose meter (CONTOUR NEXT METER) Misc, as directed in vitro three times daily for 365 days, Disp: , Rfl:     buPROPion (WELLBUTRIN XL) 300 MG 24 hr tablet, 1 tablet in the morning Orally Once a day, Disp: , Rfl:     carBAMazepine (TEGRETOL XR) 400 MG Tb12, Take 400 mg by mouth 2 (two) times daily., Disp: , Rfl:     cariprazine (VRAYLAR) 4.5 mg Cap, 1 capsule., Disp: , Rfl:     celecoxib (CELEBREX) 200 MG capsule, Take by mouth., Disp: , Rfl:     clonazePAM (KLONOPIN) 0.5 MG tablet, 1 tablet Orally twice a day, Disp: , Rfl:     CONTOUR NEXT TEST STRIPS Strp, 3 (three) times daily. Use to test blood glucose, Disp: , Rfl:     dexlansoprazole (DEXILANT) 60 mg capsule, 1 capsule. (Patient not taking: Reported on 11/26/2024), Disp: , Rfl:     DULoxetine (CYMBALTA) 60 MG capsule, 1 capsule., Disp: , Rfl:     empagliflozin (JARDIANCE) 25 mg tablet, Jardiance 25 mg tablet, [RxNorm: 8977509], Disp: , Rfl:     estradioL (ESTRACE) 0.5 MG tablet, 1 tablet Orally Once a day, Disp: , Rfl:     famotidine (PEPCID) 20 MG tablet, 1 tablet at bedtime Orally Once a day, Disp: , Rfl:     fenofibrate 160 MG Tab, Take 1 tablet by mouth once daily., Disp: , Rfl:     fluticasone propionate (FLONASE) 50 mcg/actuation nasal spray, 1 spray by Each Nostril route nightly., Disp: , Rfl:     fluticasone-umeclidin-vilanter (TRELEGY ELLIPTA) 100-62.5-25 mcg DsDv, Inhale 1 puff into the lungs once daily., Disp: , Rfl:     furosemide (LASIX) 20 MG tablet, 1 tablet Orally Once a day, Disp: , Rfl:     HYDROcodone-acetaminophen (NORCO) 5-325 mg per tablet, Take 1 tablet by mouth every 6 (six) hours as needed  for Pain., Disp: 28 tablet, Rfl: 0    hydrOXYzine pamoate (VISTARIL) 25 MG Cap, Take 25 mg by mouth Daily. Per pt reports 1 the am and 2 at bedside, Disp: , Rfl:     icosapent ethyL (VASCEPA) 1 gram Cap, 2 capsules with meals Orally Twice a day, Disp: , Rfl:     INVEGA SUSTENNA 234 mg/1.5 mL Syrg injection, Inject into the muscle., Disp: , Rfl:     linaCLOtide (LINZESS) 145 mcg Cap capsule, Take 145 mcg by mouth before breakfast., Disp: , Rfl:     loratadine (CLARITIN) 10 mg tablet, 1 tablet Orally Once a day for 30 day(s), Disp: , Rfl:     metFORMIN (GLUCOPHAGE) 500 MG tablet, metFORMIN 500 mg tablet, [RxNorm: 333810], Disp: , Rfl:     metoprolol tartrate (LOPRESSOR) 25 MG tablet, Take 12.5 mg by mouth once daily., Disp: , Rfl:     mupirocin (BACTROBAN) 2 % ointment, Apply topically 2 (two) times daily., Disp: , Rfl:     paliperidone palm, 3 month, (INVEGA TRINZA) 546 mg/1.75 mL Syrg injection, Invega Trinza 546 mg/1.75 mL intramuscular syringe, [RxNorm: 3326065] (Patient not taking: Reported on 11/26/2024), Disp: , Rfl:     polyethylene glycol (GLYCOLAX) 17 gram/dose powder, Take 17 g by mouth as needed., Disp: , Rfl:     pregabalin (LYRICA) 100 MG capsule, Lyrica 100 mg capsule, [RxNorm: 977350], Disp: , Rfl:     promethazine (PHENERGAN) 25 MG tablet, 1 tablet as needed Orally every 12 hrs, Disp: , Rfl:     RESTASIS 0.05 % ophthalmic emulsion, Place 1 drop into both eyes 2 (two) times daily., Disp: , Rfl:     topiramate (TOPAMAX) 200 MG Tab, 1 tablet Orally Twice a day, Disp: , Rfl:     valACYclovir (VALTREX) 500 MG tablet, Take 500 mg by mouth 2 (two) times daily., Disp: , Rfl:     zaleplon (SONATA) 5 MG Cap, 1 capsule at bedtime as needed Orally Once a day, Disp: , Rfl:     Review of patient's allergies indicates:   Allergen Reactions    Adhesive      Other reaction(s): skin tears easily with adhesive    Cephalexin Nausea And Vomiting    Erythromycin Nausea And Vomiting    Lithium Nausea And Vomiting     Naproxen Nausea And Vomiting       Social History     Tobacco Use    Smoking status: Every Day     Current packs/day: 0.25     Average packs/day: 0.3 packs/day for 42.0 years (10.5 ttl pk-yrs)     Types: Vaping with nicotine, Cigarettes     Passive exposure: Past    Smokeless tobacco: Never    Tobacco comments:     2-3 cigarettes/day. Done with cigarettes. Vaping a little   Substance Use Topics    Alcohol use: Not Currently     Comment: stopped in 2005     Occupation: Medically disabled since 1995 due to her mental health conditions including schizoaffective disorder and bipolar disorder.  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       Family History   Problem Relation Name Age of Onset    Coronary artery disease Mother Shobha Arnould     Diabetes Mother Shobha Arnould     Arthritis Mother Shobha Arnould         Back , hands    Early death Mother Shobha Arnould         Diabetes    Heart disease Mother Shobha Arnould     Hypertension Mother Shobha Arnould     Kidney disease Mother Shobha Arnould     Stroke Mother Shobha Hardwickould     Vision loss Mother Shobha Hardwickould     Coronary artery disease Father Cesar     Asthma Father Cesar         Back    Heart disease Father Cesar     Hypertension Father Cesar     Vision loss Father Cesar     Arthritis Sister Hailey         Not sure    Asthma Sister Hailey         Finger, shoulder    Mental illness Sister Hailey     Vision loss Sister Hailey     Arthritis Brother Zacarias         Hip    Birth defects Brother Zacarias         Special Needs/ Downs Syndrome    Heart disease Brother Zacarias     Learning disabilities Brother Zacarias     Intellectual disability Brother Zacarias        ROS:  Constitutional:  Negative for chills and fever.   HENT:  Negative for congestion and sore throat.    Eyes:  Negative for blurred vision and double vision.   Respiratory:  Negative for cough and shortness of breath.    Cardiovascular:  Negative for chest pain and palpitations.   Gastrointestinal:   Negative for constipation, diarrhea, nausea and vomiting.   Musculoskeletal:  Positive for back pain and neck pain.   Neurological: Negative for sensory change and focal weakness. Positive for headaches.   Endo/Heme/Allergies:  Does not bruise/bleed easily.   Psychiatric/Behavioral:  Positive for depression and anxiety.    ROS    OBJECTIVE:     Visit Vitals  LMP  (LMP Unknown)        Physical Exam    Body Habitus: Normal    Physical Exam:  Constitutional: The patient is well-developed and cooperative, sitting comfortably in a chair    Mental Status:   Oriented to person, place, and time  Normal speech    Cranial nerves:  CN II: PERRL, 4 to 3 mm, brisk bilaterally  CN III, IV, and VI: extraocular movements normal, no ptosis  CN V: normal facial sensation and masseter function  CN VII: facial strength normal and symmetrical  CN VIII: hearing normal bilaterally  CN IX and X: swallowing and phonation normal  CN XI: shoulder shrug intact bilaterally  CN XII: tongue protrusion midline    Motor:  Muscle bulk: normal in all extremities  Tone: normal in all extremities.  No pronator drift    Imaging:  All pertinent neuroimaging independently reviewed. Discussed these findings in detail with the patient.    XR of cervical spine completed 01/14/2025 reveals stable postoperative changes with anterior fusion hardware present from C3 to C5. Chronic grade 1 anterolisthesis of C2 on C3, C3 on C4, and C4 on C5 is grossly unchanged. No evidence of hardware failure or loosening. No abnormal movement with flexion and extension. No evidence of instability.     CT of cervical spine completed 01/14/2025 stable postoperative changes with anterior fusion hardware present from C3 to C5. Multilevel degenerative changes including anterior bridging osteophytes at C5-C6 and C6-C7. No evidence of hardware failure or loosening.         ASSESSMENT/PLAN:     There are no diagnoses linked to this encounter.    Encounter Diagnoses   Name Primary?     Status post cervical spinal fusion Yes    Acquired spondylolisthesis of cervical vertebra          No orders of the defined types were placed in this encounter.       Faith Jin presents today for postoperative visit.  Complains of primarily muscle spasms in her neck.  Robaxin has been refilled for use while she begins physical therapy. Thoroughly discussed that should she continue to  feel like the needs this medication she was instructed to follow up with her PCP as she is out of the acute postoperative phase.  Encouraged heat or ice as tolerated for muscle health. Remain hydrated and consider a magnesium supplement for muscle relaxation.  She is encouraged to continue to take Tylenol as needed for discomfort.  Anterior cervical incision is well healed.  She is encouraged to continue progressive mobility.  She is able to begin rubbing vitamin E oil on the incision to help break up scar tissue and improve healing.  She may begin weaning use of the cervical collar at this time. Encouraged to begin outpatient physical therapy at [facility] on [facility] where she has been previously established. Encouraged to return to baseline activity. Patient verbalized understanding.    Patient will follow up in person in 2 months after physical therapy.     Follow up in about 2 months (around 3/23/2025) for With physical therapy.      This note will be sent to the patient's referring provider No ref. provider found and primary care provider Garrick Nascimento MD.        Antoinette Huffman, KRISTI-C  Neurosurgery      Disclaimer:  This note is prepared using voice recognition software and as such is likely to have errors despite attempts at proofreading. Please contact me for questions.

## 2025-01-23 ENCOUNTER — OFFICE VISIT (OUTPATIENT)
Dept: NEUROSURGERY | Facility: CLINIC | Age: 60
End: 2025-01-23
Payer: MEDICARE

## 2025-01-23 DIAGNOSIS — M43.12 ACQUIRED SPONDYLOLISTHESIS OF CERVICAL VERTEBRA: ICD-10-CM

## 2025-01-23 DIAGNOSIS — Z98.1 STATUS POST CERVICAL SPINAL FUSION: Primary | ICD-10-CM

## 2025-01-23 DIAGNOSIS — M62.838 SPASM OF CERVICAL PARASPINOUS MUSCLE: ICD-10-CM

## 2025-01-24 RX ORDER — METHOCARBAMOL 750 MG/1
750 TABLET, FILM COATED ORAL 3 TIMES DAILY
Qty: 40 TABLET | Refills: 2 | Status: SHIPPED | OUTPATIENT
Start: 2025-01-24 | End: 2025-03-05

## 2025-01-27 ENCOUNTER — TELEPHONE (OUTPATIENT)
Dept: NEUROSURGERY | Facility: CLINIC | Age: 60
End: 2025-01-27
Payer: MEDICARE

## 2025-01-27 NOTE — TELEPHONE ENCOUNTER
I spoke with the patient to get her scheduled in 2mo with Antoinette post PT. She is scheduled for 3/27/25 at 9:00. She was compliant w date and time. Of note, she is scheduled to start PT on 1/29/25.

## 2025-02-17 ENCOUNTER — OFFICE VISIT (OUTPATIENT)
Dept: NEUROLOGY | Facility: CLINIC | Age: 60
End: 2025-02-17
Payer: MEDICARE

## 2025-02-17 VITALS
HEIGHT: 63 IN | HEART RATE: 74 BPM | DIASTOLIC BLOOD PRESSURE: 80 MMHG | WEIGHT: 150 LBS | SYSTOLIC BLOOD PRESSURE: 130 MMHG | BODY MASS INDEX: 26.58 KG/M2

## 2025-02-17 DIAGNOSIS — G25.1 TREMOR DUE TO MULTIPLE DRUGS: ICD-10-CM

## 2025-02-17 DIAGNOSIS — G45.9 TRANSIENT ISCHEMIC ATTACK (TIA): Primary | ICD-10-CM

## 2025-02-17 RX ORDER — PREGABALIN 150 MG/1
150 CAPSULE ORAL 3 TIMES DAILY
COMMUNITY
Start: 2025-02-04

## 2025-02-17 RX ORDER — ALBUTEROL SULFATE 90 UG/1
2 INHALANT RESPIRATORY (INHALATION)
COMMUNITY
Start: 2025-01-08

## 2025-02-17 NOTE — PROGRESS NOTES
Subjective:      Patient ID: Faith Jin is a 60 y.o. female.    Chief Complaint:  Transient Ischemic Attack (Pt denies any issues pertaining to dx. States she had recent neck surgery due to her breaking her neck)      History of Present Illness  Patient presents for follow up of TIA (July and September of 2022.). MRI brain and MRA head/neck in the past normal. Sees psychiatrist, Dr. Natacha Smalls but will be seeing a new psychiatrist soon. Sees Dr. Angeles for endo. Last A1C was 5.5 in October 2024.  Today, patient denies any new onset of numbness or tingling. Reports her left foot is dragging when she walks. Denies any headache or visual changes. Patient reports she broke her neck last March and saw Dr. Sabrina Bingham who performed surgery. She has developed a twitch/shaking of her upper extremities. Psych just started her on cogentin.           Past Medical History:   Diagnosis Date    Acquired spondylolisthesis of cervical vertebra     Ambulates with cane     Anxiety     Bipolar disorder     w/manic depression as per pt    Carpal tunnel syndrome     COPD (chronic obstructive pulmonary disease)     Depression     Diabetes mellitus     Digestive disorder     GERD    Dry eyes     Headache     Hyperlipidemia     Hypertension     Insomnia     Lumbar degenerative disc disease     Mixed hyperlipidemia     Muscle spasms of both lower extremities     Muscle spasms of neck     Neuropathy     Personal history of fall     PONV (postoperative nausea and vomiting)     Right hip pain     radiates down to ankle    Seizure-like activity 03/31/2024    no further activity since 03    TIA (transient ischemic attack) 2022    Ulnar nerve external compression syndrome        Past Surgical History:   Procedure Laterality Date    ANTERIOR CERVICAL DISCECTOMY W/ FUSION N/A 10/28/2024    Procedure: DISCECTOMY, SPINE, CERVICAL, ANTERIOR APPROACH, WITH FUSION;  Surgeon: Chana Bingham MD;  Location: [facility];  Service:  Neurosurgery;  Laterality: N/A;  C3-4, C4-5 ACDF // NTI // TIVA // [employer] //  XX    ANTERIOR CRUCIATE LIGAMENT REPAIR Left 1993    CARPAL TUNNEL RELEASE Right 05/05/2023    Procedure: RELEASE, CARPAL TUNNEL;  Surgeon: Canelo Bhatt MD;  Location: [facility] OR;  Service: Orthopedics;  Laterality: Right;    FRACTURE SURGERY  2017    Mauro Cárdenas    JOINT REPLACEMENT  2016    revision total knee ledt    NECK SURGERY  10/2024    SHOULDER ARTHROSCOPY W/ ROTATOR CUFF REPAIR Left     TOTAL KNEE ARTHROPLASTY Left     X2    ULNAR NERVE TRANSPOSITION Right 05/05/2023    Procedure: TRANSPOSITION, NERVE, ULNAR  Endoscopic;  Surgeon: Canelo Bhatt MD;  Location: [facility] OR;  Service: Orthopedics;  Laterality: Right;  Endoscopic    WRIST SURGERY Right        Family History   Problem Relation Name Age of Onset    Coronary artery disease Mother Shobha Arnould     Diabetes Mother Shobha Arnould     Arthritis Mother Shobha Arnould         Back , hands    Early death Mother Shobha Arnould         Diabetes    Heart disease Mother Shobha Arnould     Hypertension Mother Shobha Arnould     Kidney disease Mother Shobha Arnould     Stroke Mother Shobha Arnould     Vision loss Mother Shobha Hardwickould     Coronary artery disease Father Cesar     Asthma Father Cesar         Back    Heart disease Father Cesar     Hypertension Father Cesar     Vision loss Father Cesar     Arthritis Sister Hailey         Not sure    Asthma Sister Hailey         Finger, shoulder    Mental illness Sister Hailey     Vision loss Sister Hailey     Arthritis Brother Zacarias         Hip    Birth defects Brother Zacarias         Special Needs/ Downs Syndrome    Heart disease Brother Zacarias     Learning disabilities Brother Zacarias     Intellectual disability Brother Zacarias        Social History     Socioeconomic History    Marital status:    Tobacco Use    Smoking status: Every Day     Current packs/day: 0.25     Average packs/day: 0.3 packs/day for 42.0 years (10.5 ttl pk-yrs)      "Types: Vaping with nicotine, Cigarettes     Passive exposure: Past    Smokeless tobacco: Never    Tobacco comments:     2-3 cigarettes/day. Done with cigarettes. Vaping a little   Substance and Sexual Activity    Alcohol use: Not Currently     Comment: stopped in 2005    Drug use: Not Currently     Frequency: 2.0 times per week     Types: Marijuana, Cocaine     Comment: pt stated last time was 10/20/24 and will stop until after sx    Sexual activity: Not Currently     Partners: Male     Birth control/protection: None     Comment: Abstinence     Social Drivers of Health     Financial Resource Strain: Low Risk  (10/29/2024)    Overall Financial Resource Strain (CARDIA)     Difficulty of Paying Living Expenses: Not very hard   Food Insecurity: No Food Insecurity (10/29/2024)    Hunger Vital Sign     Worried About Running Out of Food in the Last Year: Never true     Ran Out of Food in the Last Year: Never true   Transportation Needs: No Transportation Needs (10/29/2024)    TRANSPORTATION NEEDS     Transportation : No   Stress: No Stress Concern Present (10/29/2024)    [employer] - Occupational Stress Questionnaire     Feeling of Stress : Only a little   Housing Stability: Unknown (10/29/2024)    Housing Stability Vital Sign     Unable to Pay for Housing in the Last Year: No     Homeless in the Last Year: No       Current Medications[1]    Review of patient's allergies indicates:   Allergen Reactions    Adhesive      Other reaction(s): skin tears easily with adhesive    Cephalexin Nausea And Vomiting    Erythromycin Nausea And Vomiting    Lithium Nausea And Vomiting    Naproxen Nausea And Vomiting      Vitals:    02/17/25 0938   BP: 130/80   BP Location: Left arm   Patient Position: Sitting   Pulse: 74   Weight: 68 kg (150 lb)   Height: 5' 3" (1.6 m)      Review of Systems  12 point ROS performed and negative unless otherwise documented in HPI  Objective:     Neurologic Exam      Mental " Status   Oriented to person, place, and time.   Speech: speech is normal   Level of consciousness: alert     Cranial Nerves   Cranial nerves II through XII intact.      Motor Exam   Muscle bulk: normal     Strength   Strength 5/5 throughout.      Sensory Exam   Light touch normal.      Gait, Coordination, and Reflexes      Gait  Gait: normal; no current dragging of left leg noted     Physical Exam  Vitals and nursing note reviewed.   Cardiovascular:      Rate and Rhythm: Normal rate.   Pulmonary:      Effort: Pulmonary effort is normal.   Neurological:      Mental Status: She is oriented to person, place, and time.      Cranial Nerves: Cranial nerves 2-12 are intact.      Motor: Motor strength is normal.      Gait: Gait is intact.   Psychiatric:         Speech: Speech normal.     Assessment:     1. Transient ischemic attack (TIA)    2. Tremor due to multiple drugs        Plan:   Discussed that tremor/abnormal movement can be coming from multiple psychotropic agents; she is to continue management of meds per psych but may need to consider treatment for tardive dyskinesia  Continue statin; goal LDL <70.  Goal A1C <7.0; continue management per endo  Secondary stroke prevention measures discussed. Education provided on signs and symptoms of stroke; advised to call 9-1-1 with any new onset of numbness, tingling or weakness, difficulty with speech or facial droop.              [1]   Current Outpatient Medications   Medication Sig Dispense Refill    albuterol (PROVENTIL/VENTOLIN HFA) 90 mcg/actuation inhaler Inhale 2 puffs into the lungs.      atorvastatin (LIPITOR) 40 MG tablet 1 tablet Orally Once a day      azelastine (ASTELIN) 137 mcg (0.1 %) nasal spray 1 puff in each nostril Nasally Twice a day for 30 day(s)      benztropine (COGENTIN) 1 MG tablet Take 1 mg by mouth 2 (two) times daily.      blood-glucose meter (CONTOUR NEXT METER) Misc as directed in vitro three times daily for 365 days      buPROPion (WELLBUTRIN  XL) 300 MG 24 hr tablet 1 tablet in the morning Orally Once a day      carBAMazepine (TEGRETOL XR) 400 MG Tb12 Take 400 mg by mouth 2 (two) times daily.      cariprazine (VRAYLAR) 4.5 mg Cap 1 capsule.      celecoxib (CELEBREX) 200 MG capsule Take by mouth.      clonazePAM (KLONOPIN) 0.5 MG tablet 1 tablet Orally twice a day      CONTOUR NEXT TEST STRIPS Strp 3 (three) times daily. Use to test blood glucose      dexlansoprazole (DEXILANT) 60 mg capsule 1 capsule.      DULoxetine (CYMBALTA) 60 MG capsule 1 capsule.      empagliflozin (JARDIANCE) 25 mg tablet Jardiance 25 mg tablet, [RxNorm: 6964684]      estradioL (ESTRACE) 0.5 MG tablet 1 tablet Orally Once a day      famotidine (PEPCID) 20 MG tablet 1 tablet at bedtime Orally Once a day      fenofibrate 160 MG Tab Take 1 tablet by mouth once daily.      fluticasone propionate (FLONASE) 50 mcg/actuation nasal spray 1 spray by Each Nostril route nightly.      fluticasone-umeclidin-vilanter (TRELEGY ELLIPTA) 100-62.5-25 mcg DsDv Inhale 1 puff into the lungs once daily.      furosemide (LASIX) 20 MG tablet 1 tablet Orally Once a day      hydrOXYzine pamoate (VISTARIL) 25 MG Cap Take 25 mg by mouth Daily. Per pt reports 1 the am and 2 at bedside      icosapent ethyL (VASCEPA) 1 gram Cap 2 capsules with meals Orally Twice a day      INVEGA SUSTENNA 234 mg/1.5 mL Syrg injection Inject into the muscle.      linaCLOtide (LINZESS) 145 mcg Cap capsule Take 145 mcg by mouth before breakfast.      loratadine (CLARITIN) 10 mg tablet 1 tablet Orally Once a day for 30 day(s)      metFORMIN (GLUCOPHAGE) 500 MG tablet metFORMIN 500 mg tablet, [RxNorm: 232003]      methocarbamoL (ROBAXIN) 750 MG Tab Take 1 tablet (750 mg total) by mouth 3 (three) times daily. 40 tablet 2    metoprolol tartrate (LOPRESSOR) 25 MG tablet Take 12.5 mg by mouth once daily.      mupirocin (BACTROBAN) 2 % ointment Apply topically 2 (two) times daily.      paliperidone palm, 3 month, (INVEGA TRINZA) 546  mg/1.75 mL Syrg injection       polyethylene glycol (GLYCOLAX) 17 gram/dose powder Take 17 g by mouth as needed.      pregabalin (LYRICA) 150 MG capsule Take 150 mg by mouth 3 (three) times daily.      promethazine (PHENERGAN) 25 MG tablet 1 tablet as needed Orally every 12 hrs      RESTASIS 0.05 % ophthalmic emulsion Place 1 drop into both eyes 2 (two) times daily.      topiramate (TOPAMAX) 200 MG Tab 1 tablet Orally Twice a day      valACYclovir (VALTREX) 500 MG tablet Take 500 mg by mouth 2 (two) times daily.      zaleplon (SONATA) 5 MG Cap 1 capsule at bedtime as needed Orally Once a day       No current facility-administered medications for this visit.

## 2025-03-27 ENCOUNTER — TELEPHONE (OUTPATIENT)
Dept: NEUROSURGERY | Facility: CLINIC | Age: 60
End: 2025-03-27

## 2025-03-31 NOTE — PROGRESS NOTES
[facility]  History & Physical  Neurosurgery      Faith Jin   [id number]   1965       SUBJECTIVE:     CHIEF COMPLAINT:  Postoperative follow up    HPI:    Ms. Jin is a 59 y.o. right-handed female who has a past medical history significant for hypertension, TIA, COPD, neuropathy, bipolar disorder, schizoaffective disorder, depression, as well as methamphetamine and marijuana use. She is s/p C3-4, C4-5 ACDF with Dr. Bingham on 10/28/2024.    She is also s/p fall after syncopal episode on 3/28/2024. Prior to surgical intervention, she complained of mild neck pain without radiating arm pain. Her neck pain gradually improved over time. She has chronic left-sided numbness and weakness after a TIA in 2022. She has tried tylenol, Toradol, and bracing. Due to anterior spondylolisthesis at C3-4 and C4-5 along with persistent widening of her right C4-5 facet joint even with prolonged bracing of her cervical spine she was deemed a candidate for C3-4, C4-5 ACDF.    She presents today as a follow-up patient. She is now 5 months postop. During her last visit to the neurosurgery clinic she was referred to outpatient physical therapy. She had her initial evaluation with [facility] on 01/28/2025 but never returned due to family/home life issues. She reports today that she has been compliant with the BGS, however, she cannot find her  for it right now, believes that it has been taken from her home. She still wears a soft cervical collar as needed for comfort. Reports full resolution of neck pain with a pain rating of 0/10 today. Continues with posterior cervical tightness. Full ROM. Discomfort when looking left and right with strain coming form posterior neck. Mild decreased sensation to left fingertips. Denies dropping objects. Denies any new onset weakness, tingling or numbness. The patient denies bowel or bladder dysfunction.  Denies difficulty with balance.    Continues to complain of lower  back pain. Reports that her lower back pain is chronic. Located primarily to right lower back. Radiates into right lateral thigh. Occasional burning sensation. Denies taking anything for the pain. She is prescribed Celebrex, Cymbalta, lyrica, and tizanidine,  Has not had any updated imaging of lumbar spine since 04/15/2024.     Of note, she is feeling lost because she is losing her psychiatrist due to longterm to take care of her son's health. She does report that she will begin seeing the NP who is associated with the office but is distraught over losing her psychiatrist of many years.       Patient Active Problem List    Diagnosis Date Noted    Acquired spondylolisthesis of cervical vertebra 10/28/2024    Left-sided weakness 09/13/2022    TIA (transient ischemic attack) 07/10/2022    Generalized weakness 07/06/2022    Type II diabetes mellitus 07/06/2022     Past Medical History:   Diagnosis Date    Acquired spondylolisthesis of cervical vertebra     Ambulates with cane     Anxiety     Bipolar disorder     w/manic depression as per pt    Carpal tunnel syndrome     COPD (chronic obstructive pulmonary disease)     Depression     Diabetes mellitus     Digestive disorder     GERD    Dry eyes     Headache     Hyperlipidemia     Hypertension     Insomnia     Lumbar degenerative disc disease     Mixed hyperlipidemia     Muscle spasms of both lower extremities     Muscle spasms of neck     Neuropathy     Personal history of fall     PONV (postoperative nausea and vomiting)     Right hip pain     radiates down to ankle    Seizure-like activity 03/31/2024    no further activity since 03    TIA (transient ischemic attack) 2022    Ulnar nerve external compression syndrome      Past Surgical History:   Procedure Laterality Date    ANTERIOR CERVICAL DISCECTOMY W/ FUSION N/A 10/28/2024    Procedure: DISCECTOMY, SPINE, CERVICAL, ANTERIOR APPROACH, WITH FUSION;  Surgeon: Chana Bingham MD;  Location: [facility];  Service:  Neurosurgery;  Laterality: N/A;  C3-4, C4-5 ACDF // NTI // TIVA // [employer] //  XX    ANTERIOR CRUCIATE LIGAMENT REPAIR Left 1993    CARPAL TUNNEL RELEASE Right 05/05/2023    Procedure: RELEASE, CARPAL TUNNEL;  Surgeon: Canelo Bhatt MD;  Location: [facility] OR;  Service: Orthopedics;  Laterality: Right;    FRACTURE SURGERY  2017    Mauro Cárdenas    JOINT REPLACEMENT  2016    revision total knee ledt    NECK SURGERY  10/2024    SHOULDER ARTHROSCOPY W/ ROTATOR CUFF REPAIR Left     TOTAL KNEE ARTHROPLASTY Left     X2    ULNAR NERVE TRANSPOSITION Right 05/05/2023    Procedure: TRANSPOSITION, NERVE, ULNAR  Endoscopic;  Surgeon: Canelo Bhatt MD;  Location: [facility] OR;  Service: Orthopedics;  Laterality: Right;  Endoscopic    WRIST SURGERY Right        Current Outpatient Medications:     albuterol (PROVENTIL/VENTOLIN HFA) 90 mcg/actuation inhaler, Inhale 2 puffs into the lungs., Disp: , Rfl:     atorvastatin (LIPITOR) 40 MG tablet, 1 tablet Orally Once a day, Disp: , Rfl:     azelastine (ASTELIN) 137 mcg (0.1 %) nasal spray, 1 puff in each nostril Nasally Twice a day for 30 day(s), Disp: , Rfl:     benztropine (COGENTIN) 1 MG tablet, Take 1 mg by mouth 2 (two) times daily., Disp: , Rfl:     blood-glucose meter (CONTOUR NEXT METER) Misc, as directed in vitro three times daily for 365 days, Disp: , Rfl:     buPROPion (WELLBUTRIN XL) 300 MG 24 hr tablet, 1 tablet in the morning Orally Once a day, Disp: , Rfl:     carBAMazepine (TEGRETOL XR) 400 MG Tb12, Take 400 mg by mouth 2 (two) times daily., Disp: , Rfl:     cariprazine (VRAYLAR) 4.5 mg Cap, 1 capsule., Disp: , Rfl:     celecoxib (CELEBREX) 200 MG capsule, Take by mouth., Disp: , Rfl:     clonazePAM (KLONOPIN) 0.5 MG tablet, 1 tablet Orally twice a day, Disp: , Rfl:     CONTOUR NEXT TEST STRIPS Strp, 3 (three) times daily. Use to test blood glucose, Disp: , Rfl:     dexlansoprazole (DEXILANT) 60 mg capsule, 1 capsule., Disp: , Rfl:     DULoxetine (CYMBALTA) 60 MG capsule, 1  capsule., Disp: , Rfl:     empagliflozin (JARDIANCE) 25 mg tablet, Jardiance 25 mg tablet, [RxNorm: 5458631], Disp: , Rfl:     estradioL (ESTRACE) 0.5 MG tablet, 1 tablet Orally Once a day, Disp: , Rfl:     famotidine (PEPCID) 20 MG tablet, 1 tablet at bedtime Orally Once a day, Disp: , Rfl:     fenofibrate 160 MG Tab, Take 1 tablet by mouth once daily., Disp: , Rfl:     fluticasone propionate (FLONASE) 50 mcg/actuation nasal spray, 1 spray by Each Nostril route nightly., Disp: , Rfl:     fluticasone-umeclidin-vilanter (TRELEGY ELLIPTA) 100-62.5-25 mcg DsDv, Inhale 1 puff into the lungs once daily., Disp: , Rfl:     furosemide (LASIX) 20 MG tablet, 1 tablet Orally Once a day, Disp: , Rfl:     hydrOXYzine pamoate (VISTARIL) 25 MG Cap, Take 25 mg by mouth Daily. Per pt reports 1 the am and 2 at bedside, Disp: , Rfl:     icosapent ethyL (VASCEPA) 1 gram Cap, 2 capsules with meals Orally Twice a day, Disp: , Rfl:     INVEGA SUSTENNA 234 mg/1.5 mL Syrg injection, Inject into the muscle., Disp: , Rfl:     linaCLOtide (LINZESS) 145 mcg Cap capsule, Take 145 mcg by mouth before breakfast., Disp: , Rfl:     loratadine (CLARITIN) 10 mg tablet, 1 tablet Orally Once a day for 30 day(s), Disp: , Rfl:     metFORMIN (GLUCOPHAGE) 500 MG tablet, metFORMIN 500 mg tablet, [RxNorm: 429108], Disp: , Rfl:     methocarbamoL (ROBAXIN) 750 MG Tab, Take by mouth., Disp: , Rfl:     metoprolol succinate (TOPROL-XL) 25 MG 24 hr tablet, Take 12.5 mg by mouth., Disp: , Rfl:     metoprolol tartrate (LOPRESSOR) 25 MG tablet, Take 12.5 mg by mouth once daily., Disp: , Rfl:     mupirocin (BACTROBAN) 2 % ointment, Apply topically 2 (two) times daily., Disp: , Rfl:     paliperidone palm, 3 month, (INVEGA TRINZA) 546 mg/1.75 mL Syrg injection, , Disp: , Rfl:     polyethylene glycol (GLYCOLAX) 17 gram/dose powder, Take 17 g by mouth as needed., Disp: , Rfl:     pregabalin (LYRICA) 150 MG capsule, Take 150 mg by mouth 3 (three) times daily., Disp: , Rfl:      promethazine (PHENERGAN) 25 MG tablet, 1 tablet as needed Orally every 12 hrs, Disp: , Rfl:     RESTASIS 0.05 % ophthalmic emulsion, Place 1 drop into both eyes 2 (two) times daily., Disp: , Rfl:     tiZANidine (ZANAFLEX) 4 MG tablet, Take 4 mg by mouth every 8 (eight) hours as needed., Disp: , Rfl:     topiramate (TOPAMAX) 200 MG Tab, 1 tablet Orally Twice a day, Disp: , Rfl:     valACYclovir (VALTREX) 500 MG tablet, Take 500 mg by mouth 2 (two) times daily., Disp: , Rfl:     zaleplon (SONATA) 5 MG Cap, 1 capsule at bedtime as needed Orally Once a day, Disp: , Rfl:     Review of patient's allergies indicates:   Allergen Reactions    Adhesive      Other reaction(s): skin tears easily with adhesive    Amoxicillin      Other Reaction(s): Unknown    Latex Other (See Comments)    Cephalexin Nausea And Vomiting    Erythromycin Nausea And Vomiting    Lithium Nausea And Vomiting    Naproxen Nausea And Vomiting       Social History     Tobacco Use    Smoking status: Every Day     Current packs/day: 0.25     Average packs/day: 0.3 packs/day for 42.0 years (10.5 ttl pk-yrs)     Types: Vaping with nicotine, Cigarettes     Passive exposure: Past    Smokeless tobacco: Never    Tobacco comments:     2-3 cigarettes/day. Done with cigarettes. Vaping a little   Substance Use Topics    Alcohol use: Not Currently     Comment: stopped in 2005     Occupation: Medically disabled since 1995 due to her mental health conditions including schizoaffective disorder and bipolar disorder.  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       Family History   Problem Relation Name Age of Onset    Coronary artery disease Mother Shobha Jin     Diabetes Mother Shobha Jin     Arthritis Mother Shobha Jin         Back , hands    Early death Mother Shobha Jin         Diabetes    Heart disease Mother Shobha Jin     Hypertension Mother Shobha Jin     Kidney disease Mother Shobha Jin     Stroke Mother Shobha Jin   "   Vision loss Mother Shobha Jin     Coronary artery disease Father Cesar     Asthma Father Cesar         Back    Heart disease Father Cesar     Hypertension Father Cesar     Vision loss Father Cesar     Arthritis Sister Hailey         Not sure    Asthma Sister Hailey         Finger, shoulder    Mental illness Sister Hailey     Vision loss Sister Hailey     Arthritis Brother Zacarias         Hip    Birth defects Brother Zacarias         Special Needs/ Downs Syndrome    Heart disease Brother Zacarias     Learning disabilities Brother Zacarias     Intellectual disability Brother Zacarias        ROS:  Constitutional:  Negative for chills and fever.   HENT:  Negative for congestion and sore throat.    Eyes:  Negative for blurred vision and double vision.   Respiratory:  Negative for cough and shortness of breath.    Cardiovascular:  Negative for chest pain and palpitations.   Gastrointestinal:  Negative for constipation, diarrhea, nausea and vomiting.   Musculoskeletal:  Positive for back pain and neck pain.   Neurological: Positive for sensory change and negative for headaches and focal weakness.   Endo/Heme/Allergies:  Does not bruise/bleed easily.   Psychiatric/Behavioral:  Positive for depression and anxiety.    ROS    OBJECTIVE:     Visit Vitals  /73 (BP Location: Left arm, Patient Position: Sitting)   Pulse 71   Resp 16   Ht 5' 3" (1.6 m)   Wt 68.9 kg (152 lb)   LMP  (LMP Unknown)   BMI 26.93 kg/m²        Physical Exam    Body Habitus: Normal    Physical Exam:  Constitutional: The patient is well-developed and cooperative, sitting comfortably in a chair    Mental Status:   Oriented to person, place, and time  Normal speech    Cranial nerves:  CN II: PERRL, 4 to 3 mm, brisk bilaterally  CN III, IV, and VI: extraocular movements normal, no ptosis  CN V: normal facial sensation and masseter function  CN VII: facial strength normal and symmetrical  CN VIII: hearing normal bilaterally  CN IX and X: swallowing and phonation " normal  CN XI: shoulder shrug intact bilaterally  CN XII: tongue protrusion midline    Motor:  Muscle bulk: normal in all extremities  Tone: normal in all extremities.  No pronator drift    Upper extremities:  Deltoid: right 5/5; left 5/5  Biceps: right 5/5; left 5/5  Triceps: right 5/5; left 5/5  Wrist extensors: right 5/5; left 5/5  Wrist flexors: right 5/5; left 5/5  : right 5/5; left 5/5  Interosseous muscles: right 5/5; left 5/5     Lower extremities:  Hip flexors: right 5/5; left 5/5  Knee extensors: right 5/5; left 5/5  Knee flexors: right 5/5; left 5/5  Foot dorsiflexors: right 5/5; left 5/5  Foot plantar flexors: right 5/5; left 5/5  Extensor hallucis longus: right 5/5; left 5/5    Sensation:  Normal to light touch x 4 extremities  With exception of decreased sensation to L fingertips     Reflexes:  Biceps: right 1+/4; left 1+/4  Brachioradialis: right 1+/4; left 1+/4  Triceps: right 1+/4; left 1+/4  Knee: right 0/4; left 0/4  Ankle: right 0/4; left 0/4    Ordoñezs sign: right negative; left negative  Babinski: right downgoing; left downgoing  Clonus: right negative; left negative     Coordination:  Finger to nose: right normal; left normal     Musculoskeletal:     Anterior cervical incision is well healed     Gait: normal     Straight leg test: right negative; left negative     Cervical: Muscle tightness and tenderness with palpation to posterior cervical   Upper back: No pain with palpation  Lower back: No pain with palpation      Imaging:  All pertinent neuroimaging independently reviewed. Discussed these findings in detail with the patient.    XR of cervical spine completed 01/14/2025 reveals stable postoperative changes with anterior fusion hardware present from C3 to C5. Chronic grade 1 anterolisthesis of C2 on C3, C3 on C4, and C4 on C5 is grossly unchanged. No evidence of hardware failure or loosening. No abnormal movement with flexion and extension. No evidence of instability.     CT of  cervical spine completed 01/14/2025 stable postoperative changes with anterior fusion hardware present from C3 to C5. Multilevel degenerative changes including anterior bridging osteophytes at C5-C6 and C6-C7. No evidence of hardware failure or loosening.     XR lumbar spine completed 04/19/2024 when compared to lumbar spine x-rays on 12/05/2023, revealed stable Grade I anterolisthesis of L4 on L5 with no motion on flexion-extension views.     MRI lumbar spine without gadolinium- completed 03/28/2024 revealed Grade I anterior spondylolisthesis of L4 on L5.  A L4-5 disc bulge contributes to moderate stenosis with left-greater-than-right neuroforaminal narrowing.  There is fluid in the bilateral L4-5 facets         ASSESSMENT/PLAN:     There are no diagnoses linked to this encounter.    Encounter Diagnoses   Name Primary?    Status post cervical spinal fusion     Spasm of cervical paraspinous muscle Yes    Acquired spondylolisthesis of cervical vertebra     Lumbar spondylosis     Chronic bilateral low back pain with bilateral sciatica        Orders Placed This Encounter   Procedures    X-Ray Lumbar Complete Including Flex And Ext    Ambulatory Referral/Consult to Physical Therapy        Faith Ramane Annabel presents today for follow up after competing PT.  Patient presented for initial evaluation but never returned for to start her plan of care. Symptoms are better since surgery. Muscle tightness remains to posterior neck. Continues to complain of lower back pain. Did take the time to review imaging completed a year ago. Will order imaging for follow up visit.  Encouraged to continue with BGS as long as she can tolerate it and was instructed to by the rep Joseph. Ambulatory referral provided to patient to attend [facility] for physical therapy as this is closer to her home. Discussed other ways to improve her muscle health such as rotating ice and heat, massage, TENs unit and stretching exercises. She is encouraged  to continue to take Tylenol as needed for discomfort.   Encouraged to return to baseline activity. Patient verbalized understanding. Ms. Jin will follow up in 3 months after completing more physical therapy. We will also order updated imaging of her lower back at that time.       Follow up in about 3 months (around 7/3/2025).      This note will be sent to the patient's referring provider No ref. provider found and primary care provider Garrick Nascimento MD.        Antoinette Huffman, KRISTI-C  Neurosurgery  [facility]      Disclaimer:  This note is prepared using voice recognition software and as such is likely to have errors despite attempts at proofreading. Please contact me for questions.

## 2025-04-03 ENCOUNTER — OFFICE VISIT (OUTPATIENT)
Dept: NEUROSURGERY | Facility: CLINIC | Age: 60
End: 2025-04-03
Payer: MEDICARE

## 2025-04-03 VITALS
SYSTOLIC BLOOD PRESSURE: 109 MMHG | HEIGHT: 63 IN | HEART RATE: 71 BPM | RESPIRATION RATE: 16 BRPM | DIASTOLIC BLOOD PRESSURE: 73 MMHG | WEIGHT: 152 LBS | BODY MASS INDEX: 26.93 KG/M2

## 2025-04-03 DIAGNOSIS — Z98.1 STATUS POST CERVICAL SPINAL FUSION: ICD-10-CM

## 2025-04-03 DIAGNOSIS — M43.12 ACQUIRED SPONDYLOLISTHESIS OF CERVICAL VERTEBRA: ICD-10-CM

## 2025-04-03 DIAGNOSIS — M54.42 CHRONIC BILATERAL LOW BACK PAIN WITH BILATERAL SCIATICA: ICD-10-CM

## 2025-04-03 DIAGNOSIS — M47.816 LUMBAR SPONDYLOSIS: ICD-10-CM

## 2025-04-03 DIAGNOSIS — G89.29 CHRONIC BILATERAL LOW BACK PAIN WITH BILATERAL SCIATICA: ICD-10-CM

## 2025-04-03 DIAGNOSIS — M62.838 SPASM OF CERVICAL PARASPINOUS MUSCLE: Primary | ICD-10-CM

## 2025-04-03 DIAGNOSIS — M54.41 CHRONIC BILATERAL LOW BACK PAIN WITH BILATERAL SCIATICA: ICD-10-CM

## 2025-04-03 PROCEDURE — 3074F SYST BP LT 130 MM HG: CPT | Mod: CPTII,,,

## 2025-04-03 PROCEDURE — 99215 OFFICE O/P EST HI 40 MIN: CPT | Mod: ,,,

## 2025-04-03 PROCEDURE — 1159F MED LIST DOCD IN RCRD: CPT | Mod: CPTII,,,

## 2025-04-03 PROCEDURE — 3078F DIAST BP <80 MM HG: CPT | Mod: CPTII,,,

## 2025-04-03 PROCEDURE — 3008F BODY MASS INDEX DOCD: CPT | Mod: CPTII,,,

## 2025-04-03 PROCEDURE — 1160F RVW MEDS BY RX/DR IN RCRD: CPT | Mod: CPTII,,,

## 2025-04-19 ENCOUNTER — HOSPITAL ENCOUNTER (EMERGENCY)
Facility: HOSPITAL | Age: 60
Discharge: HOME | End: 2025-04-19
Attending: STUDENT IN AN ORGANIZED HEALTH CARE EDUCATION/TRAINING PROGRAM
Payer: MEDICARE

## 2025-04-19 VITALS
BODY MASS INDEX: 26.82 KG/M2 | TEMPERATURE: 98 F | HEIGHT: 62 IN | HEART RATE: 78 BPM | WEIGHT: 145.75 LBS | OXYGEN SATURATION: 99 % | RESPIRATION RATE: 18 BRPM | SYSTOLIC BLOOD PRESSURE: 146 MMHG | DIASTOLIC BLOOD PRESSURE: 84 MMHG

## 2025-04-19 DIAGNOSIS — R11.10 VOMITING: ICD-10-CM

## 2025-04-19 DIAGNOSIS — S70.11XA CONTUSION OF RIGHT HIP AND THIGH, INITIAL ENCOUNTER: ICD-10-CM

## 2025-04-19 DIAGNOSIS — S80.02XA CONTUSION OF LEFT KNEE, INITIAL ENCOUNTER: ICD-10-CM

## 2025-04-19 DIAGNOSIS — S70.01XA CONTUSION OF RIGHT HIP AND THIGH, INITIAL ENCOUNTER: ICD-10-CM

## 2025-04-19 DIAGNOSIS — M25.562 LEFT KNEE PAIN: ICD-10-CM

## 2025-04-19 DIAGNOSIS — F19.10 SUBSTANCE ABUSE: ICD-10-CM

## 2025-04-19 DIAGNOSIS — M25.551 ACUTE HIP PAIN, RIGHT: ICD-10-CM
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
## 2025-04-19 LAB
ACCEPTIBLE SP GR UR QL: 1.02 (ref 1–1.03)
ALBUMIN SERPL-MCNC: 4.2 G/DL (ref 3.4–4.8)
ALBUMIN/GLOB SERPL: 1.7 RATIO (ref 1.1–2)
ALP SERPL-CCNC: 97 UNIT/L (ref 40–150)
ALT SERPL-CCNC: 8 UNIT/L (ref 0–55)
AMMONIA PLAS-MSCNC: 24.2 UMOL/L (ref 18–72)
AMPHET UR QL SCN: POSITIVE
ANION GAP SERPL CALC-SCNC: 13 MEQ/L
AST SERPL-CCNC: 11 UNIT/L (ref 11–45)
BACTERIA #/AREA URNS AUTO: ABNORMAL /HPF
BARBITURATE SCN PRESENT UR: NEGATIVE
BASOPHILS # BLD AUTO: 0.04 X10(3)/MCL
BASOPHILS NFR BLD AUTO: 0.7 %
BENZODIAZ UR QL SCN: NEGATIVE
BILIRUB SERPL-MCNC: 0.4 MG/DL
BILIRUB UR QL STRIP.AUTO: ABNORMAL
BUN SERPL-MCNC: 12.1 MG/DL (ref 9.8–20.1)
CALCIUM SERPL-MCNC: 8.9 MG/DL (ref 8.4–10.2)
CANNABINOIDS UR QL SCN: POSITIVE
CHLORIDE SERPL-SCNC: 113 MMOL/L (ref 98–107)
CLARITY UR: CLEAR
CO2 SERPL-SCNC: 18 MMOL/L (ref 23–31)
COCAINE UR QL SCN: NEGATIVE
COLOR UR AUTO: YELLOW
CREAT SERPL-MCNC: 0.68 MG/DL (ref 0.55–1.02)
CREAT/UREA NIT SERPL: 18
EOSINOPHIL # BLD AUTO: 0.14 X10(3)/MCL (ref 0–0.9)
EOSINOPHIL NFR BLD AUTO: 2.3 %
ERYTHROCYTE [DISTWIDTH] IN BLOOD BY AUTOMATED COUNT: 19.9 % (ref 11.5–17)
ETHANOL SERPL-MCNC: <10 MG/DL
FENTANYL UR QL SCN: NEGATIVE
GFR SERPLBLD CREATININE-BSD FMLA CKD-EPI: >60 ML/MIN/1.73/M2
GLOBULIN SER-MCNC: 2.5 GM/DL (ref 2.4–3.5)
GLUCOSE SERPL-MCNC: 137 MG/DL (ref 82–115)
GLUCOSE UR QL STRIP: >=1000
HCT VFR BLD AUTO: 40.4 % (ref 37–47)
HGB BLD-MCNC: 12.4 G/DL (ref 12–16)
HGB UR QL STRIP: NEGATIVE
IMM GRANULOCYTES # BLD AUTO: 0.01 X10(3)/MCL (ref 0–0.04)
IMM GRANULOCYTES NFR BLD AUTO: 0.2 %
KETONES UR QL STRIP: 40
LEUKOCYTE ESTERASE UR QL STRIP: NEGATIVE
LIPASE SERPL-CCNC: 36 U/L
LYMPHOCYTES # BLD AUTO: 1.35 X10(3)/MCL (ref 0.6–4.6)
LYMPHOCYTES NFR BLD AUTO: 22 %
MAGNESIUM SERPL-MCNC: 2 MG/DL (ref 1.6–2.6)
MCH RBC QN AUTO: 23.4 PG (ref 27–31)
MCHC RBC AUTO-ENTMCNC: 30.7 G/DL (ref 33–36)
MCV RBC AUTO: 76.4 FL (ref 80–94)
MDMA UR QL SCN: NEGATIVE
MONOCYTES # BLD AUTO: 0.54 X10(3)/MCL (ref 0.1–1.3)
MONOCYTES NFR BLD AUTO: 8.8 %
NEUTROPHILS # BLD AUTO: 4.05 X10(3)/MCL (ref 2.1–9.2)
NEUTROPHILS NFR BLD AUTO: 66 %
NITRITE UR QL STRIP: NEGATIVE
NRBC BLD AUTO-RTO: 0 %
OHS QRS DURATION: 82 MS
OHS QTC CALCULATION: 464 MS
OPIATES UR QL SCN: NEGATIVE
PCP UR QL: NEGATIVE
PH UR STRIP: 6 [PH]
PH UR: 6 [PH] (ref 3–11)
PLATELET # BLD AUTO: 331 X10(3)/MCL (ref 130–400)
PMV BLD AUTO: 8.4 FL (ref 7.4–10.4)
POTASSIUM SERPL-SCNC: 3.5 MMOL/L (ref 3.5–5.1)
PROT SERPL-MCNC: 6.7 GM/DL (ref 5.8–7.6)
PROT UR QL STRIP: ABNORMAL
RBC # BLD AUTO: 5.29 X10(6)/MCL (ref 4.2–5.4)
RBC #/AREA URNS AUTO: ABNORMAL /HPF
SODIUM SERPL-SCNC: 144 MMOL/L (ref 136–145)
SP GR UR STRIP.AUTO: 1.02 (ref 1–1.03)
SQUAMOUS #/AREA URNS AUTO: ABNORMAL /HPF
TROPONIN I SERPL-MCNC: <0.01 NG/ML (ref 0–0.04)
TSH SERPL-ACNC: 1.76 UIU/ML (ref 0.35–4.94)
UROBILINOGEN UR STRIP-ACNC: 0.2
WBC # BLD AUTO: 6.13 X10(3)/MCL (ref 4.5–11.5)
WBC #/AREA URNS AUTO: ABNORMAL /HPF

## 2025-04-19 PROCEDURE — 85025 COMPLETE CBC W/AUTO DIFF WBC: CPT | Performed by: STUDENT IN AN ORGANIZED HEALTH CARE EDUCATION/TRAINING PROGRAM

## 2025-04-19 PROCEDURE — 81001 URINALYSIS AUTO W/SCOPE: CPT | Performed by: STUDENT IN AN ORGANIZED HEALTH CARE EDUCATION/TRAINING PROGRAM

## 2025-04-19 PROCEDURE — 80307 DRUG TEST PRSMV CHEM ANLYZR: CPT | Performed by: STUDENT IN AN ORGANIZED HEALTH CARE EDUCATION/TRAINING PROGRAM

## 2025-04-19 PROCEDURE — 96374 THER/PROPH/DIAG INJ IV PUSH: CPT

## 2025-04-19 PROCEDURE — 82077 ASSAY SPEC XCP UR&BREATH IA: CPT | Performed by: STUDENT IN AN ORGANIZED HEALTH CARE EDUCATION/TRAINING PROGRAM

## 2025-04-19 PROCEDURE — 93010 ELECTROCARDIOGRAM REPORT: CPT | Mod: ,,, | Performed by: STUDENT IN AN ORGANIZED HEALTH CARE EDUCATION/TRAINING PROGRAM

## 2025-04-19 PROCEDURE — 63600175 PHARM REV CODE 636 W HCPCS: Mod: JZ,TB | Performed by: STUDENT IN AN ORGANIZED HEALTH CARE EDUCATION/TRAINING PROGRAM

## 2025-04-19 PROCEDURE — 83690 ASSAY OF LIPASE: CPT | Performed by: STUDENT IN AN ORGANIZED HEALTH CARE EDUCATION/TRAINING PROGRAM

## 2025-04-19 PROCEDURE — 82140 ASSAY OF AMMONIA: CPT | Performed by: STUDENT IN AN ORGANIZED HEALTH CARE EDUCATION/TRAINING PROGRAM

## 2025-04-19 PROCEDURE — 93005 ELECTROCARDIOGRAM TRACING: CPT

## 2025-04-19 PROCEDURE — 80053 COMPREHEN METABOLIC PANEL: CPT | Performed by: STUDENT IN AN ORGANIZED HEALTH CARE EDUCATION/TRAINING PROGRAM

## 2025-04-19 PROCEDURE — 83735 ASSAY OF MAGNESIUM: CPT | Performed by: STUDENT IN AN ORGANIZED HEALTH CARE EDUCATION/TRAINING PROGRAM

## 2025-04-19 PROCEDURE — 84484 ASSAY OF TROPONIN QUANT: CPT | Performed by: STUDENT IN AN ORGANIZED HEALTH CARE EDUCATION/TRAINING PROGRAM

## 2025-04-19 PROCEDURE — 25000003 PHARM REV CODE 250: Performed by: STUDENT IN AN ORGANIZED HEALTH CARE EDUCATION/TRAINING PROGRAM

## 2025-04-19 PROCEDURE — 99285 EMERGENCY DEPT VISIT HI MDM: CPT | Mod: 25

## 2025-04-19 PROCEDURE — 84443 ASSAY THYROID STIM HORMONE: CPT | Performed by: STUDENT IN AN ORGANIZED HEALTH CARE EDUCATION/TRAINING PROGRAM

## 2025-04-19 RX ORDER — ONDANSETRON 4 MG/1
8 TABLET, ORALLY DISINTEGRATING ORAL
Status: COMPLETED | OUTPATIENT
Start: 2025-04-19 | End: 2025-04-19

## 2025-04-19 RX ORDER — KETOROLAC TROMETHAMINE 30 MG/ML
30 INJECTION, SOLUTION INTRAMUSCULAR; INTRAVENOUS
Status: COMPLETED | OUTPATIENT
Start: 2025-04-19 | End: 2025-04-19

## 2025-04-19 RX ADMIN — KETOROLAC TROMETHAMINE 30 MG: 30 INJECTION INTRAMUSCULAR; INTRAVENOUS at 03:04

## 2025-04-19 RX ADMIN — ONDANSETRON 8 MG: 4 TABLET, ORALLY DISINTEGRATING ORAL at 01:04

## 2025-04-19 NOTE — ED PROVIDER NOTES
Encounter Date: 4/19/2025       History     Chief Complaint   Patient presents with    Hip Pain     HPI    60-year-old female with a past medical history psychiatric condition, hypertension, hyperlipidemia, type 2 diabetes and obesity presents emergency for right hip pain and left knee pain after a fall.  Patient completely does not help with the interview whatsoever.  She keeps repeatedly saying that she does not know.  Does not know how she fell.  Does not know if just her hip and left knee hurts.  Does not know she hit her head.  States she has not been taking her medications like she is supposed to.  Supposedly did cocaine 2 days ago.    Review of patient's allergies indicates:   Allergen Reactions    Adhesive      Other reaction(s): skin tears easily with adhesive    Amoxicillin      Other Reaction(s): Unknown    Latex Other (See Comments)    Cephalexin Nausea And Vomiting    Erythromycin Nausea And Vomiting    Lithium Nausea And Vomiting    Naproxen Nausea And Vomiting     Past Medical History:   Diagnosis Date    Acquired spondylolisthesis of cervical vertebra     Ambulates with cane     Anxiety     Bipolar disorder     w/manic depression as per pt    Carpal tunnel syndrome     COPD (chronic obstructive pulmonary disease)     Depression     Diabetes mellitus     Digestive disorder     GERD    Dry eyes     Headache     Hyperlipidemia     Hypertension     Insomnia     Lumbar degenerative disc disease     Mixed hyperlipidemia     Muscle spasms of both lower extremities     Muscle spasms of neck     Neuropathy     Personal history of fall     PONV (postoperative nausea and vomiting)     Right hip pain     radiates down to ankle    Seizure-like activity 03/31/2024    no further activity since 03    TIA (transient ischemic attack) 2022    Ulnar nerve external compression syndrome      Past Surgical History:   Procedure Laterality Date    ANTERIOR CERVICAL DISCECTOMY W/ FUSION N/A 10/28/2024    Procedure:  DISCECTOMY, SPINE, CERVICAL, ANTERIOR APPROACH, WITH FUSION;  Surgeon: Chana Bingham MD;  Location: [facility] OR;  Service: Neurosurgery;  Laterality: N/A;  C3-4, C4-5 ACDF // NTI // TIVA // [employer] //  XX    ANTERIOR CRUCIATE LIGAMENT REPAIR Left 1993    CARPAL TUNNEL RELEASE Right 05/05/2023    Procedure: RELEASE, CARPAL TUNNEL;  Surgeon: Canelo Bhatt MD;  Location: [facility] OR;  Service: Orthopedics;  Laterality: Right;    FRACTURE SURGERY  2017    Mauro Cárdenas    JOINT REPLACEMENT  2016    revision total knee ledt    NECK SURGERY  10/2024    SHOULDER ARTHROSCOPY W/ ROTATOR CUFF REPAIR Left     TOTAL KNEE ARTHROPLASTY Left     X2    ULNAR NERVE TRANSPOSITION Right 05/05/2023    Procedure: TRANSPOSITION, NERVE, ULNAR  Endoscopic;  Surgeon: Canelo Bhatt MD;  Location: [facility] OR;  Service: Orthopedics;  Laterality: Right;  Endoscopic    WRIST SURGERY Right      Family History   Problem Relation Name Age of Onset    Coronary artery disease Mother Shobha Arnould     Diabetes Mother Shobha Arnould     Arthritis Mother Shobha Arnould         Back , hands    Early death Mother Shobha Arnould         Diabetes    Heart disease Mother Shobha Arnould     Hypertension Mother Shobha Arnould     Kidney disease Mother Shobha Arnould     Stroke Mother Shobha Arnould     Vision loss Mother Shobha Arnould     Coronary artery disease Father Cesar     Asthma Father Cesar         Back    Heart disease Father Cesar     Hypertension Father Cesar     Vision loss Father Cesar     Arthritis Sister Hailey         Not sure    Asthma Sister Hailey         Finger, shoulder    Mental illness Sister Hailey     Vision loss Sister Hailey     Arthritis Brother Zacarias         Hip    Birth defects Brother Zacarias         Special Needs/ Downs Syndrome    Heart disease Brother Zacarias     Learning disabilities Brother Zacarias     Intellectual disability Brother Zacarias      Social History[1]  Review of Systems   Constitutional:  Negative for fever.   Respiratory:   Negative for cough.    Cardiovascular:  Negative for chest pain.   Gastrointestinal:  Negative for abdominal pain, constipation, diarrhea, nausea and vomiting.   Musculoskeletal:  Positive for arthralgias.   Neurological:  Negative for headaches.   All other systems reviewed and are negative.      Physical Exam     Initial Vitals [04/19/25 0054]   BP Pulse Resp Temp SpO2   (!) 171/94 84 18 98.2 °F (36.8 °C) 98 %      MAP       --         Physical Exam    Nursing note and vitals reviewed.  Constitutional: She appears well-developed and well-nourished. No distress.   HENT:   Head: Atraumatic.   Cardiovascular:  Normal rate and regular rhythm.           Pulmonary/Chest: Breath sounds normal. No respiratory distress. She has no wheezes. She has no rhonchi. She has no rales.   Abdominal: Abdomen is soft. There is no abdominal tenderness. There is no rebound and no guarding.   Musculoskeletal:         General: Tenderness (mild tenderness to the right lateral hip with range of motion intact.  Diffuse tenderness to the left knee with old surgical incision) present. Normal range of motion.     Neurological: She is alert. She has normal strength. GCS score is 15. GCS eye subscore is 4. GCS verbal subscore is 5. GCS motor subscore is 6.   Oriented to person place but not year   Skin: Skin is warm. Capillary refill takes less than 2 seconds.         ED Course   Procedures  Labs Reviewed   COMPREHENSIVE METABOLIC PANEL - Abnormal       Result Value    Sodium 144      Potassium 3.5      Chloride 113 (*)     CO2 18 (*)     Glucose 137 (*)     Blood Urea Nitrogen 12.1      Creatinine 0.68      Calcium 8.9      Protein Total 6.7      Albumin 4.2      Globulin 2.5      Albumin/Globulin Ratio 1.7      Bilirubin Total 0.4      ALP 97      ALT 8      AST 11      eGFR >60      Anion Gap 13.0      BUN/Creatinine Ratio 18     URINALYSIS, REFLEX TO URINE CULTURE - Abnormal    Color, UA Yellow      Appearance, UA Clear      Specific [place],  UA 1.025      pH, UA 6.0      Protein, UA Trace (*)     Glucose, UA >=1000 (*)     Ketones, UA 40 (*)     Blood, UA Negative      Bilirubin, UA Small (*)     Urobilinogen, UA 0.2      Nitrites, UA Negative      Leukocyte Esterase, UA Negative     CBC WITH DIFFERENTIAL - Abnormal    WBC 6.13      RBC 5.29      Hgb 12.4      Hct 40.4      MCV 76.4 (*)     MCH 23.4 (*)     MCHC 30.7 (*)     RDW 19.9 (*)     Platelet 331      MPV 8.4      Neut % 66.0      Lymph % 22.0      Mono % 8.8      Eos % 2.3      Basophil % 0.7      Imm Grans % 0.2      Neut # 4.05      Lymph # 1.35      Mono # 0.54      Eos # 0.14      Baso # 0.04      Imm Gran # 0.01      NRBC% 0.0     ALCOHOL,MEDICAL (ETHANOL) - Normal    Ethanol Level <10.0     MAGNESIUM - Normal    Magnesium Level 2.00     TSH - Normal    TSH 1.761     AMMONIA - Normal    Ammonia Level 24.2     LIPASE - Normal    Lipase Level 36     TROPONIN I - Normal    Troponin-I <0.010     CBC W/ AUTO DIFFERENTIAL    Narrative:     The following orders were created for panel order CBC auto differential.  Procedure                               Abnormality         Status                     ---------                               -----------         ------                     CBC with Differential[[id number]]       Abnormal            Final result                 Please view results for these tests on the individual orders.   DRUG SCREEN, URINE (BEAKER)   URINALYSIS, MICROSCOPIC     EKG Readings: (Independently Interpreted)   Initial Reading: No STEMI. Rhythm: Normal Sinus Rhythm. Heart Rate: 86. Ectopy: No Ectopy. Conduction: Normal. ST Segments: Non-Specific ST Segment Depression. T Waves: Normal. Clinical Impression: Normal Sinus Rhythm       Imaging Results              CT Head Without Contrast (Preliminary result)  Result time 04/19/25 02:48:57      Preliminary result by Mauro Jackson MD (04/19/25 02:48:57)                   Narrative:    START OF REPORT:  Technique: CT of the head  was performed without intravenous contrast with axial as well as coronal and sagittal images.    Comparison: Comparison is with study [id number] 22:12:01.    Dosage Information: Automated Exposure Control was utilized 824.25 mGy.cm.    Clinical history: 60-year-old female with a past medical history psychiatric condition, hypertension, hyperlipidemia, type 2 diabetes and obesity presents for right hip pain and left knee pain after a fall.    Findings:  Hemorrhage: No acute intracranial hemorrhage is seen.  CSF spaces: The ventricles sulci and basal cisterns are within normal limits.  Brain parenchyma: Unremarkable with preservation of the grey white junction throughout. No acute infarct is identified.  Cerebellum: Unremarkable.  Vascular: Mild atheromatous calcification of the intracranial arteries is seen.  Sella and skull base: The sella appears to be within normal limits for age.  Intracranial calcifications: Incidental note is made of bilateral choroid plexus calcification. Incidental note is made of some pineal region calcification. Incidental note is made of some calcification of the falx.  Calvarium: No acute linear or depressed skull fracture is seen.    Maxillofacial Structures:  Paranasal sinuses: There is stable appearing opacification of the left sphenoid sinus with osteitis of the sinus walls. This is consistent with chronic sinusitis. Correlate clinically. The rest of the paranasal sinuses appear clear.  Orbits: The orbits appear unremarkable.  Zygomatic arches: The zygomatic arches are intact and unremarkable.  Temporal bones and mastoids: The temporal bones and mastoids appear unremarkable.  TMJ: The mandibular condyles appear normally placed with respect to the mandibular fossa.      Impression:  1. No acute intracranial process identified. No acute intracranial traumatic injury identified. Details and other findings as noted above.                                         X-Ray Hip 2 or 3 views  Right with Pelvis when performed (Preliminary result)  Result time 04/19/25 02:15:40      Wet Read by Jarrod Pitts MD (04/19/25 02:15:40, [facility] - Emergency Dept, Emergency Medicine)    No fractures appreciated                                     X-Ray Knee Complete 4 or More Views Left (Preliminary result)  Result time 04/19/25 02:16:03      Wet Read by Jarrod Pitts MD (04/19/25 02:16:03, [facility] - Emergency Dept, Emergency Medicine)    No acute abnormalities appreciated.  Orthopedic hardware in appropriate position                                     Medications   ketorolac injection 30 mg (has no administration in time range)   ondansetron disintegrating tablet 8 mg (8 mg Oral Given 4/19/25 0145)     Medical Decision Making  Initial Assessment:       fall      Differential Diagnosis:   Judging by the patient's chief complaint and pertinent history, the patient has the following possible differential diagnoses, including but not limited to the following.  Some of these are deemed to be lower likelihood and some more likely based on my physical exam and history combined with possible lab work and/or imaging studies.   Please see the pertinent studies, and refer to the HPI.  Some of these diagnoses will take further evaluation to fully rule out, perhaps as an outpatient and the patient was encouraged to follow up when discharged for more comprehensive evaluation.      Fall, contusion, sprain, fracture, head injury, electrolyte abnormality, psychiatric condition, UTI, substance abuse,  as well as multiple other possible etiologies      Problems Addressed:  Acute hip pain, right: acute illness or injury  Contusion of left knee, initial encounter: self-limited or minor problem  Contusion of right hip and thigh, initial encounter: self-limited or minor problem  Fall, initial encounter: acute illness or injury  Substance abuse: chronic illness or  injury    Amount and/or Complexity of Data Reviewed  Labs: ordered. Decision-making details documented in ED Course.  Radiology: ordered and independent interpretation performed.  ECG/medicine tests: ordered and independent interpretation performed.    Risk  Prescription drug management.  Diagnosis or treatment significantly limited by social determinants of health.               ED Course as of 04/19/25 0312   Sat Apr 19, 2025 0225 WBC: 6.13 [BS]   0226 Hemoglobin: 12.4 [BS]   0226 Hematocrit: 40.4 [BS]   0226 Platelet Count: 331 [BS]   0238 Ammonia: 24.2 [BS]   0311 Leukocyte Esterase, UA: Negative [BS]   0311 NITRITE UA: Negative [BS]   0311 X-rays are normal.  Patient able to ambulate with no difficulty.  Head CT was obtained just because she was very vague about the fall.  States recent drug use.  Informed patient to stop doing drugs.  Follow up with PCP/ortho. [BS]      ED Course User Index  [BS] Jarrod Pitts MD                           Clinical Impression:  Final diagnoses:  [M25.551] Acute hip pain, right  [M25.562] Left knee pain  [R11.10] Vomiting  [W19.XXXA] Fall, initial encounter (Primary)  [F19.10] Substance abuse  [S70.01XA, S70.11XA] Contusion of right hip and thigh, initial encounter  [S80.02XA] Contusion of left knee, initial encounter          ED Disposition Condition    Discharge Stable          ED Prescriptions    None       Follow-up Information       Follow up With Specialties Details Why Contact Info    Garrick Nascimento MD Family Medicine Schedule an appointment as soon as possible for a visit in 2 days  [address] [facility] [address] [phone]      [facility] - Emergency Dept Emergency Medicine Go to  If symptoms worsen [address]  [facility] [address] [phone]                 [1]   Social History  Tobacco Use    Smoking status: Every Day     Current packs/day: 0.25     Average packs/day: 0.3 packs/day for 42.0  years (10.5 ttl pk-yrs)     Types: Vaping with nicotine, Cigarettes     Passive exposure: Past    Smokeless tobacco: Never    Tobacco comments:     2-3 cigarettes/day. Done with cigarettes. Vaping a little   Substance Use Topics    Alcohol use: Not Currently     Comment: stopped in 2005    Drug use: Not Currently     Frequency: 2.0 times per week     Types: Marijuana, Cocaine     Comment: pt stated last time was 10/20/24 and will stop until after sx        Jarrod Pitts MD  04/19/25 0312

## 2025-04-23 ENCOUNTER — HOSPITAL ENCOUNTER (OUTPATIENT)
Dept: RADIOLOGY | Facility: CLINIC | Age: 60
Discharge: HOME | End: 2025-04-23
Payer: MEDICARE

## 2025-04-23 ENCOUNTER — OFFICE VISIT (OUTPATIENT)
Dept: ORTHOPEDICS | Facility: CLINIC | Age: 60
End: 2025-04-23
Payer: MEDICARE

## 2025-04-23 VITALS
HEIGHT: 62 IN | HEART RATE: 83 BPM | BODY MASS INDEX: 26.82 KG/M2 | DIASTOLIC BLOOD PRESSURE: 75 MMHG | SYSTOLIC BLOOD PRESSURE: 105 MMHG | WEIGHT: 145.75 LBS

## 2025-04-23 DIAGNOSIS — G56.01 RIGHT CARPAL TUNNEL SYNDROME: Primary | ICD-10-CM

## 2025-04-23 DIAGNOSIS — M19.131 SCAPHOLUNATE ADVANCED COLLAPSE OF RIGHT WRIST: ICD-10-CM

## 2025-04-23 DIAGNOSIS — G56.01 RIGHT CARPAL TUNNEL SYNDROME: ICD-10-CM

## 2025-04-23 PROCEDURE — 73130 X-RAY EXAM OF HAND: CPT | Mod: RT,,,

## 2025-04-23 RX ORDER — BETAMETHASONE SODIUM PHOSPHATE AND BETAMETHASONE ACETATE 3; 3 MG/ML; MG/ML
6 INJECTION, SUSPENSION INTRA-ARTICULAR; INTRALESIONAL; INTRAMUSCULAR; SOFT TISSUE
Status: DISCONTINUED | OUTPATIENT
Start: 2025-04-23 | End: 2025-04-23 | Stop reason: HOSPADM

## 2025-04-23 RX ORDER — LIDOCAINE HYDROCHLORIDE 10 MG/ML
1 INJECTION, SOLUTION INFILTRATION; PERINEURAL
Status: DISCONTINUED | OUTPATIENT
Start: 2025-04-23 | End: 2025-04-23 | Stop reason: HOSPADM

## 2025-04-23 RX ADMIN — BETAMETHASONE SODIUM PHOSPHATE AND BETAMETHASONE ACETATE 6 MG: 3; 3 INJECTION, SUSPENSION INTRA-ARTICULAR; INTRALESIONAL; INTRAMUSCULAR; SOFT TISSUE at 03:04

## 2025-04-23 RX ADMIN — LIDOCAINE HYDROCHLORIDE 1 ML: 10 INJECTION, SOLUTION INFILTRATION; PERINEURAL at 03:04

## 2025-04-23 NOTE — PROGRESS NOTES
"Clinic Note    Chief Complaint: Right wrist pain    History of present illness:    Patient is here today for right wrist pain.  She had a previous scapholunate ligament surgery many years ago.  She is developing pain in her wrist localized to the radial aspect of the wrist worse with motion.   She had a brace in the past but lost it so, she has not worn it in awhile. Her last steroid injection was about a year ago and she states she had relief. She would like another injection today.       Past Surgical History:   Procedure Laterality Date    ANTERIOR CERVICAL DISCECTOMY W/ FUSION N/A 10/28/2024    Procedure: DISCECTOMY, SPINE, CERVICAL, ANTERIOR APPROACH, WITH FUSION;  Surgeon: Chana Bingham MD;  Location: [facility];  Service: Neurosurgery;  Laterality: N/A;  C3-4, C4-5 ACDF // NTI // TIVA // [employer] //  XX    ANTERIOR CRUCIATE LIGAMENT REPAIR Left 1993    CARPAL TUNNEL RELEASE Right 05/05/2023    Procedure: RELEASE, CARPAL TUNNEL;  Surgeon: Canelo Bhatt MD;  Location: [facility] OR;  Service: Orthopedics;  Laterality: Right;    FRACTURE SURGERY  2017    Mauro Cárdenas    JOINT REPLACEMENT  2016    revision total knee ledt    NECK SURGERY  10/2024    SHOULDER ARTHROSCOPY W/ ROTATOR CUFF REPAIR Left     TOTAL KNEE ARTHROPLASTY Left     X2    ULNAR NERVE TRANSPOSITION Right 05/05/2023    Procedure: TRANSPOSITION, NERVE, ULNAR  Endoscopic;  Surgeon: Canelo Bhatt MD;  Location: [facility];  Service: Orthopedics;  Laterality: Right;  Endoscopic    WRIST SURGERY Right            Examination:    Vital Signs:    Vitals:    04/23/25 1436   BP: 105/75   Pulse: 83   Weight: 66.1 kg (145 lb 11.6 oz)   Height: 5' 2" (1.575 m)       Body mass index is 26.65 kg/m².    Constitution:   Well-developed, well nourished patient in no acute distress.  Neurological:   Alert and oriented x 3 and cooperative to examination.     Psychiatric/Behavior: Normal mental status.  Respiratory:   No shortness of breath.  Eyes:    Extraoccular muscles " intact  Skin:    No scars, rash or suspicious lesions.    MSK:   Right Upper Extremity:   She can make a fist and extend her digits. Tenderness to palpation over the radiocarpal joint at the radioscaphoid joint.  Radial pulses 2+ hand is warm well perfused. Mild swelling noted on dorsal aspect of wrist.    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  I will give her an injection to the radiocarpal joint today.  I will also give her a Velcro wrist brace.  She can follow up with me as needed    Follow Up:  As needed  Xray at next visit:  None

## 2025-04-23 NOTE — PROCEDURES
Intermediate Joint Aspiration/Injection: R radiocarpal    Date/Time: 4/23/2025 3:00 PM    Performed by: Kita Orlando PA-C  Authorized by: Kita Orlando PA-C    Consent Done?:  Yes (Verbal)  Indications:  Arthritis and pain  Timeout: Prior to procedure the correct patient, procedure, and site was verified      Location:  Wrist  Site:  R radiocarpal  Ultrasonic Guidance for needle placement: No  Needle size:  25 G  Approach:  Dorsal  Medications:  1 mL LIDOcaine HCL 10 mg/ml (1%) 10 mg/mL (1 %); 6 mg betamethasone acetate-betamethasone sodium phosphate 6 mg/mL  Patient tolerance:  Patient tolerated the procedure well with no immediate complications

## 2025-05-01 ENCOUNTER — HOSPITAL ENCOUNTER (INPATIENT)
Facility: HOSPITAL | Age: 60
LOS: 2 days | Discharge: HOME | DRG: 093 | End: 2025-05-04
Attending: EMERGENCY MEDICINE | Admitting: INTERNAL MEDICINE
Payer: MEDICARE

## 2025-05-01 DIAGNOSIS — R41.82 AMS (ALTERED MENTAL STATUS): Primary | ICD-10-CM

## 2025-05-01 DIAGNOSIS — R07.9 CHEST PAIN: ICD-10-CM

## 2025-05-01 LAB
ACCEPTIBLE SP GR UR QL: 1.01 (ref 1–1.03)
ALBUMIN SERPL-MCNC: 3.8 G/DL (ref 3.4–4.8)
ALBUMIN/GLOB SERPL: 1.4 RATIO (ref 1.1–2)
ALP SERPL-CCNC: 96 UNIT/L (ref 40–150)
ALT SERPL-CCNC: 11 UNIT/L (ref 0–55)
AMPHET UR QL SCN: POSITIVE
ANION GAP SERPL CALC-SCNC: 11 MEQ/L
AST SERPL-CCNC: 11 UNIT/L (ref 11–45)
BARBITURATE SCN PRESENT UR: NEGATIVE
BASOPHILS # BLD AUTO: 0.06 X10(3)/MCL
BASOPHILS NFR BLD AUTO: 0.9 %
BENZODIAZ UR QL SCN: NEGATIVE
BILIRUB SERPL-MCNC: 0.2 MG/DL
BILIRUB UR QL STRIP.AUTO: NEGATIVE
BNP BLD-MCNC: <10 PG/ML
BUN SERPL-MCNC: 12 MG/DL (ref 9.8–20.1)
CALCIUM SERPL-MCNC: 8.6 MG/DL (ref 8.4–10.2)
CANNABINOIDS UR QL SCN: POSITIVE
CHLORIDE SERPL-SCNC: 110 MMOL/L (ref 98–107)
CLARITY UR: CLEAR
CO2 SERPL-SCNC: 21 MMOL/L (ref 23–31)
COCAINE UR QL SCN: NEGATIVE
COLOR UR AUTO: ABNORMAL
CREAT SERPL-MCNC: 0.81 MG/DL (ref 0.55–1.02)
CREAT/UREA NIT SERPL: 15
EOSINOPHIL # BLD AUTO: 0.5 X10(3)/MCL (ref 0–0.9)
EOSINOPHIL NFR BLD AUTO: 7.5 %
ERYTHROCYTE [DISTWIDTH] IN BLOOD BY AUTOMATED COUNT: 19.2 % (ref 11.5–17)
ETHANOL SERPL-MCNC: <10 MG/DL
FENTANYL UR QL SCN: NEGATIVE
GFR SERPLBLD CREATININE-BSD FMLA CKD-EPI: >60 ML/MIN/1.73/M2
GLOBULIN SER-MCNC: 2.8 GM/DL (ref 2.4–3.5)
GLUCOSE SERPL-MCNC: 87 MG/DL (ref 82–115)
GLUCOSE UR QL STRIP: >=1000
HCT VFR BLD AUTO: 37.2 % (ref 37–47)
HGB BLD-MCNC: 11.6 G/DL (ref 12–16)
HGB UR QL STRIP: NEGATIVE
IMM GRANULOCYTES # BLD AUTO: 0.02 X10(3)/MCL (ref 0–0.04)
IMM GRANULOCYTES NFR BLD AUTO: 0.3 %
KETONES UR QL STRIP: NEGATIVE
LEUKOCYTE ESTERASE UR QL STRIP: NEGATIVE
LIPASE SERPL-CCNC: 38 U/L
LYMPHOCYTES # BLD AUTO: 2.67 X10(3)/MCL (ref 0.6–4.6)
LYMPHOCYTES NFR BLD AUTO: 39.8 %
MAGNESIUM SERPL-MCNC: 2.3 MG/DL (ref 1.6–2.6)
MCH RBC QN AUTO: 24.3 PG (ref 27–31)
MCHC RBC AUTO-ENTMCNC: 31.2 G/DL (ref 33–36)
MCV RBC AUTO: 77.8 FL (ref 80–94)
MDMA UR QL SCN: NEGATIVE
MONOCYTES # BLD AUTO: 0.59 X10(3)/MCL (ref 0.1–1.3)
MONOCYTES NFR BLD AUTO: 8.8 %
NEUTROPHILS # BLD AUTO: 2.87 X10(3)/MCL (ref 2.1–9.2)
NEUTROPHILS NFR BLD AUTO: 42.7 %
NITRITE UR QL STRIP: NEGATIVE
NRBC BLD AUTO-RTO: 0 %
OHS QRS DURATION: 90 MS
OHS QTC CALCULATION: 456 MS
OPIATES UR QL SCN: NEGATIVE
PCP UR QL: NEGATIVE
PH UR STRIP: 6 [PH]
PH UR: 6 [PH] (ref 3–11)
PLATELET # BLD AUTO: 343 X10(3)/MCL (ref 130–400)
PMV BLD AUTO: 8.6 FL (ref 7.4–10.4)
POCT GLUCOSE: 118 MG/DL (ref 70–110)
POTASSIUM SERPL-SCNC: 3.6 MMOL/L (ref 3.5–5.1)
PROT SERPL-MCNC: 6.6 GM/DL (ref 5.8–7.6)
PROT UR QL STRIP: NEGATIVE
RBC # BLD AUTO: 4.78 X10(6)/MCL (ref 4.2–5.4)
SODIUM SERPL-SCNC: 142 MMOL/L (ref 136–145)
SP GR UR STRIP.AUTO: 1.01 (ref 1–1.03)
TROPONIN I SERPL-MCNC: <0.01 NG/ML (ref 0–0.04)
TSH SERPL-ACNC: 0.7 UIU/ML (ref 0.35–4.94)
UROBILINOGEN UR STRIP-ACNC: 0.2
WBC # BLD AUTO: 6.71 X10(3)/MCL (ref 4.5–11.5)

## 2025-05-01 PROCEDURE — 81003 URINALYSIS AUTO W/O SCOPE: CPT | Mod: 59 | Performed by: EMERGENCY MEDICINE

## 2025-05-01 PROCEDURE — G0378 HOSPITAL OBSERVATION PER HR: HCPCS

## 2025-05-01 PROCEDURE — 83735 ASSAY OF MAGNESIUM: CPT | Performed by: EMERGENCY MEDICINE

## 2025-05-01 PROCEDURE — 96360 HYDRATION IV INFUSION INIT: CPT

## 2025-05-01 PROCEDURE — 93010 ELECTROCARDIOGRAM REPORT: CPT | Mod: ,,, | Performed by: INTERNAL MEDICINE

## 2025-05-01 PROCEDURE — 85025 COMPLETE CBC W/AUTO DIFF WBC: CPT | Performed by: EMERGENCY MEDICINE

## 2025-05-01 PROCEDURE — 83880 ASSAY OF NATRIURETIC PEPTIDE: CPT | Performed by: EMERGENCY MEDICINE

## 2025-05-01 PROCEDURE — 96361 HYDRATE IV INFUSION ADD-ON: CPT

## 2025-05-01 PROCEDURE — 25000003 PHARM REV CODE 250: Performed by: EMERGENCY MEDICINE

## 2025-05-01 PROCEDURE — 84484 ASSAY OF TROPONIN QUANT: CPT | Performed by: EMERGENCY MEDICINE

## 2025-05-01 PROCEDURE — 84443 ASSAY THYROID STIM HORMONE: CPT | Performed by: EMERGENCY MEDICINE

## 2025-05-01 PROCEDURE — 83690 ASSAY OF LIPASE: CPT | Performed by: EMERGENCY MEDICINE

## 2025-05-01 PROCEDURE — 80053 COMPREHEN METABOLIC PANEL: CPT | Performed by: EMERGENCY MEDICINE

## 2025-05-01 PROCEDURE — 99285 EMERGENCY DEPT VISIT HI MDM: CPT | Mod: 25

## 2025-05-01 PROCEDURE — 93005 ELECTROCARDIOGRAM TRACING: CPT

## 2025-05-01 PROCEDURE — 82077 ASSAY SPEC XCP UR&BREATH IA: CPT | Performed by: EMERGENCY MEDICINE

## 2025-05-01 PROCEDURE — 82962 GLUCOSE BLOOD TEST: CPT

## 2025-05-01 PROCEDURE — 80307 DRUG TEST PRSMV CHEM ANLYZR: CPT | Performed by: EMERGENCY MEDICINE

## 2025-05-01 RX ORDER — ONDANSETRON HYDROCHLORIDE 2 MG/ML
4 INJECTION, SOLUTION INTRAVENOUS EVERY 4 HOURS PRN
Status: DISCONTINUED | OUTPATIENT
Start: 2025-05-02 | End: 2025-05-04 | Stop reason: HOSPADM

## 2025-05-01 RX ORDER — PROCHLORPERAZINE EDISYLATE 5 MG/ML
5 INJECTION INTRAMUSCULAR; INTRAVENOUS EVERY 6 HOURS PRN
Status: DISCONTINUED | OUTPATIENT
Start: 2025-05-02 | End: 2025-05-04 | Stop reason: HOSPADM

## 2025-05-01 RX ORDER — IPRATROPIUM BROMIDE AND ALBUTEROL SULFATE 2.5; .5 MG/3ML; MG/3ML
3 SOLUTION RESPIRATORY (INHALATION) EVERY 4 HOURS PRN
Status: DISCONTINUED | OUTPATIENT
Start: 2025-05-02 | End: 2025-05-04 | Stop reason: HOSPADM

## 2025-05-01 RX ORDER — ACETAMINOPHEN 500 MG
1000 TABLET ORAL EVERY 6 HOURS PRN
Status: DISCONTINUED | OUTPATIENT
Start: 2025-05-02 | End: 2025-05-04 | Stop reason: HOSPADM

## 2025-05-01 RX ORDER — SODIUM CHLORIDE 9 MG/ML
INJECTION, SOLUTION INTRAVENOUS CONTINUOUS
Status: DISCONTINUED | OUTPATIENT
Start: 2025-05-01 | End: 2025-05-03

## 2025-05-01 RX ORDER — IBUPROFEN 200 MG
24 TABLET ORAL
Status: DISCONTINUED | OUTPATIENT
Start: 2025-05-02 | End: 2025-05-04 | Stop reason: HOSPADM

## 2025-05-01 RX ORDER — INSULIN ASPART 100 [IU]/ML
0-5 INJECTION, SOLUTION INTRAVENOUS; SUBCUTANEOUS
Status: DISCONTINUED | OUTPATIENT
Start: 2025-05-02 | End: 2025-05-04 | Stop reason: HOSPADM

## 2025-05-01 RX ORDER — IBUPROFEN 200 MG
16 TABLET ORAL
Status: DISCONTINUED | OUTPATIENT
Start: 2025-05-02 | End: 2025-05-04 | Stop reason: HOSPADM

## 2025-05-01 RX ORDER — GLUCAGON 1 MG
1 KIT INJECTION
Status: DISCONTINUED | OUTPATIENT
Start: 2025-05-02 | End: 2025-05-04 | Stop reason: HOSPADM

## 2025-05-01 RX ORDER — BUDESONIDE, GLYCOPYRROLATE, AND FORMOTEROL FUMARATE 160; 9; 4.8 UG/1; UG/1; UG/1
2 AEROSOL, METERED RESPIRATORY (INHALATION) 2 TIMES DAILY
COMMUNITY

## 2025-05-01 RX ORDER — ACETAMINOPHEN 325 MG/1
650 TABLET ORAL EVERY 4 HOURS PRN
Status: DISCONTINUED | OUTPATIENT
Start: 2025-05-02 | End: 2025-05-04 | Stop reason: HOSPADM

## 2025-05-01 RX ORDER — NALOXONE HCL 0.4 MG/ML
0.02 VIAL (ML) INJECTION
Status: DISCONTINUED | OUTPATIENT
Start: 2025-05-02 | End: 2025-05-04 | Stop reason: HOSPADM

## 2025-05-01 RX ORDER — SODIUM CHLORIDE 0.9 % (FLUSH) 0.9 %
10 SYRINGE (ML) INJECTION
Status: DISCONTINUED | OUTPATIENT
Start: 2025-05-02 | End: 2025-05-04 | Stop reason: HOSPADM

## 2025-05-01 RX ADMIN — SODIUM CHLORIDE 1000 ML: 9 INJECTION, SOLUTION INTRAVENOUS at 03:05

## 2025-05-01 RX ADMIN — SODIUM CHLORIDE: 9 INJECTION, SOLUTION INTRAVENOUS at 11:05

## 2025-05-01 RX ADMIN — SODIUM CHLORIDE: 9 INJECTION, SOLUTION INTRAVENOUS at 07:05

## 2025-05-01 NOTE — Clinical Note
Diagnosis: AMS (altered mental status) [[id number]]   Future Attending Provider: LAURA DAVEY [[id number]]   Admit to which facility:: [facility] [[id number]]

## 2025-05-01 NOTE — ED PROVIDER NOTES
Encounter Date: 5/1/2025       History     Chief Complaint   Patient presents with    Altered Mental Status     Pt to er from dr. Calderon's office after an episode of confusion, forgetfulness and wandering around mob after visit. Cbg 102. Pt falls asleep easily during triage process. Office staff states pt returned to  office and had forgotten she had already seen the doctor.     60-year-old female history of hypertension, bipolar, diabetes brought over from the medical office buildings for confusion.  Apparently patient had a appointment there this morning.  She returned to her doctor's office having forgotten she was already seen.  Patient is falling asleep and confused.  CBG normal.     The history is provided by the patient.     Review of patient's allergies indicates:   Allergen Reactions    Adhesive      Other reaction(s): skin tears easily with adhesive    Amoxicillin      Other Reaction(s): Unknown    Latex Other (See Comments)    Cephalexin Nausea And Vomiting    Erythromycin Nausea And Vomiting    Lithium Nausea And Vomiting    Naproxen Nausea And Vomiting     Past Medical History:   Diagnosis Date    Acquired spondylolisthesis of cervical vertebra     Ambulates with cane     Anxiety     Bipolar disorder     w/manic depression as per pt    Carpal tunnel syndrome     COPD (chronic obstructive pulmonary disease)     Depression     Diabetes mellitus     Digestive disorder     GERD    Dry eyes     Headache     Hyperlipidemia     Hypertension     Insomnia     Lumbar degenerative disc disease     Mixed hyperlipidemia     Muscle spasms of both lower extremities     Muscle spasms of neck     Neuropathy     Personal history of fall     PONV (postoperative nausea and vomiting)     Right hip pain     radiates down to ankle    Seizure-like activity 03/31/2024    no further activity since 03    TIA (transient ischemic attack) 2022    Ulnar nerve external compression syndrome      Past Surgical History:   Procedure  Laterality Date    ANTERIOR CERVICAL DISCECTOMY W/ FUSION N/A 10/28/2024    Procedure: DISCECTOMY, SPINE, CERVICAL, ANTERIOR APPROACH, WITH FUSION;  Surgeon: Chana Bingham MD;  Location: [facility];  Service: Neurosurgery;  Laterality: N/A;  C3-4, C4-5 ACDF // NTI // TIVA // [employer] //  XX    ANTERIOR CRUCIATE LIGAMENT REPAIR Left 1993    CARPAL TUNNEL RELEASE Right 05/05/2023    Procedure: RELEASE, CARPAL TUNNEL;  Surgeon: Canelo Bhatt MD;  Location: [facility];  Service: Orthopedics;  Laterality: Right;    FRACTURE SURGERY  2017    Mauro Cárdenas    JOINT REPLACEMENT  2016    revision total knee ledt    NECK SURGERY  10/2024    SHOULDER ARTHROSCOPY W/ ROTATOR CUFF REPAIR Left     TOTAL KNEE ARTHROPLASTY Left     X2    ULNAR NERVE TRANSPOSITION Right 05/05/2023    Procedure: TRANSPOSITION, NERVE, ULNAR  Endoscopic;  Surgeon: Canelo Bhatt MD;  Location: [facility];  Service: Orthopedics;  Laterality: Right;  Endoscopic    WRIST SURGERY Right      Family History   Problem Relation Name Age of Onset    Coronary artery disease Mother Shobha Arnould     Diabetes Mother Shobha Arnould     Arthritis Mother Shobha Arnould         Back , hands    Early death Mother Shobha Arnould         Diabetes    Heart disease Mother Shobha Arnould     Hypertension Mother Shobha Arnould     Kidney disease Mother Shobha Arnould     Stroke Mother Shobha Arnould     Vision loss Mother Shobha Arnould     Coronary artery disease Father Ecsar     Asthma Father Cesar         Back    Heart disease Father Cesar     Hypertension Father Cesar     Vision loss Father Cesar     Arthritis Sister Hailey         Not sure    Asthma Sister Hailey         Finger, shoulder    Mental illness Sister Hailey     Vision loss Sister Hailey     Arthritis Brother Zacarias         Hip    Birth defects Brother Zacarias         Special Needs/ Downs Syndrome    Heart disease Brother Zacarias     Learning disabilities Brother Zacarias     Intellectual disability Brother Zacarias      Social  History[1]  Review of Systems   Unable to perform ROS: Mental status change       Physical Exam     Initial Vitals [05/01/25 1425]   BP Pulse Resp Temp SpO2   119/72 69 18 98.1 °F (36.7 °C) 100 %      MAP       --         Physical Exam    Nursing note and vitals reviewed.  Constitutional: She appears lethargic. No distress.   HENT:   Head: Atraumatic. Mouth/Throat: Mucous membranes are dry.   Eyes: Conjunctivae are normal. Pupils are equal, round, and reactive to light.   Neck: No tracheal deviation present.   Cardiovascular:  Normal rate.           Pulmonary/Chest: Breath sounds normal. No stridor. No respiratory distress.   Abdominal: Abdomen is soft. She exhibits no distension. There is no abdominal tenderness. There is no guarding.     Neurological: She has normal strength. She appears lethargic. No cranial nerve deficit.   PATIENT LOCALIZES PAIN   Skin: Skin is dry.         ED Course   Procedures  Labs Reviewed   COMPREHENSIVE METABOLIC PANEL - Abnormal       Result Value    Sodium 142      Potassium 3.6      Chloride 110 (*)     CO2 21 (*)     Glucose 87      Blood Urea Nitrogen 12.0      Creatinine 0.81      Calcium 8.6      Protein Total 6.6      Albumin 3.8      Globulin 2.8      Albumin/Globulin Ratio 1.4      Bilirubin Total 0.2      ALP 96      ALT 11      AST 11      eGFR >60      Anion Gap 11.0      BUN/Creatinine Ratio 15     CBC WITH DIFFERENTIAL - Abnormal    WBC 6.71      RBC 4.78      Hgb 11.6 (*)     Hct 37.2      MCV 77.8 (*)     MCH 24.3 (*)     MCHC 31.2 (*)     RDW 19.2 (*)     Platelet 343      MPV 8.6      Neut % 42.7      Lymph % 39.8      Mono % 8.8      Eos % 7.5      Basophil % 0.9      Imm Grans % 0.3      Neut # 2.87      Lymph # 2.67      Mono # 0.59      Eos # 0.50      Baso # 0.06      Imm Gran # 0.02      NRBC% 0.0     POCT GLUCOSE - Abnormal    POCT Glucose 118 (*)    B-TYPE NATRIURETIC PEPTIDE - Normal    Natriuretic Peptide <10.0     ALCOHOL,MEDICAL (ETHANOL) - Normal     Ethanol Level <10.0     MAGNESIUM - Normal    Magnesium Level 2.30     TROPONIN I - Normal    Troponin-I <0.010     TSH - Normal    TSH 0.703     LIPASE - Normal    Lipase Level 38     CBC W/ AUTO DIFFERENTIAL    Narrative:     The following orders were created for panel order CBC auto differential.  Procedure                               Abnormality         Status                     ---------                               -----------         ------                     CBC with Differential[[id number]]       Abnormal            Final result                 Please view results for these tests on the individual orders.   DRUG SCREEN, URINE (BEAKER)   URINALYSIS, REFLEX TO URINE CULTURE     EKG Readings: (Independently Interpreted)   Rhythm: Normal Sinus Rhythm. Heart Rate: 69. Ectopy: No Ectopy. Conduction: Normal. ST Segments: Normal ST Segments. T Waves: Normal.     ECG Results              EKG 12-lead (Final result)        Collection Time Result Time QRS Duration OHS QTC Calculation    05/01/25 14:31:18 05/01/25 18:23:58 90 456                     Final result by Interface, Lab In [facility] (05/01/25 18:24:02)                   Narrative:    Test Reason : R41.82,    Vent. Rate :  69 BPM     Atrial Rate :  69 BPM     P-R Int : 150 ms          QRS Dur :  90 ms      QT Int : 426 ms       P-R-T Axes :  47  42  63 degrees    QTcB Int : 456 ms    Normal sinus rhythm  Normal ECG  When compared with ECG of 19-Apr-2025 01:35,  AK interval has increased  Confirmed by Felix Yañez ([id number]) on 5/1/2025 6:23:54 PM    Referred By: AAAREFERRAL SELF           Confirmed By: Felix Yañez                                  Imaging Results              CT Head Without Contrast (Final result)  Result time 05/01/25 15:15:18      Final result by Stefania Fleming MD (05/01/25 15:15:18)                   Impression:      Sphenoid sinusitis otherwise unremarkable      Electronically signed by: Alejandro  Castillosinh  Date:    05/01/2025  Time:    15:15               Narrative:    EXAMINATION:  CT HEAD WITHOUT CONTRAST    CLINICAL HISTORY:  Mental status change, unknown cause;    TECHNIQUE:  Multiple axial images were obtained from the base of the brain to the vertex without contrast administration.  Sagittal and coronal reconstructions were performed. .Automatic exposure control  (AEC) is utilized to reduce patient radiation exposure.    COMPARISON:  None    FINDINGS:  There is no intracranial mass or lesion seen.  No hemorrhage is seen.  No infarct is seen.  The ventricles and basilar cisterns appear normal.  Brain parenchyma appears grossly unremarkable.    Posterior fossa appears normal.  The calvarium is intact.  The paranasal sinuses shows evidence of sphenoid sinusitis on the left side..                                       Medications   0.9% NaCl infusion (has no administration in time range)   sodium chloride 0.9% bolus 1,000 mL 1,000 mL (1,000 mLs Intravenous New Bag 5/1/25 1518)     Medical Decision Making  Medical Decision Making  Problem list/ differential diagnosis including but not limited to:  Hypoxia, hypoglycemia, DKA, electrolyte abnormality, organ failure, hepatic encephalopathy, uremia, CO2 narcosis, hypertensive encephalopathy, toxin, alcohol/drug withdrawal, sepsis, CNS tumor, ICH/SAH, stroke, CNS infection, psychiatric    Patient's chronic illnesses impacting care:  none    Diagnostic test considered but not ordered:    My interpretations:      Radiology reports      Discussion of case with external qualified healthcare professionals:  Clarissa with internal medicine    Review of external notes( inpt, ems, NH, clinic):      Decision rules/scores:    Medications reviewed:  Cogentin, Tegretol, Vraylar, Klonopin, Lyrica, tizanidine, topiramate,zaleplon  Medications ordered in the ER:  Discharge prescriptions:    Social variables possible impacting patient's healthcare:    Code  status/discussion    Shared decision making:    Consideration for admission versus discharge:  Admit     Amount and/or Complexity of Data Reviewed  Labs: ordered.  Radiology: ordered.    Risk  Prescription drug management.                                      Clinical Impression:  Final diagnoses:  [R41.82] AMS (altered mental status)          ED Disposition Condition    Transfer to Another Facility Stable                    [1]   Social History  Tobacco Use    Smoking status: Every Day     Current packs/day: 0.25     Average packs/day: 0.3 packs/day for 42.0 years (10.5 ttl pk-yrs)     Types: Vaping with nicotine, Cigarettes     Passive exposure: Past    Smokeless tobacco: Never    Tobacco comments:     2-3 cigarettes/day. Done with cigarettes. Vaping a little   Substance Use Topics    Alcohol use: Not Currently     Comment: stopped in 2005    Drug use: Not Currently     Frequency: 2.0 times per week     Types: Marijuana, Cocaine     Comment: pt stated last time was 10/20/24 and will stop until after sx        Felix Reyes MD  05/01/25 [id number]

## 2025-05-02 PROBLEM — R41.82 AMS (ALTERED MENTAL STATUS): Status: ACTIVE | Noted: 2025-05-02

## 2025-05-02 LAB
ALBUMIN SERPL-MCNC: 3.2 G/DL (ref 3.4–4.8)
ALBUMIN/GLOB SERPL: 1.2 RATIO (ref 1.1–2)
ALP SERPL-CCNC: 91 UNIT/L (ref 40–150)
ALT SERPL-CCNC: 10 UNIT/L (ref 0–55)
ANION GAP SERPL CALC-SCNC: 7 MEQ/L
AST SERPL-CCNC: 12 UNIT/L (ref 11–45)
BASOPHILS # BLD AUTO: 0.05 X10(3)/MCL
BASOPHILS NFR BLD AUTO: 0.9 %
BILIRUB SERPL-MCNC: 0.2 MG/DL
BUN SERPL-MCNC: 9.1 MG/DL (ref 9.8–20.1)
CALCIUM SERPL-MCNC: 7.9 MG/DL (ref 8.4–10.2)
CHLORIDE SERPL-SCNC: 119 MMOL/L (ref 98–107)
CO2 SERPL-SCNC: 18 MMOL/L (ref 23–31)
CREAT SERPL-MCNC: 0.66 MG/DL (ref 0.55–1.02)
CREAT/UREA NIT SERPL: 14
EOSINOPHIL # BLD AUTO: 0.38 X10(3)/MCL (ref 0–0.9)
EOSINOPHIL NFR BLD AUTO: 6.9 %
ERYTHROCYTE [DISTWIDTH] IN BLOOD BY AUTOMATED COUNT: 19.2 % (ref 11.5–17)
GFR SERPLBLD CREATININE-BSD FMLA CKD-EPI: >60 ML/MIN/1.73/M2
GLOBULIN SER-MCNC: 2.6 GM/DL (ref 2.4–3.5)
GLUCOSE SERPL-MCNC: 94 MG/DL (ref 82–115)
HCT VFR BLD AUTO: 37.9 % (ref 37–47)
HGB BLD-MCNC: 11.5 G/DL (ref 12–16)
IMM GRANULOCYTES # BLD AUTO: 0.02 X10(3)/MCL (ref 0–0.04)
IMM GRANULOCYTES NFR BLD AUTO: 0.4 %
LYMPHOCYTES # BLD AUTO: 1.92 X10(3)/MCL (ref 0.6–4.6)
LYMPHOCYTES NFR BLD AUTO: 34.7 %
MCH RBC QN AUTO: 23.7 PG (ref 27–31)
MCHC RBC AUTO-ENTMCNC: 30.3 G/DL (ref 33–36)
MCV RBC AUTO: 78.1 FL (ref 80–94)
MONOCYTES # BLD AUTO: 0.49 X10(3)/MCL (ref 0.1–1.3)
MONOCYTES NFR BLD AUTO: 8.8 %
NEUTROPHILS # BLD AUTO: 2.68 X10(3)/MCL (ref 2.1–9.2)
NEUTROPHILS NFR BLD AUTO: 48.3 %
NRBC BLD AUTO-RTO: 0 %
PLATELET # BLD AUTO: 311 X10(3)/MCL (ref 130–400)
PMV BLD AUTO: 8.7 FL (ref 7.4–10.4)
POCT GLUCOSE: 101 MG/DL (ref 70–110)
POCT GLUCOSE: 107 MG/DL (ref 70–110)
POCT GLUCOSE: 118 MG/DL (ref 70–110)
POTASSIUM SERPL-SCNC: 3.8 MMOL/L (ref 3.5–5.1)
PROT SERPL-MCNC: 5.8 GM/DL (ref 5.8–7.6)
RBC # BLD AUTO: 4.85 X10(6)/MCL (ref 4.2–5.4)
SODIUM SERPL-SCNC: 144 MMOL/L (ref 136–145)
WBC # BLD AUTO: 5.54 X10(3)/MCL (ref 4.5–11.5)

## 2025-05-02 PROCEDURE — 99222 1ST HOSP IP/OBS MODERATE 55: CPT | Mod: ,,, | Performed by: PSYCHIATRY & NEUROLOGY

## 2025-05-02 PROCEDURE — 96361 HYDRATE IV INFUSION ADD-ON: CPT

## 2025-05-02 PROCEDURE — 25000003 PHARM REV CODE 250

## 2025-05-02 PROCEDURE — 25000003 PHARM REV CODE 250: Performed by: INTERNAL MEDICINE

## 2025-05-02 PROCEDURE — 25000003 PHARM REV CODE 250: Performed by: NURSE PRACTITIONER

## 2025-05-02 PROCEDURE — 25000003 PHARM REV CODE 250: Performed by: EMERGENCY MEDICINE

## 2025-05-02 PROCEDURE — 80053 COMPREHEN METABOLIC PANEL: CPT | Performed by: NURSE PRACTITIONER

## 2025-05-02 PROCEDURE — 36415 COLL VENOUS BLD VENIPUNCTURE: CPT | Performed by: NURSE PRACTITIONER

## 2025-05-02 PROCEDURE — 21400001 HC TELEMETRY ROOM

## 2025-05-02 PROCEDURE — 85025 COMPLETE CBC W/AUTO DIFF WBC: CPT | Performed by: NURSE PRACTITIONER

## 2025-05-02 PROCEDURE — 11000001 HC ACUTE MED/SURG PRIVATE ROOM

## 2025-05-02 RX ORDER — CARBAMAZEPINE 100 MG/1
400 TABLET, EXTENDED RELEASE ORAL 2 TIMES DAILY
Status: DISCONTINUED | OUTPATIENT
Start: 2025-05-02 | End: 2025-05-04 | Stop reason: HOSPADM

## 2025-05-02 RX ORDER — HYDROCODONE BITARTRATE AND ACETAMINOPHEN 5; 325 MG/1; MG/1
1 TABLET ORAL EVERY 6 HOURS PRN
Refills: 0 | Status: DISCONTINUED | OUTPATIENT
Start: 2025-05-02 | End: 2025-05-04 | Stop reason: HOSPADM

## 2025-05-02 RX ORDER — BUPROPION HYDROCHLORIDE 150 MG/1
300 TABLET ORAL DAILY
Status: DISCONTINUED | OUTPATIENT
Start: 2025-05-03 | End: 2025-05-04 | Stop reason: HOSPADM

## 2025-05-02 RX ORDER — DULOXETIN HYDROCHLORIDE 30 MG/1
60 CAPSULE, DELAYED RELEASE ORAL 2 TIMES DAILY
Status: DISCONTINUED | OUTPATIENT
Start: 2025-05-02 | End: 2025-05-04 | Stop reason: HOSPADM

## 2025-05-02 RX ADMIN — SODIUM CHLORIDE: 9 INJECTION, SOLUTION INTRAVENOUS at 01:05

## 2025-05-02 RX ADMIN — ACETAMINOPHEN 1000 MG: 500 TABLET ORAL at 12:05

## 2025-05-02 RX ADMIN — DULOXETINE HYDROCHLORIDE 60 MG: 30 CAPSULE, DELAYED RELEASE ORAL at 11:05

## 2025-05-02 RX ADMIN — HYDROCODONE BITARTRATE AND ACETAMINOPHEN 1 TABLET: 5; 325 TABLET ORAL at 11:05

## 2025-05-02 RX ADMIN — CARBAMAZEPINE 400 MG: 100 TABLET, EXTENDED RELEASE ORAL at 11:05

## 2025-05-02 RX ADMIN — HYDROCODONE BITARTRATE AND ACETAMINOPHEN 1 TABLET: 5; 325 TABLET ORAL at 04:05

## 2025-05-02 NOTE — PLAN OF CARE
05/02/25 1035   Discharge Assessment   Assessment Type Discharge Planning Assessment   Confirmed/corrected address, phone number and insurance Yes   Confirmed Demographics Correct on Facesheet   Source of Information patient   When was your last doctors appointment? 05/01/25   Does patient/caregiver understand observation status Yes   Reason For Admission AMS   People in Home alone   Do you expect to return to your current living situation? Yes   Do you have help at home or someone to help you manage your care at home? No   Current cognitive status: Alert/Oriented   Walking or Climbing Stairs Difficulty yes   Walking or Climbing Stairs ambulation difficulty, requires equipment   Mobility Management cane   Dressing/Bathing Difficulty yes   Dressing/Bathing bathing difficulty, requires equipment   Dressing/Bathing Management sc   Home Accessibility wheelchair accessible   Equipment Currently Used at Home blood pressure machine;glucometer;cane, straight;shower chair   Readmission within 30 days? No   Patient currently being followed by outpatient case management? No   Do you currently have service(s) that help you manage your care at home? No   Do you take prescription medications? Yes   Do you have prescription coverage? Yes   Coverage [employer]   Do you have any problems affording any of your prescribed medications? No   Is the patient taking medications as prescribed? yes   Who is going to help you get home at discharge? tbd   How do you get to doctors appointments? other (see comments)  (uber)   Are you on dialysis? No   Do you take coumadin? No   Discharge Plan A Home   Discharge Plan B Home;Home Health   DME Needed Upon Discharge  none   Discharge Plan discussed with: Patient   Transition of Care Barriers None   Physical Activity   On average, how many days per week do you engage in moderate to strenuous exercise (like a brisk walk)? 0 days   On average, how many minutes do you engage in exercise at this level? 0  min   Financial Resource Strain   How hard is it for you to pay for the very basics like food, housing, medical care, and heating? Not very   Housing Stability   In the last 12 months, was there a time when you were not able to pay the mortgage or rent on time? N   At any time in the past 12 months, were you homeless or living in a shelter (including now)? N   Transportation Needs   In the past 12 months, has lack of transportation kept you from medical appointments or from getting medications? no   In the past 12 months, has lack of transportation kept you from meetings, work, or from getting things needed for daily living? No   Food Insecurity   Within the past 12 months, you worried that your food would run out before you got the money to buy more. Never true   Within the past 12 months, the food you bought just didn't last and you didn't have money to get more. Never true   Stress   Do you feel stress - tense, restless, nervous, or anxious, or unable to sleep at night because your mind is troubled all the time - these days? To some exte   Social Isolation   How often do you feel lonely or isolated from those around you?  Sometimes   Alcohol Use   Q1: How often do you have a drink containing alcohol? Never   Utilities   In the past 12 months has the electric, gas, oil, or water company threatened to shut off services in your home? No   Health Literacy   How often do you need to have someone help you when you read instructions, pamphlets, or other written material from your doctor or pharmacy? Rarely

## 2025-05-02 NOTE — H&P
[facility] Medicine History & Physical Examination       Patient Name: Faith Jin  MRN: [id number]  Patient Class: OP- Observation   Admission Date: 5/1/2025   Admitting Physician: KATIE Service   Length of Stay: 0  Attending Physician: Dr Fly Cesar  Primary Care Provider: Garrick Nascimento MD  Face-to-Face encounter date: 05/02/2025  Code Status: Full code  Chief Complaint: Altered Mental Status (Pt to er from dr. Calderon's office after an episode of confusion, forgetfulness and wandering around mob after visit. Cbg 102. Pt falls asleep easily during triage process. Office staff states pt returned to  office and had forgotten she had already seen the doctor.)        Screening for Social Drivers for health:  Patient screened for food insecurity, housing instability, transportation needs, utility difficulties, and interpersonal safety (select all that apply as identified as concern)  []Housing or Food  []Transportation Needs  []Utility Difficulties  [x]Interpersonal safety  []None    Patient information was obtained from patient, patient's family, past medical records and/or ER records.     HISTORY OF PRESENT ILLNESS:   Faith Jin is a 60 y.o. female who PMH includes cervical spondylosis, chronic back pain, anxiety, depression, bipolar disorder, COPD, DM type 2, lumbar degenerative disc disease, HTN, HLD, neuropathy, TIA with seizure-like activity, substance abuse that presents to the ED at [facility] on 5/1/2025 s/p being found wandering in the parking lot of her endocrinologist office.  It was reported patient had an appointment that morning and attended the appointment.  It was reported she returned back to the doctor's office reports of forgetting that she was seen by provider.  Patient left and was subsequently found wandering in the parking lot which EMS was called for ED transport.  No reports of nausea, vomiting, diarrhea, injury, trauma, or falls; no  reported chest pain, shortness a breath or any new foods or medication and patient reports no sick contacts.  Lab work reviewed greater than a 1000 glucose in urine, urine drug screen positive for amphetamines and cannabis; otherwise unremarkable.  CT of head without contrast impression reviewed demonstrated sphenoid sinusitis otherwise unremarkable.  It was reported per ED provider at [facility] pt was still altered and confused. The patient was transferred to [facility] on 5/1/2025 for higher level of care.   VS /72 pulse 69 respirations 18 temperature 98.1° F O2 saturation 100% on room air.  Patient is admitted to hospital medicine services for further management.    Neurology Services have been consulted.    PAST MEDICAL HISTORY:     Past Medical History:   Diagnosis Date    Acquired spondylolisthesis of cervical vertebra     Ambulates with cane     Anxiety     Bipolar disorder     w/manic depression as per pt    Carpal tunnel syndrome     COPD (chronic obstructive pulmonary disease)     Depression     Diabetes mellitus     Digestive disorder     GERD    Dry eyes     Headache     Hyperlipidemia     Hypertension     Insomnia     Lumbar degenerative disc disease     Mixed hyperlipidemia     Muscle spasms of both lower extremities     Muscle spasms of neck     Neuropathy     Personal history of fall     PONV (postoperative nausea and vomiting)     Right hip pain     radiates down to ankle    Seizure-like activity 03/31/2024    no further activity since 03    TIA (transient ischemic attack) 2022    Ulnar nerve external compression syndrome        PAST SURGICAL HISTORY:     Past Surgical History:   Procedure Laterality Date    ANTERIOR CERVICAL DISCECTOMY W/ FUSION N/A 10/28/2024    Procedure: DISCECTOMY, SPINE, CERVICAL, ANTERIOR APPROACH, WITH FUSION;  Surgeon: Chana Bingham MD;  Location: [facility];  Service: Neurosurgery;  Laterality: N/A;  C3-4, C4-5 ACDF // NTI // TIVA // [employer] //  XX    ANTERIOR CRUCIATE  LIGAMENT REPAIR Left 1993    CARPAL TUNNEL RELEASE Right 05/05/2023    Procedure: RELEASE, CARPAL TUNNEL;  Surgeon: Canelo Bhatt MD;  Location: [facility] OR;  Service: Orthopedics;  Laterality: Right;    FRACTURE SURGERY  2017    Mauro Cárdenas    JOINT REPLACEMENT  2016    revision total knee ledt    NECK SURGERY  10/2024    SHOULDER ARTHROSCOPY W/ ROTATOR CUFF REPAIR Left     TOTAL KNEE ARTHROPLASTY Left     X2    ULNAR NERVE TRANSPOSITION Right 05/05/2023    Procedure: TRANSPOSITION, NERVE, ULNAR  Endoscopic;  Surgeon: Canelo Bhatt MD;  Location: [facility] OR;  Service: Orthopedics;  Laterality: Right;  Endoscopic    WRIST SURGERY Right        ALLERGIES:   Adhesive, Amoxicillin, Latex, Cephalexin, Erythromycin, Lithium, and Naproxen    FAMILY HISTORY:   Reviewed and negative    SOCIAL HISTORY:     Social History     Tobacco Use    Smoking status: Every Day     Current packs/day: 0.25     Average packs/day: 0.3 packs/day for 42.0 years (10.5 ttl pk-yrs)     Types: Vaping with nicotine, Cigarettes     Passive exposure: Past    Smokeless tobacco: Never    Tobacco comments:     2-3 cigarettes/day. Done with cigarettes. Vaping a little   Substance Use Topics    Alcohol use: Not Currently     Comment: stopped in 2005    Urine drug screen was positive for cannabis and amphetamines    HOME MEDICATIONS:   As documented   Prior to Admission medications    Medication Sig Start Date End Date Taking? Authorizing Provider   atorvastatin (LIPITOR) 40 MG tablet 1 tablet Orally Once a day   Yes Provider, Historical   benztropine (COGENTIN) 1 MG tablet Take 1 mg by mouth 2 (two) times daily. 6/15/22  Yes Provider, Historical   buPROPion (WELLBUTRIN XL) 300 MG 24 hr tablet 1 tablet in the morning Orally Once a day   Yes Provider, Historical   carBAMazepine (TEGRETOL XR) 400 MG Tb12 Take 400 mg by mouth 2 (two) times daily. 6/15/22  Yes Provider, Historical   cariprazine (VRAYLAR) 4.5 mg Cap 1 capsule. 7/23/24  Yes Provider, Historical    clonazePAM (KLONOPIN) 0.5 MG tablet 1 tablet Orally twice a day 7/23/24  Yes Provider, Historical   DULoxetine (CYMBALTA) 60 MG capsule 1 capsule. 7/23/24  Yes Provider, Historical   empagliflozin (JARDIANCE) 25 mg tablet Jardiance 25 mg tablet, [RxNorm: 7688502] 7/23/24  Yes Provider, Historical   famotidine (PEPCID) 20 MG tablet 1 tablet at bedtime Orally Once a day   Yes Provider, Historical   furosemide (LASIX) 20 MG tablet 1 tablet Orally Once a day   Yes Provider, Historical   icosapent ethyL (VASCEPA) 1 gram Cap 2 capsules with meals Orally Twice a day   Yes Provider, Historical   metFORMIN (GLUCOPHAGE) 500 MG tablet metFORMIN 500 mg tablet, [RxNorm: 268394] 7/23/24  Yes Provider, Historical   metoprolol succinate (TOPROL-XL) 25 MG 24 hr tablet Take 12.5 mg by mouth.   Yes Provider, Historical   pregabalin (LYRICA) 150 MG capsule Take 150 mg by mouth 3 (three) times daily. 2/4/25  Yes Provider, Historical   tiZANidine (ZANAFLEX) 4 MG tablet Take 4 mg by mouth every 8 (eight) hours as needed. 3/6/25  Yes Provider, Historical   topiramate (TOPAMAX) 200 MG Tab 1 tablet Orally Twice a day   Yes Provider, Historical   albuterol (PROVENTIL/VENTOLIN HFA) 90 mcg/actuation inhaler Inhale 2 puffs into the lungs. 1/8/25   Provider, Historical   azelastine (ASTELIN) 137 mcg (0.1 %) nasal spray 1 puff in each nostril Nasally Twice a day for 30 day(s)    Provider, Historical   blood-glucose meter (CONTOUR NEXT METER) Misc as directed in vitro three times daily for 365 days 10/16/23   Provider, Historical   budesonide-glycopyr-formoterol (BREZTRI AEROSPHERE) 160-9-4.8 mcg/actuation HFAA Inhale 2 puffs into the lungs 2 (two) times a day.    Provider, Historical   celecoxib (CELEBREX) 200 MG capsule Take by mouth. 9/28/24   Provider, Historical   CONTOUR NEXT TEST STRIPS Strp 3 (three) times daily. Use to test blood glucose 3/14/24   Provider, Historical   dexlansoprazole (DEXILANT) 60 mg capsule 1 capsule.    Provider,  Historical   estradioL (ESTRACE) 0.5 MG tablet 1 tablet Orally Once a day    Provider, Historical   fenofibrate 160 MG Tab Take 1 tablet by mouth once daily.    Provider, Historical   fluticasone propionate (FLONASE) 50 mcg/actuation nasal spray 1 spray by Each Nostril route nightly. 2/15/22   Provider, Historical   fluticasone-umeclidin-vilanter (TRELEGY ELLIPTA) 100-62.5-25 mcg DsDv Inhale 1 puff into the lungs once daily.    Provider, Historical   hydrOXYzine pamoate (VISTARIL) 25 MG Cap Take 25 mg by mouth Daily. Per pt reports 1 the am and 2 at bedside 4/12/23   Provider, Historical   INVEGA SUSTENNA 234 mg/1.5 mL Syrg injection Inject into the muscle. 10/30/24   Provider, Historical   linaCLOtide (LINZESS) 145 mcg Cap capsule Take 145 mcg by mouth before breakfast.    Provider, Historical   loratadine (CLARITIN) 10 mg tablet 1 tablet Orally Once a day for 30 day(s)    Provider, Historical   methocarbamoL (ROBAXIN) 750 MG Tab Take by mouth. 3/26/25   Provider, Historical   metoprolol tartrate (LOPRESSOR) 25 MG tablet Take 12.5 mg by mouth once daily.    Provider, Historical   mupirocin (BACTROBAN) 2 % ointment Apply topically 2 (two) times daily. 4/5/24   Provider, Historical   paliperidone palm, 3 month, (INVEGA TRINZA) 546 mg/1.75 mL Syrg injection  7/23/24   Provider, Historical   polyethylene glycol (GLYCOLAX) 17 gram/dose powder Take 17 g by mouth as needed. 3/13/24   Provider, Historical   promethazine (PHENERGAN) 25 MG tablet 1 tablet as needed Orally every 12 hrs    Provider, Historical   RESTASIS 0.05 % ophthalmic emulsion Place 1 drop into both eyes 2 (two) times daily. 2/24/22   Provider, Historical   valACYclovir (VALTREX) 500 MG tablet Take 500 mg by mouth 2 (two) times daily. 5/10/22   Provider, Historical   zaleplon (SONATA) 5 MG Cap 1 capsule at bedtime as needed Orally Once a day    Provider, Historical   rosuvastatin (CRESTOR) 40 MG Tab Take 40 mg by mouth nightly. 6/15/22 7/11/22  Provider,  Historical       REVIEW OF SYSTEMS:   Except as documented, all other systems reviewed and negative     PHYSICAL EXAM:     VITAL SIGNS: 24 HRS MIN & MAX LAST   Temp  Min: 97.4 °F (36.3 °C)  Max: 98.1 °F (36.7 °C) 97.4 °F (36.3 °C)   BP  Min: 110/69  Max: 156/87 (!) 156/87   Pulse  Min: 60  Max: 78  74   Resp  Min: 12  Max: 20 20   SpO2  Min: 97 %  Max: 100 % 100 %       General appearance: Well-developed, well-nourished female chronically ill-appearing looks older than stated age; nontoxic, in no apparent distress, dozing off and on during examination.  No family member at the bedside  HENT: Atraumatic head. Moist mucous membranes of oral cavity.  Eyes:  PERRL.   Lungs:  Diminished bilateral bilaterally. No wheezing present.   Heart: Regular rate and rhythm. S1 and S2 present capillary refill brisk  Abdomen: Soft, non-distended, non-tender. Bowel sounds are normal.   Extremities: No cyanosis, clubbing, generalized weakness.  Skin:  Warm and dry.   Neuro: Neuro  CN 2, 3, 4, 6: EOMI, nl visual fields, nl gross vision test  CN 5: nl chewing, nl facial sensation  CN 7: nl smile, nl brow wrinkle, nl eyes closure  CN 8: nl hearing  CN 9, 10: nl uvular elevation on phonation  CN 11: nl shoulder shrug  CN 12: nl tongue protrusion, midline, nl lateral deviation   Strength: nl strength proximally and distally, upper and lower extremity  Speech: clear, no dysarthria  Thinking: clear, goal oriented, no delusions, dozing off and on during examination  Psych/mental status: flat affect cooperative, crying    LABS AND IMAGING:     Recent Labs   Lab 05/01/25  1510   WBC 6.71   RBC 4.78   HGB 11.6*   HCT 37.2   MCV 77.8*   MCH 24.3*   MCHC 31.2*   RDW 19.2*      MPV 8.6       Recent Labs   Lab 05/01/25  1510      K 3.6   *   CO2 21*   BUN 12.0   CREATININE 0.81   GLU 87   CALCIUM 8.6   MG 2.30   ALBUMIN 3.8   PROT 6.6   ALKPHOS 96   ALT 11   AST 11   BILITOT 0.2       Microbiology Results (last 7 days)       ** No  results found for the last 168 hours. **             CT Head Without Contrast  Narrative: EXAMINATION:  CT HEAD WITHOUT CONTRAST    CLINICAL HISTORY:  Mental status change, unknown cause;    TECHNIQUE:  Multiple axial images were obtained from the base of the brain to the vertex without contrast administration.  Sagittal and coronal reconstructions were performed. .Automatic exposure control  (AEC) is utilized to reduce patient radiation exposure.    COMPARISON:  None    FINDINGS:  There is no intracranial mass or lesion seen.  No hemorrhage is seen.  No infarct is seen.  The ventricles and basilar cisterns appear normal.  Brain parenchyma appears grossly unremarkable.    Posterior fossa appears normal.  The calvarium is intact.  The paranasal sinuses shows evidence of sphenoid sinusitis on the left side..  Impression: Sphenoid sinusitis otherwise unremarkable    Electronically signed by: Alejandro Fleming  Date:    05/01/2025  Time:    15:15        ASSESSMENT & PLAN:   ASSESSMENT:  Acute encephalopathy-suspect metabolic-POA   Methamphetamine abuse-urine drug screen positive-POA   DM type 2-POA   HTN-essential-POA   Weakness- POA    Hx of anxiety, depression, bipolar disorder, dementia, CVA TIA, dementia, osteoporosis, COPD, emphysema, asthma, chronic back pain, chronic arthritis      PLAN:  Consult Neurology Services appreciate assistance and recommendations   MRI of the brain without contrast   Neurochecks q.4 hours times 24 hours   Accu-Cheks and sliding scale coverage  Fall precautions   PT OT eval and treat  Monitor for any withdrawals abuse  IV hydration   Home medication as deemed necessary   Repeat lab work in a.m.         VTE Prophylaxis: SCD for DVT prophylaxis and will be advised to be as mobile as possible and sit in a chair as tolerated    Patient condition:  Stable    __________________________________________________________________________  INPATIENT LIST OF MEDICATIONS     Scheduled Meds:  Continuous  Infusions:   0.9% NaCl   Intravenous Continuous 125 mL/hr at 05/01/25 2311 New Bag at 05/01/25 2311     PRN Meds:.  Current Facility-Administered Medications:     acetaminophen, 1,000 mg, Oral, Q6H PRN    acetaminophen, 650 mg, Oral, Q4H PRN    albuterol-ipratropium, 3 mL, Nebulization, Q4H PRN    dextrose 50%, 12.5 g, Intravenous, PRN    dextrose 50%, 25 g, Intravenous, PRN    glucagon (human recombinant), 1 mg, Intramuscular, PRN    glucose, 16 g, Oral, PRN    glucose, 24 g, Oral, PRN    insulin aspart U-100, 0-5 Units, Subcutaneous, QID (AC + HS) PRN    naloxone, 0.02 mg, Intravenous, PRN    ondansetron, 4 mg, Intravenous, Q4H PRN    prochlorperazine, 5 mg, Intravenous, Q6H PRN    sodium chloride 0.9%, 10 mL, Intravenous, PRN      I, KRISTI Pierre have reviewed case and examined pt. Please see the following addendum for further assessment and plan from their attending MD. This note was created with a assistance of electronic voice recognition software.  There may be transcription errors as a result of using this technology however minimal; and effort has been made to assure accuracy of the transcription but any obvious areas or admissions should be clarified with the author of the document   KRISTI Bryant   05/02/2025 05/02/2025 Dr. Cesar.-I have taken over the case of patient at 7:00 a.m. on 05/02/2025.  Patient was initially seen by nurse practitioner Ms. tai Harrison.    The patient is a 60-year-old white female with multiple medical problems that includes depression/bipolar disorder/DJD/anxiety/COPD/diabetes type 2/hypertension/hyperlipidemia/neuropathy  That was found wandering in parking lot of her rheumatologist.  It is reported that patient was seen by provider  And left after being seen.  It was reported she returned back to doctor's office forgetting that she was already seen by provider.  Then she was found wandering in the parking lot and EMS was called.  Patient was  transferred to [facility] where she remained confused.  UDS pertinent for cannabis/amphetamines.  At the present moment she is alert to self/placed and time but has no recollection of events.  She agrees of doing drugs the night before.  Alert/active and oriented x3.  No acute distress.  HEENT-normocephalic with pupils equal round reactive to light and accommodation, extraocular muscles are intact.  Ears not examined.  Throat is clear.  Neck is supple with limited range of motion.  Lungs are clear.  Heart S1-S2 no S3.  Abdomen is benign.  Extremities are symmetrical without edema or cyanosis and pulses are present over dorsalis pedis and posterior tibial.  Neurological examination remains grossly unremarkable.    Assessment   R/o metabolic/toxic encephalopathy  Ams - resolved  DM 2 with a glycosuria probably secondary to  SGLT-2 inhibitors  Hypertension/depression/substance abuse/DJD/bipolar/anxiety    Plan-CT head negative.  MRI brain pending.  Neurology was consulted on admission.  We will call patient's pharmacy- [facility] for active meds . Psych consult will follow .  Cardiac/ada diet .    Discharge Planning and Disposition: No mobility needs. Ambulating well. Good social support system.   Anticipated discharge    All diagnosis and differential diagnosis have been reviewed; assessment and plan has been documented; I have personally reviewed the labs and test results that are presently available; I have reviewed the patients medication list; I have reviewed the consulting providers response and recommendations. I have reviewed or attempted to review medical records based upon their availability.    All of the patient and family questions have been addressed and answered. Patient's is agreeable to the above stated plan. I will continue to monitor closely and make adjustments to medical management as needed.      Serg VERDUGO  [facility]  5/2/25

## 2025-05-02 NOTE — CONSULTS
Re AMS    Hpi  Pt with pmh psych dz and substance abuse  Was confused and wandering around the MOB at [facility] after seeing rheumatologist  Pt feels close to her baseline today  No obvious sz or lateralizing deficits    Past Medical History:   Diagnosis Date    Acquired spondylolisthesis of cervical vertebra     Ambulates with cane     Anxiety     Bipolar disorder     w/manic depression as per pt    Carpal tunnel syndrome     COPD (chronic obstructive pulmonary disease)     Depression     Diabetes mellitus     Digestive disorder     GERD    Dry eyes     Headache     Hyperlipidemia     Hypertension     Insomnia     Lumbar degenerative disc disease     Mixed hyperlipidemia     Muscle spasms of both lower extremities     Muscle spasms of neck     Neuropathy     Personal history of fall     PONV (postoperative nausea and vomiting)     Right hip pain     radiates down to ankle    Seizure-like activity 03/31/2024    no further activity since 03    TIA (transient ischemic attack) 2022    Ulnar nerve external compression syndrome      Social History[1]  Past Surgical History:   Procedure Laterality Date    ANTERIOR CERVICAL DISCECTOMY W/ FUSION N/A 10/28/2024    Procedure: DISCECTOMY, SPINE, CERVICAL, ANTERIOR APPROACH, WITH FUSION;  Surgeon: Chana Bingham MD;  Location: [facility];  Service: Neurosurgery;  Laterality: N/A;  C3-4, C4-5 ACDF // NTI // TIVA // [employer] //  XX    ANTERIOR CRUCIATE LIGAMENT REPAIR Left 1993    CARPAL TUNNEL RELEASE Right 05/05/2023    Procedure: RELEASE, CARPAL TUNNEL;  Surgeon: Canelo Bhatt MD;  Location: [facility];  Service: Orthopedics;  Laterality: Right;    FRACTURE SURGERY  2017    Mauro Cárdenas    JOINT REPLACEMENT  2016    revision total knee ledt    NECK SURGERY  10/2024    SHOULDER ARTHROSCOPY W/ ROTATOR CUFF REPAIR Left     TOTAL KNEE ARTHROPLASTY Left     X2    ULNAR NERVE TRANSPOSITION Right 05/05/2023    Procedure: TRANSPOSITION, NERVE, ULNAR  Endoscopic;  Surgeon: Canelo Bhatt,  MD;  Location: [facility] OR;  Service: Orthopedics;  Laterality: Right;  Endoscopic    WRIST SURGERY Right      Results for orders placed or performed during the hospital encounter of 05/01/25   CT Head Without Contrast    Narrative    EXAMINATION:  CT HEAD WITHOUT CONTRAST    CLINICAL HISTORY:  Mental status change, unknown cause;    TECHNIQUE:  Multiple axial images were obtained from the base of the brain to the vertex without contrast administration.  Sagittal and coronal reconstructions were performed. .Automatic exposure control  (AEC) is utilized to reduce patient radiation exposure.    COMPARISON:  None    FINDINGS:  There is no intracranial mass or lesion seen.  No hemorrhage is seen.  No infarct is seen.  The ventricles and basilar cisterns appear normal.  Brain parenchyma appears grossly unremarkable.    Posterior fossa appears normal.  The calvarium is intact.  The paranasal sinuses shows evidence of sphenoid sinusitis on the left side..      Impression    Sphenoid sinusitis otherwise unremarkable      Electronically signed by: Alejandro Fleming  Date:    05/01/2025  Time:    15:15   Results for orders placed or performed during the hospital encounter of 03/28/24   MRI Brain Without Contrast    Narrative    EXAMINATION:  MRI BRAIN WITHOUT CONTRAST    CLINICAL HISTORY:  fall with sensory deficits;    TECHNIQUE:  Routine unenhanced brain MRI.    COMPARISON:  CT yesterday.    FINDINGS:  Diffusion imaging is negative for acute infarct.  There is minimal patchy increased T2 and FLAIR signal in the cerebral white matter which is nonspecific but most commonly associated with chronic small vessel ischemic changes. The ventricles are not enlarged.    Signal voids are visible in the intracranial internal carotid arteries bilaterally and in the basilar artery implying gross patency.      Impression    No acute intracranial findings.    No significant discrepancy with the preliminary report.      Electronically signed  by: Bar Clements  Date:    03/29/2024  Time:    09:52   Results for orders placed or performed during the hospital encounter of 09/13/22   MRA Brain    Narrative    EXAMINATION:  MRA BRAIN WITHOUT CONTRAST    CLINICAL HISTORY:  Neuro deficit, acute, stroke suspected;    TECHNIQUE:  Non-contrast 3-D time-of-flight intracranial MR angiography was performed through the [place] [facility] [place] with MIP reformatting.    COMPARISON:  None    FINDINGS:  The supraclinoid carotid arteries appear normal.  The MCAs are patent bilaterally.  M1 segments M2 segments M3 segments are patent.  A1 segments are patent.  Anterior communicating arteries patent anterior cerebral arteries are widely patent bilaterally.  No aneurysm or obstruction is seen.    The distal vertebral arteries appear normal.  Basilar artery appears normal.  No aneurysm or obstruction is seen.  Posterior cerebral arteries are patent.  The posterior communicating artery is patent on the right and not seen on the left.      Impression    Incomplete [id number] which is a normal variant    Otherwise unremarkable MRA of the brain      Electronically signed by: Stefania Fleming  Date:    09/14/2022  Time:    17:57     Scheduled Meds:  Continuous Infusions:   0.9% NaCl   Intravenous Continuous 125 mL/hr at 05/02/25 1352 New Bag at 05/02/25 1352     PRN Meds:.  Current Facility-Administered Medications:     acetaminophen, 1,000 mg, Oral, Q6H PRN    acetaminophen, 650 mg, Oral, Q4H PRN    albuterol-ipratropium, 3 mL, Nebulization, Q4H PRN    dextrose 50%, 12.5 g, Intravenous, PRN    dextrose 50%, 25 g, Intravenous, PRN    glucagon (human recombinant), 1 mg, Intramuscular, PRN    glucose, 16 g, Oral, PRN    glucose, 24 g, Oral, PRN    insulin aspart U-100, 0-5 Units, Subcutaneous, QID (AC + HS) PRN    naloxone, 0.02 mg, Intravenous, PRN    ondansetron, 4 mg, Intravenous, Q4H PRN    prochlorperazine, 5 mg, Intravenous, Q6H PRN    sodium chloride 0.9%, 10 mL, Intravenous,  PRN    .uds      Mental Status: Alert and oriented x3. Language is fluent with good comprehension.    Cranial Nerve: Pupils are equal, round, and reactive to light. Visual fields are intact to confrontation. Ocular movements are intact. Face is symmetric at rest and with activation with intact sensation throughout. Hearing intact to finger rub bilaterally. Muscles of tongue and palate activate symmetrically. No dysarthria. Strength is full in sternocleidomastoid and trapezius bilaterally.    Motor: Muscle bulk and tone are normal. Strength is 5/5 in all four extremities both proximally and distally. Intact fine motor movements bilaterally. There is no pronator drift or satelliting on arm roll.    Sensory: Sensation is intact to light touch, pinprick, vibration, and proprioception throughout. Romberg is negative.    Reflexes: 2+ and symmetric at the biceps, triceps, brachioradialis, patella, and Achilles bilaterally. Plantar response is flexor bilaterally.    Coordination: No dysmetria on finger-nose-finger or heel-knee-shin. Normal rapid alternating movements. Fast finger tapping with normal amplitude and speed.    Gait: Not tested    Imp  Ams 2.2 polysubstance use  Ruling out stroke  Home soon         [1]   Social History  Socioeconomic History    Marital status:    Tobacco Use    Smoking status: Every Day     Current packs/day: 0.25     Average packs/day: 0.3 packs/day for 42.0 years (10.5 ttl pk-yrs)     Types: Vaping with nicotine, Cigarettes     Passive exposure: Past    Smokeless tobacco: Never    Tobacco comments:     2-3 cigarettes/day. Done with cigarettes. Vaping a little   Substance and Sexual Activity    Alcohol use: Not Currently     Comment: stopped in 2005    Drug use: Not Currently     Frequency: 2.0 times per week     Types: Marijuana, Cocaine     Comment: pt stated last time was 10/20/24 and will stop until after sx    Sexual activity: Not Currently     Partners: Male     Birth  control/protection: None     Comment: Abstinence     Social Drivers of Health     Financial Resource Strain: Low Risk  (5/2/2025)    Overall Financial Resource Strain (CARDIA)     Difficulty of Paying Living Expenses: Not very hard   Food Insecurity: No Food Insecurity (5/2/2025)    Hunger Vital Sign     Worried About Running Out of Food in the Last Year: Never true     Ran Out of Food in the Last Year: Never true   Transportation Needs: No Transportation Needs (5/2/2025)    PRAPARE - Transportation     Lack of Transportation (Medical): No     Lack of Transportation (Non-Medical): No   Physical Activity: Inactive (5/2/2025)    Exercise Vital Sign     Days of Exercise per Week: 0 days     Minutes of Exercise per Session: 0 min   Stress: Stress Concern Present (5/2/2025)    [employer] - Occupational Stress Questionnaire     Feeling of Stress : To some extent   Housing Stability: Low Risk  (5/2/2025)    Housing Stability Vital Sign     Unable to Pay for Housing in the Last Year: No     Homeless in the Last Year: No
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
## 2025-05-03 PROCEDURE — 21400001 HC TELEMETRY ROOM

## 2025-05-03 PROCEDURE — 63600175 PHARM REV CODE 636 W HCPCS: Performed by: NURSE PRACTITIONER

## 2025-05-03 PROCEDURE — 99231 SBSQ HOSP IP/OBS SF/LOW 25: CPT | Mod: ,,, | Performed by: PSYCHIATRY & NEUROLOGY

## 2025-05-03 PROCEDURE — 11000001 HC ACUTE MED/SURG PRIVATE ROOM

## 2025-05-03 PROCEDURE — 25000003 PHARM REV CODE 250

## 2025-05-03 PROCEDURE — 25000003 PHARM REV CODE 250: Performed by: INTERNAL MEDICINE

## 2025-05-03 RX ORDER — AMLODIPINE BESYLATE 5 MG/1
10 TABLET ORAL DAILY
Status: DISCONTINUED | OUTPATIENT
Start: 2025-05-03 | End: 2025-05-04 | Stop reason: HOSPADM

## 2025-05-03 RX ORDER — AMLODIPINE BESYLATE 5 MG/1
5 TABLET ORAL DAILY
Status: DISCONTINUED | OUTPATIENT
Start: 2025-05-03 | End: 2025-05-03

## 2025-05-03 RX ORDER — ATORVASTATIN CALCIUM 40 MG/1
40 TABLET, FILM COATED ORAL DAILY
Status: DISCONTINUED | OUTPATIENT
Start: 2025-05-04 | End: 2025-05-04 | Stop reason: HOSPADM

## 2025-05-03 RX ORDER — POLYETHYLENE GLYCOL 3350 17 G/17G
17 POWDER, FOR SOLUTION ORAL DAILY
Status: DISCONTINUED | OUTPATIENT
Start: 2025-05-04 | End: 2025-05-04 | Stop reason: HOSPADM

## 2025-05-03 RX ADMIN — ONDANSETRON 4 MG: 2 INJECTION INTRAMUSCULAR; INTRAVENOUS at 09:05

## 2025-05-03 RX ADMIN — DULOXETINE HYDROCHLORIDE 60 MG: 30 CAPSULE, DELAYED RELEASE ORAL at 09:05

## 2025-05-03 RX ADMIN — BUPROPION HYDROCHLORIDE 300 MG: 150 TABLET, EXTENDED RELEASE ORAL at 09:05

## 2025-05-03 RX ADMIN — CARBAMAZEPINE 400 MG: 100 TABLET, EXTENDED RELEASE ORAL at 09:05

## 2025-05-03 RX ADMIN — CARBAMAZEPINE 400 MG: 100 TABLET, EXTENDED RELEASE ORAL at 08:05

## 2025-05-03 RX ADMIN — HYDROCODONE BITARTRATE AND ACETAMINOPHEN 1 TABLET: 5; 325 TABLET ORAL at 01:05

## 2025-05-03 RX ADMIN — HYDROCODONE BITARTRATE AND ACETAMINOPHEN 1 TABLET: 5; 325 TABLET ORAL at 09:05

## 2025-05-03 NOTE — PROGRESS NOTES
S  'i want to go home. I feel fine'  No active c/o today  Eating/taking meds  Says she will refrain from drug use    Scheduled Meds:   buPROPion  300 mg Oral Daily    carBAMazepine  400 mg Oral BID    DULoxetine  60 mg Oral BID     Continuous Infusions:   0.9% NaCl   Intravenous Continuous 125 mL/hr at 05/02/25 1352 New Bag at 05/02/25 1352     PRN Meds:.  Current Facility-Administered Medications:     acetaminophen, 1,000 mg, Oral, Q6H PRN    acetaminophen, 650 mg, Oral, Q4H PRN    albuterol-ipratropium, 3 mL, Nebulization, Q4H PRN    dextrose 50%, 12.5 g, Intravenous, PRN    dextrose 50%, 25 g, Intravenous, PRN    glucagon (human recombinant), 1 mg, Intramuscular, PRN    glucose, 16 g, Oral, PRN    glucose, 24 g, Oral, PRN    HYDROcodone-acetaminophen, 1 tablet, Oral, Q6H PRN    insulin aspart U-100, 0-5 Units, Subcutaneous, QID (AC + HS) PRN    naloxone, 0.02 mg, Intravenous, PRN    ondansetron, 4 mg, Intravenous, Q4H PRN    prochlorperazine, 5 mg, Intravenous, Q6H PRN    sodium chloride 0.9%, 10 mL, Intravenous, PRN  Vitals:    05/03/25 0709   BP: (!) 146/78   Pulse: 73   Resp: 18   Temp: 97.3 °F (36.3 °C)     Mental Status: Alert and oriented x3. Language is fluent with good comprehension.    Cranial Nerve: Pupils are equal, round, and reactive to light. Visual fields are intact to confrontation. Normal fundi. Ocular movements are intact. Face is symmetric at rest and with activation with intact sensation throughout. Hearing intact to finger rub bilaterally. Muscles of tongue and palate activate symmetrically. No dysarthria. Strength is full in sternocleidomastoid and trapezius bilaterally.    Motor: Muscle bulk and tone are normal. Strength is 5/5 in all four extremities both proximally and distally. Intact fine motor movements bilaterally. There is no pronator drift or satelliting on arm roll.    Sensory: Sensation is intact to light touch, pinprick, vibration, and proprioception throughout. Romberg is  negative.    Reflexes: 2+ and symmetric at the biceps, triceps, brachioradialis, patella, and Achilles bilaterally. Plantar response is flexor bilaterally.    Coordination: No dysmetria on finger-nose-finger or heel-knee-shin. Normal rapid alternating movements. Fast finger tapping with normal amplitude and speed.    Gait: Narrow based with normal stride length and good arm swing bilaterally. Able to walk on heels, toes, and in tandem.      Imp  Substance related disorder  Back to normal  Signing off  Ok to OK home from neuorlogy standpoint

## 2025-05-04 VITALS
DIASTOLIC BLOOD PRESSURE: 81 MMHG | WEIGHT: 150 LBS | TEMPERATURE: 98 F | RESPIRATION RATE: 20 BRPM | SYSTOLIC BLOOD PRESSURE: 136 MMHG | HEIGHT: 62 IN | OXYGEN SATURATION: 97 % | BODY MASS INDEX: 27.6 KG/M2 | HEART RATE: 84 BPM

## 2025-05-04 LAB
EST. AVERAGE GLUCOSE BLD GHB EST-MCNC: 119.8 MG/DL
HBA1C MFR BLD: 5.8 %
POCT GLUCOSE: 100 MG/DL (ref 70–110)
POCT GLUCOSE: 112 MG/DL (ref 70–110)

## 2025-05-04 PROCEDURE — 25000003 PHARM REV CODE 250

## 2025-05-04 PROCEDURE — 25000003 PHARM REV CODE 250: Performed by: INTERNAL MEDICINE

## 2025-05-04 PROCEDURE — 83036 HEMOGLOBIN GLYCOSYLATED A1C: CPT | Performed by: INTERNAL MEDICINE

## 2025-05-04 PROCEDURE — 63600175 PHARM REV CODE 636 W HCPCS: Performed by: NURSE PRACTITIONER

## 2025-05-04 PROCEDURE — 36415 COLL VENOUS BLD VENIPUNCTURE: CPT | Performed by: INTERNAL MEDICINE

## 2025-05-04 RX ORDER — METFORMIN HYDROCHLORIDE 500 MG/1
500 TABLET, EXTENDED RELEASE ORAL 3 TIMES DAILY
Status: ON HOLD | COMMUNITY
Start: 2025-05-01 | End: 2025-05-04

## 2025-05-04 RX ORDER — DULOXETIN HYDROCHLORIDE 60 MG/1
60 CAPSULE, DELAYED RELEASE ORAL 2 TIMES DAILY
Qty: 60 CAPSULE | Refills: 2 | Status: SHIPPED | OUTPATIENT
Start: 2025-05-04 | End: 2025-08-02

## 2025-05-04 RX ORDER — CARBAMAZEPINE 400 MG/1
400 TABLET, EXTENDED RELEASE ORAL 2 TIMES DAILY
Qty: 60 TABLET | Refills: 2 | Status: SHIPPED | OUTPATIENT
Start: 2025-05-04 | End: 2025-08-02

## 2025-05-04 RX ORDER — BUPROPION HYDROCHLORIDE 300 MG/1
300 TABLET ORAL DAILY
Qty: 30 TABLET | Refills: 2 | Status: SHIPPED | OUTPATIENT
Start: 2025-05-04 | End: 2025-08-02

## 2025-05-04 RX ORDER — METFORMIN HYDROCHLORIDE 500 MG/1
500 TABLET, EXTENDED RELEASE ORAL 2 TIMES DAILY WITH MEALS
Start: 2025-05-04

## 2025-05-04 RX ORDER — AMLODIPINE BESYLATE 10 MG/1
10 TABLET ORAL DAILY
Qty: 90 TABLET | Refills: 3 | Status: SHIPPED | OUTPATIENT
Start: 2025-05-05 | End: 2026-05-05

## 2025-05-04 RX ADMIN — AMLODIPINE BESYLATE 10 MG: 5 TABLET ORAL at 12:05

## 2025-05-04 RX ADMIN — CARBAMAZEPINE 400 MG: 100 TABLET, EXTENDED RELEASE ORAL at 09:05

## 2025-05-04 RX ADMIN — ONDANSETRON 4 MG: 2 INJECTION INTRAMUSCULAR; INTRAVENOUS at 01:05

## 2025-05-04 RX ADMIN — HYDROCODONE BITARTRATE AND ACETAMINOPHEN 1 TABLET: 5; 325 TABLET ORAL at 02:05

## 2025-05-04 RX ADMIN — HYDROCODONE BITARTRATE AND ACETAMINOPHEN 1 TABLET: 5; 325 TABLET ORAL at 08:05

## 2025-05-04 RX ADMIN — AMLODIPINE BESYLATE 10 MG: 5 TABLET ORAL at 09:05

## 2025-05-04 RX ADMIN — ONDANSETRON 4 MG: 2 INJECTION INTRAMUSCULAR; INTRAVENOUS at 08:05

## 2025-05-04 RX ADMIN — DULOXETINE HYDROCHLORIDE 60 MG: 30 CAPSULE, DELAYED RELEASE ORAL at 09:05

## 2025-05-04 RX ADMIN — PROCHLORPERAZINE EDISYLATE 5 MG: 5 INJECTION INTRAMUSCULAR; INTRAVENOUS at 11:05

## 2025-05-04 RX ADMIN — POLYETHYLENE GLYCOL 3350 17 G: 17 POWDER, FOR SOLUTION ORAL at 09:05

## 2025-05-04 RX ADMIN — BUPROPION HYDROCHLORIDE 300 MG: 150 TABLET, EXTENDED RELEASE ORAL at 09:05

## 2025-05-04 RX ADMIN — ONDANSETRON 4 MG: 2 INJECTION INTRAMUSCULAR; INTRAVENOUS at 02:05

## 2025-05-04 RX ADMIN — ATORVASTATIN CALCIUM 40 MG: 40 TABLET, FILM COATED ORAL at 09:05

## 2025-05-04 NOTE — DISCHARGE SUMMARY
[facility] Hospital Medicine Discharge Summary    Admit Date: 5/1/2025  Discharge Date and Time: 5/4/202510:10 AM  Admitting Physician:  Team  Discharging Physician: Fly Cesar MD.  Primary Care Physician: Garrick Nascimento MD  Consults: Neurology and Psychiatry    Discharge Diagnoses:  metabolic/toxic encephalopathy  -history of substance abuse with urine drug screen pertinent for cannabinoids and amphetamines  Ams - resolved  DM 2 with a glycosuria secondary to  SGLT-2 inhibitors  -hemoglobin A1c of 5.8.  Jardiance discontinued and metformin decreased to 500 mg p.o. b.i.d.  Hypertension/depression/substance abuse/DJD/bipolar/anxiety/hyperlipidemia         Hospital Course:    Faith Jin is a 60 y.o. female who PMH includes cervical spondylosis, chronic back pain, anxiety, depression, bipolar disorder, COPD, DM type 2, lumbar degenerative disc disease, HTN, HLD, neuropathy, TIA with seizure-like activity, substance abuse that presents to the ED at [facility] on 5/1/2025 s/p being found wandering in the parking lot of her MD office.  It was reported patient had an appointment that morning and attended the appointment.  It was reported she returned back to the doctor's office reports of forgetting that she was seen by provider.  Patient left and was subsequently found wandering in the parking lot which EMS was called for ED transport.  No reports of nausea, vomiting, diarrhea, injury, trauma, or falls; no reported chest pain, shortness a breath or any new foods or medication and patient reports no sick contacts.  Lab work reviewed greater than a 1000 glucose in urine(on jardiance ), urine drug screen positive for amphetamines and cannabis; otherwise unremarkable.  CT of head without contrast impression reviewed demonstrated sphenoid sinusitis otherwise unremarkable.  It was reported per ED provider at [facility] pt was still altered and confused. The patient was transferred to  [facility] on 5/1/2025 for higher level of care.   VS /72 pulse 69 respirations 18 temperature 98.1° F O2 saturation 100% on room air.  Patient is admitted to hospital medicine services for further management.    Neurology Services have been consulted.    Upon transferred to [facility] we decided to continue hydration.  Patient was emotional.  Has a history of mental health so Psychiatry was consulted.  We decided to consult Neurology as well.  On physical examination patient was nonfocal.  Next day level of consciousness was much improved and we were able to have a better interview.  The patient acknowledged to doing some drugs denied before her episode of confusion.  Urine drug screen done in the emergency room was pertinent for amphetamines/cannabinoids.  The patient was open about her drug use as well as her depression and mental issues.  Blood pressure was controlled.  Patient will continue Wellbutrin/carbamazepine/Cymbalta.  Benztropine bright LR and Lunesta was discontinued.  Metoprolol succinate 25 mg half a tablet p.o. daily was discontinued and placed on Norvasc 10 mg p.o. daily.  Medications were sent to her pharmacy.  Patient was cleared by Neurology for discharge.  Patient will be discharged in improved condition.  She should follow with the primary care physician Dr. Nascimento as well as with psychiatry/mental health for further evaluation.  Drug abuse cessation counseling was given for around 10-20 minutes.    Jardiance was discontinued and metformin was decreased to 500 mg p.o. b.i.d..  Hemoglobin A1c of 5.8 and patient has lost substantial weight secondary to ongoing and depression/anxiety and mental issues.       Pt was seen and examined on the day of discharge  Vitals:  VITAL SIGNS: 24 HRS MIN & MAX LAST   Temp  Min: 97.5 °F (36.4 °C)  Max: 98.2 °F (36.8 °C) 98.2 °F (36.8 °C)   BP  Min: 126/96  Max: 162/79 136/81   Pulse  Min: 66  Max: 94  84   Resp  Min: 18  Max: 20 20    SpO2  Min: 97 %  Max: 98 % 97 %       Physical Exam:  General appearance: Well-developed, well-nourished female   looks older than stated age; nontoxic, in no apparent distress,   HEENT: Atraumatic head. Moist mucous membranes of oral cavity.  Eyes:  PERRL.   Lungs:  clear bilateral  No wheezing present.   Heart: Regular rate and rhythm. S1 and S2 present capillary refill brisk  Abdomen: Soft, non-distended, non-tender. Bowel sounds are normal.   Extremities: No cyanosis, clubbing, generalized weakness.  Skin:  Warm and dry.   Neuro: Neuro  CN 2, 3, 4, 6: EOMI, nl visual fields, nl gross vision test  CN 5: nl chewing, nl facial sensation  CN 7: nl smile, nl brow wrinkle, nl eyes closure  CN 8: nl hearing  CN 9, 10: nl uvular elevation on phonation  CN 11: nl shoulder shrug  CN 12: nl tongue protrusion, midline, nl lateral deviation   Strength: nl strength proximally and distally, upper and lower extremity  Speech: clear, no dysarthria  Thinking: clear, goal oriented, no delusions, dozing off and on during examination  Psych/mental status:flat / emotional    Procedures Performed: No admission procedures for hospital encounter.     Significant Diagnostic Studies: See Full reports for all details    Recent Labs   Lab 05/01/25  1510 05/02/25  0535   WBC 6.71 5.54   RBC 4.78 4.85   HGB 11.6* 11.5*   HCT 37.2 37.9   MCV 77.8* 78.1*   MCH 24.3* 23.7*   MCHC 31.2* 30.3*   RDW 19.2* 19.2*    311   MPV 8.6 8.7       Recent Labs   Lab 05/01/25  1510 05/02/25  0535    144   K 3.6 3.8   * 119*   CO2 21* 18*   BUN 12.0 9.1*   CREATININE 0.81 0.66   GLU 87 94   CALCIUM 8.6 7.9*   MG 2.30  --    ALBUMIN 3.8 3.2*   PROT 6.6 5.8   ALKPHOS 96 91   ALT 11 10   AST 11 12   BILITOT 0.2 0.2        Microbiology Results (last 7 days)       ** No results found for the last 168 hours. **             CT Head Without Contrast  Narrative: EXAMINATION:  CT HEAD WITHOUT CONTRAST    CLINICAL HISTORY:  Mental status change,  unknown cause;    TECHNIQUE:  Multiple axial images were obtained from the base of the brain to the vertex without contrast administration.  Sagittal and coronal reconstructions were performed. .Automatic exposure control  (AEC) is utilized to reduce patient radiation exposure.    COMPARISON:  None    FINDINGS:  There is no intracranial mass or lesion seen.  No hemorrhage is seen.  No infarct is seen.  The ventricles and basilar cisterns appear normal.  Brain parenchyma appears grossly unremarkable.    Posterior fossa appears normal.  The calvarium is intact.  The paranasal sinuses shows evidence of sphenoid sinusitis on the left side..  Impression: Sphenoid sinusitis otherwise unremarkable    Electronically signed by: Alejandro Fleming  Date:    05/01/2025  Time:    15:15         Medication List        START taking these medications      amLODIPine 10 MG tablet  Commonly known as: NORVASC  Take 1 tablet (10 mg total) by mouth once daily.  Start taking on: May 5, 2025            CHANGE how you take these medications      buPROPion 300 MG 24 hr tablet  Commonly known as: WELLBUTRIN XL  Take 1 tablet (300 mg total) by mouth once daily.  What changed: See the new instructions.     DULoxetine 60 MG capsule  Commonly known as: CYMBALTA  Take 1 capsule (60 mg total) by mouth 2 (two) times daily.  What changed:   how to take this  when to take this     INVEGA SUSTENNA 234 mg/1.5 mL Syrg injection  Generic drug: paliperidone palmitate  What changed: Another medication with the same name was removed. Continue taking this medication, and follow the directions you see here.     metFORMIN 500 MG ER 24hr tablet  Commonly known as: GLUCOPHAGE-XR  Take 1 tablet (500 mg total) by mouth 2 (two) times daily with meals.  What changed:   when to take this  Another medication with the same name was removed. Continue taking this medication, and follow the directions you see here.            CONTINUE taking these medications      albuterol 90  mcg/actuation inhaler  Commonly known as: PROVENTIL/VENTOLIN HFA     atorvastatin 40 MG tablet  Commonly known as: LIPITOR     BREZTRI AEROSPHERE 160-9-4.8 mcg/actuation Hfaa  Generic drug: budesonide-glycopyr-formoterol     carBAMazepine 400 MG Tb12  Commonly known as: TEGRETOL XR  Take 1 tablet (400 mg total) by mouth 2 (two) times daily.     celecoxib 200 MG capsule  Commonly known as: CeleBREX     CONTOUR NEXT METER Misc  Generic drug: blood-glucose meter     CONTOUR NEXT TEST STRIPS Strp  Generic drug: blood sugar diagnostic     DEXILANT 60 mg capsule  Generic drug: dexlansoprazole     fenofibrate 160 MG Tab     furosemide 20 MG tablet  Commonly known as: LASIX     hydrOXYzine pamoate 25 MG Cap  Commonly known as: VISTARIL     LINZESS 145 mcg Cap capsule  Generic drug: linaCLOtide     loratadine 10 mg tablet  Commonly known as: CLARITIN     methocarbamoL 750 MG Tab  Commonly known as: ROBAXIN     polyethylene glycol 17 gram/dose powder  Commonly known as: GLYCOLAX     pregabalin 150 MG capsule  Commonly known as: LYRICA     TRELEGY ELLIPTA 100-62.5-25 mcg Dsdv  Generic drug: fluticasone-umeclidin-vilanter     valACYclovir 500 MG tablet  Commonly known as: VALTREX     VASCEPA 1 gram Cap  Generic drug: icosapent ethyL     zaleplon 5 MG Cap  Commonly known as: SONATA            STOP taking these medications      azelastine 137 mcg (0.1 %) nasal spray  Commonly known as: ASTELIN     estradioL 0.5 MG tablet  Commonly known as: ESTRACE     famotidine 20 MG tablet  Commonly known as: PEPCID     fluticasone propionate 50 mcg/actuation nasal spray  Commonly known as: FLONASE     JARDIANCE 25 mg tablet  Generic drug: empagliflozin     metoprolol succinate 25 MG 24 hr tablet  Commonly known as: TOPROL-XL     metoprolol tartrate 25 MG tablet  Commonly known as: LOPRESSOR     mupirocin 2 % ointment  Commonly known as: BACTROBAN     promethazine 25 MG tablet  Commonly known as: PHENERGAN     RESTASIS 0.05 % ophthalmic  emulsion  Generic drug: cycloSPORINE     tiZANidine 4 MG tablet  Commonly known as: ZANAFLEX     TOPAMAX 200 MG Tab  Generic drug: topiramate               Where to Get Your Medications        These medications were sent to [id number] - [place] - [address] [facility] [address]  [address] [address]      Phone: [phone]   amLODIPine 10 MG tablet  buPROPion 300 MG 24 hr tablet  carBAMazepine 400 MG Tb12  DULoxetine 60 MG capsule       Information about where to get these medications is not yet available    Ask your nurse or doctor about these medications  metFORMIN 500 MG ER 24hr tablet          Explained in detail to the patient about the discharge plan, medications, and follow-up visits. Pt understands and agrees with the treatment plan  Discharge Disposition:  Home  Discharged Condition: stable  Diet- ADA/cardiac  Dietary Orders (From admission, onward)       Start     Ordered    05/01/25 1845  Diet Heart Healthy  (Diet/Nutrition - [facility] Locations)  Diet effective now         05/01/25 1845                   Medications Per MD med rec  Activities as tolerated   Follow-up Information       Garrick Nascimento MD Follow up.    Specialty: Family Medicine  Why: We will call you with an appointment date and time  Contact information:  [address]  [place] [id number] [phone]                           For further questions contact hospitalist office    Discharge time 33 minutes    For worsening symptoms, chest pain, shortness of breath, increased abdominal pain, high grade fever, stroke or stroke like symptoms, immediately go to the nearest Emergency Room or call 911 as soon as possible.      Fly Verdugo M.D, on 5/4/2025. at 10:10 AM.

## 2025-05-04 NOTE — DISCHARGE INSTRUCTIONS
1- follow up with your primary care MD  2- decrease metformin 500 mgs tabs to twice a day   3- discontinue jardiance   4-please follow up w your psychiatrist or mental health clinic

## 2025-05-04 NOTE — PROGRESS NOTES
[facility] Hospital Medicine Progress Note        Chief Complaint: Inpatient Follow-up for  Altered Mental Status (Pt to er from dr. Calderon's office after an episode of confusion, forgetfulness and wandering around mob after visit. Cbg 102. Pt falls asleep easily during triage process. Office staff states pt returned to  office and had forgotten she had already seen the doctor.)    HPI: Faith Jin is a 60 y.o. female who PMH includes cervical spondylosis, chronic back pain, anxiety, depression, bipolar disorder, COPD, DM type 2, lumbar degenerative disc disease, HTN, HLD, neuropathy, TIA with seizure-like activity, substance abuse that presents to the ED at [facility] on 5/1/2025 s/p being found wandering in the parking lot of her endocrinologist office.  It was reported patient had an appointment that morning and attended the appointment.  It was reported she returned back to the doctor's office reports of forgetting that she was seen by provider.  Patient left and was subsequently found wandering in the parking lot which EMS was called for ED transport.  No reports of nausea, vomiting, diarrhea, injury, trauma, or falls; no reported chest pain, shortness a breath or any new foods or medication and patient reports no sick contacts.  Lab work reviewed greater than a 1000 glucose in urine, urine drug screen positive for amphetamines and cannabis; otherwise unremarkable.  CT of head without contrast impression reviewed demonstrated sphenoid sinusitis otherwise unremarkable.  It was reported per ED provider at [facility] pt was still altered and confused. The patient was transferred to [facility] on 5/1/2025 for higher level of care.   VS /72 pulse 69 respirations 18 temperature 98.1° F O2 saturation 100% on room air.  Patient is admitted to hospital medicine services for further management.    Neurology Services have been consulted.    Interval Hx:     5/3/25 dr chao - adarsh has been seen  by psych NP as well as neurologist . Psych meds have been adjusted . Pt has no focal deficits and neuro exam is wnl. Has been cleared for discharge . Will discharge in am           Case was discussed with patient's nurse and  on the floor.  Objective/physical exam:  General appearance: Well-developed, well-nourished female   looks older than stated age; nontoxic, in no apparent distress,   HEENT: Atraumatic head. Moist mucous membranes of oral cavity.  Eyes:  PERRL.   Lungs:  clear bilateral  No wheezing present.   Heart: Regular rate and rhythm. S1 and S2 present capillary refill brisk  Abdomen: Soft, non-distended, non-tender. Bowel sounds are normal.   Extremities: No cyanosis, clubbing, generalized weakness.  Skin:  Warm and dry.   Neuro: Neuro  CN 2, 3, 4, 6: EOMI, nl visual fields, nl gross vision test  CN 5: nl chewing, nl facial sensation  CN 7: nl smile, nl brow wrinkle, nl eyes closure  CN 8: nl hearing  CN 9, 10: nl uvular elevation on phonation  CN 11: nl shoulder shrug  CN 12: nl tongue protrusion, midline, nl lateral deviation   Strength: nl strength proximally and distally, upper and lower extremity  Speech: clear, no dysarthria  Thinking: clear, goal oriented, no delusions, dozing off and on during examination  Psych/mental status:flat / emotional  VITAL SIGNS: 24 HRS MIN & MAX LAST   Temp  Min: 97.3 °F (36.3 °C)  Max: 98 °F (36.7 °C) 98 °F (36.7 °C)   BP  Min: 132/75  Max: 162/79 (!) 162/79   Pulse  Min: 66  Max: 80  80   Resp  Min: 18  Max: 18 18   SpO2  Min: 97 %  Max: 98 % 98 %     I have reviewed the following labs:  Recent Labs   Lab 05/01/25  1510 05/02/25  0535   WBC 6.71 5.54   RBC 4.78 4.85   HGB 11.6* 11.5*   HCT 37.2 37.9   MCV 77.8* 78.1*   MCH 24.3* 23.7*   MCHC 31.2* 30.3*   RDW 19.2* 19.2*    311   MPV 8.6 8.7     Recent Labs   Lab 05/01/25  1510 05/02/25  0535    144   K 3.6 3.8   * 119*   CO2 21* 18*   BUN 12.0 9.1*   CREATININE 0.81 0.66   GLU 87 94    CALCIUM 8.6 7.9*   MG 2.30  --    ALBUMIN 3.8 3.2*   PROT 6.6 5.8   ALKPHOS 96 91   ALT 11 10   AST 11 12   BILITOT 0.2 0.2     Microbiology Results (last 7 days)       ** No results found for the last 168 hours. **             See below for Radiology    Assessment/Plan:  metabolic/toxic encephalopathy  Ams - resolved  DM 2 with a glycosuria probably secondary to  SGLT-2 inhibitors  Hypertension/depression/substance abuse/DJD/bipolar/anxiety    Plan - psych meds adjusted / cleared by neuro for discharge / pt remains non focal and  substance abuse counseling abstinence given for around 20 minutes .  Norvasc 10 mgs po daily   Dc  fluids  Home in am     VTE prophylaxis:     Patient condition:  Stable  Anticipated discharge and Disposition:   home in am       All diagnosis and differential diagnosis have been reviewed; assessment and plan has been documented; I have personally reviewed the labs and test results that are presently available; I have reviewed the patients medication list; I have reviewed the consulting providers response and recommendations. I have reviewed or attempted to review medical records based upon their availability    All of the patient's questions have been  addressed and answered. Patient's is agreeable to the above stated plan. I will continue to monitor closely and make adjustments to medical management as needed.    Portions of this note dictated using EMR integrated voice recognition software, and may be subject to voice recognition errors not corrected at proofreading. Please contact writer for clarification if needed.   _____________________________________________________________________    Malnutrition Status:  Nutrition consulted. Most recent weight and BMI monitored-     Measurements:  Wt Readings from Last 1 Encounters:   05/02/25 68 kg (150 lb)   Body mass index is 27.44 kg/m².    Patient has been screened and assessed by RD.    Malnutrition Type:  Context:    Level:       Malnutrition Characteristic Summary:       Interventions/Recommendations (treatment strategy):        Scheduled Med:   buPROPion  300 mg Oral Daily    carBAMazepine  400 mg Oral BID    DULoxetine  60 mg Oral BID      Continuous Infusions:   0.9% NaCl   Intravenous Continuous 125 mL/hr at 05/02/25 1352 New Bag at 05/02/25 1352      PRN Meds:    Current Facility-Administered Medications:     acetaminophen, 1,000 mg, Oral, Q6H PRN    acetaminophen, 650 mg, Oral, Q4H PRN    albuterol-ipratropium, 3 mL, Nebulization, Q4H PRN    dextrose 50%, 12.5 g, Intravenous, PRN    dextrose 50%, 25 g, Intravenous, PRN    glucagon (human recombinant), 1 mg, Intramuscular, PRN    glucose, 16 g, Oral, PRN    glucose, 24 g, Oral, PRN    HYDROcodone-acetaminophen, 1 tablet, Oral, Q6H PRN    insulin aspart U-100, 0-5 Units, Subcutaneous, QID (AC + HS) PRN    naloxone, 0.02 mg, Intravenous, PRN    ondansetron, 4 mg, Intravenous, Q4H PRN    prochlorperazine, 5 mg, Intravenous, Q6H PRN    sodium chloride 0.9%, 10 mL, Intravenous, PRN     Radiology:  I have personally reviewed the following imaging and agree with the radiologist.     CT Head Without Contrast  Narrative: EXAMINATION:  CT HEAD WITHOUT CONTRAST    CLINICAL HISTORY:  Mental status change, unknown cause;    TECHNIQUE:  Multiple axial images were obtained from the base of the brain to the vertex without contrast administration.  Sagittal and coronal reconstructions were performed. .Automatic exposure control  (AEC) is utilized to reduce patient radiation exposure.    COMPARISON:  None    FINDINGS:  There is no intracranial mass or lesion seen.  No hemorrhage is seen.  No infarct is seen.  The ventricles and basilar cisterns appear normal.  Brain parenchyma appears grossly unremarkable.    Posterior fossa appears normal.  The calvarium is intact.  The paranasal sinuses shows evidence of sphenoid sinusitis on the left side..  Impression: Sphenoid sinusitis otherwise  unremarkable    Electronically signed by: Alejandro Fleming  Date:    05/01/2025  Time:    15:15      Fly Cesar MD  Department of Hospital Medicine   [facility]   05/03/2025

## 2025-05-05 ENCOUNTER — PATIENT OUTREACH (OUTPATIENT)
Dept: ADMINISTRATIVE | Facility: CLINIC | Age: 60
End: 2025-05-05
Payer: MEDICARE

## 2025-05-05 NOTE — PROGRESS NOTES
2nd attempt-C3 nurse attempted to contact Faith Jin for a [facility] h[facility]ial discharge follow up call. No answer. LVM.  Non [facility] pcp.

## 2025-05-05 NOTE — PROGRESS NOTES
C3 nurse attempted to contact Faith Jin for a [facility] hosptial discharge follow up call. No answer. LVM.  Non [facility] pcp.

## 2025-05-06 NOTE — PROGRESS NOTES
3rd attempt-C3 nurse attempted to contact Faith Jin for a [facility]ial discharge follow up call. No answer. LVM.  Non [facility] pcp.

## 2025-05-10 LAB — POCT GLUCOSE: 132 MG/DL (ref 70–110)

## 2025-06-11 ENCOUNTER — HOSPITAL ENCOUNTER (EMERGENCY)
Facility: HOSPITAL | Age: 60
Discharge: HOME | End: 2025-06-11
Attending: EMERGENCY MEDICINE
Payer: MEDICARE

## 2025-06-11 VITALS
HEART RATE: 83 BPM | TEMPERATURE: 98 F | SYSTOLIC BLOOD PRESSURE: 100 MMHG | WEIGHT: 150 LBS | OXYGEN SATURATION: 98 % | HEIGHT: 62 IN | BODY MASS INDEX: 27.6 KG/M2 | RESPIRATION RATE: 21 BRPM | DIASTOLIC BLOOD PRESSURE: 63 MMHG

## 2025-06-11 DIAGNOSIS — F19.10 SUBSTANCE ABUSE: Primary | ICD-10-CM

## 2025-06-11 DIAGNOSIS — R53.1 WEAKNESS: ICD-10-CM

## 2025-06-11 LAB
ACCEPTIBLE SP GR UR QL: 1.01 (ref 1–1.03)
ALBUMIN SERPL-MCNC: 3.4 G/DL (ref 3.4–4.8)
ALBUMIN/GLOB SERPL: 1.2 RATIO (ref 1.1–2)
ALP SERPL-CCNC: 84 UNIT/L (ref 40–150)
ALT SERPL-CCNC: 12 UNIT/L (ref 0–55)
AMPHET UR QL SCN: POSITIVE
ANION GAP SERPL CALC-SCNC: 8 MEQ/L
AST SERPL-CCNC: 16 UNIT/L (ref 11–45)
BARBITURATE SCN PRESENT UR: NEGATIVE
BASOPHILS # BLD AUTO: 0.05 X10(3)/MCL
BASOPHILS NFR BLD AUTO: 0.9 %
BENZODIAZ UR QL SCN: NEGATIVE
BILIRUB SERPL-MCNC: 0.2 MG/DL
BILIRUB UR QL STRIP.AUTO: NEGATIVE
BUN SERPL-MCNC: 11.9 MG/DL (ref 9.8–20.1)
CALCIUM SERPL-MCNC: 7.8 MG/DL (ref 8.4–10.2)
CANNABINOIDS UR QL SCN: POSITIVE
CHLORIDE SERPL-SCNC: 112 MMOL/L (ref 98–107)
CLARITY UR: CLEAR
CO2 SERPL-SCNC: 23 MMOL/L (ref 23–31)
COCAINE UR QL SCN: NEGATIVE
COLOR UR AUTO: ABNORMAL
CREAT SERPL-MCNC: 0.87 MG/DL (ref 0.55–1.02)
CREAT/UREA NIT SERPL: 14
EOSINOPHIL # BLD AUTO: 0.34 X10(3)/MCL (ref 0–0.9)
EOSINOPHIL NFR BLD AUTO: 6 %
ERYTHROCYTE [DISTWIDTH] IN BLOOD BY AUTOMATED COUNT: 19.9 % (ref 11.5–17)
ETHANOL SERPL-MCNC: <10 MG/DL
FENTANYL UR QL SCN: NEGATIVE
GFR SERPLBLD CREATININE-BSD FMLA CKD-EPI: >60 ML/MIN/1.73/M2
GLOBULIN SER-MCNC: 2.8 GM/DL (ref 2.4–3.5)
GLUCOSE SERPL-MCNC: 128 MG/DL (ref 82–115)
GLUCOSE UR QL STRIP: >=1000
HCT VFR BLD AUTO: 33.5 % (ref 37–47)
HGB BLD-MCNC: 10.7 G/DL (ref 12–16)
HGB UR QL STRIP: NEGATIVE
IMM GRANULOCYTES # BLD AUTO: 0.01 X10(3)/MCL (ref 0–0.04)
IMM GRANULOCYTES NFR BLD AUTO: 0.2 %
KETONES UR QL STRIP: NEGATIVE
LEUKOCYTE ESTERASE UR QL STRIP: NEGATIVE
LYMPHOCYTES # BLD AUTO: 2 X10(3)/MCL (ref 0.6–4.6)
LYMPHOCYTES NFR BLD AUTO: 35.3 %
MAGNESIUM SERPL-MCNC: 2.2 MG/DL (ref 1.6–2.6)
MCH RBC QN AUTO: 24.8 PG (ref 27–31)
MCHC RBC AUTO-ENTMCNC: 31.9 G/DL (ref 33–36)
MCV RBC AUTO: 77.5 FL (ref 80–94)
MDMA UR QL SCN: NEGATIVE
MONOCYTES # BLD AUTO: 0.55 X10(3)/MCL (ref 0.1–1.3)
MONOCYTES NFR BLD AUTO: 9.7 %
NEUTROPHILS # BLD AUTO: 2.71 X10(3)/MCL (ref 2.1–9.2)
NEUTROPHILS NFR BLD AUTO: 47.9 %
NITRITE UR QL STRIP: NEGATIVE
NRBC BLD AUTO-RTO: 0 %
OHS QRS DURATION: 88 MS
OHS QTC CALCULATION: 476 MS
OPIATES UR QL SCN: NEGATIVE
PCP UR QL: NEGATIVE
PH UR STRIP: 7 [PH]
PH UR: 7 [PH] (ref 3–11)
PLATELET # BLD AUTO: 365 X10(3)/MCL (ref 130–400)
PMV BLD AUTO: 8.5 FL (ref 7.4–10.4)
POTASSIUM SERPL-SCNC: 3.6 MMOL/L (ref 3.5–5.1)
PROT SERPL-MCNC: 6.2 GM/DL (ref 5.8–7.6)
PROT UR QL STRIP: NEGATIVE
RBC # BLD AUTO: 4.32 X10(6)/MCL (ref 4.2–5.4)
SODIUM SERPL-SCNC: 143 MMOL/L (ref 136–145)
SP GR UR STRIP.AUTO: 1.01 (ref 1–1.03)
UROBILINOGEN UR STRIP-ACNC: 0.2
WBC # BLD AUTO: 5.66 X10(3)/MCL (ref 4.5–11.5)

## 2025-06-11 PROCEDURE — 63600175 PHARM REV CODE 636 W HCPCS: Performed by: NURSE PRACTITIONER

## 2025-06-11 PROCEDURE — 83735 ASSAY OF MAGNESIUM: CPT | Performed by: NURSE PRACTITIONER

## 2025-06-11 PROCEDURE — 80307 DRUG TEST PRSMV CHEM ANLYZR: CPT | Performed by: NURSE PRACTITIONER

## 2025-06-11 PROCEDURE — 80053 COMPREHEN METABOLIC PANEL: CPT | Performed by: NURSE PRACTITIONER

## 2025-06-11 PROCEDURE — 93005 ELECTROCARDIOGRAM TRACING: CPT

## 2025-06-11 PROCEDURE — 93010 ELECTROCARDIOGRAM REPORT: CPT | Mod: ,,, | Performed by: INTERNAL MEDICINE

## 2025-06-11 PROCEDURE — 81003 URINALYSIS AUTO W/O SCOPE: CPT | Performed by: NURSE PRACTITIONER

## 2025-06-11 PROCEDURE — 82077 ASSAY SPEC XCP UR&BREATH IA: CPT | Performed by: NURSE PRACTITIONER

## 2025-06-11 PROCEDURE — 99285 EMERGENCY DEPT VISIT HI MDM: CPT | Mod: 25

## 2025-06-11 PROCEDURE — 85025 COMPLETE CBC W/AUTO DIFF WBC: CPT | Performed by: NURSE PRACTITIONER

## 2025-06-11 RX ORDER — NALOXONE HYDROCHLORIDE 1 MG/ML
INJECTION INTRAMUSCULAR; INTRAVENOUS; SUBCUTANEOUS
Qty: 2 ML | Refills: 11 | Status: SHIPPED | OUTPATIENT
Start: 2025-06-11 | End: 2025-06-12 | Stop reason: SDUPTHER

## 2025-06-11 RX ORDER — METOCLOPRAMIDE HYDROCHLORIDE 5 MG/ML
10 INJECTION INTRAMUSCULAR; INTRAVENOUS
Status: COMPLETED | OUTPATIENT
Start: 2025-06-11 | End: 2025-06-11

## 2025-06-11 RX ADMIN — METOCLOPRAMIDE 10 MG: 5 INJECTION, SOLUTION INTRAMUSCULAR; INTRAVENOUS at 04:06

## 2025-06-11 NOTE — ED PROVIDER NOTES
Encounter Date: 6/11/2025       History     Chief Complaint   Patient presents with    Altered Mental Status     Friend called EMS stating pt was lethargic; currently alert, oriented; states that she took 2 klonopin; received 250ml NS with EMS     See MDM    The history is provided by the patient. No  was used.     Review of patient's allergies indicates:   Allergen Reactions    Adhesive      Other reaction(s): skin tears easily with adhesive    Amoxicillin      Other Reaction(s): Unknown    Latex Other (See Comments)    Cephalexin Nausea And Vomiting    Erythromycin Nausea And Vomiting    Lithium Nausea And Vomiting    Naproxen Nausea And Vomiting     Past Medical History:   Diagnosis Date    Acquired spondylolisthesis of cervical vertebra     Ambulates with cane     Anxiety     Bipolar disorder     w/manic depression as per pt    Carpal tunnel syndrome     COPD (chronic obstructive pulmonary disease)     Depression     Diabetes mellitus     Digestive disorder     GERD    Dry eyes     Headache     Hyperlipidemia     Hypertension     Insomnia     Lumbar degenerative disc disease     Mixed hyperlipidemia     Muscle spasms of both lower extremities     Muscle spasms of neck     Neuropathy     Personal history of fall     PONV (postoperative nausea and vomiting)     Right hip pain     radiates down to ankle    Seizure-like activity 03/31/2024    no further activity since 03    TIA (transient ischemic attack) 2022    Ulnar nerve external compression syndrome      Past Surgical History:   Procedure Laterality Date    ANTERIOR CERVICAL DISCECTOMY W/ FUSION N/A 10/28/2024    Procedure: DISCECTOMY, SPINE, CERVICAL, ANTERIOR APPROACH, WITH FUSION;  Surgeon: Chana Bingham MD;  Location: [facility];  Service: Neurosurgery;  Laterality: N/A;  C3-4, C4-5 ACDF // NTI // TIVA // [employer] //  XX    ANTERIOR CRUCIATE LIGAMENT REPAIR Left 1993    CARPAL TUNNEL RELEASE Right 05/05/2023    Procedure: RELEASE,  CARPAL TUNNEL;  Surgeon: Canelo Bhatt MD;  Location: [facility] OR;  Service: Orthopedics;  Laterality: Right;    FRACTURE SURGERY  2017    Mauro Cárdenas    JOINT REPLACEMENT  2016    revision total knee ledt    NECK SURGERY  10/2024    SHOULDER ARTHROSCOPY W/ ROTATOR CUFF REPAIR Left     TOTAL KNEE ARTHROPLASTY Left     X2    ULNAR NERVE TRANSPOSITION Right 05/05/2023    Procedure: TRANSPOSITION, NERVE, ULNAR  Endoscopic;  Surgeon: Canelo Bhatt MD;  Location: [facility] OR;  Service: Orthopedics;  Laterality: Right;  Endoscopic    WRIST SURGERY Right      Family History   Problem Relation Name Age of Onset    Coronary artery disease Mother Shobha Arnould     Diabetes Mother Shobha Arnould     Arthritis Mother Shobha Arnould         Back , hands    Early death Mother Shobha Arnould         Diabetes    Heart disease Mother Shobha Arnould     Hypertension Mother Shobha Arnould     Kidney disease Mother Shobha Arnould     Stroke Mother Shobha Arnould     Vision loss Mother Shobha Arnould     Coronary artery disease Father Cesar     Asthma Father Cesar         Back    Heart disease Father Cesar     Hypertension Father Cesar     Vision loss Father Cesar     Arthritis Sister Hailey         Not sure    Asthma Sister Hailey         Finger, shoulder    Mental illness Sister Hailey     Vision loss Sister Hailey     Arthritis Brother Zacarias         Hip    Birth defects Brother Zacarias         Special Needs/ Downs Syndrome    Heart disease Brother Zacarias     Learning disabilities Brother Zacarias     Intellectual disability Brother Zacarias      Social History[1]  Review of Systems   Neurological:  Positive for weakness.   All other systems reviewed and are negative.      Physical Exam     Initial Vitals [06/11/25 1434]   BP Pulse Resp Temp SpO2   100/63 83 (!) 21 97.5 °F (36.4 °C) 98 %      MAP       --         Physical Exam    Nursing note and vitals reviewed.  Constitutional: She appears well-developed.   Eyes: Conjunctivae and EOM are normal. Pupils  are equal, round, and reactive to light.   Neck:   Normal range of motion.  Cardiovascular:  Normal rate, regular rhythm, normal heart sounds and intact distal pulses.           Pulmonary/Chest: Breath sounds normal. No respiratory distress.   Abdominal: Abdomen is soft. She exhibits no distension. There is no abdominal tenderness.   Musculoskeletal:         General: Normal range of motion.      Cervical back: Normal range of motion.      Comments: Bilateral UE and LE strength equal and strong. No deficits. No weakness.      Neurological: She is alert and oriented to person, place, and time. She has normal strength. No sensory deficit.   Appears tired but is awake and answering all my questions appropriately and holding conversation with me    No facial droop. No slurred speech.    Skin: Skin is warm and dry.   Psychiatric: She has a normal mood and affect.         ED Course   Procedures  Labs Reviewed   CBC WITH DIFFERENTIAL - Abnormal       Result Value    WBC 5.66      RBC 4.32      Hgb 10.7 (*)     Hct 33.5 (*)     MCV 77.5 (*)     MCH 24.8 (*)     MCHC 31.9 (*)     RDW 19.9 (*)     Platelet 365      MPV 8.5      Neut % 47.9      Lymph % 35.3      Mono % 9.7      Eos % 6.0      Basophil % 0.9      Imm Grans % 0.2      Neut # 2.71      Lymph # 2.00      Mono # 0.55      Eos # 0.34      Baso # 0.05      Imm Gran # 0.01      NRBC% 0.0     COMPREHENSIVE METABOLIC PANEL - Abnormal    Sodium 143      Potassium 3.6      Chloride 112 (*)     CO2 23      Glucose 128 (*)     Blood Urea Nitrogen 11.9      Creatinine 0.87      Calcium 7.8 (*)     Protein Total 6.2      Albumin 3.4      Globulin 2.8      Albumin/Globulin Ratio 1.2      Bilirubin Total 0.2      ALP 84      ALT 12      AST 16      eGFR >60      Anion Gap 8.0      BUN/Creatinine Ratio 14     URINALYSIS, REFLEX TO URINE CULTURE - Abnormal    Color, UA Straw      Appearance, UA Clear      Specific Gravity, UA 1.010      pH, UA 7.0      Protein, UA Negative       Glucose, UA >=1000 (*)     Ketones, UA Negative      Blood, UA Negative      Bilirubin, UA Negative      Urobilinogen, UA 0.2      Nitrites, UA Negative      Leukocyte Esterase, UA Negative     DRUG SCREEN, URINE (BEAKER) - Abnormal    Amphetamines, Urine Positive (*)     Barbiturates, Urine Negative      Benzodiazepine, Urine Negative      Cannabinoids, Urine Positive (*)     Cocaine, Urine Negative      Fentanyl, Urine Negative      MDMA, Urine Negative      Opiates, Urine Negative      Phencyclidine, Urine Negative      pH, Urine 7.0      Specific Gravity, Urine Auto 1.010      Narrative:     Cut off concentrations:    Amphetamines - 1000 ng/ml  Barbiturates - 200 ng/ml  Benzodiazepine - 200 ng/ml  Cannabinoids (THC) - 50 ng/ml  Cocaine - 300 ng/ml  Fentanyl - 1.0 ng/ml  MDMA - 500 ng/ml  Opiates - 300 ng/ml   Phencyclidine (PCP) - 25 ng/ml    Specimen submitted for drug analysis and tested for pH and specific gravity in order to evaluate sample integrity. Suspect tampering if specific gravity is <1.003 and/or pH is not within the range of 4.5 - 8.0  False negatives may result form substances such as bleach added to urine.  False positives may result for the presence of a substance with similar chemical structure to the drug or its metabolite.    This test provides only a PRELIMINARY analytical test result. A more specific alternate chemical method must be used in order to obtain a confirmed analytical result. Gas chromatography/mass spectrometry (GC/MS) is the preferred confirmatory method. Other chemical confirmation methods are available. Clinical consideration and professional judgement should be applied to any drug of abuse test result, particularly when preliminary positive results are used.    Positive results will be confirmed only at the physicians request. Unconfirmed screening results are to be used only for medical purposes (treatment).        MAGNESIUM - Normal    Magnesium Level 2.20      ALCOHOL,MEDICAL (ETHANOL) - Normal    Ethanol Level <10.0       EKG Readings: (Independently Interpreted)   Initial Reading: No STEMI. Rhythm: Normal Sinus Rhythm. Heart Rate: 82. Ectopy: No Ectopy. Conduction: Normal. ST Segments: Normal ST Segments. T Waves: Normal. Clinical Impression: Normal Sinus Rhythm     ECG Results              EKG 12-lead (Final result)        Collection Time Result Time QRS Duration OHS QTC Calculation    06/11/25 14:54:34 06/11/25 15:33:33 88 476                     Final result by Interface, Lab In [facility] (06/11/25 15:33:42)                   Narrative:    Test Reason : R53.1,    Vent. Rate :  82 BPM     Atrial Rate :  82 BPM     P-R Int : 144 ms          QRS Dur :  88 ms      QT Int : 408 ms       P-R-T Axes :  55  21  51 degrees    QTcB Int : 476 ms    Normal sinus rhythm  Normal ECG  When compared with ECG of 01-May-2025 14:31,  No significant change was found  Confirmed by Felix Yañez ([id number]) on 6/11/2025 3:33:29 PM    Referred By: AAAREFERRAL SELF           Confirmed By: Felix Yañez                                  Imaging Results              CT Head Without Contrast (Final result)  Result time 06/11/25 15:20:38      Final result by Adarsh Meng MD (06/11/25 15:20:38)                   Impression:      No acute intracranial pathology.    Chronic left sphenoid sinusitis.      Electronically signed by: Adarsh Meng  Date:    06/11/2025  Time:    15:20               Narrative:    EXAMINATION:  CT HEAD WITHOUT CONTRAST    CLINICAL HISTORY:  Transient ischemic attack (TIA);    TECHNIQUE:  Low dose axial images were obtained through the head.  Coronal and sagittal reformations were also performed. Contrast was not administered.  Dose reduction techniques including automatic exposure control (AEC) were utilized.    Dose (DLP): 823 mGycm    COMPARISON:  CT head without contrast 05/01/2025.    FINDINGS:  INTRACRANIAL: Brain parenchyma demonstrates normal attenuation and  configuration.  No acute intracranial hemorrhage.  No hydrocephalus.  No intracranial mass effect.    SINUSES: Opacification of the left sphenoid sinus with associated mucoperiosteal thickening, suggestive of sequela of chronic sinusitis.  Remaining partially imaged paranasal sinuses are clear.    SKULL/SCALP: Visualized osseous structures are normal.    ORBITS: Visualized orbits are normal.                                       Medications   metoclopramide injection 10 mg (10 mg Intravenous Given 6/11/25 9567)     Medical Decision Making  61 y/o female presents from home where EMS was called by friend/neighbor who was concerned that she was altered. Patient currently answers all my questions appropriately. She does admit to taking two klonopin earlier.. she also tells me she snorted cocaine and meth today. No facial droop. No weakness. States she has vomited some over the last couple of days. She is eating and drinking. She states her neighbor came over and he gets her all worked up which is why she took the klonopin.    Ct head neg. Labs unremarkable for acute findings. UA does have glucose but no infection. Marijuana and amphetamines in urine. Which is interesting since she states she took two klonopin and cocaine as well....?     She was hydrated. She is now more awake. She ambulated in ER steadily. Will discharge home.    Amount and/or Complexity of Data Reviewed  Labs: ordered. Decision-making details documented in ED Course.  Radiology: ordered. Decision-making details documented in ED Course.    Risk  Prescription drug management.      Additional MDM:   Differential Diagnosis:   Other: The following diagnoses were also considered and will be evaluated: dehydration, drug abuse and UTI.                                   Clinical Impression:  Final diagnoses:  [R53.1] Weakness  [F19.10] Substance abuse (Primary)          ED Disposition Condition    Discharge Stable          ED Prescriptions       Medication Sig  Dispense Start Date End Date Auth. Provider    naloxone (NARCAN) 1 mg/mL injection 2 mg (1 mg per nostril) by Nasal route as needed for opioid overdose; may repeat in 3 to 5 minutes if not effective. Call 911 2 mL 6/11/2025 -- Alisha Morgan FNP          Follow-up Information       Follow up With Specialties Details Why Contact Info    Garrick Nascimento MD Family Medicine Call in 1 week As needed [address] [facility] [id number] [phone]                     [1]   Social History  Tobacco Use    Smoking status: Every Day     Current packs/day: 0.25     Average packs/day: 0.3 packs/day for 42.0 years (10.5 ttl pk-yrs)     Types: Vaping with nicotine, Cigarettes     Passive exposure: Past    Smokeless tobacco: Never    Tobacco comments:     2-3 cigarettes/day. Done with cigarettes. Vaping a little   Substance Use Topics    Alcohol use: Yes     Comment: stopped in 2005    Drug use: Yes     Frequency: 2.0 times per week     Types: Marijuana, Cocaine     Comment: pt stated last time was 06/11/2025        Alisha Morgan FNP  06/11/25 7022

## 2025-06-11 NOTE — DISCHARGE INSTRUCTIONS
Do not use drugs. Do not take klonopin inappropriately. Bring narcan with you incase you overdose on cocaine     Stay hydrated.

## 2025-06-12 RX ORDER — NALOXONE HYDROCHLORIDE 4 MG/.1ML
SPRAY NASAL
Qty: 1 EACH | Refills: 11 | Status: SHIPPED | OUTPATIENT
Start: 2025-06-12

## 2025-07-02 ENCOUNTER — TELEPHONE (OUTPATIENT)
Dept: NEUROSURGERY | Facility: CLINIC | Age: 60
End: 2025-07-02
Payer: MEDICARE

## 2025-07-02 NOTE — TELEPHONE ENCOUNTER
Spoke with patient to explain that Dr Bingham's medical leave has been extended and (per Antoinette) that if she is no longer having symptoms or no longer needing to be seen, then she can call us on an as needed basis. Patient stated that she is no longer needing to be seen for her neck but her low back has been bothering her. I explained to the patient that she was referred to us for her neck and therefore, she would need to contact her PCP. Patient expressed understanding and thanked us for all our help.

## 2025-07-10 ENCOUNTER — HOSPITAL ENCOUNTER (OUTPATIENT)
Dept: RADIOLOGY | Facility: CLINIC | Age: 60
Discharge: HOME | End: 2025-07-10
Attending: ORTHOPAEDIC SURGERY
Payer: MEDICARE

## 2025-07-10 ENCOUNTER — OFFICE VISIT (OUTPATIENT)
Dept: ORTHOPEDICS | Facility: CLINIC | Age: 60
End: 2025-07-10
Payer: MEDICARE

## 2025-07-10 VITALS
HEART RATE: 88 BPM | DIASTOLIC BLOOD PRESSURE: 72 MMHG | BODY MASS INDEX: 27.79 KG/M2 | WEIGHT: 151 LBS | SYSTOLIC BLOOD PRESSURE: 114 MMHG | HEIGHT: 62 IN

## 2025-07-10 DIAGNOSIS — Z96.652 PRESENCE OF LEFT ARTIFICIAL KNEE JOINT: ICD-10-CM

## 2025-07-10 DIAGNOSIS — M17.11 PRIMARY OSTEOARTHRITIS OF RIGHT KNEE: ICD-10-CM

## 2025-07-10 DIAGNOSIS — M25.561 RIGHT KNEE PAIN, UNSPECIFIED CHRONICITY: Primary | ICD-10-CM

## 2025-07-10 DIAGNOSIS — M18.11 ARTHRITIS OF CARPOMETACARPAL (CMC) JOINT OF RIGHT THUMB: ICD-10-CM

## 2025-07-10 DIAGNOSIS — M25.561 RIGHT KNEE PAIN, UNSPECIFIED CHRONICITY: ICD-10-CM

## 2025-07-10 DIAGNOSIS — T84.093D FAILED TOTAL LEFT KNEE REPLACEMENT, SUBSEQUENT ENCOUNTER: ICD-10-CM

## 2025-07-10 PROCEDURE — 73564 X-RAY EXAM KNEE 4 OR MORE: CPT | Mod: 50,,, | Performed by: ORTHOPAEDIC SURGERY

## 2025-07-10 RX ORDER — CARIPRAZINE 4.5 MG/1
4.5 CAPSULE, GELATIN COATED ORAL DAILY
COMMUNITY
Start: 2025-07-01

## 2025-07-10 RX ORDER — BENZTROPINE MESYLATE 1 MG/1
1 TABLET ORAL 2 TIMES DAILY
COMMUNITY
Start: 2025-07-01

## 2025-07-10 RX ADMIN — LIDOCAINE HYDROCHLORIDE 1 ML: 20 INJECTION, SOLUTION INFILTRATION; PERINEURAL at 03:07

## 2025-07-10 RX ADMIN — BETAMETHASONE SODIUM PHOSPHATE AND BETAMETHASONE ACETATE 6 MG: 3; 3 INJECTION, SUSPENSION INTRA-ARTICULAR; INTRALESIONAL; INTRAMUSCULAR; SOFT TISSUE at 03:07

## 2025-07-10 RX ADMIN — BETAMETHASONE SODIUM PHOSPHATE AND BETAMETHASONE ACETATE 12 MG: 3; 3 INJECTION, SUSPENSION INTRA-ARTICULAR; INTRALESIONAL; INTRAMUSCULAR; SOFT TISSUE at 03:07

## 2025-07-10 RX ADMIN — LIDOCAINE HYDROCHLORIDE 5 ML: 20 INJECTION, SOLUTION EPIDURAL; INFILTRATION; INTRACAUDAL; PERINEURAL at 03:07

## 2025-07-11 RX ORDER — LIDOCAINE HYDROCHLORIDE 20 MG/ML
5 INJECTION, SOLUTION EPIDURAL; INFILTRATION; INTRACAUDAL; PERINEURAL
Status: DISCONTINUED | OUTPATIENT
Start: 2025-07-10 | End: 2025-07-11 | Stop reason: HOSPADM

## 2025-07-11 RX ORDER — BETAMETHASONE SODIUM PHOSPHATE AND BETAMETHASONE ACETATE 3; 3 MG/ML; MG/ML
6 INJECTION, SUSPENSION INTRA-ARTICULAR; INTRALESIONAL; INTRAMUSCULAR; SOFT TISSUE
Status: DISCONTINUED | OUTPATIENT
Start: 2025-07-10 | End: 2025-07-11 | Stop reason: HOSPADM

## 2025-07-11 RX ORDER — LIDOCAINE HYDROCHLORIDE 20 MG/ML
1 INJECTION, SOLUTION INFILTRATION; PERINEURAL
Status: DISCONTINUED | OUTPATIENT
Start: 2025-07-10 | End: 2025-07-11 | Stop reason: HOSPADM

## 2025-07-11 RX ORDER — BETAMETHASONE SODIUM PHOSPHATE AND BETAMETHASONE ACETATE 3; 3 MG/ML; MG/ML
12 INJECTION, SUSPENSION INTRA-ARTICULAR; INTRALESIONAL; INTRAMUSCULAR; SOFT TISSUE
Status: DISCONTINUED | OUTPATIENT
Start: 2025-07-10 | End: 2025-07-11 | Stop reason: HOSPADM

## 2025-07-11 NOTE — PROGRESS NOTES
Chief Complaint:   Chief Complaint   Patient presents with    Right Wrist - Pain     Pt had CTR sx w/ Dr. Bhatt 5/2023, having a lot of pain, has swelling in the wrist. Denies any numbness/tingling. Cortisone inj 4/23/25 gave some relief, but wore off already.     Knee Pain     [place] knee pain. Hx of Lt TKA around 2017. Lt knee feels loose and wobbly. Pt walks about a mile everyday. Rt knee pain started, has had steroid injections before. Lt knee hurts more than Rt. Tylenol and celebrex for pain.        History of present illness:  History of Present Illness    CHIEF COMPLAINT:  - Faith presents for follow-up of bilateral knee pain and pain in the base of the thumb.    HPI:  Faith presents with bilateral knee pain, uncertain which knee is worse. Her right knee has not received treatment or injections for a while, with the last steroid injection administered two years ago in July 2021. Her left knee is described as loose with a tendency to hyperextend due to a ligament in the back giving way. Her left knee experienced a popping sensation while walking about six or seven buildings, describing it as feeling similar to an ACL tear with a clicking sensation. Her left knee also exhibits instability with excessive side-to-side and front-to-back motion, which is a recurrence of a previous issue that led to a revision surgery.    She complains of hand pain, specifically in the CMC joint at the base of the thumb, likely due to arthritis. Elbow pain is also mentioned, occurring after lifting bricks.    PREVIOUS TREATMENTS:  - Steroid shot in the right knee: July 2021  - Knee brace/sleeve provided for left knee support    SURGICAL HISTORY:  - Right knee replacement: Date not specified  - Left knee replacement: Date not specified  - Left knee revision surgery: Date not specified, performed due to ligament loosening      ROS:  Musculoskeletal: +joint pain, +limb pain          Past Medical History:   Diagnosis Date    Acquired  spondylolisthesis of cervical vertebra     Ambulates with cane     Anxiety     Bipolar disorder     w/manic depression as per pt    Carpal tunnel syndrome     COPD (chronic obstructive pulmonary disease)     Depression     Diabetes mellitus     Digestive disorder     GERD    Dry eyes     Headache     Hyperlipidemia     Hypertension     Insomnia     Lumbar degenerative disc disease     Mixed hyperlipidemia     Muscle spasms of both lower extremities     Muscle spasms of neck     Neuropathy     Personal history of fall     PONV (postoperative nausea and vomiting)     Right hip pain     radiates down to ankle    Seizure-like activity 03/31/2024    no further activity since 03    TIA (transient ischemic attack) 2022    Ulnar nerve external compression syndrome        Past Surgical History:   Procedure Laterality Date    ANTERIOR CERVICAL DISCECTOMY W/ FUSION N/A 10/28/2024    Procedure: DISCECTOMY, SPINE, CERVICAL, ANTERIOR APPROACH, WITH FUSION;  Surgeon: Chana Bingham MD;  Location: [facility] OR;  Service: Neurosurgery;  Laterality: N/A;  C3-4, C4-5 ACDF // NTI // TIVA // [employer] //  XX    ANTERIOR CRUCIATE LIGAMENT REPAIR Left 1993    CARPAL TUNNEL RELEASE Right 05/05/2023    Procedure: RELEASE, CARPAL TUNNEL;  Surgeon: Canelo Bhatt MD;  Location: [facility] OR;  Service: Orthopedics;  Laterality: Right;    FRACTURE SURGERY  2017    Mauro Cárdenas    JOINT REPLACEMENT  2016    revision total knee ledt    NECK SURGERY  10/2024    SHOULDER ARTHROSCOPY W/ ROTATOR CUFF REPAIR Left     TOTAL KNEE ARTHROPLASTY Left 2017    X2    ULNAR NERVE TRANSPOSITION Right 05/05/2023    Procedure: TRANSPOSITION, NERVE, ULNAR  Endoscopic;  Surgeon: Canelo Bhatt MD;  Location: [facility] OR;  Service: Orthopedics;  Laterality: Right;  Endoscopic    WRIST SURGERY Right        Current Outpatient Medications   Medication Sig    albuterol (PROVENTIL/VENTOLIN HFA) 90 mcg/actuation inhaler Inhale 2 puffs into the lungs.    amLODIPine (NORVASC) 10 MG  tablet Take 1 tablet (10 mg total) by mouth once daily.    atorvastatin (LIPITOR) 40 MG tablet 1 tablet Orally Once a day    benztropine (COGENTIN) 1 MG tablet Take 1 mg by mouth 2 (two) times daily.    blood-glucose meter (CONTOUR NEXT METER) Misc as directed in vitro three times daily for 365 days    buPROPion (WELLBUTRIN XL) 300 MG 24 hr tablet Take 1 tablet (300 mg total) by mouth once daily.    carBAMazepine (TEGRETOL XR) 400 MG Tb12 Take 1 tablet (400 mg total) by mouth 2 (two) times daily.    celecoxib (CELEBREX) 200 MG capsule Take by mouth.    CONTOUR NEXT TEST STRIPS Strp 3 (three) times daily. Use to test blood glucose    dexlansoprazole (DEXILANT) 60 mg capsule 1 capsule.    DULoxetine (CYMBALTA) 60 MG capsule Take 1 capsule (60 mg total) by mouth 2 (two) times daily.    fenofibrate 160 MG Tab Take 1 tablet by mouth once daily.    furosemide (LASIX) 20 MG tablet 1 tablet Orally Once a day    icosapent ethyL (VASCEPA) 1 gram Cap 2 capsules with meals Orally Twice a day    INVEGA SUSTENNA 234 mg/1.5 mL Syrg injection Inject into the muscle.    linaCLOtide (LINZESS) 145 mcg Cap capsule Take 145 mcg by mouth before breakfast.    loratadine (CLARITIN) 10 mg tablet 1 tablet Orally Once a day for 30 day(s)    metFORMIN (GLUCOPHAGE-XR) 500 MG ER 24hr tablet Take 1 tablet (500 mg total) by mouth 2 (two) times daily with meals.    methocarbamoL (ROBAXIN) 750 MG Tab Take by mouth.    naloxone (NARCAN) 4 mg/actuation Spry 4mg by nasal route as needed for opioid overdose; may repeat every 2-3 minutes in alternating nostrils until medical help arrives. Call 911    polyethylene glycol (GLYCOLAX) 17 gram/dose powder Take 17 g by mouth as needed.    pregabalin (LYRICA) 150 MG capsule Take 150 mg by mouth 3 (three) times daily.    valACYclovir (VALTREX) 500 MG tablet Take 500 mg by mouth 2 (two) times daily.    VRAYLAR 4.5 mg Cap Take 4.5 mg by mouth once daily.    zaleplon (SONATA) 5 MG Cap 1 capsule at bedtime as needed  Orally Once a day    budesonide-glycopyr-formoterol (BREZTRI AEROSPHERE) 160-9-4.8 mcg/actuation HFAA Inhale 2 puffs into the lungs 2 (two) times a day. (Patient not taking: Reported on 7/10/2025)    fluticasone-umeclidin-vilanter (TRELEGY ELLIPTA) 100-62.5-25 mcg DsDv Inhale 1 puff into the lungs once daily. (Patient not taking: Reported on 7/10/2025)    hydrOXYzine pamoate (VISTARIL) 25 MG Cap Take 25 mg by mouth Daily. Per pt reports 1 the am and 2 at bedside (Patient not taking: Reported on 7/10/2025)     No current facility-administered medications for this visit.       Review of patient's allergies indicates:   Allergen Reactions    Adhesive      Other reaction(s): skin tears easily with adhesive    Amoxicillin      Other Reaction(s): Unknown    Latex Other (See Comments)    Cephalexin Nausea And Vomiting    Erythromycin Nausea And Vomiting    Lithium Nausea And Vomiting    Naproxen Nausea And Vomiting       Family History   Problem Relation Name Age of Onset    Coronary artery disease Mother Shobha Arnould     Diabetes Mother Shobha Arnould     Arthritis Mother Shobha Hardwickould         Back , hands    Early death Mother Shobha Arnould         Diabetes    Heart disease Mother Shobha Arnould     Hypertension Mother Shobha Arnould     Kidney disease Mother Shobha Arnould     Stroke Mother Shobha Arnould     Vision loss Mother Shobha Arnould     Depression Mother Shobha Arnould     Mental illness Mother Shobha Arnould     Coronary artery disease Father Cesar     Asthma Father Cesar         Back    Heart disease Father Cesar     Hypertension Father Cesar     Vision loss Father Cesar     Diabetes Father Cesar     Arthritis Sister Hailey         Not sure    Asthma Sister Hailey         Finger, shoulder    Mental illness Sister Hailey     Vision loss Sister Hailey     Arthritis Brother Zacarias         Hip    Birth defects Brother Zacarias         Special Needs/ Downs Syndrome    Heart disease Brother Zacarias     Learning disabilities  Brother Zacarias     Intellectual disability Brother Zacarias     Arthritis Sister Hailey     Asthma Sister Hailey     Depression Sister Hailey     Mental illness Sister Hailey     Vision loss Sister Hailey     Arthritis Brother Zacarias     Birth defects Brother Zacarias     Depression Brother Zacarias     Diabetes Brother Zacarias     Heart disease Brother Zacarias     Learning disabilities Brother Zacarias        Social History[1]        Review of Systems:    Constitution: Negative for chills, fever, and sweats.  Negative for unexplained weight loss.    HENT:  Negative for headaches and blurry vision.    Cardiovascular:Negative for chest pain or irregular heart beat. Negative for hypertension.    Respiratory:  Negative for cough and shortness of breath.    Gastrointestinal: Negative for abdominal pain, heartburn, melena, nausea, and vomitting.    Genitourinary:  Negative bladder incontinence and dysuria.    Musculoskeletal:  See HPI    Neurological: Negative for numbness.    Psychiatric/Behavioral: Negative for depression.  The patient is not nervous/anxious.      Endocrine: Negative for polyuria    Hematologic/Lymphatic: Negative for bleeding problem.  Does not bruise/bleed easily.    Skin: Negative for poor would healing and rash      Physical Examination:    Vital Signs:    Vitals:    07/10/25 1547   BP: 114/72   Pulse: 88       Body mass index is 27.62 kg/m².    General: No acute distress, alert and oriented, healthy appearing    HEENT: Head is atraumatic, mucous membranes are moist    Neck: Supples, no JVD    Cardiovascular: Palpable dorsalis pedis and posterior tibial pulses, regular rate and rhythm to those pulses    Lungs: Breathing non-labored    Skin: no rashes appreciated    Neurologic: Can flex and extend knees, ankles, and toes. Sensation is grossly intact    Bilateral knees:  Right hip with significant crepitus range of motion.  Unchanged from previous.  Left knee has been instability mainly in extension.    Right hand:  Range  of motion of the right hand with significant pain.  Mainly with the CMC grind.  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  We will also plan to give her an injection in her right thumb CMC joint.  It had a brace of the left knee.  It had see her back in 6-8 weeks to see how these injections have worked.    This note was generated with the assistance of ambient listening technology. Verbal consent was obtained by the patient and accompanying visitor(s) for the recording of patient appointment to facilitate this note. I attest to having reviewed and edited the generated note for accuracy, though some syntax or spelling errors may persist. Please contact the author of this note for any clarification.       This note was created using [email or web address] voice recognition software that occasionally misinterpreted phrases or words.    Consult note is delivered via Epic messaging service.         [1]   Social History  Socioeconomic History    Marital status:    Tobacco Use    Smoking status: Every Day     Current packs/day: 0.25     Average packs/day: 0.3 packs/day for 42.0 years (10.5 ttl pk-yrs)     Types: Vaping with nicotine, Cigarettes     Passive exposure: Past    Smokeless tobacco: Never    Tobacco comments:     2-3 cigarettes/day. Done with cigarettes. Vaping a little   Substance and Sexual Activity    Alcohol use: Yes     Comment: stopped in 2005    Drug use: Yes     Frequency: 2.0 times per week     Types: Marijuana, Cocaine     Comment: pt stated last time was 06/11/2025    Sexual activity: Not Currently     Partners: Male     Birth control/protection: None     Comment: Abstinence     Social Drivers of Health     Financial Resource Strain: Low Risk  (5/2/2025)    Overall Financial Resource Strain (CARDIA)     Difficulty of Paying  Living Expenses: Not very hard   Food Insecurity: No Food Insecurity (5/2/2025)    Hunger Vital Sign     Worried About Running Out of Food in the Last Year: Never true     Ran Out of Food in the Last Year: Never true   Transportation Needs: No Transportation Needs (5/2/2025)    PRAPARE - Transportation     Lack of Transportation (Medical): No     Lack of Transportation (Non-Medical): No   Physical Activity: Inactive (5/2/2025)    Exercise Vital Sign     Days of Exercise per Week: 0 days     Minutes of Exercise per Session: 0 min   Stress: Stress Concern Present (5/2/2025)    [employer] - Occupational Stress Questionnaire     Feeling of Stress : To some extent   Housing Stability: Low Risk  (5/2/2025)    Housing Stability Vital Sign     Unable to Pay for Housing in the Last Year: No     Homeless in the Last Year: No

## (undated) DEVICE — COLLAR CERV RIGID MED 3.25IN

## (undated) DEVICE — KIT SURGICAL TURNOVER

## (undated) DEVICE — TUBING SILICON CLR 3/16IN 10FT

## (undated) DEVICE — SPONGE SURGIFOAM 100 8.5X12X10

## (undated) DEVICE — KIT SURGIFLO HEMOSTATIC MATRIX

## (undated) DEVICE — CORD BIPOLAR 12 FOOT

## (undated) DEVICE — DRAPE SURG W/TWL 17 5/8X23

## (undated) DEVICE — SUT 2/0 30IN SILK BLK BRAI

## (undated) DEVICE — SUT VICRYL 2 0 CT 2

## (undated) DEVICE — GLOVE PROTEXIS BLUE LATEX 7.5

## (undated) DEVICE — SPNG CHERRY DISECT XRAY DTECT

## (undated) DEVICE — SPONGE PATTY NEURO SGL 0.5X6

## (undated) DEVICE — SUT MONOCRYL 4-0 PS-2

## (undated) DEVICE — BLADE SURG STAINLESS STEEL #15

## (undated) DEVICE — SOL NACL IRR 1000ML BTL

## (undated) DEVICE — PENCIL E-Z CLEAN ROCKER 15FT

## (undated) DEVICE — DRAPE C-ARM COVER EZ 36X28IN

## (undated) DEVICE — Device

## (undated) DEVICE — DRESSING XEROFORM FOIL PK 1X8

## (undated) DEVICE — ELECTRODE PATIENT RETURN DISP

## (undated) DEVICE — ELECTRODE BLADE INSULATED 1 IN

## (undated) DEVICE — DRAPE C-ARMOR EQUIPMENT COVER

## (undated) DEVICE — DRAPE OPMI STERILE

## (undated) DEVICE — SUT 4-0 ETHILON 18 PS-2

## (undated) DEVICE — SPONGE X-RAY DETCT .375IN

## (undated) DEVICE — SUT 5/0 18IN PLAIN GUT D/A

## (undated) DEVICE — GAUZE SPONGE 4X4 12PLY

## (undated) DEVICE — BANDAGE VELCLOSE ELAS 2INX5YD

## (undated) DEVICE — BANDAGE ACE NON LATEX 3IN

## (undated) DEVICE — BANDAGE ESMARK LATEX FREE 4INX

## (undated) DEVICE — BANDAGE VELCLOSE ELAS 3INX5YD

## (undated) DEVICE — BUR BONE CUT MICRO TPS 3X3.8MM

## (undated) DEVICE — PAD DERMAPROX XL 22X14X.5IN

## (undated) DEVICE — APPLICATOR CHLORAPREP ORN 26ML

## (undated) DEVICE — DRAPE HAND STERILE

## (undated) DEVICE — SPONGE KERLIX SUPER 6X6.75IN

## (undated) DEVICE — DRESSING TRANS 4X4 TEGADERM

## (undated) DEVICE — BANDAGE KERLIX AMD

## (undated) DEVICE — GOWN NONREINF SET-IN SLV XL

## (undated) DEVICE — SEE MEDLINE ITEM 157166

## (undated) DEVICE — NDL HYPO 22GX1 1/2 SYR 10ML LL

## (undated) DEVICE — RESTRAINT LIMB HOLDER ADJ FOAM

## (undated) DEVICE — TRAY CATH FOL SIL URIMTR 16FR

## (undated) DEVICE — DRAIN JACKSON PRATT TRCR 10FR

## (undated) DEVICE — SUT ETHILON 3-0 PS2 18 BLK

## (undated) DEVICE — COVER HD BACK TABLE 6FT

## (undated) DEVICE — SPONGE DERMACEA GAUZE 4X4

## (undated) DEVICE — SUT MONOCRYL 3-0 PS-2 UND

## (undated) DEVICE — ADHESIVE DERMABOND ADVANCED

## (undated) DEVICE — DRESSING XEROFORM 1X8IN

## (undated) DEVICE — KIT ANTIFOG W/SPONG & FLUID

## (undated) DEVICE — BANDAGE GAUZE 6PLY FLUFF 2X3

## (undated) DEVICE — GLOVE PROTEXIS PI SYN SURG 6.5

## (undated) DEVICE — GLOVE PROTEXIS PI SYN SURG 7

## (undated) DEVICE — SLING ARM LARGE

## (undated) DEVICE — COVER C-ARM STRAP BAND 44X80IN

## (undated) DEVICE — KIT DRAIN WOUND RND SPRNG RESV

## (undated) DEVICE — SUT VICRYL PLUS 3-0 SH 18IN

## (undated) DEVICE — CUFF ATS 2 PORT SNGL BLDR 18IN

## (undated) DEVICE — GLOVE PROTEXIS LTX MICRO  7

## (undated) DEVICE — GLOVE PROTEXIS BLUE LATEX 6.5

## (undated) DEVICE — SHEET DRAPE TOP BAR - EMERALD